# Patient Record
Sex: MALE | Race: WHITE | NOT HISPANIC OR LATINO | Employment: OTHER | URBAN - METROPOLITAN AREA
[De-identification: names, ages, dates, MRNs, and addresses within clinical notes are randomized per-mention and may not be internally consistent; named-entity substitution may affect disease eponyms.]

---

## 2018-09-07 ENCOUNTER — APPOINTMENT (OUTPATIENT)
Dept: GENERAL RADIOLOGY | Facility: HOSPITAL | Age: 68
End: 2018-09-07

## 2018-09-07 ENCOUNTER — APPOINTMENT (OUTPATIENT)
Dept: CT IMAGING | Facility: HOSPITAL | Age: 68
End: 2018-09-07

## 2018-09-07 ENCOUNTER — HOSPITAL ENCOUNTER (OUTPATIENT)
Facility: HOSPITAL | Age: 68
Setting detail: OBSERVATION
Discharge: HOME | End: 2018-09-09
Attending: EMERGENCY MEDICINE | Admitting: EMERGENCY MEDICINE

## 2018-09-07 DIAGNOSIS — R07.9 CHEST PAIN, UNSPECIFIED TYPE: Primary | ICD-10-CM

## 2018-09-07 DIAGNOSIS — N18.9 CHRONIC RENAL IMPAIRMENT, UNSPECIFIED CKD STAGE: ICD-10-CM

## 2018-09-07 DIAGNOSIS — I10 HYPERTENSION, UNSPECIFIED TYPE: ICD-10-CM

## 2018-09-07 DIAGNOSIS — I49.8 VENTRICULAR BIGEMINY: ICD-10-CM

## 2018-09-07 PROBLEM — K21.9 GERD (GASTROESOPHAGEAL REFLUX DISEASE): Status: ACTIVE | Noted: 2018-09-07

## 2018-09-07 PROBLEM — R00.1 BRADYCARDIA: Status: ACTIVE | Noted: 2018-09-07

## 2018-09-07 LAB
ALBUMIN SERPL-MCNC: 3.7 G/DL (ref 3.5–5.2)
ALBUMIN/GLOB SERPL: 1.2 G/DL
ALP SERPL-CCNC: 73 U/L (ref 39–117)
ALT SERPL W P-5'-P-CCNC: 11 U/L (ref 1–41)
ANION GAP SERPL CALCULATED.3IONS-SCNC: 9.2 MMOL/L
AST SERPL-CCNC: 12 U/L (ref 1–40)
BASOPHILS # BLD AUTO: 0.05 10*3/MM3 (ref 0–0.2)
BASOPHILS NFR BLD AUTO: 0.3 % (ref 0–1.5)
BILIRUB SERPL-MCNC: 0.5 MG/DL (ref 0.1–1.2)
BUN BLD-MCNC: 19 MG/DL (ref 8–23)
BUN/CREAT SERPL: 11.2 (ref 7–25)
CALCIUM SPEC-SCNC: 9.9 MG/DL (ref 8.6–10.5)
CHLORIDE SERPL-SCNC: 104 MMOL/L (ref 98–107)
CO2 SERPL-SCNC: 28.8 MMOL/L (ref 22–29)
CREAT BLD-MCNC: 1.7 MG/DL (ref 0.76–1.27)
D DIMER PPP FEU-MCNC: 0.32 MCGFEU/ML (ref 0–0.49)
DEPRECATED RDW RBC AUTO: 52.2 FL (ref 37–54)
EOSINOPHIL # BLD AUTO: 0.15 10*3/MM3 (ref 0–0.7)
EOSINOPHIL NFR BLD AUTO: 0.9 % (ref 0.3–6.2)
ERYTHROCYTE [DISTWIDTH] IN BLOOD BY AUTOMATED COUNT: 15.8 % (ref 11.5–14.5)
GFR SERPL CREATININE-BSD FRML MDRD: 40 ML/MIN/1.73
GLOBULIN UR ELPH-MCNC: 3 GM/DL
GLUCOSE BLD-MCNC: 91 MG/DL (ref 65–99)
HCT VFR BLD AUTO: 39.2 % (ref 40.4–52.2)
HGB BLD-MCNC: 12.5 G/DL (ref 13.7–17.6)
IMM GRANULOCYTES # BLD: 0.13 10*3/MM3 (ref 0–0.03)
IMM GRANULOCYTES NFR BLD: 0.8 % (ref 0–0.5)
INR PPP: 1.02 (ref 0.9–1.1)
LIPASE SERPL-CCNC: 41 U/L (ref 13–60)
LYMPHOCYTES # BLD AUTO: 11.47 10*3/MM3 (ref 0.9–4.8)
LYMPHOCYTES NFR BLD AUTO: 69.5 % (ref 19.6–45.3)
MAGNESIUM SERPL-MCNC: 2 MG/DL (ref 1.6–2.4)
MCH RBC QN AUTO: 28.7 PG (ref 27–32.7)
MCHC RBC AUTO-ENTMCNC: 31.9 G/DL (ref 32.6–36.4)
MCV RBC AUTO: 90.1 FL (ref 79.8–96.2)
MONOCYTES # BLD AUTO: 0.6 10*3/MM3 (ref 0.2–1.2)
MONOCYTES NFR BLD AUTO: 3.6 % (ref 5–12)
NEUTROPHILS # BLD AUTO: 4.23 10*3/MM3 (ref 1.9–8.1)
NEUTROPHILS NFR BLD AUTO: 25.7 % (ref 42.7–76)
NT-PROBNP SERPL-MCNC: 285.6 PG/ML (ref 0–900)
PLAT MORPH BLD: NORMAL
PLATELET # BLD AUTO: 280 10*3/MM3 (ref 140–500)
PMV BLD AUTO: 11 FL (ref 6–12)
POTASSIUM BLD-SCNC: 4 MMOL/L (ref 3.5–5.2)
PROCALCITONIN SERPL-MCNC: 0.03 NG/ML (ref 0.1–0.25)
PROT SERPL-MCNC: 6.7 G/DL (ref 6–8.5)
PROTHROMBIN TIME: 13.2 SECONDS (ref 11.7–14.2)
RBC # BLD AUTO: 4.35 10*6/MM3 (ref 4.6–6)
RBC MORPH BLD: NORMAL
SMUDGE CELLS BLD QL SMEAR: NORMAL
SODIUM BLD-SCNC: 142 MMOL/L (ref 136–145)
TROPONIN T SERPL-MCNC: <0.01 NG/ML (ref 0–0.03)
TROPONIN T SERPL-MCNC: <0.01 NG/ML (ref 0–0.03)
TSH SERPL DL<=0.05 MIU/L-ACNC: 2.94 MIU/ML (ref 0.27–4.2)
WBC NRBC COR # BLD: 16.5 10*3/MM3 (ref 4.5–10.7)

## 2018-09-07 PROCEDURE — 84443 ASSAY THYROID STIM HORMONE: CPT | Performed by: HOSPITALIST

## 2018-09-07 PROCEDURE — 84484 ASSAY OF TROPONIN QUANT: CPT | Performed by: EMERGENCY MEDICINE

## 2018-09-07 PROCEDURE — 87040 BLOOD CULTURE FOR BACTERIA: CPT | Performed by: HOSPITALIST

## 2018-09-07 PROCEDURE — 80053 COMPREHEN METABOLIC PANEL: CPT | Performed by: EMERGENCY MEDICINE

## 2018-09-07 PROCEDURE — G0378 HOSPITAL OBSERVATION PER HR: HCPCS

## 2018-09-07 PROCEDURE — 84484 ASSAY OF TROPONIN QUANT: CPT | Performed by: HOSPITALIST

## 2018-09-07 PROCEDURE — 96361 HYDRATE IV INFUSION ADD-ON: CPT

## 2018-09-07 PROCEDURE — 93005 ELECTROCARDIOGRAM TRACING: CPT | Performed by: EMERGENCY MEDICINE

## 2018-09-07 PROCEDURE — 93005 ELECTROCARDIOGRAM TRACING: CPT

## 2018-09-07 PROCEDURE — 85379 FIBRIN DEGRADATION QUANT: CPT | Performed by: EMERGENCY MEDICINE

## 2018-09-07 PROCEDURE — 83880 ASSAY OF NATRIURETIC PEPTIDE: CPT | Performed by: EMERGENCY MEDICINE

## 2018-09-07 PROCEDURE — 83690 ASSAY OF LIPASE: CPT | Performed by: EMERGENCY MEDICINE

## 2018-09-07 PROCEDURE — 85007 BL SMEAR W/DIFF WBC COUNT: CPT | Performed by: EMERGENCY MEDICINE

## 2018-09-07 PROCEDURE — 96374 THER/PROPH/DIAG INJ IV PUSH: CPT

## 2018-09-07 PROCEDURE — 85025 COMPLETE CBC W/AUTO DIFF WBC: CPT | Performed by: EMERGENCY MEDICINE

## 2018-09-07 PROCEDURE — 84145 PROCALCITONIN (PCT): CPT | Performed by: HOSPITALIST

## 2018-09-07 PROCEDURE — 99285 EMERGENCY DEPT VISIT HI MDM: CPT

## 2018-09-07 PROCEDURE — 71250 CT THORAX DX C-: CPT

## 2018-09-07 PROCEDURE — 85610 PROTHROMBIN TIME: CPT | Performed by: EMERGENCY MEDICINE

## 2018-09-07 PROCEDURE — 83735 ASSAY OF MAGNESIUM: CPT | Performed by: HOSPITALIST

## 2018-09-07 PROCEDURE — 99202 OFFICE O/P NEW SF 15 MIN: CPT | Performed by: INTERNAL MEDICINE

## 2018-09-07 PROCEDURE — 71046 X-RAY EXAM CHEST 2 VIEWS: CPT

## 2018-09-07 RX ORDER — ONDANSETRON 4 MG/1
4 TABLET, FILM COATED ORAL EVERY 6 HOURS PRN
Status: DISCONTINUED | OUTPATIENT
Start: 2018-09-07 | End: 2018-09-09 | Stop reason: HOSPADM

## 2018-09-07 RX ORDER — PREDNISOLONE ACETATE 10 MG/ML
1 SUSPENSION/ DROPS OPHTHALMIC DAILY
Status: DISCONTINUED | OUTPATIENT
Start: 2018-09-07 | End: 2018-09-09 | Stop reason: HOSPADM

## 2018-09-07 RX ORDER — FAMOTIDINE 10 MG/ML
20 INJECTION, SOLUTION INTRAVENOUS EVERY 12 HOURS SCHEDULED
Status: DISCONTINUED | OUTPATIENT
Start: 2018-09-07 | End: 2018-09-09 | Stop reason: HOSPADM

## 2018-09-07 RX ORDER — ONDANSETRON 4 MG/1
4 TABLET, ORALLY DISINTEGRATING ORAL EVERY 6 HOURS PRN
Status: DISCONTINUED | OUTPATIENT
Start: 2018-09-07 | End: 2018-09-09 | Stop reason: HOSPADM

## 2018-09-07 RX ORDER — SODIUM CHLORIDE 9 MG/ML
75 INJECTION, SOLUTION INTRAVENOUS CONTINUOUS
Status: DISCONTINUED | OUTPATIENT
Start: 2018-09-07 | End: 2018-09-09 | Stop reason: HOSPADM

## 2018-09-07 RX ORDER — NITROGLYCERIN 0.4 MG/1
0.4 TABLET SUBLINGUAL
Status: DISCONTINUED | OUTPATIENT
Start: 2018-09-07 | End: 2018-09-09 | Stop reason: HOSPADM

## 2018-09-07 RX ORDER — ZOLPIDEM TARTRATE 5 MG/1
5 TABLET ORAL NIGHTLY PRN
Status: DISCONTINUED | OUTPATIENT
Start: 2018-09-07 | End: 2018-09-09 | Stop reason: HOSPADM

## 2018-09-07 RX ORDER — ACETAMINOPHEN 325 MG/1
650 TABLET ORAL EVERY 4 HOURS PRN
Status: DISCONTINUED | OUTPATIENT
Start: 2018-09-07 | End: 2018-09-09 | Stop reason: HOSPADM

## 2018-09-07 RX ORDER — ASPIRIN 325 MG
325 TABLET ORAL ONCE
Status: COMPLETED | OUTPATIENT
Start: 2018-09-07 | End: 2018-09-07

## 2018-09-07 RX ORDER — SODIUM CHLORIDE 0.9 % (FLUSH) 0.9 %
1-10 SYRINGE (ML) INJECTION AS NEEDED
Status: DISCONTINUED | OUTPATIENT
Start: 2018-09-07 | End: 2018-09-09 | Stop reason: HOSPADM

## 2018-09-07 RX ORDER — LABETALOL HYDROCHLORIDE 5 MG/ML
20 INJECTION, SOLUTION INTRAVENOUS ONCE
Status: COMPLETED | OUTPATIENT
Start: 2018-09-07 | End: 2018-09-07

## 2018-09-07 RX ORDER — ONDANSETRON 2 MG/ML
4 INJECTION INTRAMUSCULAR; INTRAVENOUS EVERY 6 HOURS PRN
Status: DISCONTINUED | OUTPATIENT
Start: 2018-09-07 | End: 2018-09-09 | Stop reason: HOSPADM

## 2018-09-07 RX ORDER — SILICONE ADHESIVE 1.5" X 3"
1 SHEET (EA) TOPICAL 3 TIMES DAILY
Status: DISCONTINUED | OUTPATIENT
Start: 2018-09-07 | End: 2018-09-09 | Stop reason: HOSPADM

## 2018-09-07 RX ADMIN — LABETALOL HYDROCHLORIDE 20 MG: 5 INJECTION, SOLUTION INTRAVENOUS at 14:29

## 2018-09-07 RX ADMIN — ASPIRIN 325 MG: 325 TABLET ORAL at 16:19

## 2018-09-07 RX ADMIN — SODIUM CHLORIDE 75 ML/HR: 9 INJECTION, SOLUTION INTRAVENOUS at 20:47

## 2018-09-07 RX ADMIN — ZOLPIDEM TARTRATE 5 MG: 5 TABLET ORAL at 23:52

## 2018-09-07 RX ADMIN — NITROGLYCERIN 1 INCH: 20 OINTMENT TOPICAL at 18:33

## 2018-09-07 RX ADMIN — SODIUM CHLORIDE 1 DROP: 50 SOLUTION OPHTHALMIC at 23:52

## 2018-09-07 NOTE — H&P
HISTORY AND PHYSICAL   [facility]        Patient Identification:  Name: Angel Cisneros  Age: 68 y.o.  Sex: male  :  1950  MRN: [id number]                     Primary Care Physician: Chris Harden MD    Chief Complaint:  Chest pain     History of Present Illness:   Mr Cisneros is a pleasant 68-year-old male who is comfortably sitting in bed at this time.  Came to the ER due to complaints of chest pain.  He says that the chest pain was more right-sided and was worse with deep inspiration.  He is on Imbruvica for his leukemia and is managed by Dr. Childress with [facility] oncology.  He denies any productive cough or issues with fever chills or night sweats.  He does admit to problems with reflux but he does not take any type of Pepcid her PPI and uses as needed Tums.  He came to the ER troponin was negative though EKG demonstrated some abnormalities but no pass EKGs to really compare.  Cardiology was asked to admit though that they deferred to medicine secondary to his comorbidities.  He has no past history of hypertension though came in with a blood pressure 190/110 and was given labetalol his blood pressure subsequently came down to 140/80.  He also had nitroglycerin patch placed on his chest.  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  At the time of my exam was watching the sternum and heart rate on the monitor and he actually dropped down into the upper 30s though he was asymptomatic at the time he was not having issues with dizziness noted he seemed like he was syncopal and he was with not complaining of excessive chest pain worsening at the time.  Wife present at bedside who wants Dr. Michel suarez seeing for cardiology as she claims he is a dear friend but I informed her they do not come to [facility] anymore and will be consulting [place] cardiology.    Past Medical History:  Past  Medical History:   Diagnosis Date   • Arthritis     both knees   • Cancer (CMS/HCC)     dx with lymphoma 2009/ radiation   • Cataracts, bilateral    • Hx of cornea transplant     both eyes   • Leukemia (CMS/HCC)    • Leukemia (CMS/HCC)    • Lymphoma (CMS/HCC)     dx in 2009. treated with radiation.   • Lymphoma (CMS/HCC)     treated with radiation. dx in 2009   • Lymphoma (CMS/HCC)    • Lymphoma (CMS/HCC)      Past Surgical History:  Past Surgical History:   Procedure Laterality Date   • BREAST LUMPECTOMY Right    • COLONOSCOPY N/A 3/8/2016    Procedure: COLONOSCOPY with cold polypectomy X 3;  Surgeon: Chris Harden MD;  Location: [facility] ENDOSCOPY;  Service:    • EYE SURGERY     • KNEE SURGERY Left    • TOE SURGERY Bilateral    • TONSILLECTOMY        Home Meds:  Prescriptions Prior to Admission   Medication Sig Dispense Refill Last Dose   • ibrutinib (IMBRUVICA) 420 MG tablet tablet Take 420 mg by mouth Every Morning.   9/7/2018 at Unknown time   • prednisoLONE acetate (PRED FORTE) 1 % ophthalmic suspension Administer 1 drop to both eyes daily.      • sodium chloride (SENA 128) 5 % ophthalmic solution Administer 1 drop into the left eye 3 (three) times a day.          Allergies:  Allergies   Allergen Reactions   • Penicillins Rash     Immunizations:    There is no immunization history on file for this patient.  Social History:   Social History     Social History Narrative   • No narrative on file     Social History     Social History   • Marital status:      Spouse name: N/A   • Number of children: N/A   • Years of education: N/A     Occupational History   • Not on file.     Social History Main Topics   • Smoking status: Never Smoker   • Smokeless tobacco: Not on file   • Alcohol use No   • Drug use: No   • Sexual activity: Defer     Other Topics Concern   • Not on file     Social History Narrative   • No narrative on file       Family History:  History reviewed. No pertinent family history.     Review of  "Systems  See history of present illness and past medical history.  Patient denies fever chills or night sweats.  Denies any changes to vision smell taste or sound headache dizziness or loss of consciousness.  Denies any nausea vomiting but admits to reflux and denies any abdominal pains.  Admits to right-sided chest pain that is not reproducible and is worse with deep inspiration but denies any left-sided chest pain or radiation to arm or neck.  Denies any productive cough or shortness of breath.  Denies abdominal pain dysuria bruising bleeding or focal loss of function.  Denies missing any routine medications. Remainder of ROS is negative.    Objective:  tMax 24 hrs: Temp (24hrs), Av.9 °F (36.6 °C), Min:97.8 °F (36.6 °C), Max:97.9 °F (36.6 °C)    Vitals Ranges:   Temp:  [97.8 °F (36.6 °C)-97.9 °F (36.6 °C)] 97.9 °F (36.6 °C)  Heart Rate:  [67-73] 70  Resp:  [18-20] 20  BP: (147-193)/() 170/71      Exam:  /71 (BP Location: Left arm, Patient Position: Lying)   Pulse 70   Temp 97.9 °F (36.6 °C) (Oral)   Resp 20   Ht 172.7 cm (68\")   Wt 76.7 kg (169 lb 3.2 oz)   SpO2 99%   BMI 25.73 kg/m²     General Appearance:    Alert, cooperative, no distress, AOx3, soft spoken non toxic male, spouse at bs    Head:    Normocephalic, without obvious abnormality, atraumatic   Eyes:    PERRL, conjunctiva/corneas clear, EOM's intact, both eyes   Ears:    Normal external ear canals, both ears   Nose:   Nares normal, septum midline, mucosa normal, no drainage    or sinus tenderness   Throat:   Lips, mucosa, and tongue normal   Neck:   Supple, symmetrical, trachea midline, no adenopathy;     thyroid:  no enlargement/tenderness/nodules; no JVD, surgical changes noted    Back:     Symmetric, no curvature   Lungs:     Clear to auscultation bilaterally, respirations unlabored   Chest Wall:    No tenderness or deformity    Heart:    Bradycardic rate and rhythm down to upper 30s, S1 and S2 normal   Abdomen:     Soft, " non-tender, bowel sounds active all four quadrants,     no masses, no hepatomegaly, no splenomegaly   Extremities:   Extremities normal, atraumatic, no cyanosis or edema   Pulses:   2+ and symmetric all extremities   Skin:   Skin color, texture, turgor normal, no rashes or lesions       Neurologic:   CNII-XII intact, normal strength, moving all without focal deficit       .    Data Review:  Labs in chart were reviewed.             Imaging Results (all)     Procedure Component Value Units Date/Time    CT Chest Without Contrast [[id number]] Collected:  09/07/18 1630     Updated:  09/07/18 1630    Narrative:       CT CHEST WITHOUT CONTRAST     HISTORY: 68-year-old male with leukemia. Lymphoma. Chest pain. Abnormal  chest radiograph.     TECHNIQUE: Radiation dose reduction techniques were utilized, including  automated exposure control and exposure modulation based on body size.   3 mm images were obtained through the chest without the administration  of IV contrast. Compared with previous chest CT from 08/24/2011.     FINDINGS: There has been interval development of mediastinal and  bilateral hilar lymphadenopathy. Some of the nodes are difficult to  measure in the absence of IV contrast, but an AP window node measures  2.3 x 0.9 cm. A precarinal node measures 2.0 x 1.3 cm. There is no  axillary or subpectoral lymphadenopathy. There are faint ill-defined  ground glass opacities within the central aspects of the upper lobes.  There are no pleural or pericardial effusions. The pulmonary arteries  are enlarged.       Impression:       1. Mediastinal and bilateral hilar lymphadenopathy.  2. Uncertain etiology of the faint ground glass opacities in the central  aspects of the upper lobes. Clinically the patient is not in pulmonary  edema and there is also no correlation for pneumonia. The appearance is  nonspecific. Atypical pneumonia or early CHF is possible. Please  correlate clinically and follow-up is recommended.      Discussed with Dr. Page.       XR Chest 2 View [[id number]] Collected:  09/07/18 1443     Updated:  09/07/18 1523    Narrative:       TWO-VIEW CHEST     HISTORY: Leukemia. Lymphoma. Chest pain.     FINDINGS:  There are no recent chest x-rays for comparison. The lungs  are well-expanded with some very minimal vague interstitial prominence  as well as a linear band of atelectasis extending into the right upper  lobe. The heart is slightly enlarged. There is no evidence of hilar  adenopathy. There are no pleural effusions.     This report was finalized on 9/7/2018 3:20 PM by Dr. Fidel Madison M.D.               Assessment:  Principal Problem:    Chest pain  Active Problems:    Lymphoma (CMS/HCC)    Leukemia (CMS/HCC)    GERD (gastroesophageal reflux disease)    HTN (hypertension)    Bradycardia      Plan:  Chest pain with normal troponin though abnormalities on EKG noted.  Will repeat serial cardiac troponins.  Patient was given labetalol in the ER and during the end of my history and physical his heart rate started to drop down into the upper 30s and low 40s.  He is currently asymptomatic but will place a stat cardiology consult for further recommendations.  We'll hold any further management of his hypertension until cardiology can give further recommendations.  Wife requested Dr. Michel suarez with [facility] but I informed her that they no longer come to this hospital.  Will also check a TSH.  He was given an aspirin in the ER.    Immunocompromised with abnormalities noted on CT that are likely due to his underlying cancer.  He has no symptoms consistent with pneumonia such as fever chills night sweats productive cough or tachypnea.  We'll add appropriate calcitonin to labs and will also check blood cultures ×2.  He states his current leukocytosis is improved but he lives with an elevated white blood cell count secondary to his leukemia which is normally managed by [facility] Dr. Childress    Elevated creatinine -  probable CKD but no past labs to compare - gentle IVF overnight and will monitor with phosphorus and magnesium    Lovenox for DVT prophylaxis and further recommendations to follow as clinical course unfolds.    Jose Gayle MD  9/7/2018  7:54 PM

## 2018-09-07 NOTE — PROGRESS NOTES
Discharge Planning Assessment  [facility]     Patient Name: Angel Cisneros  MRN: [id number]  Today's Date: 9/7/2018    Admit Date: 9/7/2018          Discharge Needs Assessment     Row Name 09/07/18 1713       Living Environment    Lives With spouse    Name(s) of Who Lives With Patient Felicita Cisneros    Current Living Arrangements home/apartment/condo    Primary Care Provided by self    Provides Primary Care For no one    Family Caregiver if Needed none    Quality of Family Relationships supportive;involved;helpful    Able to Return to Prior Arrangements yes       Resource/Environmental Concerns    Resource/Environmental Concerns none    Transportation Concerns car, none       Transition Planning    Patient/Family Anticipates Transition to home with family    Patient/Family Anticipated Services at Transition none    Transportation Anticipated family or friend will provide       Discharge Needs Assessment    Readmission Within the Last 30 Days no previous admission in last 30 days    Concerns to be Addressed no discharge needs identified    Equipment Currently Used at Home none    Anticipated Changes Related to Illness none    Equipment Needed After Discharge none    Offered/Gave Vendor List no            Discharge Plan     Row Name 09/07/18 171       Plan    Plan Patient has no anticipated d/c needs at this time. Spouse will transport pt home upon d/c via private vehicle        Destination     No service coordination in this encounter.      Durable Medical Equipment     No service coordination in this encounter.      Dialysis/Infusion     No service coordination in this encounter.      Home Medical Care     No service coordination in this encounter.      Social Care     No service coordination in this encounter.                Demographic Summary     Row Name 09/07/18 1710       General Information    Admission Type observation    Arrived From home    Referral Source interdisciplinary rounds    Reason for Consult  discharge planning    Preferred Language English     Used During This Interaction no    General Information Comments information on facesheet correct       Contact Information    Permission Granted to Share Info With     Contact Information Obtained for     Contact Information Comments Wife at bedside- Felicita Cisneros            Functional Status     Row Name 09/07/18 1716       Functional Status    Usual Activity Tolerance good    Current Activity Tolerance good       Functional Status, IADL    Medications independent    Meal Preparation independent;assistive person    Housekeeping independent    Laundry independent    Shopping independent       Mental Status    General Appearance WDL WDL       Mental Status Summary    Recent Changes in Mental Status/Cognitive Functioning no changes       Employment/    Employment Status retired            Psychosocial    No documentation.           Abuse/Neglect    No documentation.           Legal    No documentation.           Substance Abuse    No documentation.           Patient Forms    No documentation.         Nahomy Huntley RN

## 2018-09-07 NOTE — PROGRESS NOTES
Clinical Pharmacy Services: Medication History    Angel Cisneros is a 68 y.o. male presenting to [facility] for   Chief Complaint   Patient presents with   • Chest Pain     right sided chest pain and indegestion starting last night.        He  has a past medical history of Arthritis; Cancer (CMS/HCC); Cataracts, bilateral; cornea transplant; Leukemia (CMS/HCC); Leukemia (CMS/HCC); Lymphoma (CMS/HCC); Lymphoma (CMS/HCC); Lymphoma (CMS/HCC); and Lymphoma (CMS/HCC).    Allergies as of 09/07/2018 - Reviewed 09/07/2018   Allergen Reaction Noted   • Penicillins Rash 03/08/2016       Medication information was obtained from: Patient  Pharmacy and Phone Number: [facility] [phone]    Prior to Admission Medications     Prescriptions Last Dose Informant Patient Reported? Taking?    ibrutinib (IMBRUVICA) 420 MG tablet tablet 9/7/2018 Self Yes Yes    Take 420 mg by mouth Every Morning.    prednisoLONE acetate (PRED FORTE) 1 % ophthalmic suspension  Self Yes Yes    Administer 1 drop to both eyes daily.    sodium chloride (SENA 128) 5 % ophthalmic solution  Self Yes Yes    Administer 1 drop into the left eye 3 (three) times a day.            Medication notes: Imbruvica added to profile per patient    This medication list is complete to the best of my knowledge as of 9/7/2018    Please call if questions.    Rupa Soler, Medication History Technician  9/7/2018 4:46 PM

## 2018-09-07 NOTE — ED PROVIDER NOTES
" EMERGENCY DEPARTMENT ENCOUNTER    CHIEF COMPLAINT  Chief Complaint: chest discomfort  History given by: patient  History limited by: none  Room Number: 28/28  PMD: Chris Harden MD Dr. Beanblossom, cardiologist    HPI:  Pt is a 68 y.o. male who presents complaining of right sided chest discomfort that he describes as his \"heart throbbing\" that began lastnight with associated indigestion that began earlier this morning. He reports that his right upper chest pain is worse with deep breathing. He took TUMS w/ some mild relief. He denies leg pain, n/v, or other complaints.    Duration: one day  Onset: gradual  Timing: constant  Location: right chest  Radiation: neck  Quality: 'heart throbbing'  Intensity/Severity: moderate  Progression: unchanged  Associated Symptoms: indigestion   Aggravating Factors: none  Alleviating Factors: none  Previous Episodes: none  Treatment before arrival: none    PAST MEDICAL HISTORY  Active Ambulatory Problems     Diagnosis Date Noted   • Lymphoma (CMS/HCC)    • Leukemia (CMS/HCC)      Resolved Ambulatory Problems     Diagnosis Date Noted   • No Resolved Ambulatory Problems     Past Medical History:   Diagnosis Date   • Arthritis    • Cancer (CMS/HCC)    • Cataracts, bilateral    • Hx of cornea transplant    • Leukemia (CMS/HCC)    • Leukemia (CMS/HCC)    • Lymphoma (CMS/HCC)    • Lymphoma (CMS/HCC)    • Lymphoma (CMS/HCC)    • Lymphoma (CMS/HCC)        PAST SURGICAL HISTORY  Past Surgical History:   Procedure Laterality Date   • BREAST LUMPECTOMY Right    • COLONOSCOPY N/A 3/8/2016    Procedure: COLONOSCOPY with cold polypectomy X 3;  Surgeon: Chris Harden MD;  Location: [facility] ENDOSCOPY;  Service:    • TOE SURGERY Bilateral    • TONSILLECTOMY         FAMILY HISTORY  History reviewed. No pertinent family history.    SOCIAL HISTORY  Social History     Social History   • Marital status:      Spouse name: N/A   • Number of children: N/A   • Years of education: N/A "     Occupational History   • Not on file.     Social History Main Topics   • Smoking status: Never Smoker   • Smokeless tobacco: Not on file   • Alcohol use No   • Drug use: No   • Sexual activity: Defer     Other Topics Concern   • Not on file     Social History Narrative   • No narrative on file       ALLERGIES  Penicillins    REVIEW OF SYSTEMS  Review of Systems   Constitutional: Negative for activity change, appetite change and fever.   HENT: Negative for congestion and sore throat.    Eyes: Negative.    Respiratory: Negative for cough and shortness of breath.    Cardiovascular: Positive for chest pain. Negative for leg swelling.   Gastrointestinal: Negative for abdominal pain, diarrhea and vomiting.        Indigestion   Endocrine: Negative.    Genitourinary: Negative for decreased urine volume and dysuria.   Musculoskeletal: Negative for neck pain.   Skin: Negative for rash and wound.   Allergic/Immunologic: Negative.    Neurological: Negative for weakness, numbness and headaches.   Hematological: Negative.    Psychiatric/Behavioral: Negative.    All other systems reviewed and are negative.      PHYSICAL EXAM  ED Triage Vitals [09/07/18 1353]   Temp Heart Rate Resp BP SpO2   97.8 °F (36.6 °C) 72 18 -- 98 %      Temp src Heart Rate Source Patient Position BP Location FiO2 (%)   Tympanic Monitor -- -- --       Physical Exam   Constitutional: He is oriented to person, place, and time. No distress.   HENT:   Head: Normocephalic and atraumatic.   Eyes: Pupils are equal, round, and reactive to light. EOM are normal.   Neck: Normal range of motion. Neck supple.   Cardiovascular: Normal rate, regular rhythm and normal heart sounds.    Pulmonary/Chest: Effort normal and breath sounds normal. No respiratory distress.   Mild right upper chest all tenderness.   Abdominal: Soft. There is tenderness in the epigastric area. There is no rebound and no guarding.   Musculoskeletal: Normal range of motion. He exhibits no edema.    Neurological: He is alert and oriented to person, place, and time. He has normal sensation and normal strength.   Skin: Skin is warm and dry.   Psychiatric: Mood and affect normal.   Nursing note and vitals reviewed.      LAB RESULTS  Lab Results (last 24 hours)     Procedure Component Value Units Date/Time    CBC & Differential [[id number]] Collected:  09/07/18 1426    Specimen:  Blood Updated:  09/07/18 1457    Narrative:       The following orders were created for panel order CBC & Differential.  Procedure                               Abnormality         Status                     ---------                               -----------         ------                     Scan Slide[[id number]]                                        Final result               CBC Auto Differential[[id number]]         Abnormal            Final result                 Please view results for these tests on the individual orders.    Comprehensive Metabolic Panel [[id number]]  (Abnormal) Collected:  09/07/18 1426    Specimen:  Blood Updated:  09/07/18 1505     Glucose 91 mg/dL      BUN 19 mg/dL      Creatinine 1.70 (H) mg/dL      Sodium 142 mmol/L      Potassium 4.0 mmol/L      Chloride 104 mmol/L      CO2 28.8 mmol/L      Calcium 9.9 mg/dL      Total Protein 6.7 g/dL      Albumin 3.70 g/dL      ALT (SGPT) 11 U/L      AST (SGOT) 12 U/L      Alkaline Phosphatase 73 U/L      Total Bilirubin 0.5 mg/dL      eGFR Non African Amer 40 (L) mL/min/1.73      Globulin 3.0 gm/dL      A/G Ratio 1.2 g/dL      BUN/Creatinine Ratio 11.2     Anion Gap 9.2 mmol/L     Protime-INR [[id number]]  (Normal) Collected:  09/07/18 1426    Specimen:  Blood Updated:  09/07/18 1456     Protime 13.2 Seconds      INR 1.02    BNP [[id number]]  (Normal) Collected:  09/07/18 1426    Specimen:  Blood Updated:  09/07/18 1511     proBNP 285.6 pg/mL     Narrative:       Among patients with dyspnea, NT-proBNP is highly sensitive for the detection of acute congestive heart failure. In  addition NT-proBNP of <300 pg/ml effectively rules out acute congestive heart failure with 99% negative predictive value.    D-dimer, Quantitative [[id number]]  (Normal) Collected:  09/07/18 1426    Specimen:  Blood Updated:  09/07/18 1456     D-Dimer, Quantitative 0.32 MCGFEU/mL     Narrative:       The Stago D-Dimer test used in conjunction with a clinical pretest probability (PTP) assessment model, has been approved by the FDA to rule out the presence of venous thromboembolism (VTE) in outpatients suspected of deep venous thrombosis (DVT) or pulmonary embolism (PE).     Troponin [[id number]]  (Normal) Collected:  09/07/18 1426    Specimen:  Blood Updated:  09/07/18 1511     Troponin T <0.010 ng/mL     Narrative:       Troponin T Reference Ranges:  Less than 0.03 ng/mL:    Negative for AMI  0.03 to 0.09 ng/mL:      Indeterminant for AMI  Greater than 0.09 ng/mL: Positive for AMI    Lipase [[id number]]  (Normal) Collected:  09/07/18 1426    Specimen:  Blood Updated:  09/07/18 1505     Lipase 41 U/L     CBC Auto Differential [[id number]]  (Abnormal) Collected:  09/07/18 1426    Specimen:  Blood Updated:  09/07/18 1457     WBC 16.50 (H) 10*3/mm3      RBC 4.35 (L) 10*6/mm3      Hemoglobin 12.5 (L) g/dL      Hematocrit 39.2 (L) %      MCV 90.1 fL      MCH 28.7 pg      MCHC 31.9 (L) g/dL      RDW 15.8 (H) %      RDW-SD 52.2 fl      MPV 11.0 fL      Platelets 280 10*3/mm3      Neutrophil % 25.7 (L) %      Lymphocyte % 69.5 (H) %      Monocyte % 3.6 (L) %      Eosinophil % 0.9 %      Basophil % 0.3 %      Immature Grans % 0.8 (H) %      Neutrophils, Absolute 4.23 10*3/mm3      Lymphocytes, Absolute 11.47 (H) 10*3/mm3      Monocytes, Absolute 0.60 10*3/mm3      Eosinophils, Absolute 0.15 10*3/mm3      Basophils, Absolute 0.05 10*3/mm3      Immature Grans, Absolute 0.13 (H) 10*3/mm3     Scan Slide [[id number]] Collected:  09/07/18 1426    Specimen:  Blood Updated:  09/07/18 1457     RBC Morphology Normal     Smudge Cells Slight/1+      Platelet Morphology Normal          I ordered the above labs and reviewed the results    RADIOLOGY  XR Chest 2 View   There are no recent chest x-rays for comparison. The lungs  are well-expanded with some very minimal vague interstitial prominence  as well as a linear band of atelectasis extending into the right upper  lobe. The heart is slightly enlarged. There is no evidence of hilar  adenopathy. There are no pleural effusions.           I ordered the above noted radiological studies. Interpreted by radiologist. Reviewed by me in PACS.       PROCEDURES  Procedures  EKG    EKG time: 1357  Rhythm/Rate: NSR 69  No Acute Ischemia  Non-Specific ST-T changes  T wave inversion in Lead I and Lead II  No prior for comparison.   Interpreted Contemporaneously by me.  Independently viewed by me    PROGRESS AND CONSULTS     1515  CT chest ordered for better view of possible PNA.    1601  BP- 157/95 HR- 67 Temp- 97.8 °F (36.6 °C) (Tympanic) O2 sat- 98%  Rechecked the patient who is in NAD and is resting comfortably. Pt reports he feels improved. Pt is noticed to be in ventricular bigeminy on the monitor intermittently. Discussed labs showing a mild elevation of his creatinine from last month; otherwise unremarkable. Pt told that CT chest showed no signs of PNA. Pt told the plan to consult with [facility] before arriving at a disposition decision.     1625  Discussed the pt with KISHA Arroyo.  He agrees patient should be admitted but asked for [facility] to admit.    1635  Dr House in ED to see patient and review EKG.    1645  D/W Dr Gayle who will admit to a tele bed.    MEDICAL DECISION MAKING  Results were reviewed/discussed with the patient and they were also made aware of online access. Pt also made aware that some labs, such as cultures, will not be resulted during ER visit and follow up with PMD is necessary.     MDM  Number of Diagnoses or Management Options  Chest pain, unspecified type:   Hypertension, unspecified type:    Ventricular bigeminy:      Amount and/or Complexity of Data Reviewed  Clinical lab tests: reviewed (Creatinine 1.70)  Tests in the radiology section of CPT®: reviewed (CXR-There are no recent chest x-rays for comparison. The lungs are well-expanded with some very minimal vague interstitial prominence as well as a linear band of atelectasis extending into the right upper lobe. The heart is slightly enlarged. There is no evidence of hilar adenopathy. There are no pleural effusions.  CT chest- mediastinal lymphadenopathy consistent with lymphoma. No PNA, masses, or pneumothorax noted. Questionable vascular congestion.)  Tests in the medicine section of CPT®: reviewed (See EKG procedure note.)  Decide to obtain previous medical records or to obtain history from someone other than the patient: yes (08/30/18 Pt's creatinine was 1.6 and WBC was 15.6)  Discuss the patient with other providers: yes (Dr. House, [facility] [place])  Independent visualization of images, tracings, or specimens: yes    Patient Progress  Patient progress: stable         DIAGNOSIS  Final diagnoses:   Chest pain, unspecified type   Ventricular bigeminy   Hypertension, unspecified type   Chronic renal impairment, unspecified CKD stage       DISPOSITION  ADMISSION    Latest Documented Vital Signs:  As of 4:50 PM  BP- 147/66 HR- 73 Temp- 97.8 °F (36.6 °C) (Tympanic) O2 sat- 96%      Documentation assistance provided by robyn Bell for Dr. Page.  Information recorded by the scribe was done at my direction and has been verified and validated by me.     Krysten Bell  09/07/18 1611       Thanh Page MD  09/07/18 1902

## 2018-09-07 NOTE — CONSULTS
Patient Name: Angel Cisneros  :1950  68 y.o.    Date of Admission: 2018  Date of Consultation:  18  Encounter Provider: Gilmar Caraballo MD  Place of Service: [facility] CARDIOLOGY  Referring Provider: No ref. provider found  Patient Care Team:  Chris Harden MD as PCP - General (Gastroenterology)      Chief complaint:   Chest pain/discomfort    History of Present Illness:    68-year-old male with a medical history of arthritis, leukemia and lymphoma that was treated with radiation in , cataracts and corneal transplants bilaterally.     2018-presented to the ED with chest pain to the right side of his chest describing it as feeling his- heart was throbbing that started last night and was associated with some indigestion earlier this morning.  He reports the chest pain is worse with deep inspiration, and he did get some relief from OTC Tums that he took prior to arrival. He denies any other type of discomfort,  Nausea/vomiting, or palpitations      We've been consult for management of his chest pain, hypertension and for cardiac workup      Troponin T negative ×3  Pro .6  Creatinine 1.70  BUN 19  D-dimer 0.32        CT chest IMPRESSION:  1. Mediastinal and bilateral hilar lymphadenopathy.  2. Uncertain etiology of the faint ground glass opacities in the central aspects of the upper lobes.   3. Clinically the patient is not in pulmonary edema and there is also no correlation for pneumonia. The appearance is nonspecific.   4.  Atypical pneumonia or early CHF is possible. Please correlate clinically and follow-up is recommended.     Chest x-ray 2 views FINDINGS:  There are no recent chest x-rays for comparison. The lungs  are well-expanded with some very minimal vague interstitial prominence as well as a linear band of atelectasis extending into the right upper lobe. The heart is slightly enlarged. There is no evidence of hilar adenopathy. There  are no pleural effusions.    Past Medical History:   Diagnosis Date   • Arthritis     both knees   • Cancer (CMS/HCC)     dx with lymphoma 2009/ radiation   • Cataracts, bilateral    • Hx of cornea transplant     both eyes   • Leukemia (CMS/HCC)    • Leukemia (CMS/HCC)    • Lymphoma (CMS/HCC)     dx in 2009. treated with radiation.   • Lymphoma (CMS/HCC)     treated with radiation. dx in 2009   • Lymphoma (CMS/HCC)    • Lymphoma (CMS/HCC)        Past Surgical History:   Procedure Laterality Date   • BREAST LUMPECTOMY Right    • COLONOSCOPY N/A 3/8/2016    Procedure: COLONOSCOPY with cold polypectomy X 3;  Surgeon: Chris Harden MD;  Location: [facility] ENDOSCOPY;  Service:    • EYE SURGERY     • KNEE SURGERY Left    • TOE SURGERY Bilateral    • TONSILLECTOMY           Prior to Admission medications    Medication Sig Start Date End Date Taking? Authorizing Provider   prednisoLONE acetate (PRED FORTE) 1 % ophthalmic suspension Administer 1 drop to both eyes daily.   Yes Provider, MD Sedrick   sodium chloride (SENA 128) 5 % ophthalmic solution Administer 1 drop into the left eye 3 (three) times a day.   Yes Provider, MD Sedrick       Allergies   Allergen Reactions   • Penicillins Rash       Social History     Social History   • Marital status:      Social History Main Topics   • Smoking status: Never Smoker   • Alcohol use No   • Drug use: No   • Sexual activity: Defer     Other Topics Concern   • Not on file       History reviewed. No pertinent family history.    REVIEW OF SYSTEMS:   All systems reviewed.  Pertinent positives identified in HPI.  All other systems are negative.      Objective:     Vitals:    09/07/18 1851 09/07/18 1957 09/07/18 2332 09/08/18 0658   BP:  137/63 157/72 117/60   BP Location:  Right arm Right arm Left arm   Patient Position:  Lying Lying Lying   Pulse:  70 74 59   Resp:  20 20 16   Temp:  98 °F (36.7 °C) 98 °F (36.7 °C) 97.7 °F (36.5 °C)   TempSrc:  Oral Oral Oral   SpO2:   98% 98% 96%   Weight: 76.7 kg (169 lb 3.2 oz)      Height:         Body mass index is 25.73 kg/m².    General Appearance:    Alert, cooperative, in no acute distress   Head:    Normocephalic, without obvious abnormality, atraumatic   Eyes:            Lids and lashes normal, conjunctivae and sclerae normal, no   icterus, no pallor, corneas clear, PERRLA   Ears:    Ears appear intact with no abnormalities noted   Throat:   No oral lesions, no thrush, oral mucosa moist   Neck:   No adenopathy, supple, trachea midline, no thyromegaly, no   carotid bruit, no JVD   Back:     No kyphosis present, no scoliosis present, no skin lesions, erythema or scars, no tenderness to percussion or palpation, range of motion normal   Lungs:     Clear to auscultation,respirations regular, even and unlabored    Heart:    Irregular rhythm and normal rate, normal S1 and S2, no murmur, no gallop, no rub, no click   Chest Wall:    No abnormalities observed   Abdomen:     Normal bowel sounds, no masses, no organomegaly, soft        non-tender, non-distended, no guarding, no rebound  tenderness   Extremities:   Moves all extremities well, no edema, no cyanosis, no redness   Pulses:   Pulses palpable and equal bilaterally. Normal radial, carotid, femoral, dorsalis pedis and posterior tibial pulses bilaterally. Normal abdominal aorta   Skin:  Psychiatric:   No bleeding, bruising or rash    Alert and oriented x 3, normal mood and affect   Lab Review:       Results from last 7 days  Lab Units 09/08/18  0450 09/07/18  1426   SODIUM mmol/L 142 142   POTASSIUM mmol/L 3.7 4.0   CHLORIDE mmol/L 106 104   CO2 mmol/L 23.7 28.8   BUN mg/dL 20 19   CREATININE mg/dL 1.50* 1.70*   CALCIUM mg/dL 8.8 9.9   BILIRUBIN mg/dL  --  0.5   ALK PHOS U/L  --  73   ALT (SGPT) U/L  --  11   AST (SGOT) U/L  --  12   GLUCOSE mg/dL 80 91       Results from last 7 days  Lab Units 09/08/18  0450 09/07/18  2232 09/07/18  1426   TROPONIN T ng/mL <0.010 <0.010 <0.010       Results  from last 7 days  Lab Units 09/08/18  0450   WBC 10*3/mm3 13.69*   HEMOGLOBIN g/dL 11.5*   HEMATOCRIT % 37.2*   PLATELETS 10*3/mm3 256       Results from last 7 days  Lab Units 09/07/18  1426   INR  1.02       Results from last 7 days  Lab Units 09/07/18  2232   MAGNESIUM mg/dL 2.0                 9/7/2018    I personally viewed and interpreted the patient's EKG/Telemetry data.        Assessment and Plan:   1.  Chest discomfort: This appears to be rather atypical more pleuritic type of discomfort than cardiac.  There is no evidence of an acute MI.  His electrocardiogram is abnormal and I will repeat.  2.  Hypertension: This is the first episode that the patient has ever had.  His blood pressure this morning is back down to normal.  We'll withhold any further medication at this time.  3.  Ventricular dysrhythmia: This is new for the patient.  He has complained recently of palpitations.  He has not had prior history of arrhythmia or any cardiac problem for that matter.  An echocardiogram is pending at this time.      Prior to any stress testing.  I think the patient needs to be hydrated and I will review his echocardiogram.  I don't believe this again is acute coronary syndrome.  Gilmar Caraballo MD  09/08/18  9:28 AM

## 2018-09-08 ENCOUNTER — APPOINTMENT (OUTPATIENT)
Dept: CARDIOLOGY | Facility: HOSPITAL | Age: 68
End: 2018-09-08
Attending: HOSPITALIST

## 2018-09-08 PROBLEM — N18.30 STAGE 3 CHRONIC KIDNEY DISEASE: Status: ACTIVE | Noted: 2018-09-08

## 2018-09-08 LAB
ALBUMIN SERPL-MCNC: 3.5 G/DL (ref 3.5–5.2)
ANION GAP SERPL CALCULATED.3IONS-SCNC: 12.3 MMOL/L
BH CV ECHO MEAS - ACS: 2.1 CM
BH CV ECHO MEAS - AO MAX PG (FULL): 3.5 MMHG
BH CV ECHO MEAS - AO MAX PG: 8.3 MMHG
BH CV ECHO MEAS - AO MEAN PG (FULL): 2 MMHG
BH CV ECHO MEAS - AO MEAN PG: 4 MMHG
BH CV ECHO MEAS - AO ROOT AREA (BSA CORRECTED): 1.7
BH CV ECHO MEAS - AO ROOT AREA: 8 CM^2
BH CV ECHO MEAS - AO ROOT DIAM: 3.2 CM
BH CV ECHO MEAS - AO V2 MAX: 144 CM/SEC
BH CV ECHO MEAS - AO V2 MEAN: 90.2 CM/SEC
BH CV ECHO MEAS - AO V2 VTI: 27.9 CM
BH CV ECHO MEAS - ASC AORTA: 2.9 CM
BH CV ECHO MEAS - AVA(I,A): 2.4 CM^2
BH CV ECHO MEAS - AVA(I,D): 2.4 CM^2
BH CV ECHO MEAS - AVA(V,A): 2.4 CM^2
BH CV ECHO MEAS - AVA(V,D): 2.4 CM^2
BH CV ECHO MEAS - BSA(HAYCOCK): 1.9 M^2
BH CV ECHO MEAS - BSA: 1.9 M^2
BH CV ECHO MEAS - BZI_BMI: 25.7 KILOGRAMS/M^2
BH CV ECHO MEAS - BZI_METRIC_HEIGHT: 172.7 CM
BH CV ECHO MEAS - BZI_METRIC_WEIGHT: 76.7 KG
BH CV ECHO MEAS - EDV(CUBED): 117.6 ML
BH CV ECHO MEAS - EDV(MOD-SP2): 71 ML
BH CV ECHO MEAS - EDV(MOD-SP4): 75 ML
BH CV ECHO MEAS - EDV(TEICH): 112.8 ML
BH CV ECHO MEAS - EF(CUBED): 77.1 %
BH CV ECHO MEAS - EF(MOD-BP): 65 %
BH CV ECHO MEAS - EF(MOD-SP2): 64.8 %
BH CV ECHO MEAS - EF(MOD-SP4): 62.7 %
BH CV ECHO MEAS - EF(TEICH): 69 %
BH CV ECHO MEAS - ESV(CUBED): 27 ML
BH CV ECHO MEAS - ESV(MOD-SP2): 25 ML
BH CV ECHO MEAS - ESV(MOD-SP4): 28 ML
BH CV ECHO MEAS - ESV(TEICH): 35 ML
BH CV ECHO MEAS - FS: 38.8 %
BH CV ECHO MEAS - IVS/LVPW: 0.69
BH CV ECHO MEAS - IVSD: 0.9 CM
BH CV ECHO MEAS - LAT PEAK E' VEL: 10 CM/SEC
BH CV ECHO MEAS - LV DIASTOLIC VOL/BSA (35-75): 39.4 ML/M^2
BH CV ECHO MEAS - LV MASS(C)D: 200.5 GRAMS
BH CV ECHO MEAS - LV MASS(C)DI: 105.4 GRAMS/M^2
BH CV ECHO MEAS - LV MAX PG: 4.8 MMHG
BH CV ECHO MEAS - LV MEAN PG: 2 MMHG
BH CV ECHO MEAS - LV SYSTOLIC VOL/BSA (12-30): 14.7 ML/M^2
BH CV ECHO MEAS - LV V1 MAX: 109 CM/SEC
BH CV ECHO MEAS - LV V1 MEAN: 72.1 CM/SEC
BH CV ECHO MEAS - LV V1 VTI: 21.5 CM
BH CV ECHO MEAS - LVIDD: 4.9 CM
BH CV ECHO MEAS - LVIDS: 3 CM
BH CV ECHO MEAS - LVLD AP2: 7.2 CM
BH CV ECHO MEAS - LVLD AP4: 6.8 CM
BH CV ECHO MEAS - LVLS AP2: 5.5 CM
BH CV ECHO MEAS - LVLS AP4: 5.4 CM
BH CV ECHO MEAS - LVOT AREA (M): 3.1 CM^2
BH CV ECHO MEAS - LVOT AREA: 3.1 CM^2
BH CV ECHO MEAS - LVOT DIAM: 2 CM
BH CV ECHO MEAS - LVPWD: 1.3 CM
BH CV ECHO MEAS - MED PEAK E' VEL: 8 CM/SEC
BH CV ECHO MEAS - MV A DUR: 0.17 SEC
BH CV ECHO MEAS - MV A MAX VEL: 89.4 CM/SEC
BH CV ECHO MEAS - MV DEC SLOPE: 380.5 CM/SEC^2
BH CV ECHO MEAS - MV DEC TIME: 0.27 SEC
BH CV ECHO MEAS - MV E MAX VEL: 90.6 CM/SEC
BH CV ECHO MEAS - MV E/A: 1
BH CV ECHO MEAS - MV MAX PG: 4.6 MMHG
BH CV ECHO MEAS - MV MEAN PG: 2 MMHG
BH CV ECHO MEAS - MV P1/2T MAX VEL: 103 CM/SEC
BH CV ECHO MEAS - MV P1/2T: 79.3 MSEC
BH CV ECHO MEAS - MV V2 MAX: 107 CM/SEC
BH CV ECHO MEAS - MV V2 MEAN: 70.6 CM/SEC
BH CV ECHO MEAS - MV V2 VTI: 31.6 CM
BH CV ECHO MEAS - MVA P1/2T LCG: 2.1 CM^2
BH CV ECHO MEAS - MVA(P1/2T): 2.8 CM^2
BH CV ECHO MEAS - MVA(VTI): 2.1 CM^2
BH CV ECHO MEAS - PA ACC TIME: 0.11 SEC
BH CV ECHO MEAS - PA MAX PG (FULL): 4.3 MMHG
BH CV ECHO MEAS - PA MAX PG: 6.8 MMHG
BH CV ECHO MEAS - PA PR(ACCEL): 30 MMHG
BH CV ECHO MEAS - PA V2 MAX: 130 CM/SEC
BH CV ECHO MEAS - PI END-D VEL: 124 CM/SEC
BH CV ECHO MEAS - PULM A REVS DUR: 0.17 SEC
BH CV ECHO MEAS - PULM A REVS VEL: 38.3 CM/SEC
BH CV ECHO MEAS - PULM DIAS VEL: 62 CM/SEC
BH CV ECHO MEAS - PULM S/D: 1.1
BH CV ECHO MEAS - PULM SYS VEL: 66 CM/SEC
BH CV ECHO MEAS - PVA(V,A): 2.1 CM^2
BH CV ECHO MEAS - PVA(V,D): 2.1 CM^2
BH CV ECHO MEAS - QP/QS: 0.72
BH CV ECHO MEAS - RAP SYSTOLE: 3 MMHG
BH CV ECHO MEAS - RV MAX PG: 2.5 MMHG
BH CV ECHO MEAS - RV MEAN PG: 1 MMHG
BH CV ECHO MEAS - RV V1 MAX: 78.3 CM/SEC
BH CV ECHO MEAS - RV V1 MEAN: 44.7 CM/SEC
BH CV ECHO MEAS - RV V1 VTI: 14.1 CM
BH CV ECHO MEAS - RVOT AREA: 3.5 CM^2
BH CV ECHO MEAS - RVOT DIAM: 2.1 CM
BH CV ECHO MEAS - RVSP: 37 MMHG
BH CV ECHO MEAS - SI(AO): 117.9 ML/M^2
BH CV ECHO MEAS - SI(CUBED): 47.6 ML/M^2
BH CV ECHO MEAS - SI(LVOT): 35.5 ML/M^2
BH CV ECHO MEAS - SI(MOD-SP2): 24.2 ML/M^2
BH CV ECHO MEAS - SI(MOD-SP4): 24.7 ML/M^2
BH CV ECHO MEAS - SI(TEICH): 40.9 ML/M^2
BH CV ECHO MEAS - SV(AO): 224.4 ML
BH CV ECHO MEAS - SV(CUBED): 90.6 ML
BH CV ECHO MEAS - SV(LVOT): 67.5 ML
BH CV ECHO MEAS - SV(MOD-SP2): 46 ML
BH CV ECHO MEAS - SV(MOD-SP4): 47 ML
BH CV ECHO MEAS - SV(RVOT): 48.8 ML
BH CV ECHO MEAS - SV(TEICH): 77.8 ML
BH CV ECHO MEAS - TAPSE (>1.6): 2.6 CM2
BH CV ECHO MEAS - TR MAX VEL: 293 CM/SEC
BH CV ECHO MEASUREMENTS AVERAGE E/E' RATIO: 10.07
BH CV VAS BP RIGHT ARM: NORMAL MMHG
BH CV XLRA - RV BASE: 3.2 CM
BH CV XLRA - TDI S': 22 CM/SEC
BUN BLD-MCNC: 20 MG/DL (ref 8–23)
BUN/CREAT SERPL: 13.3 (ref 7–25)
CALCIUM SPEC-SCNC: 8.8 MG/DL (ref 8.6–10.5)
CHLORIDE SERPL-SCNC: 106 MMOL/L (ref 98–107)
CO2 SERPL-SCNC: 23.7 MMOL/L (ref 22–29)
CREAT BLD-MCNC: 1.5 MG/DL (ref 0.76–1.27)
DEPRECATED RDW RBC AUTO: 52.8 FL (ref 37–54)
ERYTHROCYTE [DISTWIDTH] IN BLOOD BY AUTOMATED COUNT: 15.6 % (ref 11.5–14.5)
GFR SERPL CREATININE-BSD FRML MDRD: 47 ML/MIN/1.73
GLUCOSE BLD-MCNC: 80 MG/DL (ref 65–99)
HCT VFR BLD AUTO: 37.2 % (ref 40.4–52.2)
HGB BLD-MCNC: 11.5 G/DL (ref 13.7–17.6)
LEFT ATRIUM VOLUME INDEX: 21 ML/M2
MAXIMAL PREDICTED HEART RATE: 153 BPM
MCH RBC QN AUTO: 28.4 PG (ref 27–32.7)
MCHC RBC AUTO-ENTMCNC: 30.9 G/DL (ref 32.6–36.4)
MCV RBC AUTO: 91.9 FL (ref 79.8–96.2)
PHOSPHATE SERPL-MCNC: 3.7 MG/DL (ref 2.5–4.5)
PLATELET # BLD AUTO: 256 10*3/MM3 (ref 140–500)
PMV BLD AUTO: 11 FL (ref 6–12)
POTASSIUM BLD-SCNC: 3.7 MMOL/L (ref 3.5–5.2)
RBC # BLD AUTO: 4.05 10*6/MM3 (ref 4.6–6)
SODIUM BLD-SCNC: 142 MMOL/L (ref 136–145)
STRESS TARGET HR: 130 BPM
TROPONIN T SERPL-MCNC: <0.01 NG/ML (ref 0–0.03)
WBC NRBC COR # BLD: 13.69 10*3/MM3 (ref 4.5–10.7)

## 2018-09-08 PROCEDURE — 96375 TX/PRO/DX INJ NEW DRUG ADDON: CPT

## 2018-09-08 PROCEDURE — 93306 TTE W/DOPPLER COMPLETE: CPT | Performed by: INTERNAL MEDICINE

## 2018-09-08 PROCEDURE — 96361 HYDRATE IV INFUSION ADD-ON: CPT

## 2018-09-08 PROCEDURE — 93005 ELECTROCARDIOGRAM TRACING: CPT | Performed by: INTERNAL MEDICINE

## 2018-09-08 PROCEDURE — 93306 TTE W/DOPPLER COMPLETE: CPT

## 2018-09-08 PROCEDURE — 93010 ELECTROCARDIOGRAM REPORT: CPT | Performed by: INTERNAL MEDICINE

## 2018-09-08 PROCEDURE — 85027 COMPLETE CBC AUTOMATED: CPT | Performed by: HOSPITALIST

## 2018-09-08 PROCEDURE — G0378 HOSPITAL OBSERVATION PER HR: HCPCS

## 2018-09-08 PROCEDURE — 80069 RENAL FUNCTION PANEL: CPT | Performed by: HOSPITALIST

## 2018-09-08 PROCEDURE — 96376 TX/PRO/DX INJ SAME DRUG ADON: CPT

## 2018-09-08 PROCEDURE — 84484 ASSAY OF TROPONIN QUANT: CPT | Performed by: HOSPITALIST

## 2018-09-08 RX ADMIN — ACETAMINOPHEN 650 MG: 325 TABLET, FILM COATED ORAL at 09:07

## 2018-09-08 RX ADMIN — PREDNISOLONE ACETATE 1 DROP: 10 SUSPENSION/ DROPS OPHTHALMIC at 08:54

## 2018-09-08 RX ADMIN — SODIUM CHLORIDE 1 DROP: 50 SOLUTION OPHTHALMIC at 20:07

## 2018-09-08 RX ADMIN — FAMOTIDINE 20 MG: 10 INJECTION, SOLUTION INTRAVENOUS at 01:18

## 2018-09-08 RX ADMIN — SODIUM CHLORIDE 75 ML/HR: 9 INJECTION, SOLUTION INTRAVENOUS at 21:45

## 2018-09-08 RX ADMIN — SODIUM CHLORIDE 75 ML/HR: 9 INJECTION, SOLUTION INTRAVENOUS at 09:08

## 2018-09-08 RX ADMIN — FAMOTIDINE 20 MG: 10 INJECTION, SOLUTION INTRAVENOUS at 08:54

## 2018-09-08 RX ADMIN — SODIUM CHLORIDE 1 DROP: 50 SOLUTION OPHTHALMIC at 08:54

## 2018-09-08 RX ADMIN — SODIUM CHLORIDE 1 DROP: 50 SOLUTION OPHTHALMIC at 16:28

## 2018-09-08 RX ADMIN — ZOLPIDEM TARTRATE 5 MG: 5 TABLET ORAL at 23:01

## 2018-09-08 RX ADMIN — FAMOTIDINE 20 MG: 10 INJECTION, SOLUTION INTRAVENOUS at 20:07

## 2018-09-08 NOTE — PLAN OF CARE
Problem: Patient Care Overview  Goal: Plan of Care Review  Outcome: Ongoing (interventions implemented as appropriate)   09/08/18 3666   Coping/Psychosocial   Plan of Care Reviewed With patient;spouse   Plan of Care Review   Progress improving   OTHER   Outcome Summary Pt admitted with chest pain. Currently pt has no c/o CP. Now on IVF at 75ml/hr. Nitropaste placed once. Cardio consulted, Dr. House stated he will see pt in AM. ECHO scheduled for today. Spouse stayed with pt throughout the night. Continue to monitor.       Problem: Pain, Acute (Adult)  Goal: Identify Related Risk Factors and Signs and Symptoms  Outcome: Ongoing (interventions implemented as appropriate)    Goal: Acceptable Pain Control/Comfort Level  Outcome: Ongoing (interventions implemented as appropriate)

## 2018-09-08 NOTE — PROGRESS NOTES
Name: Angel Cisneros ADMIT: 2018   : 1950  PCP: Chris Harden MD    MRN: [id number] LOS: 0 days   AGE/SEX: 68 y.o. male  ROOM: [facility]/   Subjective     Feels good.  Almost completely back to normal.  Denies any more chest pain, shortness of breath, nausea, vomiting or diarrhea  No history of blood clots, no lower extremity edema, no hemoptysis  Objective   Vital Signs  Temp:  [97.7 °F (36.5 °C)-98.2 °F (36.8 °C)] 98.2 °F (36.8 °C)  Heart Rate:  [59-74] 59  Resp:  [16-20] 16  BP: (117-193)/() 149/89  SpO2:  [96 %-99 %] 96 %  on   ;   Device (Oxygen Therapy): room air  Body mass index is 25.73 kg/m².    Physical Exam   Constitutional: He is oriented to person, place, and time. No distress.   HENT:   Head: Normocephalic and atraumatic.   Neck: No JVD present.   Cardiovascular: Normal rate, regular rhythm and normal heart sounds.    No murmur heard.  Pulmonary/Chest: Effort normal and breath sounds normal. No stridor. No respiratory distress. He has no wheezes. He exhibits tenderness (Mild right-sided chest tenderness upon palpation).   Abdominal: Soft. He exhibits no distension. There is no tenderness.   Musculoskeletal: He exhibits no edema or tenderness.   Neurological: He is alert and oriented to person, place, and time. No cranial nerve deficit.   Skin: Skin is warm and dry. He is not diaphoretic.   Psychiatric: His behavior is normal.       Results Review:       I reviewed the patient's new clinical results.    Results from last 7 days  Lab Units 18  0450 18  1426   WBC 10*3/mm3 13.69* 16.50*   HEMOGLOBIN g/dL 11.5* 12.5*   PLATELETS 10*3/mm3 256 280     Results from last 7 days  Lab Units 18  0450 18  1426   SODIUM mmol/L 142 142   POTASSIUM mmol/L 3.7 4.0   CHLORIDE mmol/L 106 104   CO2 mmol/L 23.7 28.8   BUN mg/dL 20 19   CREATININE mg/dL 1.50* 1.70*   GLUCOSE mg/dL 80 91   Estimated Creatinine Clearance: 51.8 mL/min (A) (by C-G formula based on SCr of 1.5 mg/dL  (H)).  Results from last 7 days  Lab Units 09/08/18  0450 09/07/18  2232 09/07/18  1426   CALCIUM mg/dL 8.8  --  9.9   ALBUMIN g/dL 3.50  --  3.70   MAGNESIUM mg/dL  --  2.0  --    PHOSPHORUS mg/dL 3.7  --   --          famotidine 20 mg Intravenous Q12H   prednisoLONE acetate 1 drop Both Eyes Daily   sodium chloride 1 drop Left Eye TID       sodium chloride 75 mL/hr Last Rate: 75 mL/hr (09/08/18 1112)   Diet Regular; Cardiac      Assessment/Plan      Active Hospital Problems    Diagnosis Date Noted   • **Chest pain [R07.9] 09/07/2018   • Stage 3 chronic kidney disease [N18.3] 09/08/2018   • GERD (gastroesophageal reflux disease) [K21.9] 09/07/2018   • HTN (hypertension) [I10] 09/07/2018   • Bradycardia [R00.1] 09/07/2018   • Leukemia (CMS/HCC) [C95.90]    • Lymphoma (CMS/HCC) [C85.90]       Resolved Hospital Problems    Diagnosis Date Noted Date Resolved   No resolved problems to display.       · Chest pain resolved, no acute coronary syndrome, echocardiogram pending  · Low suspicion for pulmonary embolism, d-dimer not indicated  · Leukocytosis: Chronic issue from his leukemia  · Pro-calcitonin negative going against any bacterial infection, blood cultures are pending and no growth to date  · Continue hydration for now  · Chronic kidney disease stage III at baseline of 1.5  · Bradycardia resolved  · Discontinue Lovenox given contraindication with chemotherapy, start SCDs  ·   · Disposition: When cleared from cardiology standpoint        Blu Macias MD  [facility]  09/08/18  1:17 PM

## 2018-09-08 NOTE — PROGRESS NOTES
Discharge Planning Assessment  [facility]     Patient Name: Angel Cisneros  MRN: [id number]  Today's Date: 9/8/2018    Admit Date: 9/7/2018          Discharge Needs Assessment     Row Name 09/08/18 1811       Living Environment    Lives With spouse    Current Living Arrangements home/apartment/condo    Primary Care Provided by self    Provides Primary Care For no one    Family Caregiver if Needed none    Quality of Family Relationships helpful;involved;supportive    Able to Return to Prior Arrangements yes       Resource/Environmental Concerns    Resource/Environmental Concerns none    Transportation Concerns car, none       Transition Planning    Patient/Family Anticipates Transition to home with family    Patient/Family Anticipated Services at Transition none    Transportation Anticipated family or friend will provide       Discharge Needs Assessment    Readmission Within the Last 30 Days no previous admission in last 30 days    Concerns to be Addressed denies needs/concerns at this time    Equipment Currently Used at Home none    Anticipated Changes Related to Illness none    Equipment Needed After Discharge none    Outpatient/Agency/Support Group Needs --   none    Discharge Facility/Level of Care Needs --   n/a    Offered/Gave Vendor List no    Current Discharge Risk --   none            Discharge Plan     Row Name 09/08/18 1812       Plan    Plan Home via private auto with no anticipated needs    Patient/Family in Agreement with Plan yes    Plan Comments Introduced self/explained role of CCP. Face sheet data confirmed. CMS LYNN letter signed. Pt IADLs prior to hospitalization and he plans to return home with spouse at DC. Pt denies and DME/HH/SNF needs and hopes to DC on Sunday. Pt/spouse state that he is on a chemo drug that is very expensive but pt gets assistance thru [employer] for it. Plans for his spouse to drive him home at DC with no needs. CCP to follow............JW        Destination     No  service coordination in this encounter.      Durable Medical Equipment     No service coordination in this encounter.      Dialysis/Infusion     No service coordination in this encounter.      Home Medical Care     No service coordination in this encounter.      Social Care     No service coordination in this encounter.                Demographic Summary     Row Name 09/08/18 1811       General Information    Admission Type observation    Arrived From home    Required Notices Provided Observation Status Notice    Referral Source admission list    Reason for Consult discharge planning    Preferred Language English     Used During This Interaction no       Contact Information    Permission Granted to Share Info With ;family/designee            Functional Status     Row Name 09/08/18 1811       Functional Status    Usual Activity Tolerance good    Current Activity Tolerance fair       Functional Status, IADL    Medications independent    Meal Preparation assistive person    Housekeeping independent    Laundry assistive person    Shopping independent       Mental Status    General Appearance WDL WDL            Psychosocial    No documentation.           Abuse/Neglect    No documentation.           Legal    No documentation.           Substance Abuse    No documentation.           Patient Forms    No documentation.         Susana Paniagua RN

## 2018-09-09 VITALS
BODY MASS INDEX: 25.64 KG/M2 | SYSTOLIC BLOOD PRESSURE: 159 MMHG | HEIGHT: 68 IN | WEIGHT: 169.2 LBS | TEMPERATURE: 97.8 F | OXYGEN SATURATION: 96 % | DIASTOLIC BLOOD PRESSURE: 97 MMHG | HEART RATE: 72 BPM | RESPIRATION RATE: 18 BRPM

## 2018-09-09 PROBLEM — R00.1 BRADYCARDIA: Status: RESOLVED | Noted: 2018-09-07 | Resolved: 2018-09-09

## 2018-09-09 PROCEDURE — G0378 HOSPITAL OBSERVATION PER HR: HCPCS

## 2018-09-09 PROCEDURE — 99225 PR SBSQ OBSERVATION CARE/DAY 25 MINUTES: CPT | Performed by: INTERNAL MEDICINE

## 2018-09-09 PROCEDURE — 96376 TX/PRO/DX INJ SAME DRUG ADON: CPT

## 2018-09-09 PROCEDURE — 96361 HYDRATE IV INFUSION ADD-ON: CPT

## 2018-09-09 RX ORDER — CARVEDILOL 6.25 MG/1
6.25 TABLET ORAL EVERY 12 HOURS SCHEDULED
Status: DISCONTINUED | OUTPATIENT
Start: 2018-09-09 | End: 2018-09-09 | Stop reason: HOSPADM

## 2018-09-09 RX ORDER — CARVEDILOL 6.25 MG/1
6.25 TABLET ORAL EVERY 12 HOURS SCHEDULED
Qty: 60 TABLET | Refills: 0 | Status: SHIPPED | OUTPATIENT
Start: 2018-09-09 | End: 2019-09-17

## 2018-09-09 RX ADMIN — PREDNISOLONE ACETATE 1 DROP: 10 SUSPENSION/ DROPS OPHTHALMIC at 09:31

## 2018-09-09 RX ADMIN — SODIUM CHLORIDE 75 ML/HR: 9 INJECTION, SOLUTION INTRAVENOUS at 09:31

## 2018-09-09 RX ADMIN — CARVEDILOL 6.25 MG: 6.25 TABLET, FILM COATED ORAL at 11:08

## 2018-09-09 RX ADMIN — SODIUM CHLORIDE 1 DROP: 50 SOLUTION OPHTHALMIC at 09:31

## 2018-09-09 RX ADMIN — FAMOTIDINE 20 MG: 10 INJECTION, SOLUTION INTRAVENOUS at 09:30

## 2018-09-09 NOTE — DISCHARGE SUMMARY
Name: Angel Cisneros  Age: 68 y.o.  Sex: male  :  1950  MRN: [id number]         Primary Care Physician: Chris Harden MD      Date of Admission:  2018  Date of Discharge:  2018      Chief Complaint:  Chest Pain (right sided chest pain and indegestion starting last night. )      Presenting Problem/History of Present Illness:  Ventricular bigeminy [I49.9]  Chest pain, unspecified type [R07.9]  Chronic renal impairment, unspecified CKD stage [N18.9]  Hypertension, unspecified type [I10]     Discharge Diagnosis:  Active Hospital Problems    Diagnosis Date Noted   • **Chest pain [R07.9] 2018   • Stage 3 chronic kidney disease [N18.3] 2018   • GERD (gastroesophageal reflux disease) [K21.9] 2018   • HTN (hypertension) [I10] 2018   • Leukemia (CMS/HCC) [C95.90]    • Lymphoma (CMS/HCC) [C85.90]       Resolved Hospital Problems    Diagnosis Date Noted Date Resolved   • Bradycardia [R00.1] 2018       Secondary Diagnoses:  Past Medical History:   Diagnosis Date   • Arthritis     both knees   • Cancer (CMS/HCC)     dx with lymphoma / radiation   • Cataracts, bilateral    • Hx of cornea transplant     both eyes   • Leukemia (CMS/HCC)    • Leukemia (CMS/HCC)    • Lymphoma (CMS/HCC)     dx in . treated with radiation.   • Lymphoma (CMS/HCC)     treated with radiation. dx in    • Lymphoma (CMS/HCC)    • Lymphoma (CMS/HCC)        Consults:    Consulting Physician(s)     Provider Relationship Specialty    Kris Sullivan III, MD Consulting Physician Cardiology          Procedures Performed:    2-D echo    Interpretation Summary   · EF = 65%.  · Left ventricular systolic function is normal.  · Mild tricuspid valve regurgitation is present.  · Calculated right ventricular systolic pressure from tricuspid regurgitation is 37 mmHg.  · Estimated right ventricular systolic pressure from tricuspid regurgitation is mildly elevated (35-45 mmHg).        CT CHEST  WITHOUT CONTRAST     HISTORY: 68-year-old male with leukemia. Lymphoma. Chest pain. Abnormal  chest radiograph.     TECHNIQUE: Radiation dose reduction techniques were utilized, including  automated exposure control and exposure modulation based on body size.   3 mm images were obtained through the chest without the administration  of IV contrast. Compared with previous chest CT from 08/24/2011.     FINDINGS: There has been interval development of mediastinal and  bilateral hilar lymphadenopathy. Some of the nodes are difficult to  measure in the absence of IV contrast, but an AP window node measures  2.3 x 0.9 cm. A precarinal node measures 2.0 x 1.3 cm. There is no  axillary or subpectoral lymphadenopathy. There are faint ill-defined  ground glass opacities within the central aspects of the upper lobes.  There are no pleural or pericardial effusions. The pulmonary arteries  are enlarged.     IMPRESSION:  1. Mediastinal and bilateral hilar lymphadenopathy.  2. Uncertain etiology of the faint ground glass opacities in the central  aspects of the upper lobes. Clinically the patient is not in pulmonary  edema and there is also no correlation for pneumonia. The appearance is  nonspecific. Atypical pneumonia or early CHF is possible. Please  correlate clinically and follow-up is recommended.      Hospital Course:    The patient is a pleasant 68-year-old male with a history of leukemia currently on Imbruvica who came to the hospital for chest pain.  Please see admitting history and physical for complete details.  He was ruled out for acute coronary syndrome and the pain was atypical most consistent with musculoskeletal.  He had a CT chest without contrast showed possible pneumonia or early heart failure.  He has no symptoms or signs of pneumonia or heart failure.  He has lymphadenopathy likely from his underlying leukemia.  He had 2-D echo which was within normal limits.  Chest pain has resolved and continues with mild  back spasms which is not bothersome.  He had low suspicion for pulmonary embolism without any shortness of breath, lower extremity edema, hemoptysis.  Due to his underlying renal function a d-dimer undoubtedly would be positive and lead to further testing in a patient that has a low diagnostic probability of having a pulmonary embolism.  The patient is medically stable for discharge today.  He would like to follow-up with his wife's cardiologist, Dr. Michel Mathis in the future.  I discussed discharge plan with the patient and his wife and they're both in agreement.    Physical Exam:  Temp:  [97.8 °F (36.6 °C)-98.6 °F (37 °C)] 97.8 °F (36.6 °C)  Heart Rate:  [70-72] 72  Resp:  [16-18] 18  BP: (142-175)/(72-97) 159/97  Body mass index is 25.73 kg/m².  Physical Exam  onstitutional: He is oriented to person, place, and time. No distress.   HENT:   Head: Normocephalic and atraumatic.   Neck: No JVD present.   Cardiovascular: Normal rate, regular rhythm and normal heart sounds.    No murmur heard.  Pulmonary/Chest: Effort normal and breath sounds normal. No stridor. No respiratory distress. He has no wheezes.  Abdominal: Soft. He exhibits no distension. There is no tenderness.   Musculoskeletal: He exhibits no edema or tenderness.   Neurological: He is alert and oriented to person, place, and time. No cranial nerve deficit.   Skin: Skin is warm and dry. He is not diaphoretic.   Psychiatric: His behavior is normal.   Condition on Discharge:  Stable.    Discharge Disposition:   Home with family    Allergies:   Allergies   Allergen Reactions   • Penicillins Rash       Discharge Medications:      Your medication list      START taking these medications      Instructions Last Dose Given Next Dose Due   carvedilol 6.25 MG tablet  Commonly known as:  COREG      Take 1 tablet by mouth Every 12 (Twelve) Hours.          CONTINUE taking these medications      Instructions Last Dose Given Next Dose Due   IMBRUVICA 420 MG tablet  tablet  Generic drug:  ibrutinib      Take 420 mg by mouth Every Morning.       prednisoLONE acetate 1 % ophthalmic suspension  Commonly known as:  PRED FORTE      Administer 1 drop to both eyes daily.       sodium chloride 5 % ophthalmic solution  Commonly known as:  SENA 128      Administer 1 drop into the left eye 3 (three) times a day.             Where to Get Your Medications      These medications were sent to [facility]/pharmacy #[id number] - [facility] [place], [place] - [address] AT [facility] [phone] [facility] [phone]   [address] [facility] [address]    Phone:  [phone]   · carvedilol 6.25 MG tablet         Discharge Diet:   Diet Instructions     Advance Diet As Tolerated             Activity at Discharge:   Activity Instructions     Activity as Tolerated             Follow-up Appointments:  No future appointments.  Additional Instructions for the Follow-ups that You Need to Schedule     Discharge Follow-up with PCP    As directed      Currently Documented PCP:  Chris Harden MD  PCP Phone Number:  [phone]    Follow Up Details:  within 1 week for hospital follow up         Discharge Follow-up with Specified Provider: Cardiology (Dr. Martin); 2 Weeks    As directed      To:  Cardiology (Dr. Martin)    Follow Up:  2 Weeks           Follow-up Information     Chris Harden MD .    Specialty:  Gastroenterology  Why:  within 1 week for hospital follow up  Contact information:  [address] [facility] [address]  [place] [id number] [phone]                 Additional Instructions for the Follow-ups that You Need to Schedule     Discharge Follow-up with PCP    As directed      Currently Documented PCP:  Chris Harden MD  PCP Phone Number:  [phone]    Follow Up Details:  within 1 week for hospital follow up         Discharge Follow-up with Specified Provider: Cardiology (Dr. Martin); 2 Weeks    As directed      To:  Cardiology (Dr. Martin)    Follow  Up:  2 Weeks               Test Results Pending at Discharge:  None   Order Current Status    Blood Culture - Blood, Preliminary result    Blood Culture - Blood, Preliminary result           Code status:   Code Status and Medical Interventions:   Ordered at: 09/07/18 1953     Level Of Support Discussed With:    Patient     Code Status:    CPR     Medical Interventions (Level of Support Prior to Arrest):    Full         Blu Macias MD  [facility]  09/09/18  11:23 AM      Time: greater than 30 minutes.

## 2018-09-09 NOTE — PLAN OF CARE
Problem: Patient Care Overview  Goal: Plan of Care Review  Outcome: Ongoing (interventions implemented as appropriate)   09/08/18 5311   Coping/Psychosocial   Plan of Care Reviewed With patient;spouse   Plan of Care Review   Progress improving   OTHER   Outcome Summary PT WITH NO C/O CHEST PAIN, MEDICATED FOR HEADACHE AND LOWER ABDOMINAL PAIN WITH TYLENOL WITH RELIEF, HAD ECHO DONE TODAY, TROPONIN LEVELS NEGATIVE, IVF CONTINUES AT 75CC/HR, VSS, SAFETY MAINTAINED, CONTINUE TO MONITOR       Problem: Pain, Acute (Adult)  Goal: Identify Related Risk Factors and Signs and Symptoms  Outcome: Outcome(s) achieved Date Met: 09/08/18    Goal: Acceptable Pain Control/Comfort Level  Outcome: Ongoing (interventions implemented as appropriate)

## 2018-09-09 NOTE — PROGRESS NOTES
Hospital Follow Up    LOS:  LOS: 0 days   Patient Name: Angel Cisneros  Age/Sex: 68 y.o. male  : 1950  MRN: [id number]    Date of Hospital Visit: 18  Length of Stay: 0  Encounter Provider: Gilmar Caraballo MD  Place of Service: [facility]    Subjective:     Follow Up for: chest pain, PVCs    Interval History: .  Arrhythmia appears to be decreased.  The patient denies any symptoms.      Objective:     Objective:  Temp:  [97.8 °F (36.6 °C)-98.6 °F (37 °C)] 97.8 °F (36.6 °C)  Heart Rate:  [70-72] 70  Resp:  [16-18] 18  BP: (142-175)/(72-93) 154/87  Body mass index is 25.73 kg/m².    Intake/Output Summary (Last 24 hours) at 18 0916  Last data filed at 18 0721   Gross per 24 hour   Intake             1000 ml   Output              800 ml   Net              200 ml     1    18  1425 18  1851   Weight: 79.9 kg (176 lb 1.6 oz) 76.7 kg (169 lb 3.2 oz)     Weight change:     Physical Exam:   General Appearance: Alert, cooperative, in no acute distress. AAOx4.   HEENT: Normocephalic.  Neck: Supple. No JVD. No Carotid bruit. No thyromegaly  Lungs: CTAB. Normal respiratory effort and rate.  Heart:: Regular rate and rhythm, normal S1 and S2, no murmurs, gallops or rubs.  Abdomen: Soft, nontender, non-distended. positive bowel sounds  Extremities: Warm, no cyanosis, or clubbing. No edema.     Lab Review:     Results from last 7 days  Lab Units 18  0450 18  1426   SODIUM mmol/L 142 142   POTASSIUM mmol/L 3.7 4.0   CHLORIDE mmol/L 106 104   CO2 mmol/L 23.7 28.8   BUN mg/dL 20 19   CREATININE mg/dL 1.50* 1.70*   GLUCOSE mg/dL 80 91   CALCIUM mg/dL 8.8 9.9         Results from last 7 days  Lab Units 18  0450 18  2232 18  1426   TROPONIN T ng/mL <0.010 <0.010 <0.010       Results from last 7 days  Lab Units 18  0450 18  1426   WBC 10*3/mm3 13.69* 16.50*   HEMOGLOBIN g/dL 11.5* 12.5*   HEMATOCRIT % 37.2* 39.2*    PLATELETS 10*3/mm3 256 280       Results from last 7 days  Lab Units 09/07/18  1426   INR  1.02       Results from last 7 days  Lab Units 09/07/18  2232   MAGNESIUM mg/dL 2.0           Results from last 7 days  Lab Units 09/07/18  1426   PROBNP pg/mL 285.6       Results from last 7 days  Lab Units 09/07/18  2232   TSH mIU/mL 2.940         I reviewed the patient's new clinical results.          I personally viewed and interpreted the patient's EKG/Telemetry data.  Current Medications:   Scheduled Meds:  famotidine 20 mg Intravenous Q12H   prednisoLONE acetate 1 drop Both Eyes Daily   sodium chloride 1 drop Left Eye TID     Continuous Infusions:  sodium chloride 75 mL/hr Last Rate: 75 mL/hr (09/09/18 0447)       Allergies:  Allergies   Allergen Reactions   • Penicillins Rash       Assessment & Plan     Principal Problem:    Chest pain  Active Problems:    Lymphoma (CMS/HCC)    Leukemia (CMS/HCC)    GERD (gastroesophageal reflux disease)    HTN (hypertension)    Bradycardia    Stage 3 chronic kidney disease    1.  Chest discomfort: This appears to be rather atypical more pleuritic type of discomfort than cardiac.  There is no evidence of an acute MI.  His electrocardiogram is abnormal and I will repeat.  2.  Hypertension: This is the first episode that the patient has ever had.   his blood pressure is slightly elevated.    3.  Ventricular dysrhythmia: This is new for the patient.  He has complained recently of palpitations.  He has not had prior history of arrhythmia or any cardiac problem for that matter.          Plan: Start carvedilol.  From my standpoint he can be discharged to home and follow-up as an outpatient.  We can plan on an outpatient stress test after follow-up.    Gilmar Caraballo MD  09/09/18

## 2018-09-09 NOTE — PLAN OF CARE
Problem: Patient Care Overview  Goal: Plan of Care Review  Outcome: Ongoing (interventions implemented as appropriate)   09/09/18 0888   Coping/Psychosocial   Plan of Care Reviewed With patient   Plan of Care Review   Progress improving   OTHER   Outcome Summary Pt had slight episode of confusion after admin of ambien. Easily reoriented to place and time. No c/o chest pain of abd discomfort. VSS. Safety maintained. Will continue to monitor       Problem: Pain, Acute (Adult)  Goal: Acceptable Pain Control/Comfort Level  Outcome: Ongoing (interventions implemented as appropriate)   09/09/18 1658   Pain, Acute (Adult)   Acceptable Pain Control/Comfort Level making progress toward outcome

## 2018-09-12 LAB
BACTERIA SPEC AEROBE CULT: NORMAL
BACTERIA SPEC AEROBE CULT: NORMAL

## 2018-09-14 ENCOUNTER — NURSE TRIAGE (OUTPATIENT)
Dept: CALL CENTER | Facility: HOSPITAL | Age: 68
End: 2018-09-14

## 2018-09-14 NOTE — TELEPHONE ENCOUNTER
"  Reason for Disposition  • [1] Caller requesting NON-URGENT health information AND [2] PCP's office is the best resource    Additional Information  • Negative: [1] Caller is not with the adult (patient) AND [2] reporting urgent symptoms  • Negative: Lab result questions  • Negative: Medication questions  • Negative: Caller cannot be reached by phone  • Negative: Caller has already spoken to PCP or another triager  • Negative: RN needs further essential information from caller in order to complete triage  • Negative: Requesting regular office appointment    Answer Assessment - Initial Assessment Questions  1. REASON FOR CALL or QUESTION: \"What is your reason for calling today?\" or \"How can I best help you?\" or \"What question do you have that I can help answer?\"      Test results from 9/7/18    Protocols used: INFORMATION ONLY CALL-ADULT-    "

## 2018-09-24 ENCOUNTER — OFFICE VISIT (OUTPATIENT)
Dept: GASTROENTEROLOGY | Facility: CLINIC | Age: 68
End: 2018-09-24

## 2018-09-24 VITALS
HEIGHT: 69 IN | BODY MASS INDEX: 25.03 KG/M2 | SYSTOLIC BLOOD PRESSURE: 169 MMHG | WEIGHT: 169 LBS | HEART RATE: 58 BPM | DIASTOLIC BLOOD PRESSURE: 100 MMHG

## 2018-09-24 DIAGNOSIS — R10.84 GENERALIZED ABDOMINAL PAIN: Primary | ICD-10-CM

## 2018-09-24 DIAGNOSIS — C91.10 CLL (CHRONIC LYMPHOCYTIC LEUKEMIA) (HCC): ICD-10-CM

## 2018-09-24 PROCEDURE — 99213 OFFICE O/P EST LOW 20 MIN: CPT | Performed by: INTERNAL MEDICINE

## 2018-09-24 RX ORDER — CALCIUM CARBONATE 200(500)MG
1 TABLET,CHEWABLE ORAL DAILY
COMMUNITY

## 2018-09-24 RX ORDER — FLUOROMETHOLONE 0.1 %
1 SUSPENSION, DROPS(FINAL DOSAGE FORM)(ML) OPHTHALMIC (EYE) DAILY
COMMUNITY
Start: 2018-07-22

## 2018-09-28 DIAGNOSIS — R10.84 GENERALIZED ABDOMINAL PAIN: ICD-10-CM

## 2018-09-28 DIAGNOSIS — C91.10 CLL (CHRONIC LYMPHOCYTIC LEUKEMIA) (HCC): ICD-10-CM

## 2018-10-01 ENCOUNTER — TELEPHONE (OUTPATIENT)
Dept: GASTROENTEROLOGY | Facility: CLINIC | Age: 68
End: 2018-10-01

## 2018-10-01 NOTE — TELEPHONE ENCOUNTER
----- Message from Chris Harden MD sent at 9/28/2018  4:55 PM EDT -----      ----- Message -----  From: Leann Zamora RN  Sent: 9/28/2018   8:44 AM  To: Chris Harden MD    CT results reviewed by Dr Harden:  Only demonstrates mild BPH, all else normal.  Pt notified.  He has FU appt 10/8/18.

## 2018-10-03 PROBLEM — C91.10 CLL (CHRONIC LYMPHOCYTIC LEUKEMIA) (HCC): Status: ACTIVE | Noted: 2018-10-03

## 2018-10-03 PROBLEM — R10.84 GENERALIZED ABDOMINAL PAIN: Status: ACTIVE | Noted: 2018-10-03

## 2018-10-08 ENCOUNTER — OFFICE VISIT (OUTPATIENT)
Dept: GASTROENTEROLOGY | Facility: CLINIC | Age: 68
End: 2018-10-08

## 2018-10-08 VITALS — HEART RATE: 59 BPM | DIASTOLIC BLOOD PRESSURE: 80 MMHG | SYSTOLIC BLOOD PRESSURE: 143 MMHG

## 2018-10-08 DIAGNOSIS — C91.10 CLL (CHRONIC LYMPHOCYTIC LEUKEMIA) (HCC): ICD-10-CM

## 2018-10-08 DIAGNOSIS — R10.84 GENERALIZED ABDOMINAL PAIN: Primary | ICD-10-CM

## 2018-10-08 DIAGNOSIS — K21.9 GASTROESOPHAGEAL REFLUX DISEASE, ESOPHAGITIS PRESENCE NOT SPECIFIED: ICD-10-CM

## 2018-10-08 PROCEDURE — 99213 OFFICE O/P EST LOW 20 MIN: CPT | Performed by: INTERNAL MEDICINE

## 2018-10-08 RX ORDER — SODIUM CHLORIDE, SODIUM LACTATE, POTASSIUM CHLORIDE, CALCIUM CHLORIDE 600; 310; 30; 20 MG/100ML; MG/100ML; MG/100ML; MG/100ML
30 INJECTION, SOLUTION INTRAVENOUS CONTINUOUS
Status: CANCELLED | OUTPATIENT
Start: 2018-10-23

## 2018-10-08 NOTE — PROGRESS NOTES
Subjective   Angel Cisneros is a 68 y.o.. male is here today for follow-up.    Chief Complaint   Patient presents with   • Heartburn   • Abdominal Pain     Discuss CT Results      History of Present Illness  Patient continues to have generalized abdominal distress.  He is having some heartburn.  This tends to be postprandially.  It is mild.  Recent evaluation is included colonoscopy and most recently CT scan of the abdomen and pelvis.  Neither the studies demonstrated significant pathology.    The following portions of the patient's history were reviewed and updated as appropriate: allergies, current medications, past family history, past medical history, past social history, past surgical history and problem list.      Current Outpatient Prescriptions:   •  calcium carbonate (TUMS) 500 MG chewable tablet, Chew 1 tablet Daily., Disp: , Rfl:   •  carvedilol (COREG) 6.25 MG tablet, Take 1 tablet by mouth Every 12 (Twelve) Hours., Disp: 60 tablet, Rfl: 0  •  doxylamine (UNISOM) 25 MG tablet, Take  by mouth., Disp: , Rfl:   •  fluorometholone (FML) 0.1 % ophthalmic suspension, , Disp: , Rfl:   •  ibrutinib (IMBRUVICA) 420 MG tablet tablet, Take 420 mg by mouth Every Morning., Disp: , Rfl:   •  prednisoLONE acetate (PRED FORTE) 1 % ophthalmic suspension, Administer 1 drop to both eyes daily., Disp: , Rfl:   •  sodium chloride (SENA 128) 5 % ophthalmic solution, Administer 1 drop into the left eye 3 (three) times a day., Disp: , Rfl:     Family History   Problem Relation Age of Onset   • Kidney cancer Mother        Review of Systems   Respiratory: Negative for shortness of breath.    Cardiovascular: Negative for chest pain.       Objective   Physical Exam   Constitutional: He appears well-developed and well-nourished.   Abdominal:   His abdominal examination is completely benign.   Nursing note and vitals reviewed.      Pertinent laboratory results were reviewed. , Pertinent old records were reviewed.  and Pertinent  radiology results were reviewed.     Assessment/Plan   Problems Addressed this Visit        Digestive    GERD (gastroesophageal reflux disease)    Relevant Orders    Case Request (Completed)       Nervous and Auditory    Generalized abdominal pain - Primary    Relevant Orders    Case Request (Completed)       Hematopoietic and Hemostatic    CLL (chronic lymphocytic leukemia) (CMS/HCC)    Relevant Orders    Case Request (Completed)        For completeness sake we will go forward with EGD.  Use of over-the-counter PPIs is encouraged.

## 2018-10-09 PROBLEM — K21.9 GASTROESOPHAGEAL REFLUX DISEASE: Status: ACTIVE | Noted: 2018-10-09

## 2018-10-19 ENCOUNTER — TELEPHONE (OUTPATIENT)
Dept: GASTROENTEROLOGY | Facility: CLINIC | Age: 68
End: 2018-10-19

## 2018-10-23 ENCOUNTER — ANESTHESIA EVENT (OUTPATIENT)
Dept: GASTROENTEROLOGY | Facility: HOSPITAL | Age: 68
End: 2018-10-23

## 2018-10-23 ENCOUNTER — HOSPITAL ENCOUNTER (OUTPATIENT)
Facility: HOSPITAL | Age: 68
Setting detail: HOSPITAL OUTPATIENT SURGERY
Discharge: HOME | End: 2018-10-23
Attending: INTERNAL MEDICINE | Admitting: INTERNAL MEDICINE

## 2018-10-23 ENCOUNTER — ANESTHESIA (OUTPATIENT)
Dept: GASTROENTEROLOGY | Facility: HOSPITAL | Age: 68
End: 2018-10-23

## 2018-10-23 VITALS
TEMPERATURE: 98.1 F | RESPIRATION RATE: 16 BRPM | BODY MASS INDEX: 25.31 KG/M2 | HEART RATE: 53 BPM | HEIGHT: 68 IN | DIASTOLIC BLOOD PRESSURE: 68 MMHG | OXYGEN SATURATION: 98 % | SYSTOLIC BLOOD PRESSURE: 135 MMHG | WEIGHT: 167 LBS

## 2018-10-23 DIAGNOSIS — C91.10 CLL (CHRONIC LYMPHOCYTIC LEUKEMIA) (HCC): ICD-10-CM

## 2018-10-23 DIAGNOSIS — K21.9 GASTROESOPHAGEAL REFLUX DISEASE, ESOPHAGITIS PRESENCE NOT SPECIFIED: ICD-10-CM

## 2018-10-23 DIAGNOSIS — R10.84 GENERALIZED ABDOMINAL PAIN: ICD-10-CM

## 2018-10-23 PROCEDURE — 88305 TISSUE EXAM BY PATHOLOGIST: CPT | Performed by: INTERNAL MEDICINE

## 2018-10-23 PROCEDURE — 88312 SPECIAL STAINS GROUP 1: CPT | Performed by: INTERNAL MEDICINE

## 2018-10-23 PROCEDURE — 43239 EGD BIOPSY SINGLE/MULTIPLE: CPT | Performed by: INTERNAL MEDICINE

## 2018-10-23 PROCEDURE — 25010000002 PROPOFOL 10 MG/ML EMULSION: Performed by: ANESTHESIOLOGY

## 2018-10-23 RX ORDER — LIDOCAINE HYDROCHLORIDE 20 MG/ML
INJECTION, SOLUTION INFILTRATION; PERINEURAL AS NEEDED
Status: DISCONTINUED | OUTPATIENT
Start: 2018-10-23 | End: 2018-10-23 | Stop reason: SURG

## 2018-10-23 RX ORDER — PROPOFOL 10 MG/ML
VIAL (ML) INTRAVENOUS AS NEEDED
Status: DISCONTINUED | OUTPATIENT
Start: 2018-10-23 | End: 2018-10-23 | Stop reason: SURG

## 2018-10-23 RX ORDER — PROPOFOL 10 MG/ML
VIAL (ML) INTRAVENOUS CONTINUOUS PRN
Status: DISCONTINUED | OUTPATIENT
Start: 2018-10-23 | End: 2018-10-23 | Stop reason: SURG

## 2018-10-23 RX ORDER — SODIUM CHLORIDE, SODIUM LACTATE, POTASSIUM CHLORIDE, CALCIUM CHLORIDE 600; 310; 30; 20 MG/100ML; MG/100ML; MG/100ML; MG/100ML
30 INJECTION, SOLUTION INTRAVENOUS CONTINUOUS
Status: DISCONTINUED | OUTPATIENT
Start: 2018-10-23 | End: 2018-10-23 | Stop reason: HOSPADM

## 2018-10-23 RX ADMIN — PROPOFOL 125 MG: 10 INJECTION, EMULSION INTRAVENOUS at 09:17

## 2018-10-23 RX ADMIN — SODIUM CHLORIDE, POTASSIUM CHLORIDE, SODIUM LACTATE AND CALCIUM CHLORIDE 30 ML/HR: 600; 310; 30; 20 INJECTION, SOLUTION INTRAVENOUS at 08:33

## 2018-10-23 RX ADMIN — LIDOCAINE HYDROCHLORIDE 50 MG: 20 INJECTION, SOLUTION INFILTRATION; PERINEURAL at 09:17

## 2018-10-23 RX ADMIN — PROPOFOL 140 MCG/KG/MIN: 10 INJECTION, EMULSION INTRAVENOUS at 09:17

## 2018-10-23 NOTE — ANESTHESIA POSTPROCEDURE EVALUATION
Patient: Angel Cisneros    Procedure Summary     Date:  10/23/18 Room / Location:   [facility]    Anesthesia Start:  0910 Anesthesia Stop:  0930    Procedure:  ESOPHAGOGASTRODUODENOSCOPY WITH BIOPSY (N/A Esophagus) Diagnosis:       Generalized abdominal pain      CLL (chronic lymphocytic leukemia) (CMS/HCC)      Gastroesophageal reflux disease, esophagitis presence not specified      (Generalized abdominal pain [R10.84])      (CLL (chronic lymphocytic leukemia) (CMS/HCC) [C91.90])      (Gastroesophageal reflux disease, esophagitis presence not specified [K21.9])    Surgeon:  Chris Harden MD Provider:  Rich Bustamante MD    Anesthesia Type:  MAC ASA Status:  3          Anesthesia Type: MAC  Last vitals  BP   174/93 (10/23/18 0818)   Temp   36.7 °C (98.1 °F) (10/23/18 0814)   Pulse   63 (10/23/18 0814)   Resp   10 (10/23/18 0814)     SpO2   99 % (10/23/18 0814)     Post Anesthesia Care and Evaluation    Patient location during evaluation: bedside  Patient participation: complete - patient participated  Level of consciousness: awake and alert  Pain score: 0  Pain management: adequate  Airway patency: patent  Anesthetic complications: No anesthetic complications  PONV Status: none  Cardiovascular status: acceptable  Respiratory status: acceptable  Hydration status: acceptable  Post Neuraxial Block status: Motor and sensory function returned to baseline

## 2018-10-23 NOTE — ADDENDUM NOTE
Addendum  created 10/23/18 1013 by Rich Bustmaante MD    Anesthesia Review and Sign - Ready for Procedure

## 2018-10-23 NOTE — ANESTHESIA PREPROCEDURE EVALUATION
Anesthesia Evaluation     Patient summary reviewed                Airway   Mallampati: II  TM distance: >3 FB  Neck ROM: full  No difficulty expected  Dental    (+) upper dentures    Pulmonary     breath sounds clear to auscultation  Cardiovascular   Exercise tolerance: good (4-7 METS)    Rhythm: regular  Rate: normal        Neuro/Psych  GI/Hepatic/Renal/Endo      Musculoskeletal     Abdominal    Substance History      OB/GYN          Other                        Anesthesia Plan    ASA 3     MAC     intravenous induction

## 2018-10-24 LAB
CYTO UR: NORMAL
LAB AP CASE REPORT: NORMAL
PATH REPORT.FINAL DX SPEC: NORMAL
PATH REPORT.GROSS SPEC: NORMAL

## 2019-09-16 RX ORDER — ALLOPURINOL 100 MG/1
100 TABLET ORAL DAILY
Start: 2019-09-16 | End: 2019-11-20

## 2019-09-17 ENCOUNTER — OFFICE VISIT (OUTPATIENT)
Dept: FAMILY MEDICINE CLINIC | Facility: CLINIC | Age: 69
End: 2019-09-17

## 2019-09-17 VITALS
HEART RATE: 61 BPM | DIASTOLIC BLOOD PRESSURE: 78 MMHG | BODY MASS INDEX: 25.46 KG/M2 | OXYGEN SATURATION: 96 % | SYSTOLIC BLOOD PRESSURE: 157 MMHG | HEIGHT: 68 IN | WEIGHT: 168 LBS | TEMPERATURE: 98.5 F

## 2019-09-17 DIAGNOSIS — K21.9 GASTROESOPHAGEAL REFLUX DISEASE WITHOUT ESOPHAGITIS: ICD-10-CM

## 2019-09-17 DIAGNOSIS — I10 ESSENTIAL HYPERTENSION: Primary | ICD-10-CM

## 2019-09-17 DIAGNOSIS — C91.10 CLL (CHRONIC LYMPHOCYTIC LEUKEMIA) (HCC): ICD-10-CM

## 2019-09-17 PROCEDURE — 99214 OFFICE O/P EST MOD 30 MIN: CPT | Performed by: FAMILY MEDICINE

## 2019-09-17 RX ORDER — AMLODIPINE BESYLATE 5 MG/1
5 TABLET ORAL DAILY
Qty: 90 TABLET | Refills: 3 | Status: SHIPPED | OUTPATIENT
Start: 2019-09-17 | End: 2020-06-03 | Stop reason: SDUPTHER

## 2019-09-17 NOTE — PROGRESS NOTES
Chief Complaint   Patient presents with   • Hypertension       Subjective   Angel Cisneros is a 69 y.o. male.     History of Present Illness   F/U HTN.  BP running 140-150/90s.    F/U CLL.  Imbruvica is doing well.  No SE. Cr 1.7 6/26/19.    F/U TANNER.  Doing wellwith meds.  No Se.    The following portions of the patient's history were reviewed and updated as appropriate: allergies, current medications, past family history, past medical history, past social history, past surgical history and problem list.    Review of Systems   Constitutional: Negative for appetite change and fatigue.   HENT: Negative for nosebleeds and sore throat.    Eyes: Negative for blurred vision and visual disturbance.   Respiratory: Negative for shortness of breath and wheezing.    Cardiovascular: Negative for chest pain and leg swelling.   Gastrointestinal: Negative for abdominal distention and abdominal pain.   Endocrine: Negative for cold intolerance and polyuria.   Genitourinary: Negative for dysuria and hematuria.   Musculoskeletal: Negative for arthralgias and myalgias.   Skin: Negative for color change and rash.   Neurological: Negative for weakness and confusion.   Psychiatric/Behavioral: Negative for agitation and depressed mood.       Patient Active Problem List   Diagnosis   • Lymphoma (CMS/HCC)   • Leukemia (CMS/HCC)   • Chest pain   • GERD (gastroesophageal reflux disease)   • HTN (hypertension)   • Stage 3 chronic kidney disease (CMS/HCC)   • Generalized abdominal pain   • CLL (chronic lymphocytic leukemia) (CMS/HCC)   • Gastroesophageal reflux disease   • Hodgkin's disease of lymph nodes of head, face, and neck (CMS/HCC)       Allergies   Allergen Reactions   • Ambien [Zolpidem Tartrate] Mental Status Change   • Penicillins Rash         Current Outpatient Medications:   •  calcium carbonate (TUMS) 500 MG chewable tablet, Chew 1 tablet Daily., Disp: , Rfl:   •  fluorometholone (FML) 0.1 % ophthalmic suspension, , Disp: , Rfl:    •  ibrutinib (IMBRUVICA) 420 MG tablet tablet, Take 420 mg by mouth Every Morning., Disp: , Rfl:   •  prednisoLONE acetate (PRED FORTE) 1 % ophthalmic suspension, Administer 1 drop to both eyes daily., Disp: , Rfl:   •  sodium chloride (SENA 128) 5 % ophthalmic solution, Administer 1 drop into the left eye 3 (three) times a day., Disp: , Rfl:   •  allopurinol (ZYLOPRIM) 100 MG tablet, Take 1 tablet by mouth Daily., Disp: , Rfl:   •  amLODIPine (NORVASC) 5 MG tablet, Take 1 tablet by mouth Daily., Disp: 90 tablet, Rfl: 3  •  doxylamine (UNISOM) 25 MG tablet, Take  by mouth., Disp: , Rfl:     Past Medical History:   Diagnosis Date   • Arthritis     both knees   • Cancer (CMS/HCC)     dx with lymphoma 2009/ radiation   • Cataracts, bilateral    • Hx of cornea transplant     both eyes   • Leukemia (CMS/HCC)    • Lymphoma (CMS/HCC)     dx in 2009. treated with radiation.   • Lymphoma (CMS/HCC)     treated with radiation. dx in 2009       Past Surgical History:   Procedure Laterality Date   • BREAST LUMPECTOMY Right    • COLONOSCOPY N/A 3/8/2016    Procedure: COLONOSCOPY with cold polypectomy X 3;  Surgeon: Chris Harden MD;  Location: [facility] ENDOSCOPY;  Service:    • ENDOSCOPY N/A 10/23/2018    Procedure: ESOPHAGOGASTRODUODENOSCOPY WITH BIOPSY;  Surgeon: Chris Harden MD;  Location: [facility] ENDOSCOPY;  Service: Gastroenterology   • EYE SURGERY      partial cornea transplant 4-5 years ago   • KNEE SURGERY Left    • TOE SURGERY Bilateral    • TONSILLECTOMY         Family History   Problem Relation Age of Onset   • Kidney cancer Mother        Social History     Tobacco Use   • Smoking status: Never Smoker   • Smokeless tobacco: Never Used   Substance Use Topics   • Alcohol use: No            Objective     Vitals:    09/17/19 1616   BP: 157/78   Pulse: 61   Temp: 98.5 °F (36.9 °C)   SpO2: 96%     Body mass index is 25.54 kg/m².    Physical Exam   Constitutional: He is oriented to person, place, and time. He appears  well-developed and well-nourished.   HENT:   Head: Normocephalic and atraumatic.   Right Ear: External ear normal.   Left Ear: External ear normal.   Nose: Nose normal.   Mouth/Throat: Oropharynx is clear and moist.   Eyes: Conjunctivae and EOM are normal. Pupils are equal, round, and reactive to light.   Neck: Normal range of motion. Neck supple. No tracheal deviation present. No thyromegaly present.   Cardiovascular: Normal rate, regular rhythm and normal heart sounds. Exam reveals no gallop and no friction rub.   No murmur heard.  Pulmonary/Chest: Effort normal and breath sounds normal. No respiratory distress. He exhibits no tenderness.   Abdominal: Soft. Bowel sounds are normal. He exhibits no distension. There is no tenderness.   Musculoskeletal: Normal range of motion. He exhibits no edema or tenderness.   Lymphadenopathy:     He has no cervical adenopathy.   Neurological: He is alert and oriented to person, place, and time. He displays normal reflexes. No cranial nerve deficit or sensory deficit. Coordination normal.   Skin: Skin is warm and dry.   Psychiatric: He has a normal mood and affect. His behavior is normal. Judgment and thought content normal.   Nursing note and vitals reviewed.      Lab Results   Component Value Date    GLUCOSE 80 09/08/2018    BUN 23 (H) 06/26/2019    CREATININE 1.7 (H) 06/26/2019    EGFRIFNONA 47 (L) 09/08/2018    BCR 13.5 06/26/2019    K 4.0 06/26/2019    CO2 28 06/26/2019    CALCIUM 8.9 06/26/2019    ALBUMIN 3.8 06/26/2019    LABIL2 1.4 06/26/2019    AST 27 06/26/2019    ALT 18 06/26/2019       WBC   Date Value Ref Range Status   06/26/2019 10.29 4.5 - 11.0 10*3/uL Final     RBC   Date Value Ref Range Status   06/26/2019 4.62 4.5 - 5.9 10*6/uL Final     Hemoglobin   Date Value Ref Range Status   06/26/2019 13.9 13.5 - 17.5 g/dL Final     Hematocrit   Date Value Ref Range Status   06/26/2019 42.3 41.0 - 53.0 % Final     MCV   Date Value Ref Range Status   06/26/2019 91.6 80.0 -  100.0 fL Final     MCH   Date Value Ref Range Status   06/26/2019 30.1 26.0 - 34.0 pg Final     MCHC   Date Value Ref Range Status   06/26/2019 32.9 31.0 - 37.0 g/dL Final     RDW   Date Value Ref Range Status   06/26/2019 15.3 12.0 - 16.8 % Final     RDW-SD   Date Value Ref Range Status   09/08/2018 52.8 37.0 - 54.0 fl Final     MPV   Date Value Ref Range Status   06/26/2019 10.7 6.7 - 10.8 fL Final     Platelets   Date Value Ref Range Status   06/26/2019 226 140 - 440 10*3/uL Final     Neutrophil Rel %   Date Value Ref Range Status   06/26/2019 67.1 45 - 80 % Final     Lymphocyte Rel %   Date Value Ref Range Status   06/26/2019 23.4 15 - 50 % Final     Monocyte Rel %   Date Value Ref Range Status   06/26/2019 7.9 0 - 15 % Final     Eosinophil %   Date Value Ref Range Status   06/26/2019 1.2 0 - 7 % Final     Basophil Rel %   Date Value Ref Range Status   06/26/2019 0.4 0 - 2 % Final     Immature Grans %   Date Value Ref Range Status   09/07/2018 0.8 (H) 0.0 - 0.5 % Final     Neutrophils Absolute   Date Value Ref Range Status   06/26/2019 6.91 2.0 - 8.8 10*3/uL Final     Lymphocytes Absolute   Date Value Ref Range Status   06/26/2019 2.41 0.7 - 5.5 10*3/uL Final     Monocytes Absolute   Date Value Ref Range Status   06/26/2019 0.81 0.0 - 1.7 10*3/uL Final     Eosinophils Absolute   Date Value Ref Range Status   06/26/2019 0.12 0.0 - 0.8 10*3/uL Final     Basophils Absolute   Date Value Ref Range Status   06/26/2019 0.04 0.0 - 0.2 10*3/uL Final     Immature Grans, Absolute   Date Value Ref Range Status   09/07/2018 0.13 (H) 0.00 - 0.03 10*3/mm3 Final       No results found for: HGBA1C    No results found for: [id number]    TSH   Date Value Ref Range Status   09/07/2018 2.940 0.270 - 4.200 mIU/mL Final       No results found for: CHOL  No results found for: TRIG  No results found for: HDL  No results found for: LDL  No results found for: VLDL  No results found for: LDLHDL      Procedures    Assessment/Plan   Problems  Addressed this Visit        Cardiovascular and Mediastinum    HTN (hypertension) - Primary    Relevant Medications    amLODIPine (NORVASC) 5 MG tablet       Digestive    GERD (gastroesophageal reflux disease)       Hematopoietic and Hemostatic    CLL (chronic lymphocytic leukemia) (CMS/[facility])          No orders of the defined types were placed in this encounter.      Current Outpatient Medications   Medication Sig Dispense Refill   • calcium carbonate (TUMS) 500 MG chewable tablet Chew 1 tablet Daily.     • fluorometholone (FML) 0.1 % ophthalmic suspension      • ibrutinib (IMBRUVICA) 420 MG tablet tablet Take 420 mg by mouth Every Morning.     • prednisoLONE acetate (PRED FORTE) 1 % ophthalmic suspension Administer 1 drop to both eyes daily.     • sodium chloride (SENA 128) 5 % ophthalmic solution Administer 1 drop into the left eye 3 (three) times a day.     • allopurinol (ZYLOPRIM) 100 MG tablet Take 1 tablet by mouth Daily.     • amLODIPine (NORVASC) 5 MG tablet Take 1 tablet by mouth Daily. 90 tablet 3   • doxylamine (UNISOM) 25 MG tablet Take  by mouth.       No current facility-administered medications for this visit.        Angel Cisneros had no medications administered during this visit.    Return in about 2 months (around 11/17/2019).    There are no Patient Instructions on file for this visit.

## 2019-11-05 ENCOUNTER — OFFICE VISIT (OUTPATIENT)
Dept: FAMILY MEDICINE CLINIC | Facility: CLINIC | Age: 69
End: 2019-11-05

## 2019-11-05 VITALS
BODY MASS INDEX: 24.55 KG/M2 | SYSTOLIC BLOOD PRESSURE: 142 MMHG | WEIGHT: 162 LBS | HEIGHT: 68 IN | OXYGEN SATURATION: 99 % | DIASTOLIC BLOOD PRESSURE: 80 MMHG | HEART RATE: 63 BPM | TEMPERATURE: 97.5 F

## 2019-11-05 DIAGNOSIS — B37.0 ORAL THRUSH: Primary | ICD-10-CM

## 2019-11-05 PROCEDURE — 99213 OFFICE O/P EST LOW 20 MIN: CPT | Performed by: FAMILY MEDICINE

## 2019-11-05 NOTE — PROGRESS NOTES
Chief Complaint   Patient presents with   • Headache     C/o pain on left side of head   • Sore Throat     C/o sore throat    • Bruise on tongue       Subjective   Angel Cisneros is a 69 y.o. male.     History of Present Illness   PT has been having ST, cough, congestion sinus issues on left side of his head.  He has a bruise on center of his tongue. Left side of his head hurts as well. No fever or chills.  He just finished amoxil yesterday for his dental work.    The following portions of the patient's history were reviewed and updated as appropriate: allergies, current medications, past family history, past medical history, past social history, past surgical history and problem list.    Review of Systems   Constitutional: Negative for fatigue.   Eyes: Negative for blurred vision.   Respiratory: Negative for cough, chest tightness and shortness of breath.    Cardiovascular: Negative for chest pain, palpitations and leg swelling.   Neurological: Negative for dizziness, light-headedness and headache.   All other systems reviewed and are negative.      Patient Active Problem List   Diagnosis   • Lymphoma (CMS/HCC)   • Leukemia (CMS/HCC)   • Chest pain   • GERD (gastroesophageal reflux disease)   • HTN (hypertension)   • Stage 3 chronic kidney disease (CMS/HCC)   • Generalized abdominal pain   • CLL (chronic lymphocytic leukemia) (CMS/HCC)   • Gastroesophageal reflux disease   • Hodgkin's disease of lymph nodes of head, face, and neck (CMS/HCC)   • Oral thrush       Allergies   Allergen Reactions   • Ambien [Zolpidem Tartrate] Mental Status Change   • Penicillins Rash         Current Outpatient Medications:   •  allopurinol (ZYLOPRIM) 100 MG tablet, Take 1 tablet by mouth Daily., Disp: , Rfl:   •  amLODIPine (NORVASC) 5 MG tablet, Take 1 tablet by mouth Daily., Disp: 90 tablet, Rfl: 3  •  calcium carbonate (TUMS) 500 MG chewable tablet, Chew 1 tablet Daily., Disp: , Rfl:   •  doxylamine (UNISOM) 25 MG tablet, Take   "by mouth., Disp: , Rfl:   •  fluorometholone (FML) 0.1 % ophthalmic suspension, , Disp: , Rfl:   •  ibrutinib (IMBRUVICA) 420 MG tablet tablet, Take 420 mg by mouth Every Morning., Disp: , Rfl:   •  prednisoLONE acetate (PRED FORTE) 1 % ophthalmic suspension, Administer 1 drop to both eyes daily., Disp: , Rfl:   •  sodium chloride (SENA 128) 5 % ophthalmic solution, Administer 1 drop into the left eye 3 (three) times a day., Disp: , Rfl:   •  nystatin (MYCOSTATIN) [id number] UNIT/ML suspension, Swish and swallow 5 mL 4 (Four) Times a Day., Disp: 473 mL, Rfl: 0    Past Medical History:   Diagnosis Date   • Arthritis     both knees   • Cancer (CMS/HCC)     dx with lymphoma 2009/ radiation   • Cataracts, bilateral    • Hx of cornea transplant     both eyes   • Leukemia (CMS/HCC)    • Lymphoma (CMS/HCC)     dx in 2009. treated with radiation.   • Lymphoma (CMS/HCC)     treated with radiation. dx in 2009       Past Surgical History:   Procedure Laterality Date   • BREAST LUMPECTOMY Right    • COLONOSCOPY N/A 3/8/2016    Procedure: COLONOSCOPY with cold polypectomy X 3;  Surgeon: Chris Harden MD;  Location: [facility] ENDOSCOPY;  Service:    • ENDOSCOPY N/A 10/23/2018    Procedure: ESOPHAGOGASTRODUODENOSCOPY WITH BIOPSY;  Surgeon: Chris Harden MD;  Location: [facility] ENDOSCOPY;  Service: Gastroenterology   • EYE SURGERY      partial cornea transplant 4-5 years ago   • KNEE SURGERY Left    • TOE SURGERY Bilateral    • TONSILLECTOMY         Family History   Problem Relation Age of Onset   • Kidney cancer Mother        Social History     Tobacco Use   • Smoking status: Never Smoker   • Smokeless tobacco: Never Used   Substance Use Topics   • Alcohol use: No            Objective     Visit Vitals  /80   Pulse 63   Temp 97.5 °F (36.4 °C)   Ht 172.7 cm (68\")   Wt 73.5 kg (162 lb)   SpO2 99%   BMI 24.63 kg/m²       Physical Exam   Constitutional: He appears well-developed. No distress.   HENT:   Head: Normocephalic and " atraumatic.   Right Ear: External ear normal.   Left Ear: External ear normal.   Nose: Nose normal.   Mouth/Throat: Oropharynx is clear and moist. No oropharyngeal exudate.   Oral thrush and sinus pain   Eyes: Conjunctivae and lids are normal.   Neck: Trachea normal and normal range of motion. Neck supple. Carotid bruit is not present. No thyroid mass and no thyromegaly present.   Cardiovascular: Normal rate, regular rhythm and normal heart sounds.   Pulmonary/Chest: Effort normal and breath sounds normal.   Lymphadenopathy:     He has no cervical adenopathy.   Neurological: He is alert. He is not disoriented.   Skin: Skin is warm and dry.   Psychiatric: He has a normal mood and affect. His speech is normal and behavior is normal. He is attentive.       Lab Results   Component Value Date    GLUCOSE 80 09/08/2018    BUN 19 10/03/2019    CREATININE 1.7 (H) 10/03/2019    EGFRIFNONA 47 (L) 09/08/2018    BCR 11.2 10/03/2019    K 4.5 10/03/2019    CO2 29 10/03/2019    CALCIUM 8.9 10/03/2019    ALBUMIN 4.0 10/03/2019    LABIL2 1.3 10/03/2019    AST 27 10/03/2019    ALT 15 10/03/2019       WBC   Date Value Ref Range Status   10/03/2019 7.37 4.5 - 11.0 10*3/uL Final     RBC   Date Value Ref Range Status   10/03/2019 4.77 4.5 - 5.9 10*6/uL Final     Hemoglobin   Date Value Ref Range Status   10/03/2019 14.4 13.5 - 17.5 g/dL Final     Hematocrit   Date Value Ref Range Status   10/03/2019 44.0 41.0 - 53.0 % Final     MCV   Date Value Ref Range Status   10/03/2019 92.2 80.0 - 100.0 fL Final     MCH   Date Value Ref Range Status   10/03/2019 30.2 26.0 - 34.0 pg Final     MCHC   Date Value Ref Range Status   10/03/2019 32.7 31.0 - 37.0 g/dL Final     RDW   Date Value Ref Range Status   10/03/2019 15.0 12.0 - 16.8 % Final     RDW-SD   Date Value Ref Range Status   09/08/2018 52.8 37.0 - 54.0 fl Final     MPV   Date Value Ref Range Status   10/03/2019 11.1 (H) 6.7 - 10.8 fL Final     Platelets   Date Value Ref Range Status    10/03/2019 224 140 - 440 10*3/uL Final     Neutrophil Rel %   Date Value Ref Range Status   10/03/2019 46.9 45 - 80 % Final     Lymphocyte Rel %   Date Value Ref Range Status   10/03/2019 40.4 15 - 50 % Final     Monocyte Rel %   Date Value Ref Range Status   10/03/2019 11.1 0 - 15 % Final     Eosinophil %   Date Value Ref Range Status   10/03/2019 0.9 0 - 7 % Final     Basophil Rel %   Date Value Ref Range Status   10/03/2019 0.7 0 - 2 % Final     Immature Grans %   Date Value Ref Range Status   09/07/2018 0.8 (H) 0.0 - 0.5 % Final     Neutrophils Absolute   Date Value Ref Range Status   10/03/2019 3.45 2.0 - 8.8 10*3/uL Final     Lymphocytes Absolute   Date Value Ref Range Status   10/03/2019 2.98 0.7 - 5.5 10*3/uL Final     Monocytes Absolute   Date Value Ref Range Status   10/03/2019 0.82 0.0 - 1.7 10*3/uL Final     Eosinophils Absolute   Date Value Ref Range Status   10/03/2019 0.07 0.0 - 0.8 10*3/uL Final     Basophils Absolute   Date Value Ref Range Status   10/03/2019 0.05 0.0 - 0.2 10*3/uL Final     Immature Grans, Absolute   Date Value Ref Range Status   09/07/2018 0.13 (H) 0.00 - 0.03 10*3/mm3 Final       No results found for: HGBA1C    No results found for: [id number]    TSH   Date Value Ref Range Status   09/07/2018 2.940 0.270 - 4.200 mIU/mL Final           Procedures    Assessment/Plan   Problems Addressed this Visit        Other    Oral thrush - Primary    Relevant Medications    nystatin (MYCOSTATIN) [id number] UNIT/ML suspension          No orders of the defined types were placed in this encounter.      Current Outpatient Medications   Medication Sig Dispense Refill   • allopurinol (ZYLOPRIM) 100 MG tablet Take 1 tablet by mouth Daily.     • amLODIPine (NORVASC) 5 MG tablet Take 1 tablet by mouth Daily. 90 tablet 3   • calcium carbonate (TUMS) 500 MG chewable tablet Chew 1 tablet Daily.     • doxylamine (UNISOM) 25 MG tablet Take  by mouth.     • fluorometholone (FML) 0.1 % ophthalmic suspension       • ibrutinib (IMBRUVICA) 420 MG tablet tablet Take 420 mg by mouth Every Morning.     • prednisoLONE acetate (PRED FORTE) 1 % ophthalmic suspension Administer 1 drop to both eyes daily.     • sodium chloride (SENA 128) 5 % ophthalmic solution Administer 1 drop into the left eye 3 (three) times a day.     • nystatin (MYCOSTATIN) [id number] UNIT/ML suspension Swish and swallow 5 mL 4 (Four) Times a Day. 473 mL 0     No current facility-administered medications for this visit.        No Follow-up on file.    There are no Patient Instructions on file for this visit.

## 2019-11-20 ENCOUNTER — OFFICE VISIT (OUTPATIENT)
Dept: FAMILY MEDICINE CLINIC | Facility: CLINIC | Age: 69
End: 2019-11-20

## 2019-11-20 VITALS
BODY MASS INDEX: 24.86 KG/M2 | SYSTOLIC BLOOD PRESSURE: 131 MMHG | TEMPERATURE: 98.5 F | HEART RATE: 64 BPM | DIASTOLIC BLOOD PRESSURE: 82 MMHG | HEIGHT: 68 IN | WEIGHT: 164 LBS | OXYGEN SATURATION: 98 %

## 2019-11-20 DIAGNOSIS — I10 ESSENTIAL HYPERTENSION: Primary | ICD-10-CM

## 2019-11-20 DIAGNOSIS — Z00.00 MEDICARE ANNUAL WELLNESS VISIT, SUBSEQUENT: ICD-10-CM

## 2019-11-20 DIAGNOSIS — C91.10 CLL (CHRONIC LYMPHOCYTIC LEUKEMIA) (HCC): ICD-10-CM

## 2019-11-20 PROCEDURE — 90686 IIV4 VACC NO PRSV 0.5 ML IM: CPT | Performed by: FAMILY MEDICINE

## 2019-11-20 PROCEDURE — G0439 PPPS, SUBSEQ VISIT: HCPCS | Performed by: FAMILY MEDICINE

## 2019-11-20 PROCEDURE — 99214 OFFICE O/P EST MOD 30 MIN: CPT | Performed by: FAMILY MEDICINE

## 2019-11-20 PROCEDURE — G0008 ADMIN INFLUENZA VIRUS VAC: HCPCS | Performed by: FAMILY MEDICINE

## 2019-11-20 NOTE — PROGRESS NOTES
The ABCs of the Annual Wellness Visit  Subsequent Medicare Wellness Visit    Chief Complaint   Patient presents with   • Hypertension       Subjective   History of Present Illness:  Angel Cisneros is a 69 y.o. male who presents for a Subsequent Medicare Wellness Visit.  F/u HTN.  BP log 120-140/70-80s.  F/u CLL.  Undergoing imbruvica treatment now.    HEALTH RISK ASSESSMENT    Recent Hospitalizations:  No hospitalization(s) within the last year.    Current Medical Providers:  Patient Care Team:  Ayaan Amin MD as PCP - General (Family Medicine)    Smoking Status:  Social History     Tobacco Use   Smoking Status Never Smoker   Smokeless Tobacco Never Used       Alcohol Consumption:  Social History     Substance and Sexual Activity   Alcohol Use No       Depression Screen:   PHQ-2/PHQ-9 Depression Screening 11/20/2019   Little interest or pleasure in doing things 0   Feeling down, depressed, or hopeless 0   Total Score 0       Fall Risk Screen:  JIMMY Fall Risk Assessment was completed, and patient is at LOW risk for falls.Assessment completed on:11/20/2019    Health Habits and Functional and Cognitive Screening:  Functional & Cognitive Status 11/20/2019   Do you have difficulty preparing food and eating? No   Do you have difficulty bathing yourself, getting dressed or grooming yourself? No   Do you have difficulty using the toilet? No   Do you have difficulty moving around from place to place? No   Do you have trouble with steps or getting out of a bed or a chair? No   Current Diet Well Balanced Diet   Dental Exam Up to date   Eye Exam Up to date   Exercise (times per week) 0 times per week   Current Exercise Activities Include None   Do you need help using the phone?  No   Are you deaf or do you have serious difficulty hearing?  No   Do you need help with transportation? No   Do you need help shopping? No   Do you need help preparing meals?  No   Do you need help with housework?  No   Do you need help with  laundry? No   Do you need help taking your medications? No   Do you need help managing money? No   Do you ever drive or ride in a car without wearing a seat belt? No   Have you felt unusual stress, anger or loneliness in the last month? No   Who do you live with? Spouse   If you need help, do you have trouble finding someone available to you? No   Have you been bothered in the last four weeks by sexual problems? No   Do you have difficulty concentrating, remembering or making decisions? No         Does the patient have evidence of cognitive impairment? No    Asprin use counseling:Does not need ASA (and currently is not on it)    Age-appropriate Screening Schedule:  Refer to the list below for future screening recommendations based on patient's age, sex and/or medical conditions. Orders for these recommended tests are listed in the plan section. The patient has been provided with a written plan.    Health Maintenance   Topic Date Due   • TDAP/TD VACCINES (1 - Tdap) 01/26/1970   • ZOSTER VACCINE (1 of 2) 01/26/2001   • PNEUMOCOCCAL VACCINES (65+ LOW/MEDIUM RISK) (1 of 2 - PCV13) 01/26/2016   • INFLUENZA VACCINE  08/01/2019   • COLONOSCOPY  03/08/2026          The following portions of the patient's history were reviewed and updated as appropriate: allergies, current medications, past family history, past medical history, past social history, past surgical history and problem list.    Outpatient Medications Prior to Visit   Medication Sig Dispense Refill   • amLODIPine (NORVASC) 5 MG tablet Take 1 tablet by mouth Daily. 90 tablet 3   • calcium carbonate (TUMS) 500 MG chewable tablet Chew 1 tablet Daily.     • doxylamine (UNISOM) 25 MG tablet Take  by mouth.     • fluorometholone (FML) 0.1 % ophthalmic suspension      • ibrutinib (IMBRUVICA) 420 MG tablet tablet Take 420 mg by mouth Every Morning.     • prednisoLONE acetate (PRED FORTE) 1 % ophthalmic suspension Administer 1 drop to both eyes daily.     • sodium  chloride (SENA 128) 5 % ophthalmic solution Administer 1 drop into the left eye 3 (three) times a day.     • allopurinol (ZYLOPRIM) 100 MG tablet Take 1 tablet by mouth Daily.     • nystatin (MYCOSTATIN) 736862 UNIT/ML suspension Swish and swallow 5 mL 4 (Four) Times a Day. 473 mL 0     No facility-administered medications prior to visit.        Patient Active Problem List   Diagnosis   • Lymphoma (CMS/HCC)   • Leukemia (CMS/HCC)   • Chest pain   • GERD (gastroesophageal reflux disease)   • HTN (hypertension)   • Stage 3 chronic kidney disease (CMS/HCC)   • Generalized abdominal pain   • CLL (chronic lymphocytic leukemia) (CMS/HCC)   • Gastroesophageal reflux disease   • Hodgkin's disease of lymph nodes of head, face, and neck (CMS/HCC)   • Oral thrush   • Medicare annual wellness visit, subsequent       Advanced Care Planning:  Patient does not have an advance directive - information provided to the patient today    Review of Systems   Constitutional: Negative for activity change, appetite change and fatigue.   HENT: Negative for hearing loss and postnasal drip.    Eyes: Negative for discharge and itching.   Respiratory: Negative for cough and shortness of breath.    Cardiovascular: Negative for chest pain and leg swelling.   Gastrointestinal: Negative for abdominal distention and abdominal pain.   Endocrine: Negative for cold intolerance and heat intolerance.   Genitourinary: Negative for difficulty urinating and flank pain.   Musculoskeletal: Negative for arthralgias and myalgias.   Skin: Negative for color change.   Neurological: Negative for dizziness and facial asymmetry.   Hematological: Negative for adenopathy.   Psychiatric/Behavioral: Negative for agitation and confusion.       Compared to one year ago, the patient feels his physical health is the same.  Compared to one year ago, the patient feels his mental health is the same.    Reviewed chart for potential of high risk medication in the elderly:  "yes  Reviewed chart for potential of harmful drug interactions in the elderly:yes    Objective         Vitals:    11/20/19 1606   BP: 131/82   Pulse: 64   Temp: 98.5 °F (36.9 °C)   SpO2: 98%   Weight: 74.4 kg (164 lb)   Height: 172.7 cm (68\")       Body mass index is 24.94 kg/m².  Discussed the patient's BMI with him. The BMI is in the acceptable range.    Physical Exam   Constitutional: He appears well-developed and well-nourished.   HENT:   Head: Normocephalic and atraumatic.   Right Ear: External ear normal.   Left Ear: External ear normal.   Nose: Nose normal.   Mouth/Throat: Oropharynx is clear and moist.   Eyes: Conjunctivae and EOM are normal. Pupils are equal, round, and reactive to light. Right eye exhibits no discharge. Left eye exhibits no discharge. No scleral icterus.   Neck: Normal range of motion. Neck supple. No JVD present. No tracheal deviation present. No thyromegaly present.   Cardiovascular: Normal rate, regular rhythm, normal heart sounds and intact distal pulses. Exam reveals no gallop and no friction rub.   No murmur heard.  Pulmonary/Chest: Effort normal and breath sounds normal. No respiratory distress. He has no wheezes. He has no rales. He exhibits no tenderness.   Abdominal: Soft. Bowel sounds are normal. He exhibits no distension and no mass. There is no tenderness. There is no rebound and no guarding. No hernia.   Musculoskeletal: Normal range of motion. He exhibits no edema or tenderness.   Lymphadenopathy:     He has no cervical adenopathy.   Neurological: He displays normal reflexes. No cranial nerve deficit or sensory deficit. He exhibits normal muscle tone. Coordination normal.   Skin: Skin is warm and dry.   Psychiatric: He has a normal mood and affect. His behavior is normal. Judgment and thought content normal.   Nursing note and vitals reviewed.      Lab Results   Component Value Date    GLU 87 10/03/2019        Assessment/Plan   Medicare Risks and Personalized Health " Plan  CMS Preventative Services Quick Reference  Fall Risk    The above risks/problems have been discussed with the patient.  Pertinent information has been shared with the patient in the After Visit Summary.  Follow up plans and orders are seen below in the Assessment/Plan Section.    Diagnoses and all orders for this visit:    1. Essential hypertension (Primary)    2. CLL (chronic lymphocytic leukemia) (CMS/[facility])    3. Medicare annual wellness visit, subsequent    Other orders  -     Fluarix/Fluzone/Afluria Quad>6 Months      Follow Up:  Return in about 6 months (around 5/20/2020).     An After Visit Summary and PPPS were given to the patient.

## 2019-12-30 ENCOUNTER — OFFICE VISIT (OUTPATIENT)
Dept: RETAIL CLINIC | Facility: CLINIC | Age: 69
End: 2019-12-30

## 2019-12-30 VITALS
DIASTOLIC BLOOD PRESSURE: 72 MMHG | WEIGHT: 166 LBS | BODY MASS INDEX: 25.16 KG/M2 | TEMPERATURE: 99 F | HEIGHT: 68 IN | OXYGEN SATURATION: 97 % | RESPIRATION RATE: 18 BRPM | SYSTOLIC BLOOD PRESSURE: 132 MMHG | HEART RATE: 64 BPM

## 2019-12-30 DIAGNOSIS — R05.9 COUGHING: ICD-10-CM

## 2019-12-30 DIAGNOSIS — J06.9 ACUTE URI: Primary | ICD-10-CM

## 2019-12-30 LAB
EXPIRATION DATE: NORMAL
FLUAV AG NPH QL: NEGATIVE
FLUBV AG NPH QL: NEGATIVE
INTERNAL CONTROL: NORMAL
Lab: NORMAL

## 2019-12-30 PROCEDURE — 99213 OFFICE O/P EST LOW 20 MIN: CPT | Performed by: NURSE PRACTITIONER

## 2019-12-30 PROCEDURE — 87804 INFLUENZA ASSAY W/OPTIC: CPT | Performed by: NURSE PRACTITIONER

## 2019-12-30 RX ORDER — BENZONATATE 100 MG/1
100 CAPSULE ORAL 3 TIMES DAILY PRN
Qty: 30 CAPSULE | Refills: 0 | Status: SHIPPED | OUTPATIENT
Start: 2019-12-30 | End: 2020-01-09

## 2019-12-30 RX ORDER — DOXYCYCLINE HYCLATE 100 MG
100 TABLET ORAL 2 TIMES DAILY
Qty: 20 TABLET | Refills: 0 | Status: SHIPPED | OUTPATIENT
Start: 2019-12-30 | End: 2020-01-09

## 2019-12-30 NOTE — PROGRESS NOTES
"Subjective   Angel Cisneros is a 69 y.o. male.     /72 (BP Location: Left arm, Patient Position: Sitting, Cuff Size: Adult)   Pulse 64   Temp 99 °F (37.2 °C) (Oral)   Resp 18   Ht 172.7 cm (68\")   Wt 75.3 kg (166 lb)   SpO2 97%   BMI 25.24 kg/m²       Cough   This is a new problem. The current episode started in the past 7 days. The problem has been gradually worsening. The problem occurs constantly. The cough is productive of sputum. Associated symptoms include nasal congestion, postnasal drip and a sore throat. Pertinent negatives include no chills, fever, headaches or myalgias. Nothing aggravates the symptoms. He has tried OTC cough suppressant for the symptoms. The treatment provided no relief. His past medical history is significant for bronchitis.        The following portions of the patient's history were reviewed and updated as appropriate: current medications, past family history, past medical history, past social history, past surgical history and problem list.    Review of Systems   Constitutional: Negative for chills and fever.   HENT: Positive for postnasal drip and sore throat.    Eyes: Negative.    Respiratory: Positive for cough.    Cardiovascular: Negative.    Gastrointestinal: Negative.    Endocrine: Negative.    Genitourinary: Negative.    Musculoskeletal: Negative for myalgias.   Skin: Negative.    Allergic/Immunologic: Negative.    Neurological: Negative.    Hematological: Negative.    Psychiatric/Behavioral: Negative.        Objective   Physical Exam   Constitutional: He is oriented to person, place, and time. He appears well-developed and well-nourished.   HENT:   Head: Normocephalic and atraumatic.   Right Ear: Hearing, tympanic membrane, external ear and ear canal normal.   Left Ear: Hearing, tympanic membrane, external ear and ear canal normal.   Nose: Sinus tenderness and congestion present.   Mouth/Throat: Uvula is midline and mucous membranes are normal. Posterior " oropharyngeal erythema present.   Eyes: Pupils are equal, round, and reactive to light. Conjunctivae and EOM are normal.   Cardiovascular: Normal rate, regular rhythm and normal heart sounds.   Pulmonary/Chest: Effort normal and breath sounds normal.   Abdominal: Soft. Bowel sounds are normal.   Musculoskeletal: Normal range of motion.   Neurological: He is alert and oriented to person, place, and time.   Skin: Skin is warm and dry. Capillary refill takes less than 2 seconds.   Psychiatric: He has a normal mood and affect.   Vitals reviewed.        Assessment/Plan   Angel was seen today for sinusitis.    Diagnoses and all orders for this visit:    Acute URI  -     doxycycline (VIBRAMYICN) 100 MG tablet; Take 1 tablet by mouth 2 (Two) Times a Day for 10 days.    Coughing  -     POC Influenza A / B  -     benzonatate (TESSALON PERLES) 100 MG capsule; Take 1 capsule by mouth 3 (Three) Times a Day As Needed for Cough for up to 10 days.        Cough, Adult    A cough helps to clear your throat and lungs. A cough may last only 2-3 weeks (acute), or it may last longer than 8 weeks (chronic). Many different things can cause a cough. A cough may be a sign of an illness or another medical condition.  Follow these instructions at home:  · Pay attention to any changes in your cough.  · Take medicines only as told by your doctor.  ? If you were prescribed an antibiotic medicine, take it as told by your doctor. Do not stop taking it even if you start to feel better.  ? Talk with your doctor before you try using a cough medicine.  · Drink enough fluid to keep your pee (urine) clear or pale yellow.  · If the air is dry, use a cold steam vaporizer or humidifier in your home.  · Stay away from things that make you cough at work or at home.  · If your cough is worse at night, try using extra pillows to raise your head up higher while you sleep.  · Do not smoke, and try not to be around smoke. If you need help quitting, ask your  doctor.  · Do not have caffeine.  · Do not drink alcohol.  · Rest as needed.  Contact a doctor if:  · You have new problems (symptoms).  · You cough up yellow fluid (pus).  · Your cough does not get better after 2-3 weeks, or your cough gets worse.  · Medicine does not help your cough and you are not sleeping well.  · You have pain that gets worse or pain that is not helped with medicine.  · You have a fever.  · You are losing weight and you do not know why.  · You have night sweats.  Get help right away if:  · You cough up blood.  · You have trouble breathing.  · Your heartbeat is very fast.  This information is not intended to replace advice given to you by your health care provider. Make sure you discuss any questions you have with your health care provider.  Document Released: 08/30/2012 Document Revised: 05/25/2017 Document Reviewed: 02/24/2016  [employer] Interactive Patient Education © 2019 [employer]

## 2019-12-30 NOTE — PATIENT INSTRUCTIONS
Cough, Adult    A cough helps to clear your throat and lungs. A cough may last only 2-3 weeks (acute), or it may last longer than 8 weeks (chronic). Many different things can cause a cough. A cough may be a sign of an illness or another medical condition.  Follow these instructions at home:  · Pay attention to any changes in your cough.  · Take medicines only as told by your doctor.  ? If you were prescribed an antibiotic medicine, take it as told by your doctor. Do not stop taking it even if you start to feel better.  ? Talk with your doctor before you try using a cough medicine.  · Drink enough fluid to keep your pee (urine) clear or pale yellow.  · If the air is dry, use a cold steam vaporizer or humidifier in your home.  · Stay away from things that make you cough at work or at home.  · If your cough is worse at night, try using extra pillows to raise your head up higher while you sleep.  · Do not smoke, and try not to be around smoke. If you need help quitting, ask your doctor.  · Do not have caffeine.  · Do not drink alcohol.  · Rest as needed.  Contact a doctor if:  · You have new problems (symptoms).  · You cough up yellow fluid (pus).  · Your cough does not get better after 2-3 weeks, or your cough gets worse.  · Medicine does not help your cough and you are not sleeping well.  · You have pain that gets worse or pain that is not helped with medicine.  · You have a fever.  · You are losing weight and you do not know why.  · You have night sweats.  Get help right away if:  · You cough up blood.  · You have trouble breathing.  · Your heartbeat is very fast.  This information is not intended to replace advice given to you by your health care provider. Make sure you discuss any questions you have with your health care provider.  Document Released: 08/30/2012 Document Revised: 05/25/2017 Document Reviewed: 02/24/2016  [employer] Interactive Patient Education © 2019 [employer]

## 2020-06-03 ENCOUNTER — OFFICE VISIT (OUTPATIENT)
Dept: FAMILY MEDICINE CLINIC | Facility: CLINIC | Age: 70
End: 2020-06-03

## 2020-06-03 VITALS
BODY MASS INDEX: 26.01 KG/M2 | SYSTOLIC BLOOD PRESSURE: 156 MMHG | OXYGEN SATURATION: 96 % | DIASTOLIC BLOOD PRESSURE: 86 MMHG | TEMPERATURE: 97.5 F | WEIGHT: 171.6 LBS | HEART RATE: 59 BPM | HEIGHT: 68 IN

## 2020-06-03 DIAGNOSIS — I10 ESSENTIAL HYPERTENSION: Primary | ICD-10-CM

## 2020-06-03 DIAGNOSIS — F51.01 PRIMARY INSOMNIA: ICD-10-CM

## 2020-06-03 DIAGNOSIS — N18.30 STAGE 3 CHRONIC KIDNEY DISEASE (HCC): ICD-10-CM

## 2020-06-03 DIAGNOSIS — C91.10 CLL (CHRONIC LYMPHOCYTIC LEUKEMIA) (HCC): ICD-10-CM

## 2020-06-03 PROCEDURE — 99214 OFFICE O/P EST MOD 30 MIN: CPT | Performed by: FAMILY MEDICINE

## 2020-06-03 RX ORDER — AMLODIPINE BESYLATE 10 MG/1
10 TABLET ORAL DAILY
Qty: 90 TABLET | Refills: 3 | Status: SHIPPED | OUTPATIENT
Start: 2020-06-03 | End: 2021-06-02

## 2020-06-03 NOTE — PROGRESS NOTES
Chief Complaint   Patient presents with   • Hypertension     Follow up        Subjective   Angel Cisneros is a 70 y.o. male.     History of Present Illness   F/U HTN.  No orthostasis.  On amlodipine 5mg a day. No HA.    F/u insomnia.  Does well with unisom prn.    Fu CKD Stage 3.  Cr 1.7 6 months ago.     The following portions of the patient's history were reviewed and updated as appropriate: allergies, current medications, past family history, past medical history, past social history, past surgical history and problem list.    Review of Systems   Constitutional: Negative for appetite change and fatigue.   HENT: Negative for nosebleeds and sore throat.    Eyes: Negative for blurred vision and visual disturbance.   Respiratory: Negative for shortness of breath and wheezing.    Cardiovascular: Negative for chest pain and leg swelling.   Gastrointestinal: Negative for abdominal distention and abdominal pain.   Endocrine: Negative for cold intolerance and polyuria.   Genitourinary: Negative for dysuria and hematuria.   Musculoskeletal: Negative for arthralgias and myalgias.   Skin: Negative for color change and rash.   Neurological: Negative for weakness and confusion.   Psychiatric/Behavioral: Negative for agitation and depressed mood.       Patient Active Problem List   Diagnosis   • Lymphoma (CMS/HCC)   • Leukemia (CMS/HCC)   • Chest pain   • GERD (gastroesophageal reflux disease)   • HTN (hypertension)   • Stage 3 chronic kidney disease (CMS/HCC)   • Generalized abdominal pain   • CLL (chronic lymphocytic leukemia) (CMS/HCC)   • Gastroesophageal reflux disease   • Hodgkin's disease of lymph nodes of head, face, and neck (CMS/HCC)   • Oral thrush   • Medicare annual wellness visit, subsequent   • Primary insomnia       Allergies   Allergen Reactions   • Ambien [Zolpidem Tartrate] Mental Status Change   • Penicillins Rash         Current Outpatient Medications:   •  amLODIPine (NORVASC) 10 MG tablet, Take 1 tablet  by mouth Daily., Disp: 90 tablet, Rfl: 3  •  calcium carbonate (TUMS) 500 MG chewable tablet, Chew 1 tablet Daily., Disp: , Rfl:   •  doxylamine (UNISOM) 25 MG tablet, Take  by mouth., Disp: , Rfl:   •  fluorometholone (FML) 0.1 % ophthalmic suspension, , Disp: , Rfl:   •  ibrutinib (IMBRUVICA) 420 MG tablet tablet, Take 420 mg by mouth Every Morning., Disp: , Rfl:   •  sodium chloride (SENA 128) 5 % ophthalmic solution, Administer 1 drop into the left eye 3 (three) times a day., Disp: , Rfl:   •  nystatin (MYCOSTATIN) 191382 UNIT/ML suspension, Swish and swallow 5 mL 4 (Four) Times a Day., Disp: 473 mL, Rfl: 0  •  prednisoLONE acetate (PRED FORTE) 1 % ophthalmic suspension, Administer 1 drop to both eyes daily., Disp: , Rfl:     Past Medical History:   Diagnosis Date   • Arthritis     both knees   • Cancer (CMS/HCC)     dx with lymphoma 2009/ radiation   • Cataracts, bilateral    • Hx of cornea transplant     both eyes   • Leukemia (CMS/HCC)    • Lymphoma (CMS/HCC)     dx in 2009. treated with radiation.   • Lymphoma (CMS/HCC)     treated with radiation. dx in 2009       Past Surgical History:   Procedure Laterality Date   • BREAST LUMPECTOMY Right    • COLONOSCOPY N/A 3/8/2016    Procedure: COLONOSCOPY with cold polypectomy X 3;  Surgeon: Chris Harden MD;  Location: [facility] ENDOSCOPY;  Service:    • ENDOSCOPY N/A 10/23/2018    Procedure: ESOPHAGOGASTRODUODENOSCOPY WITH BIOPSY;  Surgeon: Chris Harden MD;  Location: [facility] ENDOSCOPY;  Service: Gastroenterology   • EYE SURGERY      partial cornea transplant 4-5 years ago   • KNEE SURGERY Left    • TOE SURGERY Bilateral    • TONSILLECTOMY         Family History   Problem Relation Age of Onset   • Kidney cancer Mother        Social History     Tobacco Use   • Smoking status: Never Smoker   • Smokeless tobacco: Never Used   Substance Use Topics   • Alcohol use: No            Objective     Vitals:    06/03/20 1116   BP: 156/86   Pulse:    Temp:    SpO2:       Body mass index is 26.09 kg/m².    Physical Exam   Constitutional: He appears well-developed and well-nourished.   HENT:   Head: Normocephalic and atraumatic.   Mouth/Throat: Oropharynx is clear and moist.   Eyes: Pupils are equal, round, and reactive to light. No scleral icterus.   Neck: No thyromegaly present.   Cardiovascular: Normal rate and regular rhythm. Exam reveals no gallop and no friction rub.   No murmur heard.  Pulmonary/Chest: Effort normal. No respiratory distress. He has no wheezes. He has no rales. He exhibits no tenderness.   Abdominal: Soft. Bowel sounds are normal. He exhibits no distension. There is no tenderness.   Musculoskeletal: Normal range of motion. He exhibits no edema or deformity.   Lymphadenopathy:     He has no cervical adenopathy.   Neurological: No cranial nerve deficit. He exhibits normal muscle tone.   Skin: Skin is warm and dry. No rash noted. He is not diaphoretic.   Vitals reviewed.      Lab Results   Component Value Date    GLUCOSE 80 09/08/2018    BUN 21 (H) 12/06/2019    CREATININE 1.7 (H) 12/06/2019    EGFRIFNONA 47 (L) 09/08/2018    BCR 12.4 12/06/2019    K 4.2 12/06/2019    CO2 28 12/06/2019    CALCIUM 9.2 12/06/2019    ALBUMIN 3.8 12/06/2019    LABIL2 1.3 12/06/2019    AST 24 12/06/2019    ALT 14 12/06/2019       WBC   Date Value Ref Range Status   12/06/2019 7.32 4.5 - 11.0 10*3/uL Final     RBC   Date Value Ref Range Status   12/06/2019 4.65 4.5 - 5.9 10*6/uL Final     Hemoglobin   Date Value Ref Range Status   12/06/2019 13.8 13.5 - 17.5 g/dL Final     Hematocrit   Date Value Ref Range Status   12/06/2019 42.3 41.0 - 53.0 % Final     MCV   Date Value Ref Range Status   12/06/2019 91.0 80.0 - 100.0 fL Final     MCH   Date Value Ref Range Status   12/06/2019 29.7 26.0 - 34.0 pg Final     MCHC   Date Value Ref Range Status   12/06/2019 32.6 31.0 - 37.0 g/dL Final     RDW   Date Value Ref Range Status   12/06/2019 14.8 12.0 - 16.8 % Final     RDW-SD   Date Value Ref  Range Status   09/08/2018 52.8 37.0 - 54.0 fl Final     MPV   Date Value Ref Range Status   12/06/2019 11.0 (H) 6.7 - 10.8 fL Final     Platelets   Date Value Ref Range Status   12/06/2019 227 140 - 440 10*3/uL Final     Neutrophil Rel %   Date Value Ref Range Status   12/06/2019 52.4 45 - 80 % Final     Lymphocyte Rel %   Date Value Ref Range Status   12/06/2019 34.6 15 - 50 % Final     Monocyte Rel %   Date Value Ref Range Status   12/06/2019 10.4 0 - 15 % Final     Eosinophil %   Date Value Ref Range Status   12/06/2019 1.8 0 - 7 % Final     Basophil Rel %   Date Value Ref Range Status   12/06/2019 0.8 0 - 2 % Final     Immature Grans %   Date Value Ref Range Status   09/07/2018 0.8 (H) 0.0 - 0.5 % Final     Neutrophils Absolute   Date Value Ref Range Status   12/06/2019 3.84 2.0 - 8.8 10*3/uL Final     Lymphocytes Absolute   Date Value Ref Range Status   12/06/2019 2.53 0.7 - 5.5 10*3/uL Final     Monocytes Absolute   Date Value Ref Range Status   12/06/2019 0.76 0.0 - 1.7 10*3/uL Final     Eosinophils Absolute   Date Value Ref Range Status   12/06/2019 0.13 0.0 - 0.8 10*3/uL Final     Basophils Absolute   Date Value Ref Range Status   12/06/2019 0.06 0.0 - 0.2 10*3/uL Final     Immature Grans, Absolute   Date Value Ref Range Status   09/07/2018 0.13 (H) 0.00 - 0.03 10*3/mm3 Final       No results found for: HGBA1C    No results found for: ZGFRGQXS31    TSH   Date Value Ref Range Status   09/07/2018 2.940 0.270 - 4.200 mIU/mL Final       No results found for: CHOL  No results found for: TRIG  No results found for: HDL  No results found for: LDL  No results found for: VLDL  No results found for: LDLHDL      Procedures    Assessment/Plan   Problems Addressed this Visit        Cardiovascular and Mediastinum    HTN (hypertension) - Primary    Relevant Medications    amLODIPine (NORVASC) 10 MG tablet    Other Relevant Orders    Comprehensive Metabolic Panel       Genitourinary    Stage 3 chronic kidney disease  (CMS/[facility])    Relevant Orders    Comprehensive Metabolic Panel       Hematopoietic and Hemostatic    CLL (chronic lymphocytic leukemia) (CMS/[facility])    Relevant Orders    CBC & Differential       Other    Primary insomnia      Check ambulatory BP as BP uncontrolled.  Increase amlodipine ot 10 mg a day.      Orders Placed This Encounter   Procedures   • Comprehensive Metabolic Panel   • CBC & Differential     Order Specific Question:   Manual Differential     Answer:   No       Current Outpatient Medications   Medication Sig Dispense Refill   • amLODIPine (NORVASC) 10 MG tablet Take 1 tablet by mouth Daily. 90 tablet 3   • calcium carbonate (TUMS) 500 MG chewable tablet Chew 1 tablet Daily.     • doxylamine (UNISOM) 25 MG tablet Take  by mouth.     • fluorometholone (FML) 0.1 % ophthalmic suspension      • ibrutinib (IMBRUVICA) 420 MG tablet tablet Take 420 mg by mouth Every Morning.     • sodium chloride (SENA 128) 5 % ophthalmic solution Administer 1 drop into the left eye 3 (three) times a day.     • nystatin (MYCOSTATIN) 375806 UNIT/ML suspension Swish and swallow 5 mL 4 (Four) Times a Day. 473 mL 0   • prednisoLONE acetate (PRED FORTE) 1 % ophthalmic suspension Administer 1 drop to both eyes daily.       No current facility-administered medications for this visit.        Angel Cisneros had no medications administered during this visit.    No follow-ups on file.    There are no Patient Instructions on file for this visit.

## 2020-06-04 LAB
ALBUMIN SERPL-MCNC: 4.2 G/DL (ref 3.5–5.2)
ALBUMIN/GLOB SERPL: 1.9 G/DL
ALP SERPL-CCNC: 92 U/L (ref 39–117)
ALT SERPL-CCNC: 20 U/L (ref 1–41)
AST SERPL-CCNC: 17 U/L (ref 1–40)
BASOPHILS # BLD AUTO: 0.1 10*3/MM3 (ref 0–0.2)
BASOPHILS NFR BLD AUTO: 0.9 % (ref 0–1.5)
BILIRUB SERPL-MCNC: 0.5 MG/DL (ref 0.2–1.2)
BUN SERPL-MCNC: 24 MG/DL (ref 8–23)
BUN/CREAT SERPL: 14.1 (ref 7–25)
CALCIUM SERPL-MCNC: 9.3 MG/DL (ref 8.6–10.5)
CHLORIDE SERPL-SCNC: 103 MMOL/L (ref 98–107)
CO2 SERPL-SCNC: 28.1 MMOL/L (ref 22–29)
CREAT SERPL-MCNC: 1.7 MG/DL (ref 0.76–1.27)
EOSINOPHIL # BLD AUTO: 0.17 10*3/MM3 (ref 0–0.4)
EOSINOPHIL NFR BLD AUTO: 1.5 % (ref 0.3–6.2)
ERYTHROCYTE [DISTWIDTH] IN BLOOD BY AUTOMATED COUNT: 14.2 % (ref 12.3–15.4)
GLOBULIN SER CALC-MCNC: 2.2 GM/DL
GLUCOSE SERPL-MCNC: 77 MG/DL (ref 65–99)
HCT VFR BLD AUTO: 40.8 % (ref 37.5–51)
HGB BLD-MCNC: 13.3 G/DL (ref 13–17.7)
IMM GRANULOCYTES # BLD AUTO: 0.1 10*3/MM3 (ref 0–0.05)
IMM GRANULOCYTES NFR BLD AUTO: 0.9 % (ref 0–0.5)
LYMPHOCYTES # BLD AUTO: 3.52 10*3/MM3 (ref 0.7–3.1)
LYMPHOCYTES NFR BLD AUTO: 31.5 % (ref 19.6–45.3)
MCH RBC QN AUTO: 30 PG (ref 26.6–33)
MCHC RBC AUTO-ENTMCNC: 32.6 G/DL (ref 31.5–35.7)
MCV RBC AUTO: 91.9 FL (ref 79–97)
MONOCYTES # BLD AUTO: 1.29 10*3/MM3 (ref 0.1–0.9)
MONOCYTES NFR BLD AUTO: 11.5 % (ref 5–12)
NEUTROPHILS # BLD AUTO: 5.99 10*3/MM3 (ref 1.7–7)
NEUTROPHILS NFR BLD AUTO: 53.7 % (ref 42.7–76)
NRBC BLD AUTO-RTO: 0 /100 WBC (ref 0–0.2)
PLATELET # BLD AUTO: 221 10*3/MM3 (ref 140–450)
POTASSIUM SERPL-SCNC: 4 MMOL/L (ref 3.5–5.2)
PROT SERPL-MCNC: 6.4 G/DL (ref 6–8.5)
RBC # BLD AUTO: 4.44 10*6/MM3 (ref 4.14–5.8)
SODIUM SERPL-SCNC: 139 MMOL/L (ref 136–145)
WBC # BLD AUTO: 11.17 10*3/MM3 (ref 3.4–10.8)

## 2020-06-18 ENCOUNTER — TELEPHONE (OUTPATIENT)
Dept: FAMILY MEDICINE CLINIC | Facility: CLINIC | Age: 70
End: 2020-06-18

## 2020-06-18 NOTE — TELEPHONE ENCOUNTER
PATIENT STATE: that he would like his results on his lab that were taken on 6/3/2020    PATIENT CAN BE REACHED ON:  [phone]

## 2020-06-19 NOTE — TELEPHONE ENCOUNTER
Tell him labs are stable.  White count only slightly elevated at 11.1 which is great for him.  Creatinine stable at 1.7(elevated but ok).  Recheck in 4 to 6 months again.

## 2020-11-23 ENCOUNTER — TELEPHONE (OUTPATIENT)
Dept: FAMILY MEDICINE CLINIC | Facility: CLINIC | Age: 70
End: 2020-11-23

## 2020-11-23 ENCOUNTER — TELEMEDICINE (OUTPATIENT)
Dept: FAMILY MEDICINE CLINIC | Facility: CLINIC | Age: 70
End: 2020-11-23

## 2020-11-23 DIAGNOSIS — F51.01 PRIMARY INSOMNIA: Primary | ICD-10-CM

## 2020-11-23 DIAGNOSIS — F43.21 ADJUSTMENT DISORDER WITH DEPRESSED MOOD: ICD-10-CM

## 2020-11-23 PROCEDURE — 99213 OFFICE O/P EST LOW 20 MIN: CPT | Performed by: FAMILY MEDICINE

## 2020-11-23 RX ORDER — MIRTAZAPINE 7.5 MG/1
7.5 TABLET, FILM COATED ORAL NIGHTLY
Qty: 30 TABLET | Refills: 2 | Status: SHIPPED | OUTPATIENT
Start: 2020-11-23 | End: 2020-12-15 | Stop reason: SDUPTHER

## 2020-11-23 NOTE — PROGRESS NOTES
C/o insomnia.      Subjective   Angel Cisneros is a 70 y.o. male.     History of Present Illness   Telemedicine visit.  No video available.  Consent obtained.  6 minute visit.    C/o insomnia.  Has had chronic problem and used unisom in past but no help now.  Wife passed away 11/22/20.  Hx of trouble with ambien.    The following portions of the patient's history were reviewed and updated as appropriate: allergies, current medications, past family history, past medical history, past social history, past surgical history and problem list.    Review of Systems   Constitutional: Negative for appetite change and fatigue.   HENT: Negative for nosebleeds and sore throat.    Eyes: Negative for blurred vision and visual disturbance.   Respiratory: Negative for shortness of breath and wheezing.    Cardiovascular: Negative for chest pain and leg swelling.   Gastrointestinal: Negative for abdominal distention and abdominal pain.   Endocrine: Negative for cold intolerance and polyuria.   Genitourinary: Negative for dysuria and hematuria.   Musculoskeletal: Negative for arthralgias and myalgias.   Skin: Negative for color change and rash.   Neurological: Negative for weakness and confusion.   Psychiatric/Behavioral: Positive for sleep disturbance and depressed mood. Negative for agitation and suicidal ideas.       Patient Active Problem List   Diagnosis   • Lymphoma (CMS/HCC)   • Leukemia (CMS/[facility])   • Chest pain   • GERD (gastroesophageal reflux disease)   • HTN (hypertension)   • Stage 3 chronic kidney disease   • Generalized abdominal pain   • CLL (chronic lymphocytic leukemia) (CMS/[facility])   • Gastroesophageal reflux disease   • Hodgkin's disease of lymph nodes of head, face, and neck (CMS/HCC)   • Oral thrush   • Medicare annual wellness visit, subsequent   • Primary insomnia   • Adjustment disorder with depressed mood       Allergies   Allergen Reactions   • Ambien [Zolpidem Tartrate] Mental Status Change   • Penicillins  Rash         Current Outpatient Medications:   •  amLODIPine (NORVASC) 10 MG tablet, Take 1 tablet by mouth Daily., Disp: 90 tablet, Rfl: 3  •  calcium carbonate (TUMS) 500 MG chewable tablet, Chew 1 tablet Daily., Disp: , Rfl:   •  doxylamine (UNISOM) 25 MG tablet, Take  by mouth., Disp: , Rfl:   •  fluorometholone (FML) 0.1 % ophthalmic suspension, , Disp: , Rfl:   •  ibrutinib (IMBRUVICA) 420 MG tablet tablet, Take 420 mg by mouth Every Morning., Disp: , Rfl:   •  mirtazapine (REMERON) 7.5 MG tablet, Take 1 tablet by mouth Every Night., Disp: 30 tablet, Rfl: 2  •  nystatin (MYCOSTATIN) 739505 UNIT/ML suspension, Swish and swallow 5 mL 4 (Four) Times a Day., Disp: 473 mL, Rfl: 0  •  prednisoLONE acetate (PRED FORTE) 1 % ophthalmic suspension, Administer 1 drop to both eyes daily., Disp: , Rfl:   •  sodium chloride (SENA 128) 5 % ophthalmic solution, Administer 1 drop into the left eye 3 (three) times a day., Disp: , Rfl:     Past Medical History:   Diagnosis Date   • Arthritis     both knees   • Cancer (CMS/HCC)     dx with lymphoma 2009/ radiation   • Cataracts, bilateral    • Hx of cornea transplant     both eyes   • Leukemia (CMS/HCC)    • Lymphoma (CMS/HCC)     dx in 2009. treated with radiation.   • Lymphoma (CMS/HCC)     treated with radiation. dx in 2009       Past Surgical History:   Procedure Laterality Date   • BREAST LUMPECTOMY Right    • COLONOSCOPY N/A 3/8/2016    Procedure: COLONOSCOPY with cold polypectomy X 3;  Surgeon: Chris Harden MD;  Location: [facility] ENDOSCOPY;  Service:    • ENDOSCOPY N/A 10/23/2018    Procedure: ESOPHAGOGASTRODUODENOSCOPY WITH BIOPSY;  Surgeon: Chris Harden MD;  Location: [facility] ENDOSCOPY;  Service: Gastroenterology   • EYE SURGERY      partial cornea transplant 4-5 years ago   • KNEE SURGERY Left    • TOE SURGERY Bilateral    • TONSILLECTOMY         Family History   Problem Relation Age of Onset   • Kidney cancer Mother        Social History     Tobacco Use   •  Smoking status: Never Smoker   • Smokeless tobacco: Never Used   Substance Use Topics   • Alcohol use: No            Objective     There were no vitals filed for this visit.  There is no height or weight on file to calculate BMI.    Physical Exam  Pulmonary:      Breath sounds: Normal breath sounds.   Neurological:      Mental Status: He is oriented to person, place, and time.   Psychiatric:         Thought Content: Thought content normal.         Judgment: Judgment normal.         Lab Results   Component Value Date    GLUCOSE 80 09/08/2018    BUN 24 (H) 06/03/2020    CREATININE 1.70 (H) 06/03/2020    EGFRIFNONA 40 (L) 06/03/2020    EGFRIFAFRI 49 (L) 06/03/2020    BCR 14.1 06/03/2020    K 4.0 06/03/2020    CO2 28.1 06/03/2020    CALCIUM 9.3 06/03/2020    PROTENTOTREF 6.4 06/03/2020    ALBUMIN 4.20 06/03/2020    LABIL2 1.9 06/03/2020    AST 17 06/03/2020    ALT 20 06/03/2020       WBC   Date Value Ref Range Status   06/03/2020 11.17 (H) 3.40 - 10.80 10*3/mm3 Final   12/06/2019 7.32 4.5 - 11.0 10*3/uL Final     RBC   Date Value Ref Range Status   06/03/2020 4.44 4.14 - 5.80 10*6/mm3 Final   12/06/2019 4.65 4.5 - 5.9 10*6/uL Final     Hemoglobin   Date Value Ref Range Status   06/03/2020 13.3 13.0 - 17.7 g/dL Final   12/06/2019 13.8 13.5 - 17.5 g/dL Final     Hematocrit   Date Value Ref Range Status   06/03/2020 40.8 37.5 - 51.0 % Final   12/06/2019 42.3 41.0 - 53.0 % Final     MCV   Date Value Ref Range Status   06/03/2020 91.9 79.0 - 97.0 fL Final   12/06/2019 91.0 80.0 - 100.0 fL Final     MCH   Date Value Ref Range Status   06/03/2020 30.0 26.6 - 33.0 pg Final   12/06/2019 29.7 26.0 - 34.0 pg Final     MCHC   Date Value Ref Range Status   06/03/2020 32.6 31.5 - 35.7 g/dL Final   12/06/2019 32.6 31.0 - 37.0 g/dL Final     RDW   Date Value Ref Range Status   06/03/2020 14.2 12.3 - 15.4 % Final   12/06/2019 14.8 12.0 - 16.8 % Final     RDW-SD   Date Value Ref Range Status   09/08/2018 52.8 37.0 - 54.0 fl Final     MPV    Date Value Ref Range Status   12/06/2019 11.0 (H) 6.7 - 10.8 fL Final     Platelets   Date Value Ref Range Status   06/03/2020 221 140 - 450 10*3/mm3 Final   12/06/2019 227 140 - 440 10*3/uL Final     Neutrophil Rel %   Date Value Ref Range Status   06/03/2020 53.7 42.7 - 76.0 % Final   12/06/2019 52.4 45 - 80 % Final     Lymphocyte Rel %   Date Value Ref Range Status   06/03/2020 31.5 19.6 - 45.3 % Final   12/06/2019 34.6 15 - 50 % Final     Monocyte Rel %   Date Value Ref Range Status   06/03/2020 11.5 5.0 - 12.0 % Final   12/06/2019 10.4 0 - 15 % Final     Eosinophil Rel %   Date Value Ref Range Status   06/03/2020 1.5 0.3 - 6.2 % Final     Eosinophil %   Date Value Ref Range Status   12/06/2019 1.8 0 - 7 % Final     Basophil Rel %   Date Value Ref Range Status   06/03/2020 0.9 0.0 - 1.5 % Final   12/06/2019 0.8 0 - 2 % Final     Immature Grans %   Date Value Ref Range Status   09/07/2018 0.8 (H) 0.0 - 0.5 % Final     Neutrophils Absolute   Date Value Ref Range Status   06/03/2020 5.99 1.70 - 7.00 10*3/mm3 Final   12/06/2019 3.84 2.0 - 8.8 10*3/uL Final     Lymphocytes Absolute   Date Value Ref Range Status   06/03/2020 3.52 (H) 0.70 - 3.10 10*3/mm3 Final   12/06/2019 2.53 0.7 - 5.5 10*3/uL Final     Monocytes Absolute   Date Value Ref Range Status   06/03/2020 1.29 (H) 0.10 - 0.90 10*3/mm3 Final   12/06/2019 0.76 0.0 - 1.7 10*3/uL Final     Eosinophils Absolute   Date Value Ref Range Status   06/03/2020 0.17 0.00 - 0.40 10*3/mm3 Final   12/06/2019 0.13 0.0 - 0.8 10*3/uL Final     Basophils Absolute   Date Value Ref Range Status   06/03/2020 0.10 0.00 - 0.20 10*3/mm3 Final   12/06/2019 0.06 0.0 - 0.2 10*3/uL Final     Immature Grans, Absolute   Date Value Ref Range Status   09/07/2018 0.13 (H) 0.00 - 0.03 10*3/mm3 Final     nRBC   Date Value Ref Range Status   06/03/2020 0.0 0.0 - 0.2 /100 WBC Final       No results found for: HGBA1C    No results found for: [id number]    TSH   Date Value Ref Range Status    09/07/2018 2.940 0.270 - 4.200 mIU/mL Final       No results found for: CHOL  No results found for: TRIG  No results found for: HDL  No results found for: LDL  No results found for: VLDL  No results found for: LDLHDL      Procedures    Assessment/Plan   Problems Addressed this Visit        Other    Primary insomnia - Primary    Relevant Medications    mirtazapine (REMERON) 7.5 MG tablet    Adjustment disorder with depressed mood    Relevant Medications    mirtazapine (REMERON) 7.5 MG tablet      Diagnoses       Codes Comments    Primary insomnia    -  Primary ICD-10-CM: F51.01  ICD-9-CM: 307.42     Adjustment disorder with depressed mood     ICD-10-CM: F43.21  ICD-9-CM: 309.0           No orders of the defined types were placed in this encounter.      Current Outpatient Medications   Medication Sig Dispense Refill   • amLODIPine (NORVASC) 10 MG tablet Take 1 tablet by mouth Daily. 90 tablet 3   • calcium carbonate (TUMS) 500 MG chewable tablet Chew 1 tablet Daily.     • doxylamine (UNISOM) 25 MG tablet Take  by mouth.     • fluorometholone (FML) 0.1 % ophthalmic suspension      • ibrutinib (IMBRUVICA) 420 MG tablet tablet Take 420 mg by mouth Every Morning.     • mirtazapine (REMERON) 7.5 MG tablet Take 1 tablet by mouth Every Night. 30 tablet 2   • nystatin (MYCOSTATIN) [id number] UNIT/ML suspension Swish and swallow 5 mL 4 (Four) Times a Day. 473 mL 0   • prednisoLONE acetate (PRED FORTE) 1 % ophthalmic suspension Administer 1 drop to both eyes daily.     • sodium chloride (SENA 128) 5 % ophthalmic solution Administer 1 drop into the left eye 3 (three) times a day.       No current facility-administered medications for this visit.        Angel Cisneros had no medications administered during this visit.    No follow-ups on file.    There are no Patient Instructions on file for this visit.

## 2020-11-23 NOTE — TELEPHONE ENCOUNTER
Caller: Angel Cisneros    Relationship: Self    Best call back number: [phone]     What medication are you requesting: LIGHT SLEEP AIDE    What are your current symptoms: PATIENT'S WIFE PASSED ON 11/22/20 AND HE IS HAVING TROUBLE GETTING REST    How long have you been experiencing symptoms: 1 NIGHT    Have you had these symptoms before:    [] Yes  [x] No    Have you been treated for these symptoms before:   [] Yes  [x] No    If a prescription is needed, what is your preferred pharmacy and phone number: [id number] - [facility], [place] - [address] AT [facility] - [phone] [facility] [phone]      Additional notes: PATIENT STATED THAT HE CAN'T HANDLE AMBIEN.

## 2020-12-04 ENCOUNTER — OFFICE VISIT (OUTPATIENT)
Dept: FAMILY MEDICINE CLINIC | Facility: CLINIC | Age: 70
End: 2020-12-04

## 2020-12-04 VITALS
SYSTOLIC BLOOD PRESSURE: 154 MMHG | HEART RATE: 69 BPM | WEIGHT: 159 LBS | BODY MASS INDEX: 24.1 KG/M2 | TEMPERATURE: 97.2 F | DIASTOLIC BLOOD PRESSURE: 82 MMHG | HEIGHT: 68 IN | OXYGEN SATURATION: 97 %

## 2020-12-04 DIAGNOSIS — R30.0 DYSURIA: ICD-10-CM

## 2020-12-04 DIAGNOSIS — R31.9 HEMATURIA, UNSPECIFIED TYPE: Primary | ICD-10-CM

## 2020-12-04 LAB
BILIRUB BLD-MCNC: NEGATIVE MG/DL
CLARITY, POC: CLEAR
COLOR UR: YELLOW
GLUCOSE UR STRIP-MCNC: NEGATIVE MG/DL
KETONES UR QL: NEGATIVE
LEUKOCYTE EST, POC: NEGATIVE
NITRITE UR-MCNC: NEGATIVE MG/ML
PH UR: 6 [PH] (ref 5–8)
PROT UR STRIP-MCNC: ABNORMAL MG/DL
RBC # UR STRIP: ABNORMAL /UL
SP GR UR: 1.02 (ref 1–1.03)
UROBILINOGEN UR QL: NORMAL

## 2020-12-04 PROCEDURE — 81003 URINALYSIS AUTO W/O SCOPE: CPT | Performed by: FAMILY MEDICINE

## 2020-12-04 PROCEDURE — 99213 OFFICE O/P EST LOW 20 MIN: CPT | Performed by: FAMILY MEDICINE

## 2020-12-04 NOTE — PROGRESS NOTES
CC: blood in urine.      Subjective   Angel Cisneros is a 70 y.o. male.     History of Present Illness   PT is here for blood in his urine for past 3 days.  Some dysuria.  No new back pain. No pain with bowel movements.  He may have slight constipation.  He states he has no blood from stool or rectum.  Hx of kidney stones in the past.    The following portions of the patient's history were reviewed and updated as appropriate: allergies, current medications, past family history, past medical history, past social history, past surgical history and problem list.    Review of Systems   Constitutional: Negative for fatigue.   Eyes: Negative for blurred vision.   Respiratory: Negative for cough, chest tightness and shortness of breath.    Cardiovascular: Negative for chest pain, palpitations and leg swelling.   Neurological: Negative for dizziness, light-headedness and headache.     See above.    Patient Active Problem List   Diagnosis   • Lymphoma (CMS/HCC)   • Leukemia (CMS/HCC)   • Chest pain   • GERD (gastroesophageal reflux disease)   • HTN (hypertension)   • Stage 3 chronic kidney disease   • Generalized abdominal pain   • CLL (chronic lymphocytic leukemia) (CMS/HCC)   • Gastroesophageal reflux disease   • Hodgkin's disease of lymph nodes of head, face, and neck (CMS/HCC)   • Oral thrush   • Medicare annual wellness visit, subsequent   • Primary insomnia   • Adjustment disorder with depressed mood   • Dysuria       Allergies   Allergen Reactions   • Ambien [Zolpidem Tartrate] Mental Status Change   • Penicillins Rash         Current Outpatient Medications:   •  amLODIPine (NORVASC) 10 MG tablet, Take 1 tablet by mouth Daily., Disp: 90 tablet, Rfl: 3  •  calcium carbonate (TUMS) 500 MG chewable tablet, Chew 1 tablet Daily., Disp: , Rfl:   •  doxylamine (UNISOM) 25 MG tablet, Take  by mouth., Disp: , Rfl:   •  fluorometholone (FML) 0.1 % ophthalmic suspension, , Disp: , Rfl:   •  ibrutinib (IMBRUVICA) 420 MG tablet  "tablet, Take 420 mg by mouth Every Morning., Disp: , Rfl:   •  mirtazapine (REMERON) 7.5 MG tablet, Take 1 tablet by mouth Every Night., Disp: 30 tablet, Rfl: 2  •  nystatin (MYCOSTATIN) 114376 UNIT/ML suspension, Swish and swallow 5 mL 4 (Four) Times a Day., Disp: 473 mL, Rfl: 0  •  prednisoLONE acetate (PRED FORTE) 1 % ophthalmic suspension, Administer 1 drop to both eyes daily., Disp: , Rfl:   •  sodium chloride (SENA 128) 5 % ophthalmic solution, Administer 1 drop into the left eye 3 (three) times a day., Disp: , Rfl:     Past Medical History:   Diagnosis Date   • Arthritis     both knees   • Cancer (CMS/HCC)     dx with lymphoma 2009/ radiation   • Cataracts, bilateral    • Hx of cornea transplant     both eyes   • Leukemia (CMS/HCC)    • Lymphoma (CMS/HCC)     dx in 2009. treated with radiation.   • Lymphoma (CMS/HCC)     treated with radiation. dx in 2009       Past Surgical History:   Procedure Laterality Date   • BREAST LUMPECTOMY Right    • COLONOSCOPY N/A 3/8/2016    Procedure: COLONOSCOPY with cold polypectomy X 3;  Surgeon: Chris Harden MD;  Location: [facility] ENDOSCOPY;  Service:    • ENDOSCOPY N/A 10/23/2018    Procedure: ESOPHAGOGASTRODUODENOSCOPY WITH BIOPSY;  Surgeon: Chris Harden MD;  Location: [facility] ENDOSCOPY;  Service: Gastroenterology   • EYE SURGERY      partial cornea transplant 4-5 years ago   • KNEE SURGERY Left    • TOE SURGERY Bilateral    • TONSILLECTOMY         Family History   Problem Relation Age of Onset   • Kidney cancer Mother        Social History     Tobacco Use   • Smoking status: Never Smoker   • Smokeless tobacco: Never Used   Substance Use Topics   • Alcohol use: No            Objective     Visit Vitals  /82   Pulse 69   Temp 97.2 °F (36.2 °C)   Ht 172.7 cm (68\")   Wt 72.1 kg (159 lb)   SpO2 97%   BMI 24.18 kg/m²       Physical Exam  Vitals signs and nursing note reviewed.   Constitutional:       Appearance: He is normal weight.   Cardiovascular:      Rate and " Rhythm: Normal rate and regular rhythm.   Pulmonary:      Effort: Pulmonary effort is normal. No respiratory distress.      Breath sounds: Normal breath sounds. No stridor.   Abdominal:      General: Abdomen is flat. Bowel sounds are normal.      Palpations: Abdomen is soft.      Comments: Suprapubic pain and some left sided flank pain,.   Neurological:      Mental Status: He is alert.         Lab Results   Component Value Date    GLUCOSE 80 09/08/2018    BUN 26 (H) 11/04/2020    CREATININE 1.80 (H) 11/04/2020    EGFRIFNONA 40 (L) 06/03/2020    EGFRIFAFRI 49 (L) 06/03/2020    BCR 14.4 11/04/2020    K 4.1 11/04/2020    CO2 28 11/04/2020    CALCIUM 9.3 11/04/2020    PROTENTOTREF 6.4 06/03/2020    ALBUMIN 3.8 11/04/2020    LABIL2 1.4 11/04/2020    AST 29 11/04/2020    ALT 15 11/04/2020       WBC   Date Value Ref Range Status   11/04/2020 8.16 4.5 - 11.0 10*3/uL Final     RBC   Date Value Ref Range Status   11/04/2020 4.28 (L) 4.5 - 5.9 10*6/uL Final     Hemoglobin   Date Value Ref Range Status   11/04/2020 12.9 (L) 13.5 - 17.5 g/dL Final     Hematocrit   Date Value Ref Range Status   11/04/2020 39.2 (L) 41.0 - 53.0 % Final     MCV   Date Value Ref Range Status   11/04/2020 91.6 80.0 - 100.0 fL Final     MCH   Date Value Ref Range Status   11/04/2020 30.1 26.0 - 34.0 pg Final     MCHC   Date Value Ref Range Status   11/04/2020 32.9 31.0 - 37.0 g/dL Final     RDW   Date Value Ref Range Status   11/04/2020 15.2 12.0 - 16.8 % Final     RDW-SD   Date Value Ref Range Status   09/08/2018 52.8 37.0 - 54.0 fl Final     MPV   Date Value Ref Range Status   11/04/2020 11.3 8.4 - 12.4 fL Final     Platelets   Date Value Ref Range Status   11/04/2020 225 140 - 440 10*3/uL Final     Neutrophil Rel %   Date Value Ref Range Status   11/04/2020 56.8 45 - 80 % Final     Lymphocyte Rel %   Date Value Ref Range Status   11/04/2020 32.5 15 - 50 % Final     Monocyte Rel %   Date Value Ref Range Status   11/04/2020 9.6 0 - 15 % Final      Eosinophil %   Date Value Ref Range Status   11/04/2020 0.5 0 - 7 % Final     Basophil Rel %   Date Value Ref Range Status   11/04/2020 0.6 0 - 2 % Final     Immature Grans %   Date Value Ref Range Status   09/07/2018 0.8 (H) 0.0 - 0.5 % Final     Neutrophils Absolute   Date Value Ref Range Status   11/04/2020 4.64 2.0 - 8.8 10*3/uL Final     Lymphocytes Absolute   Date Value Ref Range Status   11/04/2020 2.65 0.7 - 5.5 10*3/uL Final     Monocytes Absolute   Date Value Ref Range Status   11/04/2020 0.78 0.0 - 1.7 10*3/uL Final     Eosinophils Absolute   Date Value Ref Range Status   11/04/2020 0.04 0.0 - 0.8 10*3/uL Final     Basophils Absolute   Date Value Ref Range Status   11/04/2020 0.05 0.0 - 0.2 10*3/uL Final     Immature Grans, Absolute   Date Value Ref Range Status   09/07/2018 0.13 (H) 0.00 - 0.03 10*3/mm3 Final     nRBC   Date Value Ref Range Status   06/03/2020 0.0 0.0 - 0.2 /100 WBC Final       No results found for: HGBA1C    No results found for: [id number]    TSH   Date Value Ref Range Status   09/07/2018 2.940 0.270 - 4.200 mIU/mL Final       No results found for: CHOL  No results found for: TRIG  No results found for: HDL  No results found for: LDL  No results found for: VLDL  No results found for: LDLHDL      Procedures    Assessment/Plan   Problems Addressed this Visit        Nervous and Auditory    Dysuria      Other Visit Diagnoses     Hematuria, unspecified type    -  Primary    Relevant Orders    POCT urinalysis dipstick, automated (Completed)    Urine Culture - Urine, Urine, Clean Catch      Diagnoses       Codes Comments    Hematuria, unspecified type    -  Primary ICD-10-CM: R31.9  ICD-9-CM: 599.70     Dysuria     ICD-10-CM: R30.0  ICD-9-CM: 788.1           Orders Placed This Encounter   Procedures   • Urine Culture - Urine, Urine, Clean Catch   • POCT urinalysis dipstick, automated       Current Outpatient Medications   Medication Sig Dispense Refill   • amLODIPine (NORVASC) 10 MG  tablet Take 1 tablet by mouth Daily. 90 tablet 3   • calcium carbonate (TUMS) 500 MG chewable tablet Chew 1 tablet Daily.     • doxylamine (UNISOM) 25 MG tablet Take  by mouth.     • fluorometholone (FML) 0.1 % ophthalmic suspension      • ibrutinib (IMBRUVICA) 420 MG tablet tablet Take 420 mg by mouth Every Morning.     • mirtazapine (REMERON) 7.5 MG tablet Take 1 tablet by mouth Every Night. 30 tablet 2   • nystatin (MYCOSTATIN) 476387 UNIT/ML suspension Swish and swallow 5 mL 4 (Four) Times a Day. 473 mL 0   • prednisoLONE acetate (PRED FORTE) 1 % ophthalmic suspension Administer 1 drop to both eyes daily.     • sodium chloride (SENA 128) 5 % ophthalmic solution Administer 1 drop into the left eye 3 (three) times a day.       No current facility-administered medications for this visit.        No follow-ups on file.    There are no Patient Instructions on file for this visit.  Will  Get urine culture and push fluids.  If culture comes back normal will get CT to check for stone.

## 2020-12-06 LAB
BACTERIA UR CULT: NO GROWTH
BACTERIA UR CULT: NORMAL

## 2020-12-07 ENCOUNTER — TELEPHONE (OUTPATIENT)
Dept: FAMILY MEDICINE CLINIC | Facility: CLINIC | Age: 70
End: 2020-12-07

## 2020-12-07 DIAGNOSIS — R31.0 GROSS HEMATURIA: Primary | ICD-10-CM

## 2020-12-07 NOTE — TELEPHONE ENCOUNTER
Patient informed and stated that he was still having some blood in his urine and would like to go ahead with the CT

## 2020-12-14 ENCOUNTER — TELEPHONE (OUTPATIENT)
Dept: FAMILY MEDICINE CLINIC | Facility: CLINIC | Age: 70
End: 2020-12-14

## 2020-12-14 NOTE — TELEPHONE ENCOUNTER
Tell him his CT is normal.  No concerns seen.  No sign of cancer or problems seen.  Stay with same plan.  Thanks.

## 2020-12-14 NOTE — TELEPHONE ENCOUNTER
PATIENT HAS A CT DONE ON 12/07/20 AND HAS NOT GOTTEN THE RESULTS YET. PLEASE CALL.    PLEASE ADVISE   [phone]

## 2020-12-15 ENCOUNTER — OFFICE VISIT (OUTPATIENT)
Dept: FAMILY MEDICINE CLINIC | Facility: CLINIC | Age: 70
End: 2020-12-15

## 2020-12-15 VITALS
SYSTOLIC BLOOD PRESSURE: 150 MMHG | DIASTOLIC BLOOD PRESSURE: 78 MMHG | HEIGHT: 68 IN | TEMPERATURE: 97.9 F | BODY MASS INDEX: 24.4 KG/M2 | HEART RATE: 40 BPM | OXYGEN SATURATION: 97 % | WEIGHT: 161 LBS

## 2020-12-15 DIAGNOSIS — F51.01 PRIMARY INSOMNIA: ICD-10-CM

## 2020-12-15 DIAGNOSIS — K92.1 BLOOD IN STOOL: ICD-10-CM

## 2020-12-15 DIAGNOSIS — F43.21 ADJUSTMENT DISORDER WITH DEPRESSED MOOD: ICD-10-CM

## 2020-12-15 DIAGNOSIS — R31.9 HEMATURIA OF UNKNOWN CAUSE: Primary | ICD-10-CM

## 2020-12-15 PROCEDURE — 99213 OFFICE O/P EST LOW 20 MIN: CPT | Performed by: FAMILY MEDICINE

## 2020-12-15 PROCEDURE — 81003 URINALYSIS AUTO W/O SCOPE: CPT | Performed by: FAMILY MEDICINE

## 2020-12-15 RX ORDER — MIRTAZAPINE 7.5 MG/1
7.5 TABLET, FILM COATED ORAL NIGHTLY
Qty: 90 TABLET | Refills: 2 | Status: SHIPPED | OUTPATIENT
Start: 2020-12-15 | End: 2021-03-16 | Stop reason: SDUPTHER

## 2020-12-15 NOTE — PROGRESS NOTES
Chief Complaint   Patient presents with   • Bleeding from penis     C/o bleeding from penis again. He was seen for this same thing earlier this month. It got a little better, but never went away.        Subjective   Angel Cisneros is a 70 y.o. male.     History of Present Illness   Pt is here for some blood in urine.  He has also had some blood in stool  He does have a hx of hemorrhoids.  He has a hx of stones in the past.  No pain with bowel movements.  He had a normal abd/pelvis ct for stones last week.   No fever or chills.  No nvcd.  He takes activia to keep him regulated.  The following portions of the patient's history were reviewed and updated as appropriate: allergies, current medications, past family history, past medical history, past social history, past surgical history and problem list.    Review of Systems   Constitutional: Negative for fatigue.   Eyes: Negative for blurred vision.   Respiratory: Negative for cough, chest tightness and shortness of breath.    Cardiovascular: Negative for chest pain, palpitations and leg swelling.   Neurological: Negative for dizziness, light-headedness and headache.       Patient Active Problem List   Diagnosis   • Lymphoma (CMS/HCC)   • Leukemia (CMS/HCC)   • Chest pain   • GERD (gastroesophageal reflux disease)   • HTN (hypertension)   • Stage 3 chronic kidney disease   • Generalized abdominal pain   • CLL (chronic lymphocytic leukemia) (CMS/HCC)   • Gastroesophageal reflux disease   • Hodgkin's disease of lymph nodes of head, face, and neck (CMS/HCC)   • Oral thrush   • Medicare annual wellness visit, subsequent   • Primary insomnia   • Adjustment disorder with depressed mood   • Dysuria   • Gross hematuria       Allergies   Allergen Reactions   • Ambien [Zolpidem Tartrate] Mental Status Change   • Penicillins Rash         Current Outpatient Medications:   •  amLODIPine (NORVASC) 10 MG tablet, Take 1 tablet by mouth Daily., Disp: 90 tablet, Rfl: 3  •  calcium  carbonate (TUMS) 500 MG chewable tablet, Chew 1 tablet Daily., Disp: , Rfl:   •  doxylamine (UNISOM) 25 MG tablet, Take  by mouth., Disp: , Rfl:   •  fluorometholone (FML) 0.1 % ophthalmic suspension, , Disp: , Rfl:   •  ibrutinib (IMBRUVICA) 420 MG tablet tablet, Take 420 mg by mouth Every Morning., Disp: , Rfl:   •  mirtazapine (REMERON) 7.5 MG tablet, Take 1 tablet by mouth Every Night., Disp: 90 tablet, Rfl: 2  •  nystatin (MYCOSTATIN) [id number] UNIT/ML suspension, Swish and swallow 5 mL 4 (Four) Times a Day., Disp: 473 mL, Rfl: 0  •  prednisoLONE acetate (PRED FORTE) 1 % ophthalmic suspension, Administer 1 drop to both eyes daily., Disp: , Rfl:   •  sodium chloride (SENA 128) 5 % ophthalmic solution, Administer 1 drop into the left eye 3 (three) times a day., Disp: , Rfl:     Past Medical History:   Diagnosis Date   • Arthritis     both knees   • Cancer (CMS/HCC)     dx with lymphoma 2009/ radiation   • Cataracts, bilateral    • Hx of cornea transplant     both eyes   • Leukemia (CMS/HCC)    • Lymphoma (CMS/HCC)     dx in 2009. treated with radiation.   • Lymphoma (CMS/HCC)     treated with radiation. dx in 2009       Past Surgical History:   Procedure Laterality Date   • BREAST LUMPECTOMY Right    • COLONOSCOPY N/A 3/8/2016    Procedure: COLONOSCOPY with cold polypectomy X 3;  Surgeon: Chris Harden MD;  Location: [facility] ENDOSCOPY;  Service:    • ENDOSCOPY N/A 10/23/2018    Procedure: ESOPHAGOGASTRODUODENOSCOPY WITH BIOPSY;  Surgeon: Chris Harden MD;  Location: [facility] ENDOSCOPY;  Service: Gastroenterology   • EYE SURGERY      partial cornea transplant 4-5 years ago   • KNEE SURGERY Left    • TOE SURGERY Bilateral    • TONSILLECTOMY         Family History   Problem Relation Age of Onset   • Kidney cancer Mother        Social History     Tobacco Use   • Smoking status: Never Smoker   • Smokeless tobacco: Never Used   Substance Use Topics   • Alcohol use: No            Objective     Visit Vitals  /78  "  Pulse (!) 40   Temp 97.9 °F (36.6 °C)   Ht 172.7 cm (68\")   Wt 73 kg (161 lb)   SpO2 97%   BMI 24.48 kg/m²       Physical Exam  Vitals signs and nursing note reviewed.   Cardiovascular:      Rate and Rhythm: Normal rate and regular rhythm.   Pulmonary:      Effort: Pulmonary effort is normal.      Breath sounds: Normal breath sounds.   Abdominal:      General: There is no distension.      Palpations: There is no mass.      Tenderness: There is no abdominal tenderness. There is no guarding.      Hernia: No hernia is present.         Lab Results   Component Value Date    GLUCOSE 80 09/08/2018    BUN 26 (H) 11/04/2020    CREATININE 1.80 (H) 11/04/2020    EGFRIFNONA 40 (L) 06/03/2020    EGFRIFAFRI 49 (L) 06/03/2020    BCR 14.4 11/04/2020    K 4.1 11/04/2020    CO2 28 11/04/2020    CALCIUM 9.3 11/04/2020    PROTENTOTREF 6.4 06/03/2020    ALBUMIN 3.8 11/04/2020    LABIL2 1.4 11/04/2020    AST 29 11/04/2020    ALT 15 11/04/2020       WBC   Date Value Ref Range Status   11/04/2020 8.16 4.5 - 11.0 10*3/uL Final     RBC   Date Value Ref Range Status   11/04/2020 4.28 (L) 4.5 - 5.9 10*6/uL Final     Hemoglobin   Date Value Ref Range Status   11/04/2020 12.9 (L) 13.5 - 17.5 g/dL Final     Hematocrit   Date Value Ref Range Status   11/04/2020 39.2 (L) 41.0 - 53.0 % Final     MCV   Date Value Ref Range Status   11/04/2020 91.6 80.0 - 100.0 fL Final     MCH   Date Value Ref Range Status   11/04/2020 30.1 26.0 - 34.0 pg Final     MCHC   Date Value Ref Range Status   11/04/2020 32.9 31.0 - 37.0 g/dL Final     RDW   Date Value Ref Range Status   11/04/2020 15.2 12.0 - 16.8 % Final     RDW-SD   Date Value Ref Range Status   09/08/2018 52.8 37.0 - 54.0 fl Final     MPV   Date Value Ref Range Status   11/04/2020 11.3 8.4 - 12.4 fL Final     Platelets   Date Value Ref Range Status   11/04/2020 225 140 - 440 10*3/uL Final     Neutrophil Rel %   Date Value Ref Range Status   11/04/2020 56.8 45 - 80 % Final     Lymphocyte Rel %   Date " Value Ref Range Status   11/04/2020 32.5 15 - 50 % Final     Monocyte Rel %   Date Value Ref Range Status   11/04/2020 9.6 0 - 15 % Final     Eosinophil %   Date Value Ref Range Status   11/04/2020 0.5 0 - 7 % Final     Basophil Rel %   Date Value Ref Range Status   11/04/2020 0.6 0 - 2 % Final     Immature Grans %   Date Value Ref Range Status   09/07/2018 0.8 (H) 0.0 - 0.5 % Final     Neutrophils Absolute   Date Value Ref Range Status   11/04/2020 4.64 2.0 - 8.8 10*3/uL Final     Lymphocytes Absolute   Date Value Ref Range Status   11/04/2020 2.65 0.7 - 5.5 10*3/uL Final     Monocytes Absolute   Date Value Ref Range Status   11/04/2020 0.78 0.0 - 1.7 10*3/uL Final     Eosinophils Absolute   Date Value Ref Range Status   11/04/2020 0.04 0.0 - 0.8 10*3/uL Final     Basophils Absolute   Date Value Ref Range Status   11/04/2020 0.05 0.0 - 0.2 10*3/uL Final     Immature Grans, Absolute   Date Value Ref Range Status   09/07/2018 0.13 (H) 0.00 - 0.03 10*3/mm3 Final     nRBC   Date Value Ref Range Status   06/03/2020 0.0 0.0 - 0.2 /100 WBC Final       No results found for: HGBA1C    No results found for: [id number]    TSH   Date Value Ref Range Status   09/07/2018 2.940 0.270 - 4.200 mIU/mL Final       No results found for: CHOL  No results found for: TRIG  No results found for: HDL  No results found for: LDL  No results found for: VLDL  No results found for: LDLHDL      Procedures    Assessment/Plan   Problems Addressed this Visit     None      Visit Diagnoses     Hematuria of unknown cause    -  Primary    Relevant Orders    POC Urinalysis Dipstick, Automated    Ambulatory Referral to Urology    Blood in stool        Relevant Orders    Ambulatory Referral to Gastroenterology      Diagnoses       Codes Comments    Hematuria of unknown cause    -  Primary ICD-10-CM: R31.9  ICD-9-CM: 599.70     Blood in stool     ICD-10-CM: K92.1  ICD-9-CM: 578.1           Orders Placed This Encounter   Procedures   • Ambulatory Referral  to Gastroenterology     Referral Priority:   Routine     Referral Type:   Consultation     Referral Reason:   Specialty Services Required     Requested Specialty:   Gastroenterology     Number of Visits Requested:   1   • Ambulatory Referral to Urology     Referral Priority:   Routine     Referral Type:   Consultation     Referral Reason:   Specialty Services Required     Requested Specialty:   Urology     Number of Visits Requested:   1   • POC Urinalysis Dipstick, Automated       Current Outpatient Medications   Medication Sig Dispense Refill   • amLODIPine (NORVASC) 10 MG tablet Take 1 tablet by mouth Daily. 90 tablet 3   • calcium carbonate (TUMS) 500 MG chewable tablet Chew 1 tablet Daily.     • doxylamine (UNISOM) 25 MG tablet Take  by mouth.     • fluorometholone (FML) 0.1 % ophthalmic suspension      • ibrutinib (IMBRUVICA) 420 MG tablet tablet Take 420 mg by mouth Every Morning.     • mirtazapine (REMERON) 7.5 MG tablet Take 1 tablet by mouth Every Night. 90 tablet 2   • nystatin (MYCOSTATIN) 339563 UNIT/ML suspension Swish and swallow 5 mL 4 (Four) Times a Day. 473 mL 0   • prednisoLONE acetate (PRED FORTE) 1 % ophthalmic suspension Administer 1 drop to both eyes daily.     • sodium chloride (SENA 128) 5 % ophthalmic solution Administer 1 drop into the left eye 3 (three) times a day.       No current facility-administered medications for this visit.      Will get referrals. Drink more water.  Warning signs discussed.  No follow-ups on file.    There are no Patient Instructions on file for this visit.

## 2021-01-07 DIAGNOSIS — R31.0 GROSS HEMATURIA: ICD-10-CM

## 2021-01-18 ENCOUNTER — TELEMEDICINE (OUTPATIENT)
Dept: FAMILY MEDICINE CLINIC | Facility: CLINIC | Age: 71
End: 2021-01-18

## 2021-01-18 VITALS — SYSTOLIC BLOOD PRESSURE: 140 MMHG | DIASTOLIC BLOOD PRESSURE: 80 MMHG

## 2021-01-18 DIAGNOSIS — I10 ESSENTIAL HYPERTENSION: Primary | ICD-10-CM

## 2021-01-18 DIAGNOSIS — R05.9 COUGH: ICD-10-CM

## 2021-01-18 DIAGNOSIS — J01.00 ACUTE NON-RECURRENT MAXILLARY SINUSITIS: ICD-10-CM

## 2021-01-18 DIAGNOSIS — F51.01 PRIMARY INSOMNIA: ICD-10-CM

## 2021-01-18 PROCEDURE — 99214 OFFICE O/P EST MOD 30 MIN: CPT | Performed by: FAMILY MEDICINE

## 2021-01-18 RX ORDER — DOXYCYCLINE 100 MG/1
100 TABLET ORAL 2 TIMES DAILY
Qty: 14 TABLET | Refills: 0 | Status: SHIPPED | OUTPATIENT
Start: 2021-01-18 | End: 2021-06-17

## 2021-01-18 NOTE — PROGRESS NOTES
C/o sinus congestion.  F/U HTN.        Subjective   Angel Cisneros is a 70 y.o. male.     History of Present Illness   C/o sinus congestion.   Going on 4-5 days.  Lot of nasal discharge.  Occ cough.  No known ill contacts.  In person visit changed to telemedicine visit.    F/U HTN.  Doing wel lwiht meds.   No Se.    Fu insomnia.  Doing wel lwith mirtazapine.  No Se.    The following portions of the patient's history were reviewed and updated as appropriate: allergies, current medications, past family history, past medical history, past social history, past surgical history and problem list.    Review of Systems   Constitutional: Negative for appetite change and fatigue.   HENT: Positive for rhinorrhea and sinus pressure. Negative for nosebleeds and sore throat.    Eyes: Negative for blurred vision and visual disturbance.   Respiratory: Positive for cough. Negative for shortness of breath and wheezing.    Cardiovascular: Negative for chest pain and leg swelling.   Gastrointestinal: Negative for abdominal distention and abdominal pain.   Endocrine: Negative for cold intolerance and polyuria.   Genitourinary: Negative for dysuria and hematuria.   Musculoskeletal: Negative for arthralgias and myalgias.   Skin: Negative for color change and rash.   Neurological: Negative for weakness and confusion.   Psychiatric/Behavioral: Negative for agitation and depressed mood.       Patient Active Problem List   Diagnosis   • Lymphoma (CMS/HCC)   • Leukemia (CMS/HCC)   • Chest pain   • GERD (gastroesophageal reflux disease)   • HTN (hypertension)   • Stage 3 chronic kidney disease   • Generalized abdominal pain   • CLL (chronic lymphocytic leukemia) (CMS/[facility])   • Gastroesophageal reflux disease   • Hodgkin's disease of lymph nodes of head, face, and neck (CMS/HCC)   • Oral thrush   • Medicare annual wellness visit, subsequent   • Primary insomnia   • Adjustment disorder with depressed mood   • Dysuria   • Gross hematuria   • Acute  non-recurrent maxillary sinusitis   • Cough       Allergies   Allergen Reactions   • Ambien [Zolpidem Tartrate] Mental Status Change   • Penicillins Rash         Current Outpatient Medications:   •  amLODIPine (NORVASC) 10 MG tablet, Take 1 tablet by mouth Daily., Disp: 90 tablet, Rfl: 3  •  calcium carbonate (TUMS) 500 MG chewable tablet, Chew 1 tablet Daily., Disp: , Rfl:   •  doxycycline (ADOXA) 100 MG tablet, Take 1 tablet by mouth 2 (Two) Times a Day., Disp: 14 tablet, Rfl: 0  •  doxylamine (UNISOM) 25 MG tablet, Take  by mouth., Disp: , Rfl:   •  fluorometholone (FML) 0.1 % ophthalmic suspension, , Disp: , Rfl:   •  ibrutinib (IMBRUVICA) 420 MG tablet tablet, Take 420 mg by mouth Every Morning., Disp: , Rfl:   •  mirtazapine (REMERON) 7.5 MG tablet, Take 1 tablet by mouth Every Night., Disp: 90 tablet, Rfl: 2  •  nystatin (MYCOSTATIN) 780704 UNIT/ML suspension, Swish and swallow 5 mL 4 (Four) Times a Day., Disp: 473 mL, Rfl: 0  •  prednisoLONE acetate (PRED FORTE) 1 % ophthalmic suspension, Administer 1 drop to both eyes daily., Disp: , Rfl:   •  sodium chloride (SENA 128) 5 % ophthalmic solution, Administer 1 drop into the left eye 3 (three) times a day., Disp: , Rfl:     Past Medical History:   Diagnosis Date   • Arthritis     both knees   • Cancer (CMS/HCC)     dx with lymphoma 2009/ radiation   • Cataracts, bilateral    • Hx of cornea transplant     both eyes   • Leukemia (CMS/HCC)    • Lymphoma (CMS/HCC)     dx in 2009. treated with radiation.   • Lymphoma (CMS/HCC)     treated with radiation. dx in 2009       Past Surgical History:   Procedure Laterality Date   • BREAST LUMPECTOMY Right    • COLONOSCOPY N/A 3/8/2016    Procedure: COLONOSCOPY with cold polypectomy X 3;  Surgeon: Chris Harden MD;  Location: [facility] ENDOSCOPY;  Service:    • ENDOSCOPY N/A 10/23/2018    Procedure: ESOPHAGOGASTRODUODENOSCOPY WITH BIOPSY;  Surgeon: Chris Harden MD;  Location: [facility] ENDOSCOPY;  Service:  Gastroenterology   • EYE SURGERY      partial cornea transplant 4-5 years ago   • KNEE SURGERY Left    • TOE SURGERY Bilateral    • TONSILLECTOMY         Family History   Problem Relation Age of Onset   • Kidney cancer Mother        Social History     Tobacco Use   • Smoking status: Never Smoker   • Smokeless tobacco: Never Used   Substance Use Topics   • Alcohol use: No            Objective     Vitals:    01/18/21 1404   BP: 140/80     There is no height or weight on file to calculate BMI.    Physical Exam  Neurological:      Mental Status: He is oriented to person, place, and time.   Psychiatric:         Thought Content: Thought content normal.         Judgment: Judgment normal.         Lab Results   Component Value Date    GLUCOSE 80 09/08/2018    BUN 26 (H) 11/04/2020    CREATININE 1.80 (H) 11/04/2020    EGFRIFNONA 40 (L) 06/03/2020    EGFRIFAFRI 49 (L) 06/03/2020    BCR 14.4 11/04/2020    K 4.1 11/04/2020    CO2 28 11/04/2020    CALCIUM 9.3 11/04/2020    PROTENTOTREF 6.4 06/03/2020    ALBUMIN 3.8 11/04/2020    LABIL2 1.4 11/04/2020    AST 29 11/04/2020    ALT 15 11/04/2020       WBC   Date Value Ref Range Status   11/04/2020 8.16 4.5 - 11.0 10*3/uL Final     RBC   Date Value Ref Range Status   11/04/2020 4.28 (L) 4.5 - 5.9 10*6/uL Final     Hemoglobin   Date Value Ref Range Status   11/04/2020 12.9 (L) 13.5 - 17.5 g/dL Final     Hematocrit   Date Value Ref Range Status   11/04/2020 39.2 (L) 41.0 - 53.0 % Final     MCV   Date Value Ref Range Status   11/04/2020 91.6 80.0 - 100.0 fL Final     MCH   Date Value Ref Range Status   11/04/2020 30.1 26.0 - 34.0 pg Final     MCHC   Date Value Ref Range Status   11/04/2020 32.9 31.0 - 37.0 g/dL Final     RDW   Date Value Ref Range Status   11/04/2020 15.2 12.0 - 16.8 % Final     RDW-SD   Date Value Ref Range Status   09/08/2018 52.8 37.0 - 54.0 fl Final     MPV   Date Value Ref Range Status   11/04/2020 11.3 8.4 - 12.4 fL Final     Platelets   Date Value Ref Range Status    11/04/2020 225 140 - 440 10*3/uL Final     Neutrophil Rel %   Date Value Ref Range Status   11/04/2020 56.8 45 - 80 % Final     Lymphocyte Rel %   Date Value Ref Range Status   11/04/2020 32.5 15 - 50 % Final     Monocyte Rel %   Date Value Ref Range Status   11/04/2020 9.6 0 - 15 % Final     Eosinophil %   Date Value Ref Range Status   11/04/2020 0.5 0 - 7 % Final     Basophil Rel %   Date Value Ref Range Status   11/04/2020 0.6 0 - 2 % Final     Immature Grans %   Date Value Ref Range Status   09/07/2018 0.8 (H) 0.0 - 0.5 % Final     Neutrophils Absolute   Date Value Ref Range Status   11/04/2020 4.64 2.0 - 8.8 10*3/uL Final     Lymphocytes Absolute   Date Value Ref Range Status   11/04/2020 2.65 0.7 - 5.5 10*3/uL Final     Monocytes Absolute   Date Value Ref Range Status   11/04/2020 0.78 0.0 - 1.7 10*3/uL Final     Eosinophils Absolute   Date Value Ref Range Status   11/04/2020 0.04 0.0 - 0.8 10*3/uL Final     Basophils Absolute   Date Value Ref Range Status   11/04/2020 0.05 0.0 - 0.2 10*3/uL Final     Immature Grans, Absolute   Date Value Ref Range Status   09/07/2018 0.13 (H) 0.00 - 0.03 10*3/mm3 Final     nRBC   Date Value Ref Range Status   06/03/2020 0.0 0.0 - 0.2 /100 WBC Final       No results found for: HGBA1C    No results found for: OLMCACPH18    TSH   Date Value Ref Range Status   09/07/2018 2.940 0.270 - 4.200 mIU/mL Final       No results found for: CHOL  No results found for: TRIG  No results found for: HDL  No results found for: LDL  No results found for: VLDL  No results found for: LDLHDL      Procedures    Assessment/Plan   Problems Addressed this Visit     HTN (hypertension) - Primary    Primary insomnia    Acute non-recurrent maxillary sinusitis    Relevant Medications    doxycycline (ADOXA) 100 MG tablet    Cough    Relevant Orders    COVID-19,[facility] ROUTINE, NP/OP SWAB IN TRANSPORT MEDIA OR ESWAB 72 HR TAT - Swab, Nasopharynx      Diagnoses       Codes Comments    Essential hypertension     -  Primary ICD-10-CM: I10  ICD-9-CM: 401.9     Primary insomnia     ICD-10-CM: F51.01  ICD-9-CM: 307.42     Acute non-recurrent maxillary sinusitis     ICD-10-CM: J01.00  ICD-9-CM: 461.0     Cough     ICD-10-CM: R05  ICD-9-CM: 786.2         Continue amlodipine.    Continue mirtazapine.    Quarantine until test comes back.    Orders Placed This Encounter   Procedures   • COVID-19,[facility] ROUTINE, NP/OP SWAB IN TRANSPORT MEDIA OR ESWAB 72 HR TAT - Swab, Nasopharynx     Order Specific Question:   Previously tested for COVID-19?     Answer:   No     Order Specific Question:   Employed in healthcare setting?     Answer:   No     Order Specific Question:   Symptomatic for COVID-19 as defined by CDC?     Answer:   Yes     Order Specific Question:   Hospitalized for COVID-19?     Answer:   No     Order Specific Question:   Admitted to ICU for COVID-19?     Answer:   No     Order Specific Question:   Resident in a congregate (group) care setting?     Answer:   No       Current Outpatient Medications   Medication Sig Dispense Refill   • amLODIPine (NORVASC) 10 MG tablet Take 1 tablet by mouth Daily. 90 tablet 3   • calcium carbonate (TUMS) 500 MG chewable tablet Chew 1 tablet Daily.     • doxycycline (ADOXA) 100 MG tablet Take 1 tablet by mouth 2 (Two) Times a Day. 14 tablet 0   • doxylamine (UNISOM) 25 MG tablet Take  by mouth.     • fluorometholone (FML) 0.1 % ophthalmic suspension      • ibrutinib (IMBRUVICA) 420 MG tablet tablet Take 420 mg by mouth Every Morning.     • mirtazapine (REMERON) 7.5 MG tablet Take 1 tablet by mouth Every Night. 90 tablet 2   • nystatin (MYCOSTATIN) 878351 UNIT/ML suspension Swish and swallow 5 mL 4 (Four) Times a Day. 473 mL 0   • prednisoLONE acetate (PRED FORTE) 1 % ophthalmic suspension Administer 1 drop to both eyes daily.     • sodium chloride (SENA 128) 5 % ophthalmic solution Administer 1 drop into the left eye 3 (three) times a day.       No current facility-administered medications  for this visit.        Angel Cisneros had no medications administered during this visit.    No follow-ups on file.    There are no Patient Instructions on file for this visit.

## 2021-01-19 ENCOUNTER — TELEPHONE (OUTPATIENT)
Dept: FAMILY MEDICINE CLINIC | Facility: CLINIC | Age: 71
End: 2021-01-19

## 2021-01-19 NOTE — TELEPHONE ENCOUNTER
That is normal for that medicine.  Needs to eat before taking med and will do fine with it.   Make sure drinks 6-8  Ounces of water with pill.

## 2021-01-19 NOTE — TELEPHONE ENCOUNTER
DELETE AFTER REVIEWING: Telephone encounter to be sent to the clinical pool     Caller: MARIANA COLÓN    Relationship: SELF    Best call back number: [phone]    What medications are you currently taking:   Current Outpatient Medications on File Prior to Visit   Medication Sig Dispense Refill   • amLODIPine (NORVASC) 10 MG tablet Take 1 tablet by mouth Daily. 90 tablet 3   • calcium carbonate (TUMS) 500 MG chewable tablet Chew 1 tablet Daily.     • doxycycline (ADOXA) 100 MG tablet Take 1 tablet by mouth 2 (Two) Times a Day. 14 tablet 0   • doxylamine (UNISOM) 25 MG tablet Take  by mouth.     • fluorometholone (FML) 0.1 % ophthalmic suspension      • ibrutinib (IMBRUVICA) 420 MG tablet tablet Take 420 mg by mouth Every Morning.     • mirtazapine (REMERON) 7.5 MG tablet Take 1 tablet by mouth Every Night. 90 tablet 2   • nystatin (MYCOSTATIN) 165570 UNIT/ML suspension Swish and swallow 5 mL 4 (Four) Times a Day. 473 mL 0   • prednisoLONE acetate (PRED FORTE) 1 % ophthalmic suspension Administer 1 drop to both eyes daily.     • sodium chloride (SENA 128) 5 % ophthalmic solution Administer 1 drop into the left eye 3 (three) times a day.       No current facility-administered medications on file prior to visit.         Which medication are you concerned about: NEW ANTIBIOTIC GIVEN YESTERDAY DUE TO BLOODY MUCUS, MED IS AT HOME, UNSURE OF NAME OF THE MEDICINE.     Who prescribed you this medication: DR SHORT    What are your concerns: SECOND TIME PATIENT TOOK MED, HE NOTED THAT HE GOT REALLY HOT AND ITS BEEN AFFECTING HIS STOMACH. WOULD LIKE TO KNOW IF HE HAS TAKEN THIS MED BEFORE, IF NOT MIGHT NEED TO CHANGE IT.     How long have you been taking these medications: 24 HOURS

## 2021-01-20 LAB — SARS-COV-2 RNA RESP QL NAA+PROBE: NOT DETECTED

## 2021-01-20 NOTE — PROGRESS NOTES
Tell him his Covid test was negative.  No sign of infection.  Stay on the same plan as we discussed.  Thanks.

## 2021-02-09 ENCOUNTER — TELEPHONE (OUTPATIENT)
Dept: FAMILY MEDICINE CLINIC | Facility: CLINIC | Age: 71
End: 2021-02-09

## 2021-02-09 NOTE — TELEPHONE ENCOUNTER
Caller: Angel Cisneros    Relationship: Self    Best call back number: [phone]    Which medication are you concerned about: MIRTAZAPINE    Who prescribed you this medication: DR. SHORT    What are your concerns: PATIENT STATES HE HAS NOT BEEN TAKING THEM NIGHTLY AND WANTS TO KNOW IF THAT OK OR NOT. PATIENT STATES HE HASN'T HAD DEPRESSION SINCE November AND WANTS TO KNOW IF HE STILL NEEDS TO TAKE THEM OR NOT. PATIENT IS REQUESTING A CALL BACK.

## 2021-03-01 ENCOUNTER — TELEPHONE (OUTPATIENT)
Dept: FAMILY MEDICINE CLINIC | Facility: CLINIC | Age: 71
End: 2021-03-01

## 2021-03-01 NOTE — TELEPHONE ENCOUNTER
PATIENT STATED HE WOULD LIKE FOR HIS REFERRAL ON 3/9 TO BE SWITCHED TO [facility]. PATIENT STATED THE APPOINTMENT IS CURRENTLY AT [place]. PLEASE ADVISE.     CALLBACK NUMBER -[phone]

## 2021-03-16 ENCOUNTER — OFFICE VISIT (OUTPATIENT)
Dept: FAMILY MEDICINE CLINIC | Facility: CLINIC | Age: 71
End: 2021-03-16

## 2021-03-16 VITALS
HEIGHT: 68 IN | OXYGEN SATURATION: 99 % | TEMPERATURE: 98.2 F | DIASTOLIC BLOOD PRESSURE: 82 MMHG | WEIGHT: 152 LBS | HEART RATE: 62 BPM | BODY MASS INDEX: 23.04 KG/M2 | SYSTOLIC BLOOD PRESSURE: 124 MMHG

## 2021-03-16 DIAGNOSIS — F43.21 ADJUSTMENT DISORDER WITH DEPRESSED MOOD: ICD-10-CM

## 2021-03-16 DIAGNOSIS — I10 ESSENTIAL HYPERTENSION: ICD-10-CM

## 2021-03-16 DIAGNOSIS — Z23 ENCOUNTER FOR IMMUNIZATION: ICD-10-CM

## 2021-03-16 DIAGNOSIS — R39.11 BENIGN PROSTATIC HYPERPLASIA WITH URINARY HESITANCY: ICD-10-CM

## 2021-03-16 DIAGNOSIS — N40.1 BENIGN PROSTATIC HYPERPLASIA WITH URINARY HESITANCY: ICD-10-CM

## 2021-03-16 DIAGNOSIS — Z00.00 MEDICARE ANNUAL WELLNESS VISIT, SUBSEQUENT: Primary | ICD-10-CM

## 2021-03-16 DIAGNOSIS — F51.01 PRIMARY INSOMNIA: ICD-10-CM

## 2021-03-16 PROBLEM — N40.0 BPH (BENIGN PROSTATIC HYPERPLASIA): Status: ACTIVE | Noted: 2021-03-16

## 2021-03-16 PROCEDURE — G0009 ADMIN PNEUMOCOCCAL VACCINE: HCPCS | Performed by: FAMILY MEDICINE

## 2021-03-16 PROCEDURE — G0439 PPPS, SUBSEQ VISIT: HCPCS | Performed by: FAMILY MEDICINE

## 2021-03-16 PROCEDURE — 90732 PPSV23 VACC 2 YRS+ SUBQ/IM: CPT | Performed by: FAMILY MEDICINE

## 2021-03-16 PROCEDURE — 1159F MED LIST DOCD IN RCRD: CPT | Performed by: FAMILY MEDICINE

## 2021-03-16 PROCEDURE — 1170F FXNL STATUS ASSESSED: CPT | Performed by: FAMILY MEDICINE

## 2021-03-16 PROCEDURE — 99214 OFFICE O/P EST MOD 30 MIN: CPT | Performed by: FAMILY MEDICINE

## 2021-03-16 RX ORDER — MIRTAZAPINE 7.5 MG/1
7.5 TABLET, FILM COATED ORAL NIGHTLY
Qty: 90 TABLET | Refills: 2 | Status: SHIPPED | OUTPATIENT
Start: 2021-03-16 | End: 2022-02-02

## 2021-03-16 NOTE — PROGRESS NOTES
The ABCs of the Annual Wellness Visit  Subsequent Medicare Wellness Visit    Chief Complaint   Patient presents with   • Medicare Wellness-subsequent       Subjective   History of Present Illness:  Angel Cisneros is a 71 y.o. male who presents for a Subsequent Medicare Wellness Visit.  F/U insomnia.  Trouble with only sleeping 4-5 hours at night.  Not on mirtazapine now.    F/U HTN.  No orthostasis.  Doing well with meds.  Cr 1.7 8 days ago. (was 2.0 6 months ago).    HEALTH RISK ASSESSMENT    Recent Hospitalizations:  No hospitalization(s) within the last year.    Current Medical Providers:  Patient Care Team:  Ayaan Amin MD as PCP - General (Family Medicine)    Smoking Status:  Social History     Tobacco Use   Smoking Status Never Smoker   Smokeless Tobacco Never Used       Alcohol Consumption:  Social History     Substance and Sexual Activity   Alcohol Use No       Depression Screen:   PHQ-2/PHQ-9 Depression Screening 12/4/2020   Little interest or pleasure in doing things 1   Feeling down, depressed, or hopeless 1   Trouble falling or staying asleep, or sleeping too much 1   Feeling tired or having little energy 0   Poor appetite or overeating 0   Feeling bad about yourself - or that you are a failure or have let yourself or your family down 0   Trouble concentrating on things, such as reading the newspaper or watching television 0   Moving or speaking so slowly that other people could have noticed. Or the opposite - being so fidgety or restless that you have been moving around a lot more than usual 0   Thoughts that you would be better off dead, or of hurting yourself in some way 0   Total Score 3   If you checked off any problems, how difficult have these problems made it for you to do your work, take care of things at home, or get along with other people? Somewhat difficult       Fall Risk Screen:  JOEYADI Fall Risk Assessment has not been completed.    Health Habits and Functional and Cognitive  Screening:  Functional & Cognitive Status 3/16/2021   Do you have difficulty preparing food and eating? No   Do you have difficulty bathing yourself, getting dressed or grooming yourself? No   Do you have difficulty using the toilet? No   Do you have difficulty moving around from place to place? No   Do you have trouble with steps or getting out of a bed or a chair? No   Current Diet Well Balanced Diet   Dental Exam Up to date   Eye Exam Up to date   Exercise (times per week) 0 times per week   Current Exercises Include No Regular Exercise   Current Exercise Activities Include -   Do you need help using the phone?  No   Are you deaf or do you have serious difficulty hearing?  No   Do you need help with transportation? No   Do you need help shopping? No   Do you need help preparing meals?  No   Do you need help with housework?  No   Do you need help with laundry? No   Do you need help taking your medications? No   Do you need help managing money? No   Do you ever drive or ride in a car without wearing a seat belt? No   Have you felt unusual stress, anger or loneliness in the last month? Yes   Who do you live with? Alone   If you need help, do you have trouble finding someone available to you? No   Have you been bothered in the last four weeks by sexual problems? -   Do you have difficulty concentrating, remembering or making decisions? No         Does the patient have evidence of cognitive impairment? No    Asprin use counseling:Does not need ASA (and currently is not on it)    Age-appropriate Screening Schedule:  Refer to the list below for future screening recommendations based on patient's age, sex and/or medical conditions. Orders for these recommended tests are listed in the plan section. The patient has been provided with a written plan.    Health Maintenance   Topic Date Due   • TDAP/TD VACCINES (1 - Tdap) Never done   • ZOSTER VACCINE (1 of 2) Never done   • COLONOSCOPY  03/08/2026   • INFLUENZA VACCINE   Completed          The following portions of the patient's history were reviewed and updated as appropriate: allergies, current medications, past family history, past medical history, past social history, past surgical history and problem list.    Outpatient Medications Prior to Visit   Medication Sig Dispense Refill   • amLODIPine (NORVASC) 10 MG tablet Take 1 tablet by mouth Daily. 90 tablet 3   • calcium carbonate (TUMS) 500 MG chewable tablet Chew 1 tablet Daily.     • fluorometholone (FML) 0.1 % ophthalmic suspension      • ibrutinib (IMBRUVICA) 420 MG tablet tablet Take 420 mg by mouth Every Morning.     • nystatin (MYCOSTATIN) 875951 UNIT/ML suspension Swish and swallow 5 mL 4 (Four) Times a Day. 473 mL 0   • prednisoLONE acetate (PRED FORTE) 1 % ophthalmic suspension Administer 1 drop to both eyes daily.     • sodium chloride (SENA 128) 5 % ophthalmic solution Administer 1 drop into the left eye 3 (three) times a day.     • doxycycline (ADOXA) 100 MG tablet Take 1 tablet by mouth 2 (Two) Times a Day. 14 tablet 0   • doxylamine (UNISOM) 25 MG tablet Take  by mouth.     • mirtazapine (REMERON) 7.5 MG tablet Take 1 tablet by mouth Every Night. 90 tablet 2     No facility-administered medications prior to visit.       Patient Active Problem List   Diagnosis   • Lymphoma (CMS/HCC)   • Leukemia (CMS/HCC)   • Chest pain   • GERD (gastroesophageal reflux disease)   • HTN (hypertension)   • Stage 3 chronic kidney disease (CMS/HCC)   • Generalized abdominal pain   • CLL (chronic lymphocytic leukemia) (CMS/HCC)   • Gastroesophageal reflux disease   • Hodgkin's disease of lymph nodes of head, face, and neck (CMS/HCC)   • Oral thrush   • Medicare annual wellness visit, subsequent   • Primary insomnia   • Adjustment disorder with depressed mood   • Dysuria   • Gross hematuria   • Acute non-recurrent maxillary sinusitis   • Cough   • BPH (benign prostatic hyperplasia)       Advanced Care Planning:  ACP discussion was held  "with the patient during this visit. Patient has an advance directive (not in EMR), copy requested.    Review of Systems   Constitutional: Negative for activity change, appetite change and fatigue.   HENT: Negative for hearing loss and postnasal drip.    Eyes: Negative for discharge and itching.   Respiratory: Negative for cough and shortness of breath.    Cardiovascular: Negative for chest pain and leg swelling.   Gastrointestinal: Negative for abdominal distention and abdominal pain.   Endocrine: Negative for cold intolerance and heat intolerance.   Genitourinary: Negative for difficulty urinating and flank pain.   Musculoskeletal: Negative for arthralgias and myalgias.   Skin: Negative for color change.   Neurological: Negative for dizziness and facial asymmetry.   Hematological: Negative for adenopathy.   Psychiatric/Behavioral: Positive for sleep disturbance. Negative for agitation and confusion.       Compared to one year ago, the patient feels his physical health is the same.  Compared to one year ago, the patient feels his mental health is the same.    Reviewed chart for potential of high risk medication in the elderly: yes  Reviewed chart for potential of harmful drug interactions in the elderly:yes    Objective         Vitals:    03/16/21 1317   BP: 124/82   BP Location: Right arm   Patient Position: Sitting   Pulse: 62   Temp: 98.2 °F (36.8 °C)   TempSrc: Temporal   SpO2: 99%   Weight: 68.9 kg (152 lb)   Height: 172.7 cm (68\")       Body mass index is 23.11 kg/m².  Discussed the patient's BMI with him. The BMI is in the acceptable range.    Physical Exam  Vitals reviewed.   Constitutional:       Appearance: He is well-developed. He is not diaphoretic.   HENT:      Head: Normocephalic and atraumatic.   Eyes:      General: No scleral icterus.     Pupils: Pupils are equal, round, and reactive to light.   Neck:      Thyroid: No thyromegaly.   Cardiovascular:      Rate and Rhythm: Normal rate and regular " rhythm.      Heart sounds: No murmur. No friction rub. No gallop.    Pulmonary:      Effort: Pulmonary effort is normal. No respiratory distress.      Breath sounds: No wheezing or rales.   Chest:      Chest wall: No tenderness.   Abdominal:      General: Bowel sounds are normal. There is no distension.      Palpations: Abdomen is soft.      Tenderness: There is no abdominal tenderness.   Musculoskeletal:         General: No deformity. Normal range of motion.   Lymphadenopathy:      Cervical: No cervical adenopathy.   Skin:     General: Skin is warm and dry.      Findings: No rash.   Neurological:      Cranial Nerves: No cranial nerve deficit.      Motor: No abnormal muscle tone.         Lab Results   Component Value Date    GLU 88 03/08/2021        Assessment/Plan   Medicare Risks and Personalized Health Plan  CMS Preventative Services Quick Reference  Inactivity/Sedentary    The above risks/problems have been discussed with the patient.  Pertinent information has been shared with the patient in the After Visit Summary.  Follow up plans and orders are seen below in the Assessment/Plan Section.    Diagnoses and all orders for this visit:    1. Medicare annual wellness visit, subsequent (Primary)  -     Pneumococcal Polysaccharide Vaccine 23-Valent Greater Than or Equal To 1yo Subcutaneous / IM    2. Essential hypertension  -     Lipid Panel With / Chol / HDL Ratio    3. Primary insomnia  -     mirtazapine (REMERON) 7.5 MG tablet; Take 1 tablet by mouth Every Night.  Dispense: 90 tablet; Refill: 2    4. Benign prostatic hyperplasia with urinary hesitancy  -     PSA DIAGNOSTIC    5. Encounter for immunization   -     Pneumococcal Polysaccharide Vaccine 23-Valent Greater Than or Equal To 1yo Subcutaneous / IM    6. Adjustment disorder with depressed mood  -     mirtazapine (REMERON) 7.5 MG tablet; Take 1 tablet by mouth Every Night.  Dispense: 90 tablet; Refill: 2    Insomnia uncontrolled.  Start mirtazapine.    HTN  controlled.  Continue amlodipine.     Follow Up:  Return in about 3 months (around 6/16/2021).     An After Visit Summary and PPPS were given to the patient.     Preventive Counseling:  Encouraged regular exercise.  Pneumovax today.  covid recommended.  PSA checked today.

## 2021-03-17 DIAGNOSIS — R97.20 ELEVATED PSA: Primary | ICD-10-CM

## 2021-03-17 LAB
CHOLEST SERPL-MCNC: 169 MG/DL (ref 0–200)
CHOLEST/HDLC SERPL: 3.25 {RATIO}
HDLC SERPL-MCNC: 52 MG/DL (ref 40–60)
LDLC SERPL CALC-MCNC: 95 MG/DL (ref 0–100)
PSA SERPL-MCNC: 14.7 NG/ML (ref 0–4)
TRIGL SERPL-MCNC: 125 MG/DL (ref 0–150)
VLDLC SERPL CALC-MCNC: 22 MG/DL (ref 5–40)

## 2021-06-02 RX ORDER — AMLODIPINE BESYLATE 10 MG/1
TABLET ORAL
Qty: 90 TABLET | Refills: 3 | Status: SHIPPED | OUTPATIENT
Start: 2021-06-02 | End: 2021-06-17 | Stop reason: SDUPTHER

## 2021-06-17 ENCOUNTER — OFFICE VISIT (OUTPATIENT)
Dept: FAMILY MEDICINE CLINIC | Facility: CLINIC | Age: 71
End: 2021-06-17

## 2021-06-17 VITALS
BODY MASS INDEX: 23.79 KG/M2 | HEIGHT: 68 IN | WEIGHT: 157 LBS | TEMPERATURE: 97.8 F | DIASTOLIC BLOOD PRESSURE: 72 MMHG | HEART RATE: 66 BPM | SYSTOLIC BLOOD PRESSURE: 118 MMHG | OXYGEN SATURATION: 98 %

## 2021-06-17 DIAGNOSIS — R97.20 ELEVATED PSA: ICD-10-CM

## 2021-06-17 DIAGNOSIS — I10 ESSENTIAL HYPERTENSION: Primary | ICD-10-CM

## 2021-06-17 DIAGNOSIS — F51.01 PRIMARY INSOMNIA: ICD-10-CM

## 2021-06-17 PROCEDURE — 99214 OFFICE O/P EST MOD 30 MIN: CPT | Performed by: FAMILY MEDICINE

## 2021-06-17 RX ORDER — FINASTERIDE 5 MG/1
TABLET, FILM COATED ORAL
COMMUNITY
Start: 2021-03-29 | End: 2022-02-02 | Stop reason: SDUPTHER

## 2021-06-17 RX ORDER — AMLODIPINE BESYLATE 10 MG/1
10 TABLET ORAL DAILY
Qty: 90 TABLET | Refills: 3 | Status: SHIPPED | OUTPATIENT
Start: 2021-06-17 | End: 2022-04-18 | Stop reason: SDUPTHER

## 2021-06-17 NOTE — PROGRESS NOTES
Chief Complaint   Patient presents with   • Hypertension       Subjective   Angel Cisneros is a 71 y.o. male.     History of Present Illness   F/U HTN.  No orthostasis.  Doing well with meds.    F/U insomnia.   Doing better with mirtazapine 7.5 at night.  On occasion needs unisom with it.  I have a new girlfriend named Suzanne joseph.    F/U elevated PSA/BPH.  Seeing [facility].   See Dr Florence.   No nocturia.     The following portions of the patient's history were reviewed and updated as appropriate: allergies, current medications, past family history, past medical history, past social history, past surgical history and problem list.    Review of Systems   Constitutional: Negative for appetite change and fatigue.   HENT: Negative for nosebleeds and sore throat.    Eyes: Negative for blurred vision and visual disturbance.   Respiratory: Negative for shortness of breath and wheezing.    Cardiovascular: Negative for chest pain and leg swelling.   Gastrointestinal: Negative for abdominal distention and abdominal pain.   Endocrine: Negative for cold intolerance and polyuria.   Genitourinary: Negative for dysuria and hematuria.   Musculoskeletal: Negative for arthralgias and myalgias.   Skin: Negative for color change and rash.   Neurological: Negative for weakness and confusion.   Psychiatric/Behavioral: Negative for agitation and depressed mood.       Patient Active Problem List   Diagnosis   • Lymphoma (CMS/HCC)   • Leukemia (CMS/HCC)   • Chest pain   • GERD (gastroesophageal reflux disease)   • HTN (hypertension)   • Stage 3 chronic kidney disease (CMS/HCC)   • Generalized abdominal pain   • CLL (chronic lymphocytic leukemia) (CMS/[facility])   • Gastroesophageal reflux disease   • Hodgkin's disease of lymph nodes of head, face, and neck (CMS/HCC)   • Oral thrush   • Medicare annual wellness visit, subsequent   • Primary insomnia   • Adjustment disorder with depressed mood   • Dysuria   • Gross hematuria   • Acute  non-recurrent maxillary sinusitis   • Cough   • BPH (benign prostatic hyperplasia)   • Elevated PSA       Allergies   Allergen Reactions   • Ambien [Zolpidem Tartrate] Mental Status Change   • Penicillins Rash         Current Outpatient Medications:   •  amLODIPine (NORVASC) 10 MG tablet, Take 1 tablet by mouth Daily., Disp: 90 tablet, Rfl: 3  •  calcium carbonate (TUMS) 500 MG chewable tablet, Chew 1 tablet Daily., Disp: , Rfl:   •  doxylamine (UNISOM) 25 MG tablet, Take  by mouth., Disp: , Rfl:   •  finasteride (PROSCAR) 5 MG tablet, , Disp: , Rfl:   •  fluorometholone (FML) 0.1 % ophthalmic suspension, , Disp: , Rfl:   •  ibrutinib (IMBRUVICA) 420 MG tablet tablet, Take 420 mg by mouth Every Morning., Disp: , Rfl:   •  mirtazapine (REMERON) 7.5 MG tablet, Take 1 tablet by mouth Every Night., Disp: 90 tablet, Rfl: 2  •  prednisoLONE acetate (PRED FORTE) 1 % ophthalmic suspension, Administer 1 drop to both eyes daily., Disp: , Rfl:   •  sodium chloride (SENA 128) 5 % ophthalmic solution, Administer 1 drop into the left eye 3 (three) times a day., Disp: , Rfl:     Past Medical History:   Diagnosis Date   • Arthritis     both knees   • Cancer (CMS/HCC)     dx with lymphoma 2009/ radiation   • Cataracts, bilateral    • Hx of cornea transplant     both eyes   • Leukemia (CMS/HCC)    • Lymphoma (CMS/HCC)     dx in 2009. treated with radiation.   • Lymphoma (CMS/HCC)     treated with radiation. dx in 2009       Past Surgical History:   Procedure Laterality Date   • BREAST LUMPECTOMY Right    • COLONOSCOPY N/A 3/8/2016    Procedure: COLONOSCOPY with cold polypectomy X 3;  Surgeon: Chris Harden MD;  Location: [facility] ENDOSCOPY;  Service:    • ENDOSCOPY N/A 10/23/2018    Procedure: ESOPHAGOGASTRODUODENOSCOPY WITH BIOPSY;  Surgeon: Chris Harden MD;  Location: [facility] ENDOSCOPY;  Service: Gastroenterology   • EYE SURGERY      partial cornea transplant 4-5 years ago   • KNEE SURGERY Left    • TOE SURGERY Bilateral    •  TONSILLECTOMY         Family History   Problem Relation Age of Onset   • Kidney cancer Mother        Social History     Tobacco Use   • Smoking status: Never Smoker   • Smokeless tobacco: Never Used   Substance Use Topics   • Alcohol use: No            Objective     Vitals:    06/17/21 0934   BP: 118/72   Pulse: 66   Temp: 97.8 °F (36.6 °C)   SpO2: 98%     Body mass index is 23.87 kg/m².    Physical Exam  Vitals reviewed.   Constitutional:       Appearance: He is well-developed. He is not diaphoretic.   HENT:      Head: Normocephalic and atraumatic.   Eyes:      General: No scleral icterus.     Pupils: Pupils are equal, round, and reactive to light.   Neck:      Thyroid: No thyromegaly.   Cardiovascular:      Rate and Rhythm: Normal rate and regular rhythm.      Heart sounds: No murmur heard.   No friction rub. No gallop.    Pulmonary:      Effort: Pulmonary effort is normal. No respiratory distress.      Breath sounds: No wheezing or rales.   Chest:      Chest wall: No tenderness.   Abdominal:      General: Bowel sounds are normal. There is no distension.      Palpations: Abdomen is soft.      Tenderness: There is no abdominal tenderness.   Musculoskeletal:         General: No deformity. Normal range of motion.   Lymphadenopathy:      Cervical: No cervical adenopathy.   Skin:     General: Skin is warm and dry.      Findings: No rash.   Neurological:      Cranial Nerves: No cranial nerve deficit.      Motor: No abnormal muscle tone.         Lab Results   Component Value Date    GLUCOSE 80 09/08/2018    BUN 20 03/08/2021    CREATININE 1.70 (H) 03/08/2021    EGFRIFNONA 40 (L) 06/03/2020    EGFRIFAFRI 49 (L) 06/03/2020    BCR 11.8 03/08/2021    K 4.4 03/08/2021    CO2 31 (H) 03/08/2021    CALCIUM 9.1 03/08/2021    PROTENTOTREF 6.4 06/03/2020    ALBUMIN 3.9 03/08/2021    LABIL2 1.4 03/08/2021    AST 22 03/08/2021    ALT 14 03/08/2021       WBC   Date Value Ref Range Status   03/08/2021 7.55 4.5 - 11.0 10*3/uL Final      RBC   Date Value Ref Range Status   03/08/2021 4.63 4.5 - 5.9 10*6/uL Final     Hemoglobin   Date Value Ref Range Status   03/08/2021 13.6 13.5 - 17.5 g/dL Final     Hematocrit   Date Value Ref Range Status   03/08/2021 42.5 41.0 - 53.0 % Final     MCV   Date Value Ref Range Status   03/08/2021 91.8 80.0 - 100.0 fL Final     MCH   Date Value Ref Range Status   03/08/2021 29.4 26.0 - 34.0 pg Final     MCHC   Date Value Ref Range Status   03/08/2021 32.0 31.0 - 37.0 g/dL Final     RDW   Date Value Ref Range Status   03/08/2021 15.3 12.0 - 16.8 % Final     RDW-SD   Date Value Ref Range Status   09/08/2018 52.8 37.0 - 54.0 fl Final     MPV   Date Value Ref Range Status   03/08/2021 11.3 8.4 - 12.4 fL Final     Platelets   Date Value Ref Range Status   03/08/2021 251 140 - 440 10*3/uL Final     Neutrophil Rel %   Date Value Ref Range Status   03/08/2021 62.2 45 - 80 % Final     Lymphocyte Rel %   Date Value Ref Range Status   03/08/2021 25.8 15 - 50 % Final     Monocyte Rel %   Date Value Ref Range Status   03/08/2021 11.1 0 - 15 % Final     Eosinophil %   Date Value Ref Range Status   03/08/2021 0.4 0 - 7 % Final     Basophil Rel %   Date Value Ref Range Status   03/08/2021 0.5 0 - 2 % Final     Immature Grans %   Date Value Ref Range Status   09/07/2018 0.8 (H) 0.0 - 0.5 % Final     Neutrophils Absolute   Date Value Ref Range Status   03/08/2021 4.69 2.0 - 8.8 10*3/uL Final     Lymphocytes Absolute   Date Value Ref Range Status   03/08/2021 1.95 0.7 - 5.5 10*3/uL Final     Monocytes Absolute   Date Value Ref Range Status   03/08/2021 0.84 0.0 - 1.7 10*3/uL Final     Eosinophils Absolute   Date Value Ref Range Status   03/08/2021 0.03 0.0 - 0.8 10*3/uL Final     Basophils Absolute   Date Value Ref Range Status   03/08/2021 0.04 0.0 - 0.2 10*3/uL Final     Immature Grans, Absolute   Date Value Ref Range Status   09/07/2018 0.13 (H) 0.00 - 0.03 10*3/mm3 Final     nRBC   Date Value Ref Range Status   06/03/2020 0.0 0.0  - 0.2 /100 WBC Final       No results found for: HGBA1C    No results found for: ZAMPXOST09    TSH   Date Value Ref Range Status   09/07/2018 2.940 0.270 - 4.200 mIU/mL Final       No results found for: CHOL  Lab Results   Component Value Date    TRIG 125 03/16/2021     Lab Results   Component Value Date    HDL 52 03/16/2021     Lab Results   Component Value Date    LDL 95 03/16/2021     Lab Results   Component Value Date    VLDL 22 03/16/2021     No results found for: LDLHDL      Procedures    Assessment/Plan   Problems Addressed this Visit     Elevated PSA    HTN (hypertension) - Primary    Relevant Medications    amLODIPine (NORVASC) 10 MG tablet    Primary insomnia      Diagnoses       Codes Comments    Essential hypertension    -  Primary ICD-10-CM: I10  ICD-9-CM: 401.9     Primary insomnia     ICD-10-CM: F51.01  ICD-9-CM: 307.42     Elevated PSA     ICD-10-CM: R97.20  ICD-9-CM: 790.93       HTN.  Controlled.  Continue meds. RF amlodipine.    Insomnia.  Controlled.  continue mirtazapine.    Elevated PSA.   Continue f/u with urology.  Continue finasteride.      No orders of the defined types were placed in this encounter.      Current Outpatient Medications   Medication Sig Dispense Refill   • amLODIPine (NORVASC) 10 MG tablet Take 1 tablet by mouth Daily. 90 tablet 3   • calcium carbonate (TUMS) 500 MG chewable tablet Chew 1 tablet Daily.     • doxylamine (UNISOM) 25 MG tablet Take  by mouth.     • finasteride (PROSCAR) 5 MG tablet      • fluorometholone (FML) 0.1 % ophthalmic suspension      • ibrutinib (IMBRUVICA) 420 MG tablet tablet Take 420 mg by mouth Every Morning.     • mirtazapine (REMERON) 7.5 MG tablet Take 1 tablet by mouth Every Night. 90 tablet 2   • prednisoLONE acetate (PRED FORTE) 1 % ophthalmic suspension Administer 1 drop to both eyes daily.     • sodium chloride (SENA 128) 5 % ophthalmic solution Administer 1 drop into the left eye 3 (three) times a day.       No current  facility-administered medications for this visit.       Angel Cisneros had no medications administered during this visit.    Return in about 6 months (around 12/17/2021).    There are no Patient Instructions on file for this visit.

## 2021-08-31 ENCOUNTER — TELEMEDICINE (OUTPATIENT)
Dept: FAMILY MEDICINE CLINIC | Facility: CLINIC | Age: 71
End: 2021-08-31

## 2021-08-31 DIAGNOSIS — J06.9 ACUTE URI: Primary | ICD-10-CM

## 2021-08-31 PROCEDURE — 99422 OL DIG E/M SVC 11-20 MIN: CPT | Performed by: NURSE PRACTITIONER

## 2021-08-31 NOTE — PROGRESS NOTES
Chief Complaint  Cough  Unable to complete visit using a video connection to the patient. A phone visit was used to complete this visits. Total time of discussion was 15 minutes.    You have chosen to receive care through a telephone visit. Do you consent to use a telephone visit for your medical care today? Yes  Subjective          Angel Cisneros presents to [facility]  Cough  This is a new problem. The current episode started yesterday. The cough is non-productive. Associated symptoms include headaches and nasal congestion. Pertinent negatives include no ear pain, fever, sore throat or shortness of breath. Nothing aggravates the symptoms. Treatments tried: Tylenol and Claritin.  The treatment provided mild relief.   Patient has completed COVID vaccination series.   Objective   Vital Signs:   There were no vitals taken for this visit.    Physical Exam   Result Review :            Assessment and Plan {CC Problem List  Visit Diagnosis   ROS  Review (Popup)  Health Maintenance  Quality  BestPractice  Medications  SmartSets  SnapShot Encounters  Media :23}   Diagnoses and all orders for this visit:    1. Acute URI (Primary)  -     COVID-19,[facility] ROUTINE, NP/OP SWAB IN TRANSPORT MEDIA OR ESWAB 72 HR TAT - Swab, Nasopharynx; Future    Further evaluation and treatment pending lab result.     Follow Up   Return if symptoms worsen or fail to improve.  Patient was given instructions and counseling regarding his condition or for health maintenance advice. Please see specific information pulled into the AVS if appropriate.

## 2021-09-01 ENCOUNTER — TELEPHONE (OUTPATIENT)
Dept: FAMILY MEDICINE CLINIC | Facility: CLINIC | Age: 71
End: 2021-09-01

## 2021-09-01 DIAGNOSIS — R05.9 COUGH: Primary | ICD-10-CM

## 2021-09-01 RX ORDER — AZITHROMYCIN 250 MG/1
TABLET, FILM COATED ORAL
Qty: 6 TABLET | Refills: 0 | Status: SHIPPED | OUTPATIENT
Start: 2021-09-01 | End: 2021-10-16 | Stop reason: HOSPADM

## 2021-09-01 NOTE — TELEPHONE ENCOUNTER
Caller: Angel Cisneros    Relationship: Self    Best call back number: [phone]    What medication are you requesting: ANTIBIOTIC     What are your current symptoms: RESPIRATORY CONGESTION       Have you had these symptoms before:    [x] Yes  [] No    Have you been treated for these symptoms before:   [x] Yes  [] No    If a prescription is needed, what is your preferred pharmacy and phone number: [id number] - [facility], [place] - [address] AT [facility] [phone] [facility] [phone] FX     Additional notes: PATIENT HAD VIDEO VISIT 8-31-21 WITH DAVID MORTENSEN. PATIENT STATES HE WAS PRESCRIBED AN ANTIBIOTIC IN MARCH AND WOULD LIKE TO HAVE THE SAME ONE NOW    PLEASE ADVISE

## 2021-09-03 ENCOUNTER — TRANSCRIBE ORDERS (OUTPATIENT)
Dept: ADMINISTRATIVE | Facility: HOSPITAL | Age: 71
End: 2021-09-03

## 2021-09-03 ENCOUNTER — TELEPHONE (OUTPATIENT)
Dept: FAMILY MEDICINE CLINIC | Facility: CLINIC | Age: 71
End: 2021-09-03

## 2021-09-03 DIAGNOSIS — U07.1 CLINICAL DIAGNOSIS OF SEVERE ACUTE RESPIRATORY SYNDROME CORONAVIRUS 2 (SARS-COV-2) DISEASE: Primary | ICD-10-CM

## 2021-09-03 RX ORDER — METHYLPREDNISOLONE SODIUM SUCCINATE 125 MG/2ML
125 INJECTION, POWDER, LYOPHILIZED, FOR SOLUTION INTRAMUSCULAR; INTRAVENOUS AS NEEDED
OUTPATIENT
Start: 2021-09-05

## 2021-09-03 RX ORDER — SODIUM CHLORIDE 9 MG/ML
30 INJECTION, SOLUTION INTRAVENOUS ONCE
OUTPATIENT
Start: 2021-09-05

## 2021-09-03 RX ORDER — EPINEPHRINE 1 MG/ML
0.3 INJECTION, SOLUTION, CONCENTRATE INTRAVENOUS AS NEEDED
OUTPATIENT
Start: 2021-09-05

## 2021-09-03 RX ORDER — DIPHENHYDRAMINE HYDROCHLORIDE 50 MG/ML
50 INJECTION INTRAMUSCULAR; INTRAVENOUS AS NEEDED
OUTPATIENT
Start: 2021-09-05

## 2021-09-05 ENCOUNTER — HOSPITAL ENCOUNTER (INPATIENT)
Facility: HOSPITAL | Age: 71
LOS: 41 days | Discharge: NURSING, REHAB OR LONG-TERM CARE | End: 2021-10-16
Attending: EMERGENCY MEDICINE | Admitting: INTERNAL MEDICINE

## 2021-09-05 ENCOUNTER — APPOINTMENT (OUTPATIENT)
Dept: GENERAL RADIOLOGY | Facility: HOSPITAL | Age: 71
End: 2021-09-05

## 2021-09-05 DIAGNOSIS — N18.9 ACUTE RENAL FAILURE SUPERIMPOSED ON CHRONIC KIDNEY DISEASE, UNSPECIFIED CKD STAGE, UNSPECIFIED ACUTE RENAL FAILURE TYPE (HCC): ICD-10-CM

## 2021-09-05 DIAGNOSIS — J96.01 ACUTE HYPOXEMIC RESPIRATORY FAILURE (HCC): ICD-10-CM

## 2021-09-05 DIAGNOSIS — U07.1 COVID-19 VIRUS INFECTION: Primary | ICD-10-CM

## 2021-09-05 DIAGNOSIS — N17.9 ACUTE RENAL FAILURE SUPERIMPOSED ON CHRONIC KIDNEY DISEASE, UNSPECIFIED CKD STAGE, UNSPECIFIED ACUTE RENAL FAILURE TYPE (HCC): ICD-10-CM

## 2021-09-05 LAB
ALBUMIN SERPL-MCNC: 4.1 G/DL (ref 3.5–5.2)
ALBUMIN/GLOB SERPL: 1.6 G/DL
ALP SERPL-CCNC: 123 U/L (ref 39–117)
ALT SERPL W P-5'-P-CCNC: 46 U/L (ref 1–41)
ANION GAP SERPL CALCULATED.3IONS-SCNC: 14.5 MMOL/L (ref 5–15)
AST SERPL-CCNC: 57 U/L (ref 1–40)
BASOPHILS # BLD AUTO: 0.02 10*3/MM3 (ref 0–0.2)
BASOPHILS NFR BLD AUTO: 0.3 % (ref 0–1.5)
BILIRUB SERPL-MCNC: 0.3 MG/DL (ref 0–1.2)
BUN SERPL-MCNC: 40 MG/DL (ref 8–23)
BUN/CREAT SERPL: 14.8 (ref 7–25)
CALCIUM SPEC-SCNC: 8.7 MG/DL (ref 8.6–10.5)
CHLORIDE SERPL-SCNC: 108 MMOL/L (ref 98–107)
CO2 SERPL-SCNC: 21.5 MMOL/L (ref 22–29)
CREAT SERPL-MCNC: 2.7 MG/DL (ref 0.76–1.27)
D-LACTATE SERPL-SCNC: 1.3 MMOL/L (ref 0.5–2)
DEPRECATED RDW RBC AUTO: 45.8 FL (ref 37–54)
EOSINOPHIL # BLD AUTO: 0 10*3/MM3 (ref 0–0.4)
EOSINOPHIL NFR BLD AUTO: 0 % (ref 0.3–6.2)
ERYTHROCYTE [DISTWIDTH] IN BLOOD BY AUTOMATED COUNT: 14.3 % (ref 12.3–15.4)
GFR SERPL CREATININE-BSD FRML MDRD: 23 ML/MIN/1.73
GLOBULIN UR ELPH-MCNC: 2.5 GM/DL
GLUCOSE SERPL-MCNC: 113 MG/DL (ref 65–99)
HCT VFR BLD AUTO: 40.6 % (ref 37.5–51)
HGB BLD-MCNC: 13.9 G/DL (ref 13–17.7)
IMM GRANULOCYTES # BLD AUTO: 0.14 10*3/MM3 (ref 0–0.05)
IMM GRANULOCYTES NFR BLD AUTO: 2.1 % (ref 0–0.5)
LYMPHOCYTES # BLD AUTO: 1.16 10*3/MM3 (ref 0.7–3.1)
LYMPHOCYTES NFR BLD AUTO: 17.6 % (ref 19.6–45.3)
MCH RBC QN AUTO: 30.1 PG (ref 26.6–33)
MCHC RBC AUTO-ENTMCNC: 34.2 G/DL (ref 31.5–35.7)
MCV RBC AUTO: 87.9 FL (ref 79–97)
MONOCYTES # BLD AUTO: 0.79 10*3/MM3 (ref 0.1–0.9)
MONOCYTES NFR BLD AUTO: 12 % (ref 5–12)
NEUTROPHILS NFR BLD AUTO: 4.48 10*3/MM3 (ref 1.7–7)
NEUTROPHILS NFR BLD AUTO: 68 % (ref 42.7–76)
NRBC BLD AUTO-RTO: 0 /100 WBC (ref 0–0.2)
PLATELET # BLD AUTO: 141 10*3/MM3 (ref 140–450)
PMV BLD AUTO: 12 FL (ref 6–12)
POTASSIUM SERPL-SCNC: 4.5 MMOL/L (ref 3.5–5.2)
PROCALCITONIN SERPL-MCNC: 0.12 NG/ML (ref 0–0.25)
PROT SERPL-MCNC: 6.6 G/DL (ref 6–8.5)
RBC # BLD AUTO: 4.62 10*6/MM3 (ref 4.14–5.8)
SODIUM SERPL-SCNC: 144 MMOL/L (ref 136–145)
TROPONIN T SERPL-MCNC: 0.01 NG/ML (ref 0–0.03)
WBC # BLD AUTO: 6.59 10*3/MM3 (ref 3.4–10.8)

## 2021-09-05 PROCEDURE — 84484 ASSAY OF TROPONIN QUANT: CPT | Performed by: NURSE PRACTITIONER

## 2021-09-05 PROCEDURE — 71045 X-RAY EXAM CHEST 1 VIEW: CPT

## 2021-09-05 PROCEDURE — 99285 EMERGENCY DEPT VISIT HI MDM: CPT

## 2021-09-05 PROCEDURE — 87040 BLOOD CULTURE FOR BACTERIA: CPT | Performed by: NURSE PRACTITIONER

## 2021-09-05 PROCEDURE — 85025 COMPLETE CBC W/AUTO DIFF WBC: CPT | Performed by: NURSE PRACTITIONER

## 2021-09-05 PROCEDURE — 36415 COLL VENOUS BLD VENIPUNCTURE: CPT

## 2021-09-05 PROCEDURE — 80053 COMPREHEN METABOLIC PANEL: CPT | Performed by: NURSE PRACTITIONER

## 2021-09-05 PROCEDURE — 84145 PROCALCITONIN (PCT): CPT | Performed by: NURSE PRACTITIONER

## 2021-09-05 PROCEDURE — U0004 COV-19 TEST NON-CDC HGH THRU: HCPCS | Performed by: NURSE PRACTITIONER

## 2021-09-05 PROCEDURE — 93010 ELECTROCARDIOGRAM REPORT: CPT | Performed by: INTERNAL MEDICINE

## 2021-09-05 PROCEDURE — 93005 ELECTROCARDIOGRAM TRACING: CPT | Performed by: NURSE PRACTITIONER

## 2021-09-05 PROCEDURE — 25010000002 DEXAMETHASONE PER 1 MG: Performed by: NURSE PRACTITIONER

## 2021-09-05 PROCEDURE — 83605 ASSAY OF LACTIC ACID: CPT | Performed by: NURSE PRACTITIONER

## 2021-09-05 RX ORDER — DEXTROSE MONOHYDRATE 25 G/50ML
25 INJECTION, SOLUTION INTRAVENOUS
Status: DISCONTINUED | OUTPATIENT
Start: 2021-09-05 | End: 2021-10-16 | Stop reason: HOSPADM

## 2021-09-05 RX ORDER — DEXAMETHASONE SODIUM PHOSPHATE 10 MG/ML
6 INJECTION INTRAMUSCULAR; INTRAVENOUS DAILY
Status: DISCONTINUED | OUTPATIENT
Start: 2021-09-06 | End: 2021-09-07

## 2021-09-05 RX ORDER — ACETAMINOPHEN 325 MG/1
650 TABLET ORAL EVERY 4 HOURS PRN
Status: DISCONTINUED | OUTPATIENT
Start: 2021-09-05 | End: 2021-10-16 | Stop reason: HOSPADM

## 2021-09-05 RX ORDER — UREA 10 %
3 LOTION (ML) TOPICAL NIGHTLY PRN
Status: DISCONTINUED | OUTPATIENT
Start: 2021-09-05 | End: 2021-10-16 | Stop reason: HOSPADM

## 2021-09-05 RX ORDER — NICOTINE POLACRILEX 4 MG
15 LOZENGE BUCCAL
Status: DISCONTINUED | OUTPATIENT
Start: 2021-09-05 | End: 2021-10-16 | Stop reason: HOSPADM

## 2021-09-05 RX ORDER — NITROGLYCERIN 0.4 MG/1
0.4 TABLET SUBLINGUAL
Status: DISCONTINUED | OUTPATIENT
Start: 2021-09-05 | End: 2021-10-16 | Stop reason: HOSPADM

## 2021-09-05 RX ORDER — INSULIN LISPRO 100 [IU]/ML
0-9 INJECTION, SOLUTION INTRAVENOUS; SUBCUTANEOUS
Status: DISCONTINUED | OUTPATIENT
Start: 2021-09-06 | End: 2021-10-05

## 2021-09-05 RX ORDER — ONDANSETRON 4 MG/1
4 TABLET, FILM COATED ORAL EVERY 6 HOURS PRN
Status: DISCONTINUED | OUTPATIENT
Start: 2021-09-05 | End: 2021-10-16 | Stop reason: HOSPADM

## 2021-09-05 RX ORDER — ONDANSETRON 2 MG/ML
4 INJECTION INTRAMUSCULAR; INTRAVENOUS EVERY 6 HOURS PRN
Status: DISCONTINUED | OUTPATIENT
Start: 2021-09-05 | End: 2021-10-16 | Stop reason: HOSPADM

## 2021-09-05 RX ORDER — DEXAMETHASONE SODIUM PHOSPHATE 10 MG/ML
6 INJECTION INTRAMUSCULAR; INTRAVENOUS ONCE
Status: COMPLETED | OUTPATIENT
Start: 2021-09-05 | End: 2021-09-05

## 2021-09-05 RX ORDER — SODIUM CHLORIDE 0.9 % (FLUSH) 0.9 %
10 SYRINGE (ML) INJECTION AS NEEDED
Status: DISCONTINUED | OUTPATIENT
Start: 2021-09-05 | End: 2021-10-16 | Stop reason: HOSPADM

## 2021-09-05 RX ADMIN — DEXAMETHASONE SODIUM PHOSPHATE 6 MG: 10 INJECTION INTRAMUSCULAR; INTRAVENOUS at 16:57

## 2021-09-05 RX ADMIN — SODIUM CHLORIDE 1000 ML: 9 INJECTION, SOLUTION INTRAVENOUS at 16:56

## 2021-09-06 PROBLEM — J96.01 ACUTE RESPIRATORY FAILURE WITH HYPOXIA: Status: ACTIVE | Noted: 2021-09-06

## 2021-09-06 PROBLEM — N18.32 CHRONIC KIDNEY DISEASE, STAGE 3B (HCC): Status: ACTIVE | Noted: 2018-09-08

## 2021-09-06 PROBLEM — J12.82 PNEUMONIA DUE TO COVID-19 VIRUS: Status: ACTIVE | Noted: 2021-09-05

## 2021-09-06 PROBLEM — I48.0 PAROXYSMAL ATRIAL FIBRILLATION: Status: ACTIVE | Noted: 2021-09-06

## 2021-09-06 LAB
ANION GAP SERPL CALCULATED.3IONS-SCNC: 13.7 MMOL/L (ref 5–15)
BUN SERPL-MCNC: 40 MG/DL (ref 8–23)
BUN/CREAT SERPL: 16.7 (ref 7–25)
CALCIUM SPEC-SCNC: 8.9 MG/DL (ref 8.6–10.5)
CHLORIDE SERPL-SCNC: 111 MMOL/L (ref 98–107)
CO2 SERPL-SCNC: 17.3 MMOL/L (ref 22–29)
CREAT SERPL-MCNC: 2.39 MG/DL (ref 0.76–1.27)
CRP SERPL-MCNC: 8.66 MG/DL (ref 0–0.5)
DEPRECATED RDW RBC AUTO: 46.1 FL (ref 37–54)
ERYTHROCYTE [DISTWIDTH] IN BLOOD BY AUTOMATED COUNT: 14.3 % (ref 12.3–15.4)
FERRITIN SERPL-MCNC: 1117 NG/ML (ref 30–400)
GFR SERPL CREATININE-BSD FRML MDRD: 27 ML/MIN/1.73
GLUCOSE BLDC GLUCOMTR-MCNC: 125 MG/DL (ref 70–130)
GLUCOSE BLDC GLUCOMTR-MCNC: 132 MG/DL (ref 70–130)
GLUCOSE BLDC GLUCOMTR-MCNC: 138 MG/DL (ref 70–130)
GLUCOSE BLDC GLUCOMTR-MCNC: 142 MG/DL (ref 70–130)
GLUCOSE SERPL-MCNC: 142 MG/DL (ref 65–99)
HBA1C MFR BLD: 5.39 % (ref 4.8–5.6)
HCT VFR BLD AUTO: 44.9 % (ref 37.5–51)
HGB BLD-MCNC: 14.8 G/DL (ref 13–17.7)
LDH SERPL-CCNC: 419 U/L (ref 135–225)
MCH RBC QN AUTO: 29.3 PG (ref 26.6–33)
MCHC RBC AUTO-ENTMCNC: 33 G/DL (ref 31.5–35.7)
MCV RBC AUTO: 88.9 FL (ref 79–97)
PLATELET # BLD AUTO: 139 10*3/MM3 (ref 140–450)
PMV BLD AUTO: 12 FL (ref 6–12)
POTASSIUM SERPL-SCNC: 4.6 MMOL/L (ref 3.5–5.2)
QT INTERVAL: 371 MS
QT INTERVAL: 388 MS
RBC # BLD AUTO: 5.05 10*6/MM3 (ref 4.14–5.8)
SARS-COV-2 ORF1AB RESP QL NAA+PROBE: DETECTED
SODIUM SERPL-SCNC: 142 MMOL/L (ref 136–145)
WBC # BLD AUTO: 4.6 10*3/MM3 (ref 3.4–10.8)

## 2021-09-06 PROCEDURE — 93010 ELECTROCARDIOGRAM REPORT: CPT | Performed by: INTERNAL MEDICINE

## 2021-09-06 PROCEDURE — 25010000002 DEXAMETHASONE PER 1 MG: Performed by: INTERNAL MEDICINE

## 2021-09-06 PROCEDURE — 25010000002 ENOXAPARIN PER 10 MG: Performed by: INTERNAL MEDICINE

## 2021-09-06 PROCEDURE — 82962 GLUCOSE BLOOD TEST: CPT

## 2021-09-06 PROCEDURE — 85027 COMPLETE CBC AUTOMATED: CPT | Performed by: INTERNAL MEDICINE

## 2021-09-06 PROCEDURE — 93005 ELECTROCARDIOGRAM TRACING: CPT | Performed by: INTERNAL MEDICINE

## 2021-09-06 PROCEDURE — 83615 LACTATE (LD) (LDH) ENZYME: CPT | Performed by: INTERNAL MEDICINE

## 2021-09-06 PROCEDURE — 97162 PT EVAL MOD COMPLEX 30 MIN: CPT | Performed by: PHYSICAL THERAPIST

## 2021-09-06 PROCEDURE — 86140 C-REACTIVE PROTEIN: CPT | Performed by: INTERNAL MEDICINE

## 2021-09-06 PROCEDURE — 25010000002 HEPARIN (PORCINE) PER 1000 UNITS: Performed by: INTERNAL MEDICINE

## 2021-09-06 PROCEDURE — 82728 ASSAY OF FERRITIN: CPT | Performed by: INTERNAL MEDICINE

## 2021-09-06 PROCEDURE — 83036 HEMOGLOBIN GLYCOSYLATED A1C: CPT | Performed by: INTERNAL MEDICINE

## 2021-09-06 PROCEDURE — 80048 BASIC METABOLIC PNL TOTAL CA: CPT | Performed by: INTERNAL MEDICINE

## 2021-09-06 PROCEDURE — XW033E5 INTRODUCTION OF REMDESIVIR ANTI-INFECTIVE INTO PERIPHERAL VEIN, PERCUTANEOUS APPROACH, NEW TECHNOLOGY GROUP 5: ICD-10-PCS | Performed by: INTERNAL MEDICINE

## 2021-09-06 PROCEDURE — 36415 COLL VENOUS BLD VENIPUNCTURE: CPT | Performed by: INTERNAL MEDICINE

## 2021-09-06 RX ORDER — FAMOTIDINE 20 MG/1
20 TABLET, FILM COATED ORAL DAILY
Status: DISCONTINUED | OUTPATIENT
Start: 2021-09-06 | End: 2021-10-16 | Stop reason: HOSPADM

## 2021-09-06 RX ORDER — FAMOTIDINE 20 MG/1
20 TABLET, FILM COATED ORAL 2 TIMES DAILY
Status: DISCONTINUED | OUTPATIENT
Start: 2021-09-06 | End: 2021-09-06

## 2021-09-06 RX ORDER — SODIUM BICARBONATE 650 MG/1
650 TABLET ORAL 3 TIMES DAILY
Status: DISCONTINUED | OUTPATIENT
Start: 2021-09-06 | End: 2021-09-07

## 2021-09-06 RX ORDER — PREDNISOLONE ACETATE 10 MG/ML
1 SUSPENSION/ DROPS OPHTHALMIC DAILY
Status: DISCONTINUED | OUTPATIENT
Start: 2021-09-06 | End: 2021-10-16 | Stop reason: HOSPADM

## 2021-09-06 RX ORDER — CALCIUM CARBONATE 200(500)MG
1 TABLET,CHEWABLE ORAL DAILY
Status: DISCONTINUED | OUTPATIENT
Start: 2021-09-06 | End: 2021-10-16 | Stop reason: HOSPADM

## 2021-09-06 RX ORDER — AMLODIPINE BESYLATE 10 MG/1
10 TABLET ORAL DAILY
Status: DISCONTINUED | OUTPATIENT
Start: 2021-09-06 | End: 2021-09-09

## 2021-09-06 RX ORDER — FINASTERIDE 5 MG/1
5 TABLET, FILM COATED ORAL DAILY
Status: DISCONTINUED | OUTPATIENT
Start: 2021-09-06 | End: 2021-10-16 | Stop reason: HOSPADM

## 2021-09-06 RX ORDER — HEPARIN SODIUM 5000 [USP'U]/ML
5000 INJECTION, SOLUTION INTRAVENOUS; SUBCUTANEOUS EVERY 8 HOURS SCHEDULED
Status: DISCONTINUED | OUTPATIENT
Start: 2021-09-06 | End: 2021-09-06

## 2021-09-06 RX ORDER — MIRTAZAPINE 15 MG/1
7.5 TABLET, FILM COATED ORAL NIGHTLY
Status: DISCONTINUED | OUTPATIENT
Start: 2021-09-06 | End: 2021-10-16 | Stop reason: HOSPADM

## 2021-09-06 RX ORDER — SILICONE ADHESIVE 1.5" X 3"
1 SHEET (EA) TOPICAL 3 TIMES DAILY
Status: DISCONTINUED | OUTPATIENT
Start: 2021-09-06 | End: 2021-10-16 | Stop reason: HOSPADM

## 2021-09-06 RX ADMIN — ANTACID TABLETS 1 TABLET: 500 TABLET, CHEWABLE ORAL at 08:47

## 2021-09-06 RX ADMIN — Medication 3 MG: at 00:13

## 2021-09-06 RX ADMIN — ENOXAPARIN SODIUM 70 MG: 80 INJECTION, SOLUTION INTRAVENOUS; SUBCUTANEOUS at 15:02

## 2021-09-06 RX ADMIN — REMDESIVIR 200 MG: 100 INJECTION, POWDER, LYOPHILIZED, FOR SOLUTION INTRAVENOUS at 14:59

## 2021-09-06 RX ADMIN — METOPROLOL TARTRATE 12.5 MG: 25 TABLET ORAL at 20:31

## 2021-09-06 RX ADMIN — DEXAMETHASONE SODIUM PHOSPHATE 6 MG: 10 INJECTION INTRAMUSCULAR; INTRAVENOUS at 08:47

## 2021-09-06 RX ADMIN — HEPARIN SODIUM 5000 UNITS: 5000 INJECTION INTRAVENOUS; SUBCUTANEOUS at 06:01

## 2021-09-06 RX ADMIN — SODIUM CHLORIDE 1 DROP: 50 SOLUTION OPHTHALMIC at 20:37

## 2021-09-06 RX ADMIN — MIRTAZAPINE 7.5 MG: 15 TABLET, FILM COATED ORAL at 20:31

## 2021-09-06 RX ADMIN — FINASTERIDE 5 MG: 5 TABLET, FILM COATED ORAL at 08:47

## 2021-09-06 RX ADMIN — METOPROLOL TARTRATE 12.5 MG: 25 TABLET ORAL at 14:59

## 2021-09-06 RX ADMIN — SODIUM CHLORIDE 1 DROP: 50 SOLUTION OPHTHALMIC at 15:01

## 2021-09-06 RX ADMIN — FAMOTIDINE 20 MG: 20 TABLET, FILM COATED ORAL at 15:00

## 2021-09-06 RX ADMIN — Medication 3 MG: at 23:27

## 2021-09-06 RX ADMIN — AMLODIPINE BESYLATE 10 MG: 10 TABLET ORAL at 08:47

## 2021-09-06 RX ADMIN — SODIUM BICARBONATE 650 MG: 650 TABLET ORAL at 15:00

## 2021-09-06 RX ADMIN — ACETAMINOPHEN 650 MG: 325 TABLET, FILM COATED ORAL at 00:13

## 2021-09-06 RX ADMIN — PREDNISOLONE ACETATE 1 DROP: 10 SUSPENSION/ DROPS OPHTHALMIC at 08:47

## 2021-09-06 RX ADMIN — SODIUM BICARBONATE 650 MG: 650 TABLET ORAL at 20:31

## 2021-09-06 RX ADMIN — SODIUM CHLORIDE 1 DROP: 50 SOLUTION OPHTHALMIC at 08:47

## 2021-09-07 ENCOUNTER — HOSPITAL ENCOUNTER (OUTPATIENT)
Dept: INFUSION THERAPY | Facility: HOSPITAL | Age: 71
Discharge: HOME | End: 2021-09-07

## 2021-09-07 ENCOUNTER — APPOINTMENT (OUTPATIENT)
Dept: GENERAL RADIOLOGY | Facility: HOSPITAL | Age: 71
End: 2021-09-07

## 2021-09-07 LAB
ALBUMIN SERPL-MCNC: 3.5 G/DL (ref 3.5–5.2)
ALBUMIN/GLOB SERPL: 1.1 G/DL
ALP SERPL-CCNC: 114 U/L (ref 39–117)
ALT SERPL W P-5'-P-CCNC: 49 U/L (ref 1–41)
ANION GAP SERPL CALCULATED.3IONS-SCNC: 13.5 MMOL/L (ref 5–15)
ARTERIAL PATENCY WRIST A: POSITIVE
AST SERPL-CCNC: 55 U/L (ref 1–40)
ATMOSPHERIC PRESS: 744.8 MMHG
BASE EXCESS BLDA CALC-SCNC: -3.9 MMOL/L (ref 0–2)
BASOPHILS # BLD AUTO: 0.05 10*3/MM3 (ref 0–0.2)
BASOPHILS NFR BLD AUTO: 0.4 % (ref 0–1.5)
BDY SITE: ABNORMAL
BILIRUB SERPL-MCNC: 0.2 MG/DL (ref 0–1.2)
BUN SERPL-MCNC: 44 MG/DL (ref 8–23)
BUN/CREAT SERPL: 22.7 (ref 7–25)
CALCIUM SPEC-SCNC: 8.8 MG/DL (ref 8.6–10.5)
CHLORIDE SERPL-SCNC: 115 MMOL/L (ref 98–107)
CO2 SERPL-SCNC: 16.5 MMOL/L (ref 22–29)
CREAT SERPL-MCNC: 1.94 MG/DL (ref 0.76–1.27)
CRP SERPL-MCNC: 4.96 MG/DL (ref 0–0.5)
D DIMER PPP FEU-MCNC: 0.59 MCGFEU/ML (ref 0–0.49)
DEPRECATED RDW RBC AUTO: 46.8 FL (ref 37–54)
EOSINOPHIL # BLD AUTO: 0 10*3/MM3 (ref 0–0.4)
EOSINOPHIL NFR BLD AUTO: 0 % (ref 0.3–6.2)
ERYTHROCYTE [DISTWIDTH] IN BLOOD BY AUTOMATED COUNT: 14.4 % (ref 12.3–15.4)
FERRITIN SERPL-MCNC: 1203 NG/ML (ref 30–400)
GAS FLOW AIRWAY: 15 LPM
GFR SERPL CREATININE-BSD FRML MDRD: 34 ML/MIN/1.73
GLOBULIN UR ELPH-MCNC: 3.3 GM/DL
GLUCOSE BLDC GLUCOMTR-MCNC: 104 MG/DL (ref 70–130)
GLUCOSE BLDC GLUCOMTR-MCNC: 117 MG/DL (ref 70–130)
GLUCOSE BLDC GLUCOMTR-MCNC: 137 MG/DL (ref 70–130)
GLUCOSE BLDC GLUCOMTR-MCNC: 96 MG/DL (ref 70–130)
GLUCOSE SERPL-MCNC: 104 MG/DL (ref 65–99)
HCO3 BLDA-SCNC: 19.4 MMOL/L (ref 22–28)
HCT VFR BLD AUTO: 43.1 % (ref 37.5–51)
HGB BLD-MCNC: 14.5 G/DL (ref 13–17.7)
IMM GRANULOCYTES # BLD AUTO: 0.21 10*3/MM3 (ref 0–0.05)
IMM GRANULOCYTES NFR BLD AUTO: 1.6 % (ref 0–0.5)
LYMPHOCYTES # BLD AUTO: 1.79 10*3/MM3 (ref 0.7–3.1)
LYMPHOCYTES NFR BLD AUTO: 13.8 % (ref 19.6–45.3)
MCH RBC QN AUTO: 29.7 PG (ref 26.6–33)
MCHC RBC AUTO-ENTMCNC: 33.6 G/DL (ref 31.5–35.7)
MCV RBC AUTO: 88.3 FL (ref 79–97)
MODALITY: ABNORMAL
MONOCYTES # BLD AUTO: 0.94 10*3/MM3 (ref 0.1–0.9)
MONOCYTES NFR BLD AUTO: 7.2 % (ref 5–12)
NEUTROPHILS NFR BLD AUTO: 77 % (ref 42.7–76)
NEUTROPHILS NFR BLD AUTO: 9.99 10*3/MM3 (ref 1.7–7)
NRBC BLD AUTO-RTO: 0 /100 WBC (ref 0–0.2)
PCO2 BLDA: 30.1 MM HG (ref 35–45)
PH BLDA: 7.42 PH UNITS (ref 7.35–7.45)
PLATELET # BLD AUTO: 208 10*3/MM3 (ref 140–450)
PMV BLD AUTO: 11.5 FL (ref 6–12)
PO2 BLDA: 63 MM HG (ref 80–100)
POTASSIUM SERPL-SCNC: 4.4 MMOL/L (ref 3.5–5.2)
PROT SERPL-MCNC: 6.8 G/DL (ref 6–8.5)
RBC # BLD AUTO: 4.88 10*6/MM3 (ref 4.14–5.8)
SAO2 % BLDCOA: 92.5 % (ref 92–99)
SODIUM SERPL-SCNC: 145 MMOL/L (ref 136–145)
TOTAL RATE: 20 BREATHS/MINUTE
WBC # BLD AUTO: 12.98 10*3/MM3 (ref 3.4–10.8)

## 2021-09-07 PROCEDURE — 85025 COMPLETE CBC W/AUTO DIFF WBC: CPT | Performed by: INTERNAL MEDICINE

## 2021-09-07 PROCEDURE — 71045 X-RAY EXAM CHEST 1 VIEW: CPT

## 2021-09-07 PROCEDURE — 25010000002 FUROSEMIDE PER 20 MG: Performed by: INTERNAL MEDICINE

## 2021-09-07 PROCEDURE — 80053 COMPREHEN METABOLIC PANEL: CPT | Performed by: INTERNAL MEDICINE

## 2021-09-07 PROCEDURE — 36600 WITHDRAWAL OF ARTERIAL BLOOD: CPT

## 2021-09-07 PROCEDURE — 25010000002 DEXAMETHASONE PER 1 MG: Performed by: INTERNAL MEDICINE

## 2021-09-07 PROCEDURE — 86140 C-REACTIVE PROTEIN: CPT | Performed by: INTERNAL MEDICINE

## 2021-09-07 PROCEDURE — 25010000002 ENOXAPARIN PER 10 MG: Performed by: INTERNAL MEDICINE

## 2021-09-07 PROCEDURE — 25010000002 ONDANSETRON PER 1 MG: Performed by: INTERNAL MEDICINE

## 2021-09-07 PROCEDURE — 82803 BLOOD GASES ANY COMBINATION: CPT

## 2021-09-07 PROCEDURE — 82962 GLUCOSE BLOOD TEST: CPT

## 2021-09-07 PROCEDURE — 85379 FIBRIN DEGRADATION QUANT: CPT | Performed by: INTERNAL MEDICINE

## 2021-09-07 PROCEDURE — 82728 ASSAY OF FERRITIN: CPT | Performed by: INTERNAL MEDICINE

## 2021-09-07 RX ORDER — DEXAMETHASONE SODIUM PHOSPHATE 10 MG/ML
10 INJECTION INTRAMUSCULAR; INTRAVENOUS EVERY 12 HOURS
Status: DISCONTINUED | OUTPATIENT
Start: 2021-09-07 | End: 2021-09-28

## 2021-09-07 RX ORDER — FUROSEMIDE 10 MG/ML
40 INJECTION INTRAMUSCULAR; INTRAVENOUS ONCE
Status: COMPLETED | OUTPATIENT
Start: 2021-09-07 | End: 2021-09-07

## 2021-09-07 RX ORDER — DEXTROMETHORPHAN POLISTIREX 30 MG/5ML
60 SUSPENSION ORAL EVERY 12 HOURS SCHEDULED
Status: DISCONTINUED | OUTPATIENT
Start: 2021-09-07 | End: 2021-10-16 | Stop reason: HOSPADM

## 2021-09-07 RX ORDER — BENZONATATE 100 MG/1
200 CAPSULE ORAL 3 TIMES DAILY PRN
Status: DISCONTINUED | OUTPATIENT
Start: 2021-09-07 | End: 2021-10-16 | Stop reason: HOSPADM

## 2021-09-07 RX ADMIN — SODIUM CHLORIDE 1 DROP: 50 SOLUTION OPHTHALMIC at 16:57

## 2021-09-07 RX ADMIN — ANTACID TABLETS 1 TABLET: 500 TABLET, CHEWABLE ORAL at 10:09

## 2021-09-07 RX ADMIN — DEXAMETHASONE SODIUM PHOSPHATE 10 MG: 10 INJECTION INTRAMUSCULAR; INTRAVENOUS at 23:13

## 2021-09-07 RX ADMIN — MIRTAZAPINE 7.5 MG: 15 TABLET, FILM COATED ORAL at 21:04

## 2021-09-07 RX ADMIN — DEXTROMETHORPHAN POLISTIREX 60 MG: 30 SUSPENSION, EXTENDED RELEASE ORAL at 21:04

## 2021-09-07 RX ADMIN — SODIUM BICARBONATE 650 MG: 650 TABLET ORAL at 16:57

## 2021-09-07 RX ADMIN — METOPROLOL TARTRATE 12.5 MG: 25 TABLET ORAL at 21:04

## 2021-09-07 RX ADMIN — METOPROLOL TARTRATE 12.5 MG: 25 TABLET ORAL at 10:09

## 2021-09-07 RX ADMIN — ONDANSETRON 4 MG: 2 INJECTION INTRAMUSCULAR; INTRAVENOUS at 11:41

## 2021-09-07 RX ADMIN — SODIUM BICARBONATE 650 MG: 650 TABLET ORAL at 10:09

## 2021-09-07 RX ADMIN — SODIUM BICARBONATE 650 MG: 650 TABLET ORAL at 21:04

## 2021-09-07 RX ADMIN — PREDNISOLONE ACETATE 1 DROP: 10 SUSPENSION/ DROPS OPHTHALMIC at 10:12

## 2021-09-07 RX ADMIN — SODIUM CHLORIDE 1 DROP: 50 SOLUTION OPHTHALMIC at 10:12

## 2021-09-07 RX ADMIN — BENZONATATE 200 MG: 100 CAPSULE ORAL at 01:12

## 2021-09-07 RX ADMIN — SODIUM CHLORIDE 1 DROP: 50 SOLUTION OPHTHALMIC at 21:14

## 2021-09-07 RX ADMIN — DEXTROMETHORPHAN POLISTIREX 60 MG: 30 SUSPENSION, EXTENDED RELEASE ORAL at 10:12

## 2021-09-07 RX ADMIN — ENOXAPARIN SODIUM 70 MG: 80 INJECTION, SOLUTION INTRAVENOUS; SUBCUTANEOUS at 14:17

## 2021-09-07 RX ADMIN — SODIUM CHLORIDE, PRESERVATIVE FREE 10 ML: 5 INJECTION INTRAVENOUS at 23:13

## 2021-09-07 RX ADMIN — FUROSEMIDE 40 MG: 10 INJECTION, SOLUTION INTRAMUSCULAR; INTRAVENOUS at 23:13

## 2021-09-07 RX ADMIN — FAMOTIDINE 20 MG: 20 TABLET, FILM COATED ORAL at 10:10

## 2021-09-07 RX ADMIN — AMLODIPINE BESYLATE 10 MG: 10 TABLET ORAL at 10:09

## 2021-09-07 RX ADMIN — FINASTERIDE 5 MG: 5 TABLET, FILM COATED ORAL at 14:16

## 2021-09-07 RX ADMIN — REMDESIVIR 100 MG: 100 INJECTION, POWDER, LYOPHILIZED, FOR SOLUTION INTRAVENOUS at 14:18

## 2021-09-07 RX ADMIN — DEXAMETHASONE SODIUM PHOSPHATE 6 MG: 10 INJECTION INTRAMUSCULAR; INTRAVENOUS at 10:09

## 2021-09-08 ENCOUNTER — APPOINTMENT (OUTPATIENT)
Dept: CT IMAGING | Facility: HOSPITAL | Age: 71
End: 2021-09-08

## 2021-09-08 LAB
ALBUMIN SERPL-MCNC: 3.6 G/DL (ref 3.5–5.2)
ALBUMIN/GLOB SERPL: 1.2 G/DL
ALP SERPL-CCNC: 138 U/L (ref 39–117)
ALT SERPL W P-5'-P-CCNC: 65 U/L (ref 1–41)
ANION GAP SERPL CALCULATED.3IONS-SCNC: 16.1 MMOL/L (ref 5–15)
AST SERPL-CCNC: 60 U/L (ref 1–40)
BASOPHILS # BLD AUTO: 0.05 10*3/MM3 (ref 0–0.2)
BASOPHILS NFR BLD AUTO: 0.5 % (ref 0–1.5)
BILIRUB SERPL-MCNC: 0.3 MG/DL (ref 0–1.2)
BUN SERPL-MCNC: 67 MG/DL (ref 8–23)
BUN/CREAT SERPL: 29.3 (ref 7–25)
CALCIUM SPEC-SCNC: 8.8 MG/DL (ref 8.6–10.5)
CHLORIDE SERPL-SCNC: 114 MMOL/L (ref 98–107)
CO2 SERPL-SCNC: 17.9 MMOL/L (ref 22–29)
CREAT SERPL-MCNC: 2.29 MG/DL (ref 0.76–1.27)
CRP SERPL-MCNC: 4.4 MG/DL (ref 0–0.5)
DEPRECATED RDW RBC AUTO: 47 FL (ref 37–54)
EOSINOPHIL # BLD AUTO: 0 10*3/MM3 (ref 0–0.4)
EOSINOPHIL NFR BLD AUTO: 0 % (ref 0.3–6.2)
ERYTHROCYTE [DISTWIDTH] IN BLOOD BY AUTOMATED COUNT: 14.5 % (ref 12.3–15.4)
FERRITIN SERPL-MCNC: 1221 NG/ML (ref 30–400)
GFR SERPL CREATININE-BSD FRML MDRD: 28 ML/MIN/1.73
GLOBULIN UR ELPH-MCNC: 3 GM/DL
GLUCOSE BLDC GLUCOMTR-MCNC: 119 MG/DL (ref 70–130)
GLUCOSE BLDC GLUCOMTR-MCNC: 132 MG/DL (ref 70–130)
GLUCOSE BLDC GLUCOMTR-MCNC: 143 MG/DL (ref 70–130)
GLUCOSE BLDC GLUCOMTR-MCNC: 146 MG/DL (ref 70–130)
GLUCOSE SERPL-MCNC: 174 MG/DL (ref 65–99)
HCT VFR BLD AUTO: 45.5 % (ref 37.5–51)
HGB BLD-MCNC: 15.1 G/DL (ref 13–17.7)
IMM GRANULOCYTES # BLD AUTO: 0.24 10*3/MM3 (ref 0–0.05)
IMM GRANULOCYTES NFR BLD AUTO: 2.4 % (ref 0–0.5)
LDH SERPL-CCNC: 406 U/L (ref 135–225)
LYMPHOCYTES # BLD AUTO: 0.79 10*3/MM3 (ref 0.7–3.1)
LYMPHOCYTES NFR BLD AUTO: 8 % (ref 19.6–45.3)
MCH RBC QN AUTO: 29.7 PG (ref 26.6–33)
MCHC RBC AUTO-ENTMCNC: 33.2 G/DL (ref 31.5–35.7)
MCV RBC AUTO: 89.4 FL (ref 79–97)
MONOCYTES # BLD AUTO: 0.29 10*3/MM3 (ref 0.1–0.9)
MONOCYTES NFR BLD AUTO: 2.9 % (ref 5–12)
NEUTROPHILS NFR BLD AUTO: 8.48 10*3/MM3 (ref 1.7–7)
NEUTROPHILS NFR BLD AUTO: 86.2 % (ref 42.7–76)
NRBC BLD AUTO-RTO: 0 /100 WBC (ref 0–0.2)
NT-PROBNP SERPL-MCNC: 1571 PG/ML (ref 0–900)
PLATELET # BLD AUTO: 262 10*3/MM3 (ref 140–450)
PMV BLD AUTO: 11.4 FL (ref 6–12)
POTASSIUM SERPL-SCNC: 4.6 MMOL/L (ref 3.5–5.2)
PROT SERPL-MCNC: 6.6 G/DL (ref 6–8.5)
RBC # BLD AUTO: 5.09 10*6/MM3 (ref 4.14–5.8)
SODIUM SERPL-SCNC: 148 MMOL/L (ref 136–145)
WBC # BLD AUTO: 9.85 10*3/MM3 (ref 3.4–10.8)

## 2021-09-08 PROCEDURE — 86140 C-REACTIVE PROTEIN: CPT | Performed by: INTERNAL MEDICINE

## 2021-09-08 PROCEDURE — 25010000002 ENOXAPARIN PER 10 MG: Performed by: INTERNAL MEDICINE

## 2021-09-08 PROCEDURE — 25010000002 DEXAMETHASONE PER 1 MG: Performed by: INTERNAL MEDICINE

## 2021-09-08 PROCEDURE — 83615 LACTATE (LD) (LDH) ENZYME: CPT | Performed by: INTERNAL MEDICINE

## 2021-09-08 PROCEDURE — 85025 COMPLETE CBC W/AUTO DIFF WBC: CPT | Performed by: INTERNAL MEDICINE

## 2021-09-08 PROCEDURE — 97110 THERAPEUTIC EXERCISES: CPT

## 2021-09-08 PROCEDURE — 82962 GLUCOSE BLOOD TEST: CPT

## 2021-09-08 PROCEDURE — 83880 ASSAY OF NATRIURETIC PEPTIDE: CPT | Performed by: INTERNAL MEDICINE

## 2021-09-08 PROCEDURE — 70450 CT HEAD/BRAIN W/O DYE: CPT

## 2021-09-08 PROCEDURE — 82728 ASSAY OF FERRITIN: CPT | Performed by: INTERNAL MEDICINE

## 2021-09-08 PROCEDURE — 80053 COMPREHEN METABOLIC PANEL: CPT | Performed by: INTERNAL MEDICINE

## 2021-09-08 RX ADMIN — ENOXAPARIN SODIUM 70 MG: 80 INJECTION, SOLUTION INTRAVENOUS; SUBCUTANEOUS at 16:36

## 2021-09-08 RX ADMIN — DEXAMETHASONE SODIUM PHOSPHATE 10 MG: 10 INJECTION INTRAMUSCULAR; INTRAVENOUS at 21:30

## 2021-09-08 RX ADMIN — METOPROLOL TARTRATE 12.5 MG: 25 TABLET ORAL at 10:29

## 2021-09-08 RX ADMIN — AMLODIPINE BESYLATE 10 MG: 10 TABLET ORAL at 10:29

## 2021-09-08 RX ADMIN — DEXTROMETHORPHAN POLISTIREX 60 MG: 30 SUSPENSION, EXTENDED RELEASE ORAL at 21:30

## 2021-09-08 RX ADMIN — FAMOTIDINE 20 MG: 20 TABLET, FILM COATED ORAL at 10:29

## 2021-09-08 RX ADMIN — SODIUM CHLORIDE 1 DROP: 50 SOLUTION OPHTHALMIC at 09:00

## 2021-09-08 RX ADMIN — DEXAMETHASONE SODIUM PHOSPHATE 10 MG: 10 INJECTION INTRAMUSCULAR; INTRAVENOUS at 10:29

## 2021-09-08 RX ADMIN — SODIUM CHLORIDE 1 DROP: 50 SOLUTION OPHTHALMIC at 16:39

## 2021-09-08 RX ADMIN — MIRTAZAPINE 7.5 MG: 15 TABLET, FILM COATED ORAL at 21:31

## 2021-09-08 RX ADMIN — REMDESIVIR 100 MG: 100 INJECTION, POWDER, LYOPHILIZED, FOR SOLUTION INTRAVENOUS at 16:36

## 2021-09-08 RX ADMIN — Medication 3 MG: at 23:06

## 2021-09-08 RX ADMIN — ANTACID TABLETS 1 TABLET: 500 TABLET, CHEWABLE ORAL at 10:31

## 2021-09-08 RX ADMIN — METOPROLOL TARTRATE 12.5 MG: 25 TABLET ORAL at 21:31

## 2021-09-08 RX ADMIN — SODIUM CHLORIDE, PRESERVATIVE FREE 10 ML: 5 INJECTION INTRAVENOUS at 21:31

## 2021-09-08 RX ADMIN — SODIUM CHLORIDE 1 DROP: 50 SOLUTION OPHTHALMIC at 21:32

## 2021-09-08 RX ADMIN — PREDNISOLONE ACETATE 1 DROP: 10 SUSPENSION/ DROPS OPHTHALMIC at 10:31

## 2021-09-08 RX ADMIN — DEXTROMETHORPHAN POLISTIREX 60 MG: 30 SUSPENSION, EXTENDED RELEASE ORAL at 10:29

## 2021-09-08 RX ADMIN — FINASTERIDE 5 MG: 5 TABLET, FILM COATED ORAL at 10:30

## 2021-09-09 LAB
ALBUMIN SERPL-MCNC: 3.5 G/DL (ref 3.5–5.2)
ALBUMIN/GLOB SERPL: 1.2 G/DL
ALP SERPL-CCNC: 147 U/L (ref 39–117)
ALT SERPL W P-5'-P-CCNC: 108 U/L (ref 1–41)
ANION GAP SERPL CALCULATED.3IONS-SCNC: 17.3 MMOL/L (ref 5–15)
AST SERPL-CCNC: 95 U/L (ref 1–40)
BASOPHILS # BLD AUTO: 0.09 10*3/MM3 (ref 0–0.2)
BASOPHILS NFR BLD AUTO: 0.8 % (ref 0–1.5)
BILIRUB SERPL-MCNC: 0.3 MG/DL (ref 0–1.2)
BUN SERPL-MCNC: 76 MG/DL (ref 8–23)
BUN/CREAT SERPL: 34.5 (ref 7–25)
CALCIUM SPEC-SCNC: 8.7 MG/DL (ref 8.6–10.5)
CHLORIDE SERPL-SCNC: 119 MMOL/L (ref 98–107)
CO2 SERPL-SCNC: 17.7 MMOL/L (ref 22–29)
CREAT SERPL-MCNC: 2.2 MG/DL (ref 0.76–1.27)
CREAT UR-MCNC: 66.7 MG/DL
CRP SERPL-MCNC: 2.75 MG/DL (ref 0–0.5)
D DIMER PPP FEU-MCNC: 0.43 MCGFEU/ML (ref 0–0.49)
DEPRECATED RDW RBC AUTO: 48.3 FL (ref 37–54)
EOSINOPHIL # BLD AUTO: 0 10*3/MM3 (ref 0–0.4)
EOSINOPHIL NFR BLD AUTO: 0 % (ref 0.3–6.2)
ERYTHROCYTE [DISTWIDTH] IN BLOOD BY AUTOMATED COUNT: 14.5 % (ref 12.3–15.4)
FERRITIN SERPL-MCNC: 1133 NG/ML (ref 30–400)
GFR SERPL CREATININE-BSD FRML MDRD: 30 ML/MIN/1.73
GLOBULIN UR ELPH-MCNC: 2.9 GM/DL
GLUCOSE BLDC GLUCOMTR-MCNC: 125 MG/DL (ref 70–130)
GLUCOSE BLDC GLUCOMTR-MCNC: 150 MG/DL (ref 70–130)
GLUCOSE BLDC GLUCOMTR-MCNC: 173 MG/DL (ref 70–130)
GLUCOSE BLDC GLUCOMTR-MCNC: 179 MG/DL (ref 70–130)
GLUCOSE SERPL-MCNC: 142 MG/DL (ref 65–99)
HCT VFR BLD AUTO: 46.4 % (ref 37.5–51)
HGB BLD-MCNC: 15.1 G/DL (ref 13–17.7)
IMM GRANULOCYTES # BLD AUTO: 0.44 10*3/MM3 (ref 0–0.05)
IMM GRANULOCYTES NFR BLD AUTO: 3.7 % (ref 0–0.5)
LYMPHOCYTES # BLD AUTO: 1.27 10*3/MM3 (ref 0.7–3.1)
LYMPHOCYTES NFR BLD AUTO: 10.8 % (ref 19.6–45.3)
MCH RBC QN AUTO: 29.5 PG (ref 26.6–33)
MCHC RBC AUTO-ENTMCNC: 32.5 G/DL (ref 31.5–35.7)
MCV RBC AUTO: 90.8 FL (ref 79–97)
MONOCYTES # BLD AUTO: 0.69 10*3/MM3 (ref 0.1–0.9)
MONOCYTES NFR BLD AUTO: 5.8 % (ref 5–12)
NEUTROPHILS NFR BLD AUTO: 78.9 % (ref 42.7–76)
NEUTROPHILS NFR BLD AUTO: 9.31 10*3/MM3 (ref 1.7–7)
NRBC BLD AUTO-RTO: 0.1 /100 WBC (ref 0–0.2)
PLATELET # BLD AUTO: 303 10*3/MM3 (ref 140–450)
PMV BLD AUTO: 11.1 FL (ref 6–12)
POTASSIUM SERPL-SCNC: 5 MMOL/L (ref 3.5–5.2)
PROCALCITONIN SERPL-MCNC: 0.09 NG/ML (ref 0–0.25)
PROT SERPL-MCNC: 6.4 G/DL (ref 6–8.5)
RBC # BLD AUTO: 5.11 10*6/MM3 (ref 4.14–5.8)
SODIUM SERPL-SCNC: 154 MMOL/L (ref 136–145)
SODIUM UR-SCNC: 43 MMOL/L
TSH SERPL DL<=0.05 MIU/L-ACNC: 0.27 UIU/ML (ref 0.27–4.2)
WBC # BLD AUTO: 11.8 10*3/MM3 (ref 3.4–10.8)

## 2021-09-09 PROCEDURE — 80053 COMPREHEN METABOLIC PANEL: CPT | Performed by: INTERNAL MEDICINE

## 2021-09-09 PROCEDURE — 86140 C-REACTIVE PROTEIN: CPT | Performed by: INTERNAL MEDICINE

## 2021-09-09 PROCEDURE — 82570 ASSAY OF URINE CREATININE: CPT | Performed by: INTERNAL MEDICINE

## 2021-09-09 PROCEDURE — 82962 GLUCOSE BLOOD TEST: CPT

## 2021-09-09 PROCEDURE — 82728 ASSAY OF FERRITIN: CPT | Performed by: INTERNAL MEDICINE

## 2021-09-09 PROCEDURE — 85379 FIBRIN DEGRADATION QUANT: CPT | Performed by: INTERNAL MEDICINE

## 2021-09-09 PROCEDURE — 99223 1ST HOSP IP/OBS HIGH 75: CPT | Performed by: INTERNAL MEDICINE

## 2021-09-09 PROCEDURE — 99221 1ST HOSP IP/OBS SF/LOW 40: CPT | Performed by: INTERNAL MEDICINE

## 2021-09-09 PROCEDURE — 84145 PROCALCITONIN (PCT): CPT | Performed by: INTERNAL MEDICINE

## 2021-09-09 PROCEDURE — 84300 ASSAY OF URINE SODIUM: CPT | Performed by: INTERNAL MEDICINE

## 2021-09-09 PROCEDURE — 84443 ASSAY THYROID STIM HORMONE: CPT | Performed by: INTERNAL MEDICINE

## 2021-09-09 PROCEDURE — 85025 COMPLETE CBC W/AUTO DIFF WBC: CPT | Performed by: INTERNAL MEDICINE

## 2021-09-09 PROCEDURE — 25010000002 DEXAMETHASONE PER 1 MG: Performed by: INTERNAL MEDICINE

## 2021-09-09 PROCEDURE — 25010000002 ENOXAPARIN PER 10 MG: Performed by: INTERNAL MEDICINE

## 2021-09-09 RX ORDER — DEXTROSE MONOHYDRATE 50 MG/ML
100 INJECTION, SOLUTION INTRAVENOUS CONTINUOUS
Status: ACTIVE | OUTPATIENT
Start: 2021-09-09 | End: 2021-09-10

## 2021-09-09 RX ADMIN — ANTACID TABLETS 1 TABLET: 500 TABLET, CHEWABLE ORAL at 09:20

## 2021-09-09 RX ADMIN — DEXTROSE MONOHYDRATE 100 ML/HR: 50 INJECTION, SOLUTION INTRAVENOUS at 13:18

## 2021-09-09 RX ADMIN — SODIUM CHLORIDE 1 DROP: 50 SOLUTION OPHTHALMIC at 09:22

## 2021-09-09 RX ADMIN — SODIUM CHLORIDE 1 DROP: 50 SOLUTION OPHTHALMIC at 20:06

## 2021-09-09 RX ADMIN — SODIUM CHLORIDE 1 DROP: 50 SOLUTION OPHTHALMIC at 16:57

## 2021-09-09 RX ADMIN — FINASTERIDE 5 MG: 5 TABLET, FILM COATED ORAL at 09:20

## 2021-09-09 RX ADMIN — METOPROLOL TARTRATE 12.5 MG: 25 TABLET ORAL at 09:21

## 2021-09-09 RX ADMIN — AMLODIPINE BESYLATE 10 MG: 10 TABLET ORAL at 09:21

## 2021-09-09 RX ADMIN — REMDESIVIR 100 MG: 100 INJECTION, POWDER, LYOPHILIZED, FOR SOLUTION INTRAVENOUS at 13:32

## 2021-09-09 RX ADMIN — METOPROLOL TARTRATE 12.5 MG: 25 TABLET ORAL at 20:06

## 2021-09-09 RX ADMIN — PREDNISOLONE ACETATE 1 DROP: 10 SUSPENSION/ DROPS OPHTHALMIC at 09:22

## 2021-09-09 RX ADMIN — MIRTAZAPINE 7.5 MG: 15 TABLET, FILM COATED ORAL at 20:06

## 2021-09-09 RX ADMIN — DEXTROMETHORPHAN POLISTIREX 60 MG: 30 SUSPENSION, EXTENDED RELEASE ORAL at 09:20

## 2021-09-09 RX ADMIN — FAMOTIDINE 20 MG: 20 TABLET, FILM COATED ORAL at 09:21

## 2021-09-09 RX ADMIN — ENOXAPARIN SODIUM 70 MG: 80 INJECTION, SOLUTION INTRAVENOUS; SUBCUTANEOUS at 13:17

## 2021-09-09 RX ADMIN — DEXTROSE MONOHYDRATE 100 ML/HR: 50 INJECTION, SOLUTION INTRAVENOUS at 23:38

## 2021-09-09 RX ADMIN — DEXAMETHASONE SODIUM PHOSPHATE 10 MG: 10 INJECTION INTRAMUSCULAR; INTRAVENOUS at 22:07

## 2021-09-09 RX ADMIN — Medication 3 MG: at 23:44

## 2021-09-09 RX ADMIN — DEXTROMETHORPHAN POLISTIREX 60 MG: 30 SUSPENSION, EXTENDED RELEASE ORAL at 20:06

## 2021-09-09 RX ADMIN — DEXAMETHASONE SODIUM PHOSPHATE 10 MG: 10 INJECTION INTRAMUSCULAR; INTRAVENOUS at 13:18

## 2021-09-10 LAB
ALBUMIN SERPL-MCNC: 3.2 G/DL (ref 3.5–5.2)
ALBUMIN/GLOB SERPL: 1.5 G/DL
ALP SERPL-CCNC: 142 U/L (ref 39–117)
ALT SERPL W P-5'-P-CCNC: 162 U/L (ref 1–41)
ANION GAP SERPL CALCULATED.3IONS-SCNC: 12.5 MMOL/L (ref 5–15)
AST SERPL-CCNC: 113 U/L (ref 1–40)
BACTERIA SPEC AEROBE CULT: NORMAL
BACTERIA SPEC AEROBE CULT: NORMAL
BASOPHILS # BLD AUTO: 0.04 10*3/MM3 (ref 0–0.2)
BASOPHILS NFR BLD AUTO: 0.4 % (ref 0–1.5)
BILIRUB SERPL-MCNC: 0.3 MG/DL (ref 0–1.2)
BUN SERPL-MCNC: 69 MG/DL (ref 8–23)
BUN/CREAT SERPL: 36.7 (ref 7–25)
CALCIUM SPEC-SCNC: 8.4 MG/DL (ref 8.6–10.5)
CHLORIDE SERPL-SCNC: 115 MMOL/L (ref 98–107)
CO2 SERPL-SCNC: 17.5 MMOL/L (ref 22–29)
CREAT SERPL-MCNC: 1.88 MG/DL (ref 0.76–1.27)
CRP SERPL-MCNC: 1.51 MG/DL (ref 0–0.5)
DEPRECATED RDW RBC AUTO: 46 FL (ref 37–54)
EOSINOPHIL # BLD AUTO: 0 10*3/MM3 (ref 0–0.4)
EOSINOPHIL NFR BLD AUTO: 0 % (ref 0.3–6.2)
ERYTHROCYTE [DISTWIDTH] IN BLOOD BY AUTOMATED COUNT: 14.4 % (ref 12.3–15.4)
FERRITIN SERPL-MCNC: 1058 NG/ML (ref 30–400)
GFR SERPL CREATININE-BSD FRML MDRD: 36 ML/MIN/1.73
GLOBULIN UR ELPH-MCNC: 2.2 GM/DL
GLUCOSE BLDC GLUCOMTR-MCNC: 148 MG/DL (ref 70–130)
GLUCOSE BLDC GLUCOMTR-MCNC: 152 MG/DL (ref 70–130)
GLUCOSE BLDC GLUCOMTR-MCNC: 164 MG/DL (ref 70–130)
GLUCOSE BLDC GLUCOMTR-MCNC: 169 MG/DL (ref 70–130)
GLUCOSE SERPL-MCNC: 179 MG/DL (ref 65–99)
HCT VFR BLD AUTO: 40.9 % (ref 37.5–51)
HGB BLD-MCNC: 13.5 G/DL (ref 13–17.7)
IMM GRANULOCYTES # BLD AUTO: 0.41 10*3/MM3 (ref 0–0.05)
IMM GRANULOCYTES NFR BLD AUTO: 3.9 % (ref 0–0.5)
LDH SERPL-CCNC: 409 U/L (ref 135–225)
LYMPHOCYTES # BLD AUTO: 0.67 10*3/MM3 (ref 0.7–3.1)
LYMPHOCYTES NFR BLD AUTO: 6.3 % (ref 19.6–45.3)
MAGNESIUM SERPL-MCNC: 2.7 MG/DL (ref 1.6–2.4)
MCH RBC QN AUTO: 29.2 PG (ref 26.6–33)
MCHC RBC AUTO-ENTMCNC: 33 G/DL (ref 31.5–35.7)
MCV RBC AUTO: 88.5 FL (ref 79–97)
MONOCYTES # BLD AUTO: 0.44 10*3/MM3 (ref 0.1–0.9)
MONOCYTES NFR BLD AUTO: 4.2 % (ref 5–12)
NEUTROPHILS NFR BLD AUTO: 85.2 % (ref 42.7–76)
NEUTROPHILS NFR BLD AUTO: 9.02 10*3/MM3 (ref 1.7–7)
NRBC BLD AUTO-RTO: 0 /100 WBC (ref 0–0.2)
PHOSPHATE SERPL-MCNC: 3.5 MG/DL (ref 2.5–4.5)
PLATELET # BLD AUTO: 316 10*3/MM3 (ref 140–450)
PMV BLD AUTO: 11.1 FL (ref 6–12)
POTASSIUM SERPL-SCNC: 4.7 MMOL/L (ref 3.5–5.2)
PROT SERPL-MCNC: 5.4 G/DL (ref 6–8.5)
RBC # BLD AUTO: 4.62 10*6/MM3 (ref 4.14–5.8)
SODIUM SERPL-SCNC: 145 MMOL/L (ref 136–145)
URATE SERPL-MCNC: 11.3 MG/DL (ref 3.4–7)
WBC # BLD AUTO: 10.58 10*3/MM3 (ref 3.4–10.8)

## 2021-09-10 PROCEDURE — 63710000001 INSULIN LISPRO (HUMAN) PER 5 UNITS: Performed by: INTERNAL MEDICINE

## 2021-09-10 PROCEDURE — 83735 ASSAY OF MAGNESIUM: CPT | Performed by: INTERNAL MEDICINE

## 2021-09-10 PROCEDURE — 85025 COMPLETE CBC W/AUTO DIFF WBC: CPT | Performed by: INTERNAL MEDICINE

## 2021-09-10 PROCEDURE — 99232 SBSQ HOSP IP/OBS MODERATE 35: CPT | Performed by: INTERNAL MEDICINE

## 2021-09-10 PROCEDURE — 25010000002 ENOXAPARIN PER 10 MG: Performed by: INTERNAL MEDICINE

## 2021-09-10 PROCEDURE — 80053 COMPREHEN METABOLIC PANEL: CPT | Performed by: INTERNAL MEDICINE

## 2021-09-10 PROCEDURE — 25010000002 DEXAMETHASONE PER 1 MG: Performed by: INTERNAL MEDICINE

## 2021-09-10 PROCEDURE — 83615 LACTATE (LD) (LDH) ENZYME: CPT | Performed by: INTERNAL MEDICINE

## 2021-09-10 PROCEDURE — 86140 C-REACTIVE PROTEIN: CPT | Performed by: INTERNAL MEDICINE

## 2021-09-10 PROCEDURE — 82962 GLUCOSE BLOOD TEST: CPT

## 2021-09-10 PROCEDURE — 84100 ASSAY OF PHOSPHORUS: CPT | Performed by: INTERNAL MEDICINE

## 2021-09-10 PROCEDURE — 84550 ASSAY OF BLOOD/URIC ACID: CPT | Performed by: INTERNAL MEDICINE

## 2021-09-10 PROCEDURE — 82728 ASSAY OF FERRITIN: CPT | Performed by: INTERNAL MEDICINE

## 2021-09-10 PROCEDURE — 97110 THERAPEUTIC EXERCISES: CPT

## 2021-09-10 RX ADMIN — METOPROLOL TARTRATE 12.5 MG: 25 TABLET ORAL at 20:41

## 2021-09-10 RX ADMIN — METOPROLOL TARTRATE 12.5 MG: 25 TABLET ORAL at 08:23

## 2021-09-10 RX ADMIN — MIRTAZAPINE 7.5 MG: 15 TABLET, FILM COATED ORAL at 20:41

## 2021-09-10 RX ADMIN — DEXAMETHASONE SODIUM PHOSPHATE 10 MG: 10 INJECTION INTRAMUSCULAR; INTRAVENOUS at 23:33

## 2021-09-10 RX ADMIN — DEXTROMETHORPHAN POLISTIREX 60 MG: 30 SUSPENSION, EXTENDED RELEASE ORAL at 08:23

## 2021-09-10 RX ADMIN — ENOXAPARIN SODIUM 70 MG: 80 INJECTION, SOLUTION INTRAVENOUS; SUBCUTANEOUS at 11:37

## 2021-09-10 RX ADMIN — FINASTERIDE 5 MG: 5 TABLET, FILM COATED ORAL at 08:23

## 2021-09-10 RX ADMIN — DEXAMETHASONE SODIUM PHOSPHATE 10 MG: 10 INJECTION INTRAMUSCULAR; INTRAVENOUS at 10:11

## 2021-09-10 RX ADMIN — REMDESIVIR 100 MG: 100 INJECTION, POWDER, LYOPHILIZED, FOR SOLUTION INTRAVENOUS at 10:11

## 2021-09-10 RX ADMIN — ANTACID TABLETS 1 TABLET: 500 TABLET, CHEWABLE ORAL at 08:23

## 2021-09-10 RX ADMIN — SODIUM CHLORIDE 1 DROP: 50 SOLUTION OPHTHALMIC at 08:23

## 2021-09-10 RX ADMIN — FAMOTIDINE 20 MG: 20 TABLET, FILM COATED ORAL at 08:23

## 2021-09-10 RX ADMIN — INSULIN LISPRO 2 UNITS: 100 INJECTION, SOLUTION INTRAVENOUS; SUBCUTANEOUS at 16:53

## 2021-09-10 RX ADMIN — PREDNISOLONE ACETATE 1 DROP: 10 SUSPENSION/ DROPS OPHTHALMIC at 08:23

## 2021-09-10 RX ADMIN — DEXTROMETHORPHAN POLISTIREX 60 MG: 30 SUSPENSION, EXTENDED RELEASE ORAL at 20:41

## 2021-09-10 RX ADMIN — INSULIN LISPRO 2 UNITS: 100 INJECTION, SOLUTION INTRAVENOUS; SUBCUTANEOUS at 08:23

## 2021-09-10 RX ADMIN — INSULIN LISPRO 2 UNITS: 100 INJECTION, SOLUTION INTRAVENOUS; SUBCUTANEOUS at 11:36

## 2021-09-10 RX ADMIN — SODIUM CHLORIDE 1 DROP: 50 SOLUTION OPHTHALMIC at 16:53

## 2021-09-10 RX ADMIN — SODIUM CHLORIDE 1 DROP: 50 SOLUTION OPHTHALMIC at 21:33

## 2021-09-11 LAB
ALBUMIN SERPL-MCNC: 3 G/DL (ref 3.5–5.2)
ALBUMIN/GLOB SERPL: 1.1 G/DL
ALP SERPL-CCNC: 149 U/L (ref 39–117)
ALT SERPL W P-5'-P-CCNC: 142 U/L (ref 1–41)
ANION GAP SERPL CALCULATED.3IONS-SCNC: 11.5 MMOL/L (ref 5–15)
AST SERPL-CCNC: 55 U/L (ref 1–40)
BASOPHILS # BLD AUTO: 0.03 10*3/MM3 (ref 0–0.2)
BASOPHILS NFR BLD AUTO: 0.2 % (ref 0–1.5)
BILIRUB SERPL-MCNC: 0.4 MG/DL (ref 0–1.2)
BUN SERPL-MCNC: 57 MG/DL (ref 8–23)
BUN/CREAT SERPL: 33.1 (ref 7–25)
CALCIUM SPEC-SCNC: 8.6 MG/DL (ref 8.6–10.5)
CHLORIDE SERPL-SCNC: 116 MMOL/L (ref 98–107)
CO2 SERPL-SCNC: 18.5 MMOL/L (ref 22–29)
CREAT SERPL-MCNC: 1.72 MG/DL (ref 0.76–1.27)
CRP SERPL-MCNC: 1.01 MG/DL (ref 0–0.5)
D DIMER PPP FEU-MCNC: 0.48 MCGFEU/ML (ref 0–0.49)
DEPRECATED RDW RBC AUTO: 47.5 FL (ref 37–54)
EOSINOPHIL # BLD AUTO: 0 10*3/MM3 (ref 0–0.4)
EOSINOPHIL NFR BLD AUTO: 0 % (ref 0.3–6.2)
ERYTHROCYTE [DISTWIDTH] IN BLOOD BY AUTOMATED COUNT: 14.4 % (ref 12.3–15.4)
FERRITIN SERPL-MCNC: 1115 NG/ML (ref 30–400)
GFR SERPL CREATININE-BSD FRML MDRD: 40 ML/MIN/1.73
GLOBULIN UR ELPH-MCNC: 2.7 GM/DL
GLUCOSE BLDC GLUCOMTR-MCNC: 110 MG/DL (ref 70–130)
GLUCOSE BLDC GLUCOMTR-MCNC: 120 MG/DL (ref 70–130)
GLUCOSE BLDC GLUCOMTR-MCNC: 138 MG/DL (ref 70–130)
GLUCOSE BLDC GLUCOMTR-MCNC: 162 MG/DL (ref 70–130)
GLUCOSE SERPL-MCNC: 118 MG/DL (ref 65–99)
HCT VFR BLD AUTO: 44.5 % (ref 37.5–51)
HGB BLD-MCNC: 14.7 G/DL (ref 13–17.7)
LYMPHOCYTES # BLD AUTO: 0.57 10*3/MM3 (ref 0.7–3.1)
LYMPHOCYTES NFR BLD AUTO: 3.9 % (ref 19.6–45.3)
MCH RBC QN AUTO: 30 PG (ref 26.6–33)
MCHC RBC AUTO-ENTMCNC: 33 G/DL (ref 31.5–35.7)
MCV RBC AUTO: 90.8 FL (ref 79–97)
MONOCYTES # BLD AUTO: 0.74 10*3/MM3 (ref 0.1–0.9)
MONOCYTES NFR BLD AUTO: 5.1 % (ref 5–12)
NEUTROPHILS NFR BLD AUTO: 12.64 10*3/MM3 (ref 1.7–7)
NEUTROPHILS NFR BLD AUTO: 86.8 % (ref 42.7–76)
PLATELET # BLD AUTO: 318 10*3/MM3 (ref 140–450)
PMV BLD AUTO: 10.8 FL (ref 6–12)
POTASSIUM SERPL-SCNC: 4.5 MMOL/L (ref 3.5–5.2)
PROT SERPL-MCNC: 5.7 G/DL (ref 6–8.5)
RBC # BLD AUTO: 4.9 10*6/MM3 (ref 4.14–5.8)
SODIUM SERPL-SCNC: 146 MMOL/L (ref 136–145)
WBC # BLD AUTO: 14.56 10*3/MM3 (ref 3.4–10.8)

## 2021-09-11 PROCEDURE — 82962 GLUCOSE BLOOD TEST: CPT

## 2021-09-11 PROCEDURE — 82728 ASSAY OF FERRITIN: CPT | Performed by: INTERNAL MEDICINE

## 2021-09-11 PROCEDURE — 80053 COMPREHEN METABOLIC PANEL: CPT | Performed by: INTERNAL MEDICINE

## 2021-09-11 PROCEDURE — 85025 COMPLETE CBC W/AUTO DIFF WBC: CPT | Performed by: INTERNAL MEDICINE

## 2021-09-11 PROCEDURE — 86140 C-REACTIVE PROTEIN: CPT | Performed by: INTERNAL MEDICINE

## 2021-09-11 PROCEDURE — 25010000002 DEXAMETHASONE PER 1 MG: Performed by: INTERNAL MEDICINE

## 2021-09-11 PROCEDURE — 25010000002 ENOXAPARIN PER 10 MG: Performed by: INTERNAL MEDICINE

## 2021-09-11 PROCEDURE — 85379 FIBRIN DEGRADATION QUANT: CPT | Performed by: INTERNAL MEDICINE

## 2021-09-11 RX ORDER — SACCHAROMYCES BOULARDII 250 MG
250 CAPSULE ORAL 2 TIMES DAILY
Status: DISCONTINUED | OUTPATIENT
Start: 2021-09-11 | End: 2021-10-16 | Stop reason: HOSPADM

## 2021-09-11 RX ORDER — POLYETHYLENE GLYCOL 3350 17 G/17G
17 POWDER, FOR SOLUTION ORAL DAILY PRN
Status: DISCONTINUED | OUTPATIENT
Start: 2021-09-11 | End: 2021-10-16 | Stop reason: HOSPADM

## 2021-09-11 RX ADMIN — SODIUM CHLORIDE 1 DROP: 50 SOLUTION OPHTHALMIC at 18:22

## 2021-09-11 RX ADMIN — DEXAMETHASONE SODIUM PHOSPHATE 10 MG: 10 INJECTION INTRAMUSCULAR; INTRAVENOUS at 12:17

## 2021-09-11 RX ADMIN — Medication 250 MG: at 12:18

## 2021-09-11 RX ADMIN — PREDNISOLONE ACETATE 1 DROP: 10 SUSPENSION/ DROPS OPHTHALMIC at 08:48

## 2021-09-11 RX ADMIN — FAMOTIDINE 20 MG: 20 TABLET, FILM COATED ORAL at 08:41

## 2021-09-11 RX ADMIN — METOPROLOL TARTRATE 12.5 MG: 25 TABLET ORAL at 08:41

## 2021-09-11 RX ADMIN — DEXAMETHASONE SODIUM PHOSPHATE 10 MG: 10 INJECTION INTRAMUSCULAR; INTRAVENOUS at 20:19

## 2021-09-11 RX ADMIN — Medication 250 MG: at 20:22

## 2021-09-11 RX ADMIN — MIRTAZAPINE 7.5 MG: 15 TABLET, FILM COATED ORAL at 20:19

## 2021-09-11 RX ADMIN — FINASTERIDE 5 MG: 5 TABLET, FILM COATED ORAL at 08:41

## 2021-09-11 RX ADMIN — DEXTROMETHORPHAN POLISTIREX 60 MG: 30 SUSPENSION, EXTENDED RELEASE ORAL at 20:19

## 2021-09-11 RX ADMIN — DEXTROMETHORPHAN POLISTIREX 60 MG: 30 SUSPENSION, EXTENDED RELEASE ORAL at 08:41

## 2021-09-11 RX ADMIN — ENOXAPARIN SODIUM 70 MG: 80 INJECTION, SOLUTION INTRAVENOUS; SUBCUTANEOUS at 12:17

## 2021-09-11 RX ADMIN — ANTACID TABLETS 1 TABLET: 500 TABLET, CHEWABLE ORAL at 08:41

## 2021-09-11 RX ADMIN — METOPROLOL TARTRATE 12.5 MG: 25 TABLET ORAL at 20:19

## 2021-09-11 RX ADMIN — SODIUM CHLORIDE 1 DROP: 50 SOLUTION OPHTHALMIC at 08:48

## 2021-09-11 RX ADMIN — SODIUM CHLORIDE 1 DROP: 50 SOLUTION OPHTHALMIC at 20:21

## 2021-09-12 LAB
ALBUMIN SERPL-MCNC: 2.9 G/DL (ref 3.5–5.2)
ALBUMIN/GLOB SERPL: 1 G/DL
ALP SERPL-CCNC: 153 U/L (ref 39–117)
ALT SERPL W P-5'-P-CCNC: 118 U/L (ref 1–41)
ANION GAP SERPL CALCULATED.3IONS-SCNC: 11.9 MMOL/L (ref 5–15)
AST SERPL-CCNC: 37 U/L (ref 1–40)
BILIRUB SERPL-MCNC: 0.4 MG/DL (ref 0–1.2)
BUN SERPL-MCNC: 57 MG/DL (ref 8–23)
BUN/CREAT SERPL: 33.5 (ref 7–25)
CALCIUM SPEC-SCNC: 8.8 MG/DL (ref 8.6–10.5)
CHLORIDE SERPL-SCNC: 119 MMOL/L (ref 98–107)
CO2 SERPL-SCNC: 18.1 MMOL/L (ref 22–29)
CREAT SERPL-MCNC: 1.7 MG/DL (ref 0.76–1.27)
CRP SERPL-MCNC: 0.74 MG/DL (ref 0–0.5)
DEPRECATED RDW RBC AUTO: 47.6 FL (ref 37–54)
ERYTHROCYTE [DISTWIDTH] IN BLOOD BY AUTOMATED COUNT: 14.7 % (ref 12.3–15.4)
FERRITIN SERPL-MCNC: 1201 NG/ML (ref 30–400)
GFR SERPL CREATININE-BSD FRML MDRD: 40 ML/MIN/1.73
GLOBULIN UR ELPH-MCNC: 3 GM/DL
GLUCOSE BLDC GLUCOMTR-MCNC: 108 MG/DL (ref 70–130)
GLUCOSE BLDC GLUCOMTR-MCNC: 131 MG/DL (ref 70–130)
GLUCOSE BLDC GLUCOMTR-MCNC: 136 MG/DL (ref 70–130)
GLUCOSE BLDC GLUCOMTR-MCNC: 167 MG/DL (ref 70–130)
GLUCOSE SERPL-MCNC: 110 MG/DL (ref 65–99)
HCT VFR BLD AUTO: 43.4 % (ref 37.5–51)
HGB BLD-MCNC: 14.7 G/DL (ref 13–17.7)
LDH SERPL-CCNC: 461 U/L (ref 135–225)
LYMPHOCYTES # BLD MANUAL: 0 10*3/MM3 (ref 0.7–3.1)
LYMPHOCYTES NFR BLD MANUAL: 0 % (ref 19.6–45.3)
LYMPHOCYTES NFR BLD MANUAL: 9 % (ref 5–12)
MCH RBC QN AUTO: 30.2 PG (ref 26.6–33)
MCHC RBC AUTO-ENTMCNC: 33.9 G/DL (ref 31.5–35.7)
MCV RBC AUTO: 89.1 FL (ref 79–97)
METAMYELOCYTES NFR BLD MANUAL: 1 % (ref 0–0)
MONOCYTES # BLD AUTO: 1.52 10*3/MM3 (ref 0.1–0.9)
NEUTROPHILS # BLD AUTO: 15.21 10*3/MM3 (ref 1.7–7)
NEUTROPHILS NFR BLD MANUAL: 90 % (ref 42.7–76)
PLAT MORPH BLD: NORMAL
PLATELET # BLD AUTO: 399 10*3/MM3 (ref 140–450)
PMV BLD AUTO: 10.7 FL (ref 6–12)
POTASSIUM SERPL-SCNC: 5.2 MMOL/L (ref 3.5–5.2)
PROT SERPL-MCNC: 5.9 G/DL (ref 6–8.5)
RBC # BLD AUTO: 4.87 10*6/MM3 (ref 4.14–5.8)
RBC MORPH BLD: NORMAL
SMUDGE CELLS BLD QL SMEAR: ABNORMAL
SODIUM SERPL-SCNC: 149 MMOL/L (ref 136–145)
WBC # BLD AUTO: 16.9 10*3/MM3 (ref 3.4–10.8)

## 2021-09-12 PROCEDURE — 82728 ASSAY OF FERRITIN: CPT | Performed by: INTERNAL MEDICINE

## 2021-09-12 PROCEDURE — 86140 C-REACTIVE PROTEIN: CPT | Performed by: INTERNAL MEDICINE

## 2021-09-12 PROCEDURE — 85007 BL SMEAR W/DIFF WBC COUNT: CPT | Performed by: INTERNAL MEDICINE

## 2021-09-12 PROCEDURE — 82962 GLUCOSE BLOOD TEST: CPT

## 2021-09-12 PROCEDURE — 25010000002 ENOXAPARIN PER 10 MG: Performed by: INTERNAL MEDICINE

## 2021-09-12 PROCEDURE — 85025 COMPLETE CBC W/AUTO DIFF WBC: CPT | Performed by: INTERNAL MEDICINE

## 2021-09-12 PROCEDURE — 25010000002 DEXAMETHASONE PER 1 MG: Performed by: INTERNAL MEDICINE

## 2021-09-12 PROCEDURE — 80053 COMPREHEN METABOLIC PANEL: CPT | Performed by: INTERNAL MEDICINE

## 2021-09-12 PROCEDURE — 83615 LACTATE (LD) (LDH) ENZYME: CPT | Performed by: INTERNAL MEDICINE

## 2021-09-12 RX ORDER — DEXTROSE MONOHYDRATE 50 MG/ML
100 INJECTION, SOLUTION INTRAVENOUS CONTINUOUS
Status: ACTIVE | OUTPATIENT
Start: 2021-09-12 | End: 2021-09-13

## 2021-09-12 RX ADMIN — ACETAMINOPHEN 650 MG: 325 TABLET, FILM COATED ORAL at 20:54

## 2021-09-12 RX ADMIN — PREDNISOLONE ACETATE 1 DROP: 10 SUSPENSION/ DROPS OPHTHALMIC at 10:56

## 2021-09-12 RX ADMIN — DEXTROMETHORPHAN POLISTIREX 60 MG: 30 SUSPENSION, EXTENDED RELEASE ORAL at 10:56

## 2021-09-12 RX ADMIN — ANTACID TABLETS 1 TABLET: 500 TABLET, CHEWABLE ORAL at 10:56

## 2021-09-12 RX ADMIN — SODIUM CHLORIDE 1 DROP: 50 SOLUTION OPHTHALMIC at 10:58

## 2021-09-12 RX ADMIN — FINASTERIDE 5 MG: 5 TABLET, FILM COATED ORAL at 10:56

## 2021-09-12 RX ADMIN — Medication 250 MG: at 10:56

## 2021-09-12 RX ADMIN — METOPROLOL TARTRATE 12.5 MG: 25 TABLET ORAL at 20:41

## 2021-09-12 RX ADMIN — DEXTROSE MONOHYDRATE 100 ML/HR: 50 INJECTION, SOLUTION INTRAVENOUS at 17:22

## 2021-09-12 RX ADMIN — METOPROLOL TARTRATE 12.5 MG: 25 TABLET ORAL at 10:56

## 2021-09-12 RX ADMIN — SODIUM CHLORIDE 1 DROP: 50 SOLUTION OPHTHALMIC at 16:00

## 2021-09-12 RX ADMIN — DEXAMETHASONE SODIUM PHOSPHATE 10 MG: 10 INJECTION INTRAMUSCULAR; INTRAVENOUS at 10:55

## 2021-09-12 RX ADMIN — ENOXAPARIN SODIUM 70 MG: 80 INJECTION, SOLUTION INTRAVENOUS; SUBCUTANEOUS at 10:56

## 2021-09-12 RX ADMIN — DEXTROMETHORPHAN POLISTIREX 60 MG: 30 SUSPENSION, EXTENDED RELEASE ORAL at 20:41

## 2021-09-12 RX ADMIN — DEXAMETHASONE SODIUM PHOSPHATE 10 MG: 10 INJECTION INTRAMUSCULAR; INTRAVENOUS at 23:06

## 2021-09-12 RX ADMIN — Medication 250 MG: at 20:41

## 2021-09-12 RX ADMIN — MIRTAZAPINE 7.5 MG: 15 TABLET, FILM COATED ORAL at 20:41

## 2021-09-12 RX ADMIN — Medication 3 MG: at 23:06

## 2021-09-12 RX ADMIN — FAMOTIDINE 20 MG: 20 TABLET, FILM COATED ORAL at 10:56

## 2021-09-12 RX ADMIN — SODIUM CHLORIDE 1 DROP: 50 SOLUTION OPHTHALMIC at 20:42

## 2021-09-13 LAB
ALBUMIN SERPL-MCNC: 3.1 G/DL (ref 3.5–5.2)
ALBUMIN/GLOB SERPL: 1.2 G/DL
ALP SERPL-CCNC: 143 U/L (ref 39–117)
ALT SERPL W P-5'-P-CCNC: 102 U/L (ref 1–41)
ANION GAP SERPL CALCULATED.3IONS-SCNC: 9.3 MMOL/L (ref 5–15)
APTT PPP: 28 SECONDS (ref 22.7–35.4)
AST SERPL-CCNC: 37 U/L (ref 1–40)
BILIRUB SERPL-MCNC: 0.4 MG/DL (ref 0–1.2)
BUN SERPL-MCNC: 48 MG/DL (ref 8–23)
BUN/CREAT SERPL: 31.4 (ref 7–25)
BURR CELLS BLD QL SMEAR: ABNORMAL
CALCIUM SPEC-SCNC: 8.5 MG/DL (ref 8.6–10.5)
CHLORIDE SERPL-SCNC: 112 MMOL/L (ref 98–107)
CO2 SERPL-SCNC: 21.7 MMOL/L (ref 22–29)
CREAT SERPL-MCNC: 1.53 MG/DL (ref 0.76–1.27)
CRP SERPL-MCNC: 0.47 MG/DL (ref 0–0.5)
D DIMER PPP FEU-MCNC: 0.29 MCGFEU/ML (ref 0–0.49)
DEPRECATED RDW RBC AUTO: 44.9 FL (ref 37–54)
ERYTHROCYTE [DISTWIDTH] IN BLOOD BY AUTOMATED COUNT: 14.3 % (ref 12.3–15.4)
FERRITIN SERPL-MCNC: 1263 NG/ML (ref 30–400)
GFR SERPL CREATININE-BSD FRML MDRD: 45 ML/MIN/1.73
GLOBULIN UR ELPH-MCNC: 2.5 GM/DL
GLUCOSE BLDC GLUCOMTR-MCNC: 115 MG/DL (ref 70–130)
GLUCOSE BLDC GLUCOMTR-MCNC: 158 MG/DL (ref 70–130)
GLUCOSE BLDC GLUCOMTR-MCNC: 93 MG/DL (ref 70–130)
GLUCOSE SERPL-MCNC: 136 MG/DL (ref 65–99)
HCT VFR BLD AUTO: 40.4 % (ref 37.5–51)
HGB BLD-MCNC: 13.6 G/DL (ref 13–17.7)
INR PPP: 1.11 (ref 0.9–1.1)
LYMPHOCYTES # BLD MANUAL: 0.44 10*3/MM3 (ref 0.7–3.1)
LYMPHOCYTES NFR BLD MANUAL: 3 % (ref 19.6–45.3)
MCH RBC QN AUTO: 29.6 PG (ref 26.6–33)
MCHC RBC AUTO-ENTMCNC: 33.7 G/DL (ref 31.5–35.7)
MCV RBC AUTO: 87.8 FL (ref 79–97)
NEUTROPHILS # BLD AUTO: 14.24 10*3/MM3 (ref 1.7–7)
NEUTROPHILS NFR BLD MANUAL: 97 % (ref 42.7–76)
PLAT MORPH BLD: NORMAL
PLATELET # BLD AUTO: 327 10*3/MM3 (ref 140–450)
PMV BLD AUTO: 10.7 FL (ref 6–12)
POIKILOCYTOSIS BLD QL SMEAR: ABNORMAL
POTASSIUM SERPL-SCNC: 4.6 MMOL/L (ref 3.5–5.2)
PROT SERPL-MCNC: 5.6 G/DL (ref 6–8.5)
PROTHROMBIN TIME: 14.1 SECONDS (ref 11.7–14.2)
RBC # BLD AUTO: 4.6 10*6/MM3 (ref 4.14–5.8)
SODIUM SERPL-SCNC: 143 MMOL/L (ref 136–145)
WBC # BLD AUTO: 14.68 10*3/MM3 (ref 3.4–10.8)
WBC MORPH BLD: NORMAL

## 2021-09-13 PROCEDURE — 85025 COMPLETE CBC W/AUTO DIFF WBC: CPT | Performed by: INTERNAL MEDICINE

## 2021-09-13 PROCEDURE — 82962 GLUCOSE BLOOD TEST: CPT

## 2021-09-13 PROCEDURE — 85007 BL SMEAR W/DIFF WBC COUNT: CPT | Performed by: INTERNAL MEDICINE

## 2021-09-13 PROCEDURE — 82728 ASSAY OF FERRITIN: CPT | Performed by: INTERNAL MEDICINE

## 2021-09-13 PROCEDURE — 80053 COMPREHEN METABOLIC PANEL: CPT | Performed by: INTERNAL MEDICINE

## 2021-09-13 PROCEDURE — 85379 FIBRIN DEGRADATION QUANT: CPT | Performed by: INTERNAL MEDICINE

## 2021-09-13 PROCEDURE — 86140 C-REACTIVE PROTEIN: CPT | Performed by: INTERNAL MEDICINE

## 2021-09-13 PROCEDURE — 25010000002 DEXAMETHASONE PER 1 MG: Performed by: INTERNAL MEDICINE

## 2021-09-13 PROCEDURE — 97110 THERAPEUTIC EXERCISES: CPT

## 2021-09-13 PROCEDURE — 85610 PROTHROMBIN TIME: CPT | Performed by: HOSPITALIST

## 2021-09-13 PROCEDURE — 85730 THROMBOPLASTIN TIME PARTIAL: CPT | Performed by: HOSPITALIST

## 2021-09-13 PROCEDURE — 97530 THERAPEUTIC ACTIVITIES: CPT

## 2021-09-13 PROCEDURE — 25010000002 ENOXAPARIN PER 10 MG: Performed by: INTERNAL MEDICINE

## 2021-09-13 RX ADMIN — ENOXAPARIN SODIUM 70 MG: 80 INJECTION, SOLUTION INTRAVENOUS; SUBCUTANEOUS at 14:23

## 2021-09-13 RX ADMIN — ACETAMINOPHEN 650 MG: 325 TABLET, FILM COATED ORAL at 20:53

## 2021-09-13 RX ADMIN — PREDNISOLONE ACETATE 1 DROP: 10 SUSPENSION/ DROPS OPHTHALMIC at 08:08

## 2021-09-13 RX ADMIN — ANTACID TABLETS 1 TABLET: 500 TABLET, CHEWABLE ORAL at 08:08

## 2021-09-13 RX ADMIN — DEXAMETHASONE SODIUM PHOSPHATE 10 MG: 10 INJECTION INTRAMUSCULAR; INTRAVENOUS at 14:23

## 2021-09-13 RX ADMIN — FAMOTIDINE 20 MG: 20 TABLET, FILM COATED ORAL at 08:09

## 2021-09-13 RX ADMIN — Medication 250 MG: at 20:53

## 2021-09-13 RX ADMIN — Medication 3 MG: at 23:10

## 2021-09-13 RX ADMIN — FINASTERIDE 5 MG: 5 TABLET, FILM COATED ORAL at 08:08

## 2021-09-13 RX ADMIN — MIRTAZAPINE 7.5 MG: 15 TABLET, FILM COATED ORAL at 20:52

## 2021-09-13 RX ADMIN — SODIUM CHLORIDE 1 DROP: 50 SOLUTION OPHTHALMIC at 20:54

## 2021-09-13 RX ADMIN — BENZONATATE 200 MG: 100 CAPSULE ORAL at 20:53

## 2021-09-13 RX ADMIN — DEXAMETHASONE SODIUM PHOSPHATE 10 MG: 10 INJECTION INTRAMUSCULAR; INTRAVENOUS at 21:01

## 2021-09-13 RX ADMIN — METOPROLOL TARTRATE 12.5 MG: 25 TABLET ORAL at 20:52

## 2021-09-13 RX ADMIN — DEXTROMETHORPHAN POLISTIREX 60 MG: 30 SUSPENSION, EXTENDED RELEASE ORAL at 20:52

## 2021-09-13 RX ADMIN — SODIUM CHLORIDE 1 DROP: 50 SOLUTION OPHTHALMIC at 08:11

## 2021-09-13 RX ADMIN — SODIUM CHLORIDE 1 DROP: 50 SOLUTION OPHTHALMIC at 14:23

## 2021-09-13 RX ADMIN — DEXTROMETHORPHAN POLISTIREX 60 MG: 30 SUSPENSION, EXTENDED RELEASE ORAL at 08:09

## 2021-09-13 RX ADMIN — METOPROLOL TARTRATE 12.5 MG: 25 TABLET ORAL at 08:08

## 2021-09-13 RX ADMIN — Medication 250 MG: at 08:08

## 2021-09-14 LAB
ALBUMIN SERPL-MCNC: 3 G/DL (ref 3.5–5.2)
ALBUMIN/GLOB SERPL: 1.1 G/DL
ALP SERPL-CCNC: 147 U/L (ref 39–117)
ALT SERPL W P-5'-P-CCNC: 99 U/L (ref 1–41)
ANION GAP SERPL CALCULATED.3IONS-SCNC: 15.5 MMOL/L (ref 5–15)
AST SERPL-CCNC: 37 U/L (ref 1–40)
BILIRUB SERPL-MCNC: 0.5 MG/DL (ref 0–1.2)
BUN SERPL-MCNC: 43 MG/DL (ref 8–23)
BUN/CREAT SERPL: 21 (ref 7–25)
CALCIUM SPEC-SCNC: 8.9 MG/DL (ref 8.6–10.5)
CHLORIDE SERPL-SCNC: 110 MMOL/L (ref 98–107)
CO2 SERPL-SCNC: 16.5 MMOL/L (ref 22–29)
CREAT SERPL-MCNC: 2.05 MG/DL (ref 0.76–1.27)
CREAT UR-MCNC: 63.9 MG/DL
CRP SERPL-MCNC: 0.4 MG/DL (ref 0–0.5)
DEPRECATED RDW RBC AUTO: 48.5 FL (ref 37–54)
ERYTHROCYTE [DISTWIDTH] IN BLOOD BY AUTOMATED COUNT: 14.4 % (ref 12.3–15.4)
FERRITIN SERPL-MCNC: 1244 NG/ML (ref 30–400)
GFR SERPL CREATININE-BSD FRML MDRD: 32 ML/MIN/1.73
GLOBULIN UR ELPH-MCNC: 2.8 GM/DL
GLUCOSE BLDC GLUCOMTR-MCNC: 117 MG/DL (ref 70–130)
GLUCOSE BLDC GLUCOMTR-MCNC: 128 MG/DL (ref 70–130)
GLUCOSE BLDC GLUCOMTR-MCNC: 131 MG/DL (ref 70–130)
GLUCOSE BLDC GLUCOMTR-MCNC: 132 MG/DL (ref 70–130)
GLUCOSE SERPL-MCNC: 112 MG/DL (ref 65–99)
HCT VFR BLD AUTO: 47.3 % (ref 37.5–51)
HGB BLD-MCNC: 14.8 G/DL (ref 13–17.7)
LDH SERPL-CCNC: 413 U/L (ref 135–225)
LYMPHOCYTES # BLD MANUAL: 0.3 10*3/MM3 (ref 0.7–3.1)
LYMPHOCYTES NFR BLD MANUAL: 2 % (ref 19.6–45.3)
LYMPHOCYTES NFR BLD MANUAL: 2 % (ref 5–12)
MCH RBC QN AUTO: 28.8 PG (ref 26.6–33)
MCHC RBC AUTO-ENTMCNC: 31.3 G/DL (ref 31.5–35.7)
MCV RBC AUTO: 92 FL (ref 79–97)
MONOCYTES # BLD AUTO: 0.3 10*3/MM3 (ref 0.1–0.9)
MYELOCYTES NFR BLD MANUAL: 4 % (ref 0–0)
NEUTROPHILS # BLD AUTO: 13.57 10*3/MM3 (ref 1.7–7)
NEUTROPHILS NFR BLD MANUAL: 92 % (ref 42.7–76)
PLAT MORPH BLD: NORMAL
PLATELET # BLD AUTO: 357 10*3/MM3 (ref 140–450)
PMV BLD AUTO: 10.9 FL (ref 6–12)
POIKILOCYTOSIS BLD QL SMEAR: ABNORMAL
POTASSIUM SERPL-SCNC: 5 MMOL/L (ref 3.5–5.2)
PROT SERPL-MCNC: 5.8 G/DL (ref 6–8.5)
RBC # BLD AUTO: 5.14 10*6/MM3 (ref 4.14–5.8)
SODIUM SERPL-SCNC: 142 MMOL/L (ref 136–145)
SODIUM UR-SCNC: 47 MMOL/L
WBC # BLD AUTO: 14.75 10*3/MM3 (ref 3.4–10.8)
WBC MORPH BLD: NORMAL

## 2021-09-14 PROCEDURE — 82728 ASSAY OF FERRITIN: CPT | Performed by: INTERNAL MEDICINE

## 2021-09-14 PROCEDURE — 83615 LACTATE (LD) (LDH) ENZYME: CPT | Performed by: INTERNAL MEDICINE

## 2021-09-14 PROCEDURE — 80053 COMPREHEN METABOLIC PANEL: CPT | Performed by: INTERNAL MEDICINE

## 2021-09-14 PROCEDURE — 82570 ASSAY OF URINE CREATININE: CPT | Performed by: INTERNAL MEDICINE

## 2021-09-14 PROCEDURE — 99232 SBSQ HOSP IP/OBS MODERATE 35: CPT | Performed by: INTERNAL MEDICINE

## 2021-09-14 PROCEDURE — 85025 COMPLETE CBC W/AUTO DIFF WBC: CPT | Performed by: INTERNAL MEDICINE

## 2021-09-14 PROCEDURE — 86140 C-REACTIVE PROTEIN: CPT | Performed by: INTERNAL MEDICINE

## 2021-09-14 PROCEDURE — 25010000002 DEXAMETHASONE PER 1 MG: Performed by: INTERNAL MEDICINE

## 2021-09-14 PROCEDURE — 84300 ASSAY OF URINE SODIUM: CPT | Performed by: INTERNAL MEDICINE

## 2021-09-14 PROCEDURE — 85007 BL SMEAR W/DIFF WBC COUNT: CPT | Performed by: INTERNAL MEDICINE

## 2021-09-14 PROCEDURE — 25010000002 ENOXAPARIN PER 10 MG: Performed by: INTERNAL MEDICINE

## 2021-09-14 PROCEDURE — 82962 GLUCOSE BLOOD TEST: CPT

## 2021-09-14 RX ORDER — AMLODIPINE BESYLATE 5 MG/1
5 TABLET ORAL
Status: DISCONTINUED | OUTPATIENT
Start: 2021-09-14 | End: 2021-09-15

## 2021-09-14 RX ORDER — SODIUM BICARBONATE 650 MG/1
1300 TABLET ORAL 2 TIMES DAILY
Status: DISCONTINUED | OUTPATIENT
Start: 2021-09-14 | End: 2021-09-21

## 2021-09-14 RX ADMIN — FAMOTIDINE 20 MG: 20 TABLET, FILM COATED ORAL at 08:47

## 2021-09-14 RX ADMIN — SODIUM CHLORIDE, PRESERVATIVE FREE 10 ML: 5 INJECTION INTRAVENOUS at 20:16

## 2021-09-14 RX ADMIN — MIRTAZAPINE 7.5 MG: 15 TABLET, FILM COATED ORAL at 20:17

## 2021-09-14 RX ADMIN — PREDNISOLONE ACETATE 1 DROP: 10 SUSPENSION/ DROPS OPHTHALMIC at 08:47

## 2021-09-14 RX ADMIN — Medication 250 MG: at 20:17

## 2021-09-14 RX ADMIN — METOPROLOL TARTRATE 12.5 MG: 25 TABLET ORAL at 08:47

## 2021-09-14 RX ADMIN — DEXTROMETHORPHAN POLISTIREX 60 MG: 30 SUSPENSION, EXTENDED RELEASE ORAL at 20:16

## 2021-09-14 RX ADMIN — AMLODIPINE BESYLATE 5 MG: 5 TABLET ORAL at 17:15

## 2021-09-14 RX ADMIN — ACETAMINOPHEN 650 MG: 325 TABLET, FILM COATED ORAL at 20:20

## 2021-09-14 RX ADMIN — DEXAMETHASONE SODIUM PHOSPHATE 10 MG: 10 INJECTION INTRAMUSCULAR; INTRAVENOUS at 23:57

## 2021-09-14 RX ADMIN — ANTACID TABLETS 1 TABLET: 500 TABLET, CHEWABLE ORAL at 08:47

## 2021-09-14 RX ADMIN — Medication 250 MG: at 08:47

## 2021-09-14 RX ADMIN — METOPROLOL TARTRATE 12.5 MG: 25 TABLET ORAL at 20:17

## 2021-09-14 RX ADMIN — SODIUM BICARBONATE 1300 MG: 650 TABLET ORAL at 20:17

## 2021-09-14 RX ADMIN — SODIUM BICARBONATE 1300 MG: 650 TABLET ORAL at 17:15

## 2021-09-14 RX ADMIN — FINASTERIDE 5 MG: 5 TABLET, FILM COATED ORAL at 08:47

## 2021-09-14 RX ADMIN — DEXTROMETHORPHAN POLISTIREX 60 MG: 30 SUSPENSION, EXTENDED RELEASE ORAL at 08:47

## 2021-09-14 RX ADMIN — ENOXAPARIN SODIUM 70 MG: 80 INJECTION, SOLUTION INTRAVENOUS; SUBCUTANEOUS at 11:27

## 2021-09-14 RX ADMIN — SODIUM CHLORIDE 1 DROP: 50 SOLUTION OPHTHALMIC at 23:57

## 2021-09-14 RX ADMIN — SODIUM CHLORIDE 1 DROP: 50 SOLUTION OPHTHALMIC at 08:46

## 2021-09-14 RX ADMIN — SODIUM CHLORIDE 1 DROP: 50 SOLUTION OPHTHALMIC at 17:15

## 2021-09-14 RX ADMIN — DEXAMETHASONE SODIUM PHOSPHATE 10 MG: 10 INJECTION INTRAMUSCULAR; INTRAVENOUS at 11:28

## 2021-09-15 LAB
ALBUMIN SERPL-MCNC: 2.8 G/DL (ref 3.5–5.2)
ALBUMIN/GLOB SERPL: 1.1 G/DL
ALP SERPL-CCNC: 133 U/L (ref 39–117)
ALT SERPL W P-5'-P-CCNC: 81 U/L (ref 1–41)
ANION GAP SERPL CALCULATED.3IONS-SCNC: 12.8 MMOL/L (ref 5–15)
AST SERPL-CCNC: 28 U/L (ref 1–40)
BILIRUB SERPL-MCNC: 0.5 MG/DL (ref 0–1.2)
BUN SERPL-MCNC: 43 MG/DL (ref 8–23)
BUN/CREAT SERPL: 31.2 (ref 7–25)
CALCIUM SPEC-SCNC: 8.1 MG/DL (ref 8.6–10.5)
CHLORIDE SERPL-SCNC: 109 MMOL/L (ref 98–107)
CO2 SERPL-SCNC: 17.2 MMOL/L (ref 22–29)
CREAT SERPL-MCNC: 1.38 MG/DL (ref 0.76–1.27)
CRP SERPL-MCNC: 0.32 MG/DL (ref 0–0.5)
D DIMER PPP FEU-MCNC: 0.61 MCGFEU/ML (ref 0–0.49)
DEPRECATED RDW RBC AUTO: 44.7 FL (ref 37–54)
ERYTHROCYTE [DISTWIDTH] IN BLOOD BY AUTOMATED COUNT: 14 % (ref 12.3–15.4)
FERRITIN SERPL-MCNC: 1157 NG/ML (ref 30–400)
GFR SERPL CREATININE-BSD FRML MDRD: 51 ML/MIN/1.73
GLOBULIN UR ELPH-MCNC: 2.6 GM/DL
GLUCOSE BLDC GLUCOMTR-MCNC: 121 MG/DL (ref 70–130)
GLUCOSE BLDC GLUCOMTR-MCNC: 131 MG/DL (ref 70–130)
GLUCOSE BLDC GLUCOMTR-MCNC: 90 MG/DL (ref 70–130)
GLUCOSE BLDC GLUCOMTR-MCNC: 97 MG/DL (ref 70–130)
GLUCOSE SERPL-MCNC: 102 MG/DL (ref 65–99)
HCT VFR BLD AUTO: 43.1 % (ref 37.5–51)
HGB BLD-MCNC: 14.5 G/DL (ref 13–17.7)
LYMPHOCYTES # BLD MANUAL: 0.32 10*3/MM3 (ref 0.7–3.1)
LYMPHOCYTES NFR BLD MANUAL: 2 % (ref 19.6–45.3)
LYMPHOCYTES NFR BLD MANUAL: 4 % (ref 5–12)
MAGNESIUM SERPL-MCNC: 2.6 MG/DL (ref 1.6–2.4)
MCH RBC QN AUTO: 29.7 PG (ref 26.6–33)
MCHC RBC AUTO-ENTMCNC: 33.6 G/DL (ref 31.5–35.7)
MCV RBC AUTO: 88.1 FL (ref 79–97)
MONOCYTES # BLD AUTO: 0.64 10*3/MM3 (ref 0.1–0.9)
MYELOCYTES NFR BLD MANUAL: 1 % (ref 0–0)
NEUTROPHILS # BLD AUTO: 14.98 10*3/MM3 (ref 1.7–7)
NEUTROPHILS NFR BLD MANUAL: 93 % (ref 42.7–76)
PHOSPHATE SERPL-MCNC: 3.5 MG/DL (ref 2.5–4.5)
PLAT MORPH BLD: NORMAL
PLATELET # BLD AUTO: 299 10*3/MM3 (ref 140–450)
PMV BLD AUTO: 10.5 FL (ref 6–12)
POIKILOCYTOSIS BLD QL SMEAR: ABNORMAL
POTASSIUM SERPL-SCNC: 4.7 MMOL/L (ref 3.5–5.2)
PROT SERPL-MCNC: 5.4 G/DL (ref 6–8.5)
RBC # BLD AUTO: 4.89 10*6/MM3 (ref 4.14–5.8)
SODIUM SERPL-SCNC: 139 MMOL/L (ref 136–145)
WBC # BLD AUTO: 16.11 10*3/MM3 (ref 3.4–10.8)
WBC MORPH BLD: NORMAL

## 2021-09-15 PROCEDURE — 82728 ASSAY OF FERRITIN: CPT | Performed by: INTERNAL MEDICINE

## 2021-09-15 PROCEDURE — 85007 BL SMEAR W/DIFF WBC COUNT: CPT | Performed by: INTERNAL MEDICINE

## 2021-09-15 PROCEDURE — 85379 FIBRIN DEGRADATION QUANT: CPT | Performed by: INTERNAL MEDICINE

## 2021-09-15 PROCEDURE — 25010000002 DEXAMETHASONE PER 1 MG: Performed by: INTERNAL MEDICINE

## 2021-09-15 PROCEDURE — 82962 GLUCOSE BLOOD TEST: CPT

## 2021-09-15 PROCEDURE — 80053 COMPREHEN METABOLIC PANEL: CPT | Performed by: INTERNAL MEDICINE

## 2021-09-15 PROCEDURE — 97110 THERAPEUTIC EXERCISES: CPT

## 2021-09-15 PROCEDURE — 25010000002 ENOXAPARIN PER 10 MG: Performed by: INTERNAL MEDICINE

## 2021-09-15 PROCEDURE — 83735 ASSAY OF MAGNESIUM: CPT | Performed by: INTERNAL MEDICINE

## 2021-09-15 PROCEDURE — 85025 COMPLETE CBC W/AUTO DIFF WBC: CPT | Performed by: INTERNAL MEDICINE

## 2021-09-15 PROCEDURE — 84100 ASSAY OF PHOSPHORUS: CPT | Performed by: INTERNAL MEDICINE

## 2021-09-15 PROCEDURE — 99232 SBSQ HOSP IP/OBS MODERATE 35: CPT | Performed by: INTERNAL MEDICINE

## 2021-09-15 PROCEDURE — 86140 C-REACTIVE PROTEIN: CPT | Performed by: INTERNAL MEDICINE

## 2021-09-15 PROCEDURE — 97530 THERAPEUTIC ACTIVITIES: CPT

## 2021-09-15 RX ORDER — AMLODIPINE BESYLATE 10 MG/1
10 TABLET ORAL
Status: DISCONTINUED | OUTPATIENT
Start: 2021-09-15 | End: 2021-10-16 | Stop reason: HOSPADM

## 2021-09-15 RX ORDER — AMLODIPINE BESYLATE 5 MG/1
5 TABLET ORAL ONCE
Status: DISCONTINUED | OUTPATIENT
Start: 2021-09-15 | End: 2021-09-15

## 2021-09-15 RX ADMIN — AMLODIPINE BESYLATE 10 MG: 10 TABLET ORAL at 11:43

## 2021-09-15 RX ADMIN — FINASTERIDE 5 MG: 5 TABLET, FILM COATED ORAL at 09:12

## 2021-09-15 RX ADMIN — SODIUM CHLORIDE 1 DROP: 50 SOLUTION OPHTHALMIC at 20:32

## 2021-09-15 RX ADMIN — Medication 250 MG: at 09:12

## 2021-09-15 RX ADMIN — SODIUM BICARBONATE 1300 MG: 650 TABLET ORAL at 09:11

## 2021-09-15 RX ADMIN — FAMOTIDINE 20 MG: 20 TABLET, FILM COATED ORAL at 09:11

## 2021-09-15 RX ADMIN — ANTACID TABLETS 1 TABLET: 500 TABLET, CHEWABLE ORAL at 09:12

## 2021-09-15 RX ADMIN — SODIUM CHLORIDE, PRESERVATIVE FREE 10 ML: 5 INJECTION INTRAVENOUS at 09:12

## 2021-09-15 RX ADMIN — SODIUM BICARBONATE 1300 MG: 650 TABLET ORAL at 20:31

## 2021-09-15 RX ADMIN — DEXAMETHASONE SODIUM PHOSPHATE 10 MG: 10 INJECTION INTRAMUSCULAR; INTRAVENOUS at 22:34

## 2021-09-15 RX ADMIN — ENOXAPARIN SODIUM 70 MG: 80 INJECTION, SOLUTION INTRAVENOUS; SUBCUTANEOUS at 14:20

## 2021-09-15 RX ADMIN — SODIUM CHLORIDE 1 DROP: 50 SOLUTION OPHTHALMIC at 17:44

## 2021-09-15 RX ADMIN — METOPROLOL TARTRATE 12.5 MG: 25 TABLET ORAL at 20:31

## 2021-09-15 RX ADMIN — DEXAMETHASONE SODIUM PHOSPHATE 10 MG: 10 INJECTION INTRAMUSCULAR; INTRAVENOUS at 09:12

## 2021-09-15 RX ADMIN — DEXTROMETHORPHAN POLISTIREX 60 MG: 30 SUSPENSION, EXTENDED RELEASE ORAL at 09:13

## 2021-09-15 RX ADMIN — Medication 250 MG: at 20:31

## 2021-09-15 RX ADMIN — SODIUM CHLORIDE 1 DROP: 50 SOLUTION OPHTHALMIC at 09:12

## 2021-09-15 RX ADMIN — MIRTAZAPINE 7.5 MG: 15 TABLET, FILM COATED ORAL at 20:31

## 2021-09-15 RX ADMIN — METOPROLOL TARTRATE 12.5 MG: 25 TABLET ORAL at 08:11

## 2021-09-15 RX ADMIN — Medication 3 MG: at 22:34

## 2021-09-15 RX ADMIN — PREDNISOLONE ACETATE 1 DROP: 10 SUSPENSION/ DROPS OPHTHALMIC at 09:12

## 2021-09-15 RX ADMIN — DEXTROMETHORPHAN POLISTIREX 60 MG: 30 SUSPENSION, EXTENDED RELEASE ORAL at 22:34

## 2021-09-16 ENCOUNTER — APPOINTMENT (OUTPATIENT)
Dept: GENERAL RADIOLOGY | Facility: HOSPITAL | Age: 71
End: 2021-09-16

## 2021-09-16 LAB
ALBUMIN SERPL-MCNC: 2.8 G/DL (ref 3.5–5.2)
ALBUMIN/GLOB SERPL: 1.1 G/DL
ALP SERPL-CCNC: 126 U/L (ref 39–117)
ALT SERPL W P-5'-P-CCNC: 79 U/L (ref 1–41)
ANION GAP SERPL CALCULATED.3IONS-SCNC: 9.6 MMOL/L (ref 5–15)
AST SERPL-CCNC: 34 U/L (ref 1–40)
BILIRUB SERPL-MCNC: 0.5 MG/DL (ref 0–1.2)
BUN SERPL-MCNC: 39 MG/DL (ref 8–23)
BUN/CREAT SERPL: 28.1 (ref 7–25)
CALCIUM SPEC-SCNC: 8.2 MG/DL (ref 8.6–10.5)
CHLORIDE SERPL-SCNC: 107 MMOL/L (ref 98–107)
CO2 SERPL-SCNC: 23.4 MMOL/L (ref 22–29)
CREAT SERPL-MCNC: 1.39 MG/DL (ref 0.76–1.27)
CRP SERPL-MCNC: <0.3 MG/DL (ref 0–0.5)
DEPRECATED RDW RBC AUTO: 45.6 FL (ref 37–54)
ERYTHROCYTE [DISTWIDTH] IN BLOOD BY AUTOMATED COUNT: 14.1 % (ref 12.3–15.4)
FERRITIN SERPL-MCNC: 1115 NG/ML (ref 30–400)
GFR SERPL CREATININE-BSD FRML MDRD: 51 ML/MIN/1.73
GLOBULIN UR ELPH-MCNC: 2.5 GM/DL
GLUCOSE BLDC GLUCOMTR-MCNC: 105 MG/DL (ref 70–130)
GLUCOSE BLDC GLUCOMTR-MCNC: 107 MG/DL (ref 70–130)
GLUCOSE BLDC GLUCOMTR-MCNC: 134 MG/DL (ref 70–130)
GLUCOSE BLDC GLUCOMTR-MCNC: 93 MG/DL (ref 70–130)
GLUCOSE SERPL-MCNC: 90 MG/DL (ref 65–99)
HCT VFR BLD AUTO: 43.9 % (ref 37.5–51)
HGB BLD-MCNC: 14.4 G/DL (ref 13–17.7)
LDH SERPL-CCNC: 377 U/L (ref 135–225)
LYMPHOCYTES # BLD MANUAL: 0.2 10*3/MM3 (ref 0.7–3.1)
LYMPHOCYTES NFR BLD MANUAL: 1 % (ref 19.6–45.3)
LYMPHOCYTES NFR BLD MANUAL: 4 % (ref 5–12)
MAGNESIUM SERPL-MCNC: 2.3 MG/DL (ref 1.6–2.4)
MCH RBC QN AUTO: 29.4 PG (ref 26.6–33)
MCHC RBC AUTO-ENTMCNC: 32.8 G/DL (ref 31.5–35.7)
MCV RBC AUTO: 89.6 FL (ref 79–97)
MONOCYTES # BLD AUTO: 0.79 10*3/MM3 (ref 0.1–0.9)
NEUTROPHILS # BLD AUTO: 18.77 10*3/MM3 (ref 1.7–7)
NEUTROPHILS NFR BLD MANUAL: 94.9 % (ref 42.7–76)
NRBC BLD AUTO-RTO: 0.1 /100 WBC (ref 0–0.2)
NT-PROBNP SERPL-MCNC: 239.1 PG/ML (ref 0–900)
PHOSPHATE SERPL-MCNC: 3.2 MG/DL (ref 2.5–4.5)
PLAT MORPH BLD: NORMAL
PLATELET # BLD AUTO: 297 10*3/MM3 (ref 140–450)
PMV BLD AUTO: 10.3 FL (ref 6–12)
POIKILOCYTOSIS BLD QL SMEAR: ABNORMAL
POTASSIUM SERPL-SCNC: 4.6 MMOL/L (ref 3.5–5.2)
PROCALCITONIN SERPL-MCNC: 0.05 NG/ML (ref 0–0.25)
PROT SERPL-MCNC: 5.3 G/DL (ref 6–8.5)
RBC # BLD AUTO: 4.9 10*6/MM3 (ref 4.14–5.8)
SODIUM SERPL-SCNC: 140 MMOL/L (ref 136–145)
WBC # BLD AUTO: 19.78 10*3/MM3 (ref 3.4–10.8)
WBC MORPH BLD: NORMAL

## 2021-09-16 PROCEDURE — 82962 GLUCOSE BLOOD TEST: CPT

## 2021-09-16 PROCEDURE — 99233 SBSQ HOSP IP/OBS HIGH 50: CPT | Performed by: INTERNAL MEDICINE

## 2021-09-16 PROCEDURE — 82728 ASSAY OF FERRITIN: CPT | Performed by: INTERNAL MEDICINE

## 2021-09-16 PROCEDURE — 84145 PROCALCITONIN (PCT): CPT | Performed by: INTERNAL MEDICINE

## 2021-09-16 PROCEDURE — 85007 BL SMEAR W/DIFF WBC COUNT: CPT | Performed by: INTERNAL MEDICINE

## 2021-09-16 PROCEDURE — 80053 COMPREHEN METABOLIC PANEL: CPT | Performed by: INTERNAL MEDICINE

## 2021-09-16 PROCEDURE — 85025 COMPLETE CBC W/AUTO DIFF WBC: CPT | Performed by: INTERNAL MEDICINE

## 2021-09-16 PROCEDURE — 83615 LACTATE (LD) (LDH) ENZYME: CPT | Performed by: INTERNAL MEDICINE

## 2021-09-16 PROCEDURE — 25010000002 ENOXAPARIN PER 10 MG: Performed by: INTERNAL MEDICINE

## 2021-09-16 PROCEDURE — 83735 ASSAY OF MAGNESIUM: CPT | Performed by: INTERNAL MEDICINE

## 2021-09-16 PROCEDURE — 84100 ASSAY OF PHOSPHORUS: CPT | Performed by: INTERNAL MEDICINE

## 2021-09-16 PROCEDURE — 94799 UNLISTED PULMONARY SVC/PX: CPT

## 2021-09-16 PROCEDURE — 25010000002 DEXAMETHASONE PER 1 MG: Performed by: INTERNAL MEDICINE

## 2021-09-16 PROCEDURE — 71045 X-RAY EXAM CHEST 1 VIEW: CPT

## 2021-09-16 PROCEDURE — 86140 C-REACTIVE PROTEIN: CPT | Performed by: INTERNAL MEDICINE

## 2021-09-16 PROCEDURE — 83880 ASSAY OF NATRIURETIC PEPTIDE: CPT | Performed by: INTERNAL MEDICINE

## 2021-09-16 RX ADMIN — ANTACID TABLETS 1 TABLET: 500 TABLET, CHEWABLE ORAL at 08:15

## 2021-09-16 RX ADMIN — DEXAMETHASONE SODIUM PHOSPHATE 10 MG: 10 INJECTION INTRAMUSCULAR; INTRAVENOUS at 11:24

## 2021-09-16 RX ADMIN — FINASTERIDE 5 MG: 5 TABLET, FILM COATED ORAL at 08:14

## 2021-09-16 RX ADMIN — Medication 250 MG: at 20:14

## 2021-09-16 RX ADMIN — SODIUM CHLORIDE, PRESERVATIVE FREE 10 ML: 5 INJECTION INTRAVENOUS at 20:15

## 2021-09-16 RX ADMIN — DEXTROMETHORPHAN POLISTIREX 60 MG: 30 SUSPENSION, EXTENDED RELEASE ORAL at 20:14

## 2021-09-16 RX ADMIN — DEXTROMETHORPHAN POLISTIREX 60 MG: 30 SUSPENSION, EXTENDED RELEASE ORAL at 08:15

## 2021-09-16 RX ADMIN — DEXAMETHASONE SODIUM PHOSPHATE 10 MG: 10 INJECTION INTRAMUSCULAR; INTRAVENOUS at 23:18

## 2021-09-16 RX ADMIN — SODIUM BICARBONATE 1300 MG: 650 TABLET ORAL at 20:14

## 2021-09-16 RX ADMIN — SODIUM CHLORIDE 1 DROP: 50 SOLUTION OPHTHALMIC at 20:16

## 2021-09-16 RX ADMIN — METOPROLOL TARTRATE 12.5 MG: 25 TABLET ORAL at 08:15

## 2021-09-16 RX ADMIN — PREDNISOLONE ACETATE 1 DROP: 10 SUSPENSION/ DROPS OPHTHALMIC at 08:17

## 2021-09-16 RX ADMIN — SODIUM BICARBONATE 1300 MG: 650 TABLET ORAL at 08:15

## 2021-09-16 RX ADMIN — Medication 250 MG: at 08:15

## 2021-09-16 RX ADMIN — ACETAMINOPHEN 650 MG: 325 TABLET, FILM COATED ORAL at 13:47

## 2021-09-16 RX ADMIN — SODIUM CHLORIDE 1 DROP: 50 SOLUTION OPHTHALMIC at 15:38

## 2021-09-16 RX ADMIN — METOPROLOL TARTRATE 12.5 MG: 25 TABLET ORAL at 20:15

## 2021-09-16 RX ADMIN — SODIUM CHLORIDE 1 DROP: 50 SOLUTION OPHTHALMIC at 08:17

## 2021-09-16 RX ADMIN — ENOXAPARIN SODIUM 70 MG: 80 INJECTION, SOLUTION INTRAVENOUS; SUBCUTANEOUS at 13:38

## 2021-09-16 RX ADMIN — FAMOTIDINE 20 MG: 20 TABLET, FILM COATED ORAL at 08:15

## 2021-09-16 RX ADMIN — MIRTAZAPINE 7.5 MG: 15 TABLET, FILM COATED ORAL at 20:14

## 2021-09-16 RX ADMIN — AMLODIPINE BESYLATE 10 MG: 10 TABLET ORAL at 08:15

## 2021-09-17 LAB
ALBUMIN SERPL-MCNC: 2.7 G/DL (ref 3.5–5.2)
ALBUMIN/GLOB SERPL: 0.9 G/DL
ALP SERPL-CCNC: 114 U/L (ref 39–117)
ALT SERPL W P-5'-P-CCNC: 82 U/L (ref 1–41)
ANION GAP SERPL CALCULATED.3IONS-SCNC: 11 MMOL/L (ref 5–15)
AST SERPL-CCNC: 31 U/L (ref 1–40)
BASOPHILS # BLD AUTO: 0.13 10*3/MM3 (ref 0–0.2)
BASOPHILS NFR BLD AUTO: 0.7 % (ref 0–1.5)
BILIRUB SERPL-MCNC: 0.5 MG/DL (ref 0–1.2)
BUN SERPL-MCNC: 44 MG/DL (ref 8–23)
BUN/CREAT SERPL: 25.9 (ref 7–25)
CALCIUM SPEC-SCNC: 8.4 MG/DL (ref 8.6–10.5)
CHLORIDE SERPL-SCNC: 102 MMOL/L (ref 98–107)
CO2 SERPL-SCNC: 22 MMOL/L (ref 22–29)
CREAT SERPL-MCNC: 1.7 MG/DL (ref 0.76–1.27)
CRP SERPL-MCNC: 14.48 MG/DL (ref 0–0.5)
D DIMER PPP FEU-MCNC: <0.27 MCGFEU/ML (ref 0–0.49)
DEPRECATED RDW RBC AUTO: 43.4 FL (ref 37–54)
EOSINOPHIL # BLD AUTO: 0 10*3/MM3 (ref 0–0.4)
EOSINOPHIL NFR BLD AUTO: 0 % (ref 0.3–6.2)
ERYTHROCYTE [DISTWIDTH] IN BLOOD BY AUTOMATED COUNT: 13.9 % (ref 12.3–15.4)
FERRITIN SERPL-MCNC: 1762 NG/ML (ref 30–400)
GFR SERPL CREATININE-BSD FRML MDRD: 40 ML/MIN/1.73
GLOBULIN UR ELPH-MCNC: 2.9 GM/DL
GLUCOSE BLDC GLUCOMTR-MCNC: 123 MG/DL (ref 70–130)
GLUCOSE BLDC GLUCOMTR-MCNC: 138 MG/DL (ref 70–130)
GLUCOSE BLDC GLUCOMTR-MCNC: 142 MG/DL (ref 70–130)
GLUCOSE BLDC GLUCOMTR-MCNC: 93 MG/DL (ref 70–130)
GLUCOSE SERPL-MCNC: 180 MG/DL (ref 65–99)
HCT VFR BLD AUTO: 42.6 % (ref 37.5–51)
HGB BLD-MCNC: 14.6 G/DL (ref 13–17.7)
IMM GRANULOCYTES # BLD AUTO: 0.5 10*3/MM3 (ref 0–0.05)
IMM GRANULOCYTES NFR BLD AUTO: 2.5 % (ref 0–0.5)
LYMPHOCYTES # BLD AUTO: 0.38 10*3/MM3 (ref 0.7–3.1)
LYMPHOCYTES NFR BLD AUTO: 1.9 % (ref 19.6–45.3)
MCH RBC QN AUTO: 30 PG (ref 26.6–33)
MCHC RBC AUTO-ENTMCNC: 34.3 G/DL (ref 31.5–35.7)
MCV RBC AUTO: 87.5 FL (ref 79–97)
MONOCYTES # BLD AUTO: 0.34 10*3/MM3 (ref 0.1–0.9)
MONOCYTES NFR BLD AUTO: 1.7 % (ref 5–12)
NEUTROPHILS NFR BLD AUTO: 18.33 10*3/MM3 (ref 1.7–7)
NEUTROPHILS NFR BLD AUTO: 93.2 % (ref 42.7–76)
NRBC BLD AUTO-RTO: 0.1 /100 WBC (ref 0–0.2)
PHOSPHATE SERPL-MCNC: 2.5 MG/DL (ref 2.5–4.5)
PLATELET # BLD AUTO: 199 10*3/MM3 (ref 140–450)
PMV BLD AUTO: 11.3 FL (ref 6–12)
POTASSIUM SERPL-SCNC: 4.4 MMOL/L (ref 3.5–5.2)
PROT SERPL-MCNC: 5.6 G/DL (ref 6–8.5)
RBC # BLD AUTO: 4.87 10*6/MM3 (ref 4.14–5.8)
SODIUM SERPL-SCNC: 135 MMOL/L (ref 136–145)
WBC # BLD AUTO: 19.68 10*3/MM3 (ref 3.4–10.8)

## 2021-09-17 PROCEDURE — 94799 UNLISTED PULMONARY SVC/PX: CPT

## 2021-09-17 PROCEDURE — 80053 COMPREHEN METABOLIC PANEL: CPT | Performed by: INTERNAL MEDICINE

## 2021-09-17 PROCEDURE — 97110 THERAPEUTIC EXERCISES: CPT

## 2021-09-17 PROCEDURE — 82728 ASSAY OF FERRITIN: CPT | Performed by: INTERNAL MEDICINE

## 2021-09-17 PROCEDURE — 82962 GLUCOSE BLOOD TEST: CPT

## 2021-09-17 PROCEDURE — 25010000002 DEXAMETHASONE PER 1 MG: Performed by: INTERNAL MEDICINE

## 2021-09-17 PROCEDURE — 85025 COMPLETE CBC W/AUTO DIFF WBC: CPT | Performed by: INTERNAL MEDICINE

## 2021-09-17 PROCEDURE — 85379 FIBRIN DEGRADATION QUANT: CPT | Performed by: INTERNAL MEDICINE

## 2021-09-17 PROCEDURE — 94760 N-INVAS EAR/PLS OXIMETRY 1: CPT

## 2021-09-17 PROCEDURE — 99233 SBSQ HOSP IP/OBS HIGH 50: CPT | Performed by: INTERNAL MEDICINE

## 2021-09-17 PROCEDURE — 84100 ASSAY OF PHOSPHORUS: CPT | Performed by: HOSPITALIST

## 2021-09-17 PROCEDURE — 86140 C-REACTIVE PROTEIN: CPT | Performed by: INTERNAL MEDICINE

## 2021-09-17 PROCEDURE — 25010000002 ENOXAPARIN PER 10 MG: Performed by: INTERNAL MEDICINE

## 2021-09-17 RX ORDER — BUMETANIDE 0.25 MG/ML
1 INJECTION INTRAMUSCULAR; INTRAVENOUS ONCE
Status: COMPLETED | OUTPATIENT
Start: 2021-09-17 | End: 2021-09-17

## 2021-09-17 RX ADMIN — METOPROLOL TARTRATE 12.5 MG: 25 TABLET ORAL at 08:20

## 2021-09-17 RX ADMIN — AMLODIPINE BESYLATE 10 MG: 10 TABLET ORAL at 08:13

## 2021-09-17 RX ADMIN — DEXTROMETHORPHAN POLISTIREX 60 MG: 30 SUSPENSION, EXTENDED RELEASE ORAL at 19:58

## 2021-09-17 RX ADMIN — ENOXAPARIN SODIUM 70 MG: 80 INJECTION, SOLUTION INTRAVENOUS; SUBCUTANEOUS at 13:21

## 2021-09-17 RX ADMIN — ANTACID TABLETS 1 TABLET: 500 TABLET, CHEWABLE ORAL at 08:13

## 2021-09-17 RX ADMIN — FAMOTIDINE 20 MG: 20 TABLET, FILM COATED ORAL at 08:12

## 2021-09-17 RX ADMIN — BUMETANIDE 1 MG: 0.25 INJECTION, SOLUTION INTRAMUSCULAR; INTRAVENOUS at 13:20

## 2021-09-17 RX ADMIN — MIRTAZAPINE 7.5 MG: 15 TABLET, FILM COATED ORAL at 19:58

## 2021-09-17 RX ADMIN — METOPROLOL TARTRATE 12.5 MG: 25 TABLET ORAL at 19:57

## 2021-09-17 RX ADMIN — Medication 3 MG: at 23:26

## 2021-09-17 RX ADMIN — Medication 250 MG: at 08:13

## 2021-09-17 RX ADMIN — FINASTERIDE 5 MG: 5 TABLET, FILM COATED ORAL at 08:13

## 2021-09-17 RX ADMIN — PREDNISOLONE ACETATE 1 DROP: 10 SUSPENSION/ DROPS OPHTHALMIC at 08:13

## 2021-09-17 RX ADMIN — DEXAMETHASONE SODIUM PHOSPHATE 10 MG: 10 INJECTION INTRAMUSCULAR; INTRAVENOUS at 23:26

## 2021-09-17 RX ADMIN — Medication 250 MG: at 19:57

## 2021-09-17 RX ADMIN — DEXAMETHASONE SODIUM PHOSPHATE 10 MG: 10 INJECTION INTRAMUSCULAR; INTRAVENOUS at 11:44

## 2021-09-17 RX ADMIN — SODIUM CHLORIDE 1 DROP: 50 SOLUTION OPHTHALMIC at 08:20

## 2021-09-17 RX ADMIN — SODIUM BICARBONATE 1300 MG: 650 TABLET ORAL at 19:57

## 2021-09-17 RX ADMIN — SODIUM CHLORIDE 1 DROP: 50 SOLUTION OPHTHALMIC at 17:03

## 2021-09-17 RX ADMIN — DEXTROMETHORPHAN POLISTIREX 60 MG: 30 SUSPENSION, EXTENDED RELEASE ORAL at 08:12

## 2021-09-17 RX ADMIN — SODIUM CHLORIDE, PRESERVATIVE FREE 10 ML: 5 INJECTION INTRAVENOUS at 19:58

## 2021-09-17 RX ADMIN — SODIUM BICARBONATE 1300 MG: 650 TABLET ORAL at 08:13

## 2021-09-18 LAB
ALBUMIN SERPL-MCNC: 2.5 G/DL (ref 3.5–5.2)
ALBUMIN/GLOB SERPL: 0.9 G/DL
ALP SERPL-CCNC: 98 U/L (ref 39–117)
ALT SERPL W P-5'-P-CCNC: 65 U/L (ref 1–41)
ANION GAP SERPL CALCULATED.3IONS-SCNC: 7.1 MMOL/L (ref 5–15)
AST SERPL-CCNC: 18 U/L (ref 1–40)
BASOPHILS # BLD AUTO: 0.03 10*3/MM3 (ref 0–0.2)
BASOPHILS NFR BLD AUTO: 0.2 % (ref 0–1.5)
BILIRUB SERPL-MCNC: 0.4 MG/DL (ref 0–1.2)
BUN SERPL-MCNC: 43 MG/DL (ref 8–23)
BUN/CREAT SERPL: 30.3 (ref 7–25)
CALCIUM SPEC-SCNC: 8.1 MG/DL (ref 8.6–10.5)
CHLORIDE SERPL-SCNC: 102 MMOL/L (ref 98–107)
CO2 SERPL-SCNC: 25.9 MMOL/L (ref 22–29)
CREAT SERPL-MCNC: 1.42 MG/DL (ref 0.76–1.27)
CRP SERPL-MCNC: 7.15 MG/DL (ref 0–0.5)
DEPRECATED RDW RBC AUTO: 44.1 FL (ref 37–54)
EOSINOPHIL # BLD AUTO: 0 10*3/MM3 (ref 0–0.4)
EOSINOPHIL NFR BLD AUTO: 0 % (ref 0.3–6.2)
ERYTHROCYTE [DISTWIDTH] IN BLOOD BY AUTOMATED COUNT: 14 % (ref 12.3–15.4)
FERRITIN SERPL-MCNC: 1483 NG/ML (ref 30–400)
GFR SERPL CREATININE-BSD FRML MDRD: 49 ML/MIN/1.73
GLOBULIN UR ELPH-MCNC: 2.7 GM/DL
GLUCOSE BLDC GLUCOMTR-MCNC: 115 MG/DL (ref 70–130)
GLUCOSE BLDC GLUCOMTR-MCNC: 118 MG/DL (ref 70–130)
GLUCOSE BLDC GLUCOMTR-MCNC: 137 MG/DL (ref 70–130)
GLUCOSE BLDC GLUCOMTR-MCNC: 211 MG/DL (ref 70–130)
GLUCOSE SERPL-MCNC: 118 MG/DL (ref 65–99)
HCT VFR BLD AUTO: 37.6 % (ref 37.5–51)
HGB BLD-MCNC: 12.8 G/DL (ref 13–17.7)
IMM GRANULOCYTES # BLD AUTO: 0.39 10*3/MM3 (ref 0–0.05)
IMM GRANULOCYTES NFR BLD AUTO: 2.8 % (ref 0–0.5)
LDH SERPL-CCNC: 260 U/L (ref 135–225)
LYMPHOCYTES # BLD AUTO: 0.33 10*3/MM3 (ref 0.7–3.1)
LYMPHOCYTES NFR BLD AUTO: 2.4 % (ref 19.6–45.3)
MCH RBC QN AUTO: 29.3 PG (ref 26.6–33)
MCHC RBC AUTO-ENTMCNC: 34 G/DL (ref 31.5–35.7)
MCV RBC AUTO: 86 FL (ref 79–97)
MONOCYTES # BLD AUTO: 0.36 10*3/MM3 (ref 0.1–0.9)
MONOCYTES NFR BLD AUTO: 2.6 % (ref 5–12)
NEUTROPHILS NFR BLD AUTO: 12.72 10*3/MM3 (ref 1.7–7)
NEUTROPHILS NFR BLD AUTO: 92 % (ref 42.7–76)
NRBC BLD AUTO-RTO: 0 /100 WBC (ref 0–0.2)
PHOSPHATE SERPL-MCNC: 3.5 MG/DL (ref 2.5–4.5)
PLATELET # BLD AUTO: 175 10*3/MM3 (ref 140–450)
PMV BLD AUTO: 11.5 FL (ref 6–12)
POTASSIUM SERPL-SCNC: 4.3 MMOL/L (ref 3.5–5.2)
PROT SERPL-MCNC: 5.2 G/DL (ref 6–8.5)
RBC # BLD AUTO: 4.37 10*6/MM3 (ref 4.14–5.8)
SODIUM SERPL-SCNC: 135 MMOL/L (ref 136–145)
WBC # BLD AUTO: 13.83 10*3/MM3 (ref 3.4–10.8)

## 2021-09-18 PROCEDURE — 83615 LACTATE (LD) (LDH) ENZYME: CPT | Performed by: INTERNAL MEDICINE

## 2021-09-18 PROCEDURE — 85025 COMPLETE CBC W/AUTO DIFF WBC: CPT | Performed by: INTERNAL MEDICINE

## 2021-09-18 PROCEDURE — 84100 ASSAY OF PHOSPHORUS: CPT | Performed by: HOSPITALIST

## 2021-09-18 PROCEDURE — 82728 ASSAY OF FERRITIN: CPT | Performed by: INTERNAL MEDICINE

## 2021-09-18 PROCEDURE — 86140 C-REACTIVE PROTEIN: CPT | Performed by: INTERNAL MEDICINE

## 2021-09-18 PROCEDURE — 94799 UNLISTED PULMONARY SVC/PX: CPT

## 2021-09-18 PROCEDURE — 82962 GLUCOSE BLOOD TEST: CPT

## 2021-09-18 PROCEDURE — 25010000002 ENOXAPARIN PER 10 MG: Performed by: INTERNAL MEDICINE

## 2021-09-18 PROCEDURE — 80053 COMPREHEN METABOLIC PANEL: CPT | Performed by: INTERNAL MEDICINE

## 2021-09-18 PROCEDURE — 25010000002 DEXAMETHASONE PER 1 MG: Performed by: INTERNAL MEDICINE

## 2021-09-18 RX ORDER — BUMETANIDE 1 MG/1
1 TABLET ORAL ONCE
Status: COMPLETED | OUTPATIENT
Start: 2021-09-18 | End: 2021-09-18

## 2021-09-18 RX ADMIN — METOPROLOL TARTRATE 12.5 MG: 25 TABLET ORAL at 19:49

## 2021-09-18 RX ADMIN — DEXTROMETHORPHAN POLISTIREX 60 MG: 30 SUSPENSION, EXTENDED RELEASE ORAL at 10:02

## 2021-09-18 RX ADMIN — SODIUM BICARBONATE 1300 MG: 650 TABLET ORAL at 19:50

## 2021-09-18 RX ADMIN — FINASTERIDE 5 MG: 5 TABLET, FILM COATED ORAL at 10:02

## 2021-09-18 RX ADMIN — FAMOTIDINE 20 MG: 20 TABLET, FILM COATED ORAL at 10:02

## 2021-09-18 RX ADMIN — ENOXAPARIN SODIUM 70 MG: 80 INJECTION, SOLUTION INTRAVENOUS; SUBCUTANEOUS at 15:12

## 2021-09-18 RX ADMIN — ANTACID TABLETS 1 TABLET: 500 TABLET, CHEWABLE ORAL at 10:02

## 2021-09-18 RX ADMIN — SODIUM CHLORIDE, PRESERVATIVE FREE 10 ML: 5 INJECTION INTRAVENOUS at 19:50

## 2021-09-18 RX ADMIN — Medication 250 MG: at 10:02

## 2021-09-18 RX ADMIN — PREDNISOLONE ACETATE 1 DROP: 10 SUSPENSION/ DROPS OPHTHALMIC at 10:06

## 2021-09-18 RX ADMIN — SODIUM CHLORIDE 1 DROP: 50 SOLUTION OPHTHALMIC at 15:12

## 2021-09-18 RX ADMIN — AMLODIPINE BESYLATE 10 MG: 10 TABLET ORAL at 10:02

## 2021-09-18 RX ADMIN — METOPROLOL TARTRATE 12.5 MG: 25 TABLET ORAL at 10:02

## 2021-09-18 RX ADMIN — DEXAMETHASONE SODIUM PHOSPHATE 10 MG: 10 INJECTION INTRAMUSCULAR; INTRAVENOUS at 11:40

## 2021-09-18 RX ADMIN — MIRTAZAPINE 7.5 MG: 15 TABLET, FILM COATED ORAL at 19:56

## 2021-09-18 RX ADMIN — DEXTROMETHORPHAN POLISTIREX 60 MG: 30 SUSPENSION, EXTENDED RELEASE ORAL at 19:50

## 2021-09-18 RX ADMIN — SODIUM CHLORIDE 1 DROP: 50 SOLUTION OPHTHALMIC at 10:06

## 2021-09-18 RX ADMIN — SODIUM BICARBONATE 1300 MG: 650 TABLET ORAL at 10:02

## 2021-09-18 RX ADMIN — POLYETHYLENE GLYCOL 3350 17 G: 17 POWDER, FOR SOLUTION ORAL at 10:02

## 2021-09-18 RX ADMIN — Medication 3 MG: at 22:28

## 2021-09-18 RX ADMIN — DEXAMETHASONE SODIUM PHOSPHATE 10 MG: 10 INJECTION INTRAMUSCULAR; INTRAVENOUS at 22:28

## 2021-09-18 RX ADMIN — SODIUM CHLORIDE 1 DROP: 50 SOLUTION OPHTHALMIC at 19:51

## 2021-09-18 RX ADMIN — BUMETANIDE 1 MG: 1 TABLET ORAL at 11:40

## 2021-09-18 RX ADMIN — Medication 250 MG: at 19:49

## 2021-09-19 ENCOUNTER — APPOINTMENT (OUTPATIENT)
Dept: GENERAL RADIOLOGY | Facility: HOSPITAL | Age: 71
End: 2021-09-19

## 2021-09-19 LAB
ALBUMIN SERPL-MCNC: 2.9 G/DL (ref 3.5–5.2)
ALBUMIN/GLOB SERPL: 1 G/DL
ALP SERPL-CCNC: 109 U/L (ref 39–117)
ALT SERPL W P-5'-P-CCNC: 62 U/L (ref 1–41)
ANION GAP SERPL CALCULATED.3IONS-SCNC: 10.2 MMOL/L (ref 5–15)
AST SERPL-CCNC: 20 U/L (ref 1–40)
BASOPHILS # BLD AUTO: 0.06 10*3/MM3 (ref 0–0.2)
BASOPHILS NFR BLD AUTO: 0.4 % (ref 0–1.5)
BILIRUB SERPL-MCNC: 0.4 MG/DL (ref 0–1.2)
BUN SERPL-MCNC: 44 MG/DL (ref 8–23)
BUN/CREAT SERPL: 27.8 (ref 7–25)
CALCIUM SPEC-SCNC: 8.6 MG/DL (ref 8.6–10.5)
CHLORIDE SERPL-SCNC: 102 MMOL/L (ref 98–107)
CO2 SERPL-SCNC: 28.8 MMOL/L (ref 22–29)
CREAT SERPL-MCNC: 1.58 MG/DL (ref 0.76–1.27)
CRP SERPL-MCNC: 4.28 MG/DL (ref 0–0.5)
D DIMER PPP FEU-MCNC: 0.35 MCGFEU/ML (ref 0–0.49)
DEPRECATED RDW RBC AUTO: 46.1 FL (ref 37–54)
EOSINOPHIL # BLD AUTO: 0 10*3/MM3 (ref 0–0.4)
EOSINOPHIL NFR BLD AUTO: 0 % (ref 0.3–6.2)
ERYTHROCYTE [DISTWIDTH] IN BLOOD BY AUTOMATED COUNT: 14.3 % (ref 12.3–15.4)
FERRITIN SERPL-MCNC: 1273 NG/ML (ref 30–400)
GFR SERPL CREATININE-BSD FRML MDRD: 44 ML/MIN/1.73
GLOBULIN UR ELPH-MCNC: 2.9 GM/DL
GLUCOSE BLDC GLUCOMTR-MCNC: 120 MG/DL (ref 70–130)
GLUCOSE BLDC GLUCOMTR-MCNC: 128 MG/DL (ref 70–130)
GLUCOSE BLDC GLUCOMTR-MCNC: 133 MG/DL (ref 70–130)
GLUCOSE BLDC GLUCOMTR-MCNC: 195 MG/DL (ref 70–130)
GLUCOSE SERPL-MCNC: 103 MG/DL (ref 65–99)
HCT VFR BLD AUTO: 41.9 % (ref 37.5–51)
HGB BLD-MCNC: 14 G/DL (ref 13–17.7)
IMM GRANULOCYTES # BLD AUTO: 0.4 10*3/MM3 (ref 0–0.05)
IMM GRANULOCYTES NFR BLD AUTO: 2.7 % (ref 0–0.5)
LYMPHOCYTES # BLD AUTO: 0.34 10*3/MM3 (ref 0.7–3.1)
LYMPHOCYTES NFR BLD AUTO: 2.3 % (ref 19.6–45.3)
MCH RBC QN AUTO: 29.5 PG (ref 26.6–33)
MCHC RBC AUTO-ENTMCNC: 33.4 G/DL (ref 31.5–35.7)
MCV RBC AUTO: 88.4 FL (ref 79–97)
MONOCYTES # BLD AUTO: 0.47 10*3/MM3 (ref 0.1–0.9)
MONOCYTES NFR BLD AUTO: 3.2 % (ref 5–12)
NEUTROPHILS NFR BLD AUTO: 13.38 10*3/MM3 (ref 1.7–7)
NEUTROPHILS NFR BLD AUTO: 91.4 % (ref 42.7–76)
NRBC BLD AUTO-RTO: 0 /100 WBC (ref 0–0.2)
NT-PROBNP SERPL-MCNC: 265.6 PG/ML (ref 0–900)
PLATELET # BLD AUTO: 169 10*3/MM3 (ref 140–450)
PMV BLD AUTO: 11.8 FL (ref 6–12)
POTASSIUM SERPL-SCNC: 4.3 MMOL/L (ref 3.5–5.2)
PROCALCITONIN SERPL-MCNC: 1.75 NG/ML (ref 0–0.25)
PROT SERPL-MCNC: 5.8 G/DL (ref 6–8.5)
RBC # BLD AUTO: 4.74 10*6/MM3 (ref 4.14–5.8)
SODIUM SERPL-SCNC: 141 MMOL/L (ref 136–145)
WBC # BLD AUTO: 14.65 10*3/MM3 (ref 3.4–10.8)

## 2021-09-19 PROCEDURE — 82728 ASSAY OF FERRITIN: CPT | Performed by: INTERNAL MEDICINE

## 2021-09-19 PROCEDURE — 85025 COMPLETE CBC W/AUTO DIFF WBC: CPT | Performed by: INTERNAL MEDICINE

## 2021-09-19 PROCEDURE — 86022 PLATELET ANTIBODIES: CPT | Performed by: INTERNAL MEDICINE

## 2021-09-19 PROCEDURE — 80053 COMPREHEN METABOLIC PANEL: CPT | Performed by: INTERNAL MEDICINE

## 2021-09-19 PROCEDURE — 94799 UNLISTED PULMONARY SVC/PX: CPT

## 2021-09-19 PROCEDURE — 86140 C-REACTIVE PROTEIN: CPT | Performed by: INTERNAL MEDICINE

## 2021-09-19 PROCEDURE — 25010000002 CEFTRIAXONE PER 250 MG: Performed by: INTERNAL MEDICINE

## 2021-09-19 PROCEDURE — 25010000002 ENOXAPARIN PER 10 MG: Performed by: INTERNAL MEDICINE

## 2021-09-19 PROCEDURE — 71045 X-RAY EXAM CHEST 1 VIEW: CPT

## 2021-09-19 PROCEDURE — 84145 PROCALCITONIN (PCT): CPT | Performed by: INTERNAL MEDICINE

## 2021-09-19 PROCEDURE — 82962 GLUCOSE BLOOD TEST: CPT

## 2021-09-19 PROCEDURE — 25010000002 DEXAMETHASONE PER 1 MG: Performed by: INTERNAL MEDICINE

## 2021-09-19 PROCEDURE — 83880 ASSAY OF NATRIURETIC PEPTIDE: CPT | Performed by: INTERNAL MEDICINE

## 2021-09-19 PROCEDURE — 85379 FIBRIN DEGRADATION QUANT: CPT | Performed by: INTERNAL MEDICINE

## 2021-09-19 RX ORDER — BUMETANIDE 1 MG/1
1 TABLET ORAL ONCE
Status: COMPLETED | OUTPATIENT
Start: 2021-09-19 | End: 2021-09-19

## 2021-09-19 RX ADMIN — ANTACID TABLETS 1 TABLET: 500 TABLET, CHEWABLE ORAL at 09:29

## 2021-09-19 RX ADMIN — Medication 250 MG: at 19:31

## 2021-09-19 RX ADMIN — FAMOTIDINE 20 MG: 20 TABLET, FILM COATED ORAL at 09:29

## 2021-09-19 RX ADMIN — DEXTROMETHORPHAN POLISTIREX 60 MG: 30 SUSPENSION, EXTENDED RELEASE ORAL at 19:31

## 2021-09-19 RX ADMIN — METOPROLOL TARTRATE 12.5 MG: 25 TABLET ORAL at 09:29

## 2021-09-19 RX ADMIN — SODIUM CHLORIDE 1 DROP: 50 SOLUTION OPHTHALMIC at 18:23

## 2021-09-19 RX ADMIN — Medication 3 MG: at 22:59

## 2021-09-19 RX ADMIN — SODIUM CHLORIDE 1 DROP: 50 SOLUTION OPHTHALMIC at 09:30

## 2021-09-19 RX ADMIN — SODIUM CHLORIDE, PRESERVATIVE FREE 10 ML: 5 INJECTION INTRAVENOUS at 19:30

## 2021-09-19 RX ADMIN — ENOXAPARIN SODIUM 70 MG: 80 INJECTION, SOLUTION INTRAVENOUS; SUBCUTANEOUS at 14:11

## 2021-09-19 RX ADMIN — BENZONATATE 200 MG: 100 CAPSULE ORAL at 14:09

## 2021-09-19 RX ADMIN — SODIUM BICARBONATE 1300 MG: 650 TABLET ORAL at 19:31

## 2021-09-19 RX ADMIN — DEXTROMETHORPHAN POLISTIREX 60 MG: 30 SUSPENSION, EXTENDED RELEASE ORAL at 09:30

## 2021-09-19 RX ADMIN — Medication 250 MG: at 09:30

## 2021-09-19 RX ADMIN — DEXAMETHASONE SODIUM PHOSPHATE 10 MG: 10 INJECTION INTRAMUSCULAR; INTRAVENOUS at 12:29

## 2021-09-19 RX ADMIN — CEFTRIAXONE 1 G: 1 INJECTION, POWDER, FOR SOLUTION INTRAMUSCULAR; INTRAVENOUS at 18:22

## 2021-09-19 RX ADMIN — MIRTAZAPINE 7.5 MG: 15 TABLET, FILM COATED ORAL at 19:32

## 2021-09-19 RX ADMIN — METOPROLOL TARTRATE 12.5 MG: 25 TABLET ORAL at 19:31

## 2021-09-19 RX ADMIN — DEXAMETHASONE SODIUM PHOSPHATE 10 MG: 10 INJECTION INTRAMUSCULAR; INTRAVENOUS at 22:59

## 2021-09-19 RX ADMIN — AMLODIPINE BESYLATE 10 MG: 10 TABLET ORAL at 09:29

## 2021-09-19 RX ADMIN — BUMETANIDE 1 MG: 1 TABLET ORAL at 12:29

## 2021-09-19 RX ADMIN — SODIUM BICARBONATE 1300 MG: 650 TABLET ORAL at 09:29

## 2021-09-19 RX ADMIN — PREDNISOLONE ACETATE 1 DROP: 10 SUSPENSION/ DROPS OPHTHALMIC at 09:30

## 2021-09-19 RX ADMIN — FINASTERIDE 5 MG: 5 TABLET, FILM COATED ORAL at 09:30

## 2021-09-20 ENCOUNTER — APPOINTMENT (OUTPATIENT)
Dept: GENERAL RADIOLOGY | Facility: HOSPITAL | Age: 71
End: 2021-09-20

## 2021-09-20 LAB
ALBUMIN SERPL-MCNC: 2.7 G/DL (ref 3.5–5.2)
ALBUMIN/GLOB SERPL: 0.9 G/DL
ALP SERPL-CCNC: 112 U/L (ref 39–117)
ALT SERPL W P-5'-P-CCNC: 65 U/L (ref 1–41)
ANION GAP SERPL CALCULATED.3IONS-SCNC: 12.9 MMOL/L (ref 5–15)
AST SERPL-CCNC: 26 U/L (ref 1–40)
BASOPHILS # BLD AUTO: 0.03 10*3/MM3 (ref 0–0.2)
BASOPHILS NFR BLD AUTO: 0.2 % (ref 0–1.5)
BILIRUB SERPL-MCNC: 0.3 MG/DL (ref 0–1.2)
BUN SERPL-MCNC: 45 MG/DL (ref 8–23)
BUN/CREAT SERPL: 31.9 (ref 7–25)
CALCIUM SPEC-SCNC: 8.5 MG/DL (ref 8.6–10.5)
CHLORIDE SERPL-SCNC: 101 MMOL/L (ref 98–107)
CO2 SERPL-SCNC: 26.1 MMOL/L (ref 22–29)
CREAT SERPL-MCNC: 1.41 MG/DL (ref 0.76–1.27)
CRP SERPL-MCNC: 2.87 MG/DL (ref 0–0.5)
DEPRECATED RDW RBC AUTO: 44.5 FL (ref 37–54)
EOSINOPHIL # BLD AUTO: 0 10*3/MM3 (ref 0–0.4)
EOSINOPHIL NFR BLD AUTO: 0 % (ref 0.3–6.2)
ERYTHROCYTE [DISTWIDTH] IN BLOOD BY AUTOMATED COUNT: 14 % (ref 12.3–15.4)
FERRITIN SERPL-MCNC: 1345 NG/ML (ref 30–400)
GFR SERPL CREATININE-BSD FRML MDRD: 50 ML/MIN/1.73
GLOBULIN UR ELPH-MCNC: 3 GM/DL
GLUCOSE BLDC GLUCOMTR-MCNC: 105 MG/DL (ref 70–130)
GLUCOSE BLDC GLUCOMTR-MCNC: 106 MG/DL (ref 70–130)
GLUCOSE BLDC GLUCOMTR-MCNC: 114 MG/DL (ref 70–130)
GLUCOSE SERPL-MCNC: 111 MG/DL (ref 65–99)
HCT VFR BLD AUTO: 39.6 % (ref 37.5–51)
HGB BLD-MCNC: 13.5 G/DL (ref 13–17.7)
IMM GRANULOCYTES # BLD AUTO: 0.42 10*3/MM3 (ref 0–0.05)
IMM GRANULOCYTES NFR BLD AUTO: 2.9 % (ref 0–0.5)
LDH SERPL-CCNC: 323 U/L (ref 135–225)
LYMPHOCYTES # BLD AUTO: 0.47 10*3/MM3 (ref 0.7–3.1)
LYMPHOCYTES NFR BLD AUTO: 3.3 % (ref 19.6–45.3)
MAGNESIUM SERPL-MCNC: 2.3 MG/DL (ref 1.6–2.4)
MCH RBC QN AUTO: 29.7 PG (ref 26.6–33)
MCHC RBC AUTO-ENTMCNC: 34.1 G/DL (ref 31.5–35.7)
MCV RBC AUTO: 87 FL (ref 79–97)
MONOCYTES # BLD AUTO: 0.86 10*3/MM3 (ref 0.1–0.9)
MONOCYTES NFR BLD AUTO: 6 % (ref 5–12)
NEUTROPHILS NFR BLD AUTO: 12.53 10*3/MM3 (ref 1.7–7)
NEUTROPHILS NFR BLD AUTO: 87.6 % (ref 42.7–76)
NRBC BLD AUTO-RTO: 0 /100 WBC (ref 0–0.2)
PHOSPHATE SERPL-MCNC: 3.3 MG/DL (ref 2.5–4.5)
PLATELET # BLD AUTO: 144 10*3/MM3 (ref 140–450)
PMV BLD AUTO: 12.5 FL (ref 6–12)
POTASSIUM SERPL-SCNC: 4.4 MMOL/L (ref 3.5–5.2)
PROT SERPL-MCNC: 5.7 G/DL (ref 6–8.5)
RBC # BLD AUTO: 4.55 10*6/MM3 (ref 4.14–5.8)
SODIUM SERPL-SCNC: 140 MMOL/L (ref 136–145)
WBC # BLD AUTO: 14.31 10*3/MM3 (ref 3.4–10.8)

## 2021-09-20 PROCEDURE — 25010000002 CEFTRIAXONE PER 250 MG: Performed by: INTERNAL MEDICINE

## 2021-09-20 PROCEDURE — 25010000002 ENOXAPARIN PER 10 MG: Performed by: INTERNAL MEDICINE

## 2021-09-20 PROCEDURE — 71045 X-RAY EXAM CHEST 1 VIEW: CPT

## 2021-09-20 PROCEDURE — 82962 GLUCOSE BLOOD TEST: CPT

## 2021-09-20 PROCEDURE — 87205 SMEAR GRAM STAIN: CPT | Performed by: INTERNAL MEDICINE

## 2021-09-20 PROCEDURE — 84100 ASSAY OF PHOSPHORUS: CPT | Performed by: HOSPITALIST

## 2021-09-20 PROCEDURE — 87102 FUNGUS ISOLATION CULTURE: CPT | Performed by: INTERNAL MEDICINE

## 2021-09-20 PROCEDURE — 99232 SBSQ HOSP IP/OBS MODERATE 35: CPT | Performed by: INTERNAL MEDICINE

## 2021-09-20 PROCEDURE — 94799 UNLISTED PULMONARY SVC/PX: CPT

## 2021-09-20 PROCEDURE — 25010000002 DEXAMETHASONE PER 1 MG: Performed by: INTERNAL MEDICINE

## 2021-09-20 PROCEDURE — 82728 ASSAY OF FERRITIN: CPT | Performed by: INTERNAL MEDICINE

## 2021-09-20 PROCEDURE — 83615 LACTATE (LD) (LDH) ENZYME: CPT | Performed by: INTERNAL MEDICINE

## 2021-09-20 PROCEDURE — 87070 CULTURE OTHR SPECIMN AEROBIC: CPT | Performed by: INTERNAL MEDICINE

## 2021-09-20 PROCEDURE — 85025 COMPLETE CBC W/AUTO DIFF WBC: CPT | Performed by: INTERNAL MEDICINE

## 2021-09-20 PROCEDURE — 80053 COMPREHEN METABOLIC PANEL: CPT | Performed by: INTERNAL MEDICINE

## 2021-09-20 PROCEDURE — 83735 ASSAY OF MAGNESIUM: CPT | Performed by: HOSPITALIST

## 2021-09-20 PROCEDURE — 86140 C-REACTIVE PROTEIN: CPT | Performed by: INTERNAL MEDICINE

## 2021-09-20 RX ADMIN — SODIUM BICARBONATE 1300 MG: 650 TABLET ORAL at 09:25

## 2021-09-20 RX ADMIN — Medication 250 MG: at 09:25

## 2021-09-20 RX ADMIN — METOPROLOL TARTRATE 12.5 MG: 25 TABLET ORAL at 09:25

## 2021-09-20 RX ADMIN — SODIUM CHLORIDE 1 DROP: 50 SOLUTION OPHTHALMIC at 21:16

## 2021-09-20 RX ADMIN — FAMOTIDINE 20 MG: 20 TABLET, FILM COATED ORAL at 09:25

## 2021-09-20 RX ADMIN — FINASTERIDE 5 MG: 5 TABLET, FILM COATED ORAL at 09:25

## 2021-09-20 RX ADMIN — SODIUM CHLORIDE, PRESERVATIVE FREE 10 ML: 5 INJECTION INTRAVENOUS at 21:16

## 2021-09-20 RX ADMIN — METOPROLOL TARTRATE 12.5 MG: 25 TABLET ORAL at 21:16

## 2021-09-20 RX ADMIN — ANTACID TABLETS 1 TABLET: 500 TABLET, CHEWABLE ORAL at 09:25

## 2021-09-20 RX ADMIN — DEXTROMETHORPHAN POLISTIREX 60 MG: 30 SUSPENSION, EXTENDED RELEASE ORAL at 21:16

## 2021-09-20 RX ADMIN — BENZONATATE 200 MG: 100 CAPSULE ORAL at 21:20

## 2021-09-20 RX ADMIN — Medication 250 MG: at 21:15

## 2021-09-20 RX ADMIN — DEXAMETHASONE SODIUM PHOSPHATE 10 MG: 10 INJECTION INTRAMUSCULAR; INTRAVENOUS at 13:31

## 2021-09-20 RX ADMIN — DEXTROMETHORPHAN POLISTIREX 60 MG: 30 SUSPENSION, EXTENDED RELEASE ORAL at 09:26

## 2021-09-20 RX ADMIN — CEFTRIAXONE 1 G: 1 INJECTION, POWDER, FOR SOLUTION INTRAMUSCULAR; INTRAVENOUS at 18:29

## 2021-09-20 RX ADMIN — MIRTAZAPINE 7.5 MG: 15 TABLET, FILM COATED ORAL at 21:15

## 2021-09-20 RX ADMIN — SODIUM BICARBONATE 1300 MG: 650 TABLET ORAL at 21:15

## 2021-09-20 RX ADMIN — ENOXAPARIN SODIUM 70 MG: 80 INJECTION, SOLUTION INTRAVENOUS; SUBCUTANEOUS at 13:31

## 2021-09-20 RX ADMIN — DEXAMETHASONE SODIUM PHOSPHATE 10 MG: 10 INJECTION INTRAMUSCULAR; INTRAVENOUS at 21:16

## 2021-09-20 RX ADMIN — PREDNISOLONE ACETATE 1 DROP: 10 SUSPENSION/ DROPS OPHTHALMIC at 09:24

## 2021-09-20 RX ADMIN — Medication 3 MG: at 21:20

## 2021-09-20 RX ADMIN — AMLODIPINE BESYLATE 10 MG: 10 TABLET ORAL at 09:25

## 2021-09-20 RX ADMIN — SODIUM CHLORIDE 1 DROP: 50 SOLUTION OPHTHALMIC at 16:55

## 2021-09-20 RX ADMIN — SODIUM CHLORIDE 1 DROP: 50 SOLUTION OPHTHALMIC at 09:24

## 2021-09-21 LAB
GLUCOSE BLDC GLUCOMTR-MCNC: 114 MG/DL (ref 70–130)
GLUCOSE BLDC GLUCOMTR-MCNC: 134 MG/DL (ref 70–130)
GLUCOSE BLDC GLUCOMTR-MCNC: 151 MG/DL (ref 70–130)
GLUCOSE BLDC GLUCOMTR-MCNC: 161 MG/DL (ref 70–130)
MRSA DNA SPEC QL NAA+PROBE: NORMAL

## 2021-09-21 PROCEDURE — 25010000002 ENOXAPARIN PER 10 MG: Performed by: INTERNAL MEDICINE

## 2021-09-21 PROCEDURE — 82962 GLUCOSE BLOOD TEST: CPT

## 2021-09-21 PROCEDURE — 99232 SBSQ HOSP IP/OBS MODERATE 35: CPT | Performed by: INTERNAL MEDICINE

## 2021-09-21 PROCEDURE — 94799 UNLISTED PULMONARY SVC/PX: CPT

## 2021-09-21 PROCEDURE — 87641 MR-STAPH DNA AMP PROBE: CPT | Performed by: INTERNAL MEDICINE

## 2021-09-21 PROCEDURE — 25010000002 DEXAMETHASONE PER 1 MG: Performed by: INTERNAL MEDICINE

## 2021-09-21 PROCEDURE — 97110 THERAPEUTIC EXERCISES: CPT

## 2021-09-21 PROCEDURE — 25010000002 CEFTRIAXONE PER 250 MG: Performed by: INTERNAL MEDICINE

## 2021-09-21 RX ADMIN — FINASTERIDE 5 MG: 5 TABLET, FILM COATED ORAL at 10:56

## 2021-09-21 RX ADMIN — Medication 250 MG: at 10:56

## 2021-09-21 RX ADMIN — DEXAMETHASONE SODIUM PHOSPHATE 10 MG: 10 INJECTION INTRAMUSCULAR; INTRAVENOUS at 21:01

## 2021-09-21 RX ADMIN — SODIUM BICARBONATE 1300 MG: 650 TABLET ORAL at 10:56

## 2021-09-21 RX ADMIN — AMLODIPINE BESYLATE 10 MG: 10 TABLET ORAL at 10:56

## 2021-09-21 RX ADMIN — SODIUM CHLORIDE 1 DROP: 50 SOLUTION OPHTHALMIC at 17:51

## 2021-09-21 RX ADMIN — ENOXAPARIN SODIUM 70 MG: 80 INJECTION, SOLUTION INTRAVENOUS; SUBCUTANEOUS at 17:51

## 2021-09-21 RX ADMIN — SODIUM CHLORIDE 1 DROP: 50 SOLUTION OPHTHALMIC at 10:57

## 2021-09-21 RX ADMIN — METOPROLOL TARTRATE 12.5 MG: 25 TABLET ORAL at 10:57

## 2021-09-21 RX ADMIN — Medication 250 MG: at 21:02

## 2021-09-21 RX ADMIN — ACETAMINOPHEN 650 MG: 325 TABLET, FILM COATED ORAL at 21:02

## 2021-09-21 RX ADMIN — ANTACID TABLETS 1 TABLET: 500 TABLET, CHEWABLE ORAL at 10:56

## 2021-09-21 RX ADMIN — PREDNISOLONE ACETATE 1 DROP: 10 SUSPENSION/ DROPS OPHTHALMIC at 10:57

## 2021-09-21 RX ADMIN — DEXTROMETHORPHAN POLISTIREX 60 MG: 30 SUSPENSION, EXTENDED RELEASE ORAL at 21:01

## 2021-09-21 RX ADMIN — Medication 3 MG: at 21:02

## 2021-09-21 RX ADMIN — ACETAMINOPHEN 650 MG: 325 TABLET, FILM COATED ORAL at 17:51

## 2021-09-21 RX ADMIN — CEFTRIAXONE 1 G: 1 INJECTION, POWDER, FOR SOLUTION INTRAMUSCULAR; INTRAVENOUS at 17:51

## 2021-09-21 RX ADMIN — DEXTROMETHORPHAN POLISTIREX 60 MG: 30 SUSPENSION, EXTENDED RELEASE ORAL at 10:57

## 2021-09-21 RX ADMIN — MIRTAZAPINE 7.5 MG: 15 TABLET, FILM COATED ORAL at 21:02

## 2021-09-21 RX ADMIN — BENZONATATE 200 MG: 100 CAPSULE ORAL at 21:02

## 2021-09-21 RX ADMIN — SODIUM CHLORIDE 1 DROP: 50 SOLUTION OPHTHALMIC at 21:06

## 2021-09-21 RX ADMIN — FAMOTIDINE 20 MG: 20 TABLET, FILM COATED ORAL at 10:57

## 2021-09-21 RX ADMIN — METOPROLOL TARTRATE 12.5 MG: 25 TABLET ORAL at 21:01

## 2021-09-21 RX ADMIN — DEXAMETHASONE SODIUM PHOSPHATE 10 MG: 10 INJECTION INTRAMUSCULAR; INTRAVENOUS at 10:57

## 2021-09-22 ENCOUNTER — APPOINTMENT (OUTPATIENT)
Dept: GENERAL RADIOLOGY | Facility: HOSPITAL | Age: 71
End: 2021-09-22

## 2021-09-22 LAB
ANION GAP SERPL CALCULATED.3IONS-SCNC: 10.3 MMOL/L (ref 5–15)
BACTERIA SPEC RESP CULT: NORMAL
BASOPHILS # BLD AUTO: 0.03 10*3/MM3 (ref 0–0.2)
BASOPHILS NFR BLD AUTO: 0.3 % (ref 0–1.5)
BUN SERPL-MCNC: 45 MG/DL (ref 8–23)
BUN/CREAT SERPL: 33.3 (ref 7–25)
CALCIUM SPEC-SCNC: 8.1 MG/DL (ref 8.6–10.5)
CHLORIDE SERPL-SCNC: 103 MMOL/L (ref 98–107)
CO2 SERPL-SCNC: 24.7 MMOL/L (ref 22–29)
CREAT SERPL-MCNC: 1.35 MG/DL (ref 0.76–1.27)
CRP SERPL-MCNC: 1.18 MG/DL (ref 0–0.5)
DEPRECATED RDW RBC AUTO: 45.3 FL (ref 37–54)
EOSINOPHIL # BLD AUTO: 0 10*3/MM3 (ref 0–0.4)
EOSINOPHIL NFR BLD AUTO: 0 % (ref 0.3–6.2)
ERYTHROCYTE [DISTWIDTH] IN BLOOD BY AUTOMATED COUNT: 14.2 % (ref 12.3–15.4)
GFR SERPL CREATININE-BSD FRML MDRD: 52 ML/MIN/1.73
GLUCOSE BLDC GLUCOMTR-MCNC: 101 MG/DL (ref 70–130)
GLUCOSE BLDC GLUCOMTR-MCNC: 119 MG/DL (ref 70–130)
GLUCOSE BLDC GLUCOMTR-MCNC: 147 MG/DL (ref 70–130)
GLUCOSE BLDC GLUCOMTR-MCNC: 93 MG/DL (ref 70–130)
GLUCOSE SERPL-MCNC: 110 MG/DL (ref 65–99)
GRAM STN SPEC: NORMAL
HCT VFR BLD AUTO: 37.2 % (ref 37.5–51)
HGB BLD-MCNC: 12.7 G/DL (ref 13–17.7)
IMM GRANULOCYTES # BLD AUTO: 0.27 10*3/MM3 (ref 0–0.05)
IMM GRANULOCYTES NFR BLD AUTO: 2.3 % (ref 0–0.5)
LYMPHOCYTES # BLD AUTO: 0.4 10*3/MM3 (ref 0.7–3.1)
LYMPHOCYTES NFR BLD AUTO: 3.4 % (ref 19.6–45.3)
MCH RBC QN AUTO: 30 PG (ref 26.6–33)
MCHC RBC AUTO-ENTMCNC: 34.1 G/DL (ref 31.5–35.7)
MCV RBC AUTO: 87.7 FL (ref 79–97)
MONOCYTES # BLD AUTO: 0.25 10*3/MM3 (ref 0.1–0.9)
MONOCYTES NFR BLD AUTO: 2.1 % (ref 5–12)
NEUTROPHILS NFR BLD AUTO: 10.83 10*3/MM3 (ref 1.7–7)
NEUTROPHILS NFR BLD AUTO: 91.9 % (ref 42.7–76)
NRBC BLD AUTO-RTO: 0 /100 WBC (ref 0–0.2)
PF4 HEPARIN CMPLX IGG SERPL IA: 0.1 OD (ref 0–0.4)
PLATELET # BLD AUTO: 115 10*3/MM3 (ref 140–450)
PMV BLD AUTO: 12.1 FL (ref 6–12)
POTASSIUM SERPL-SCNC: 4.6 MMOL/L (ref 3.5–5.2)
PROCALCITONIN SERPL-MCNC: 0.24 NG/ML (ref 0–0.25)
RBC # BLD AUTO: 4.24 10*6/MM3 (ref 4.14–5.8)
SARS-COV-2 ORF1AB RESP QL NAA+PROBE: DETECTED
SODIUM SERPL-SCNC: 138 MMOL/L (ref 136–145)
URATE SERPL-MCNC: 7.2 MG/DL (ref 3.4–7)
WBC # BLD AUTO: 11.78 10*3/MM3 (ref 3.4–10.8)

## 2021-09-22 PROCEDURE — 85025 COMPLETE CBC W/AUTO DIFF WBC: CPT | Performed by: INTERNAL MEDICINE

## 2021-09-22 PROCEDURE — 84550 ASSAY OF BLOOD/URIC ACID: CPT | Performed by: INTERNAL MEDICINE

## 2021-09-22 PROCEDURE — 94760 N-INVAS EAR/PLS OXIMETRY 1: CPT

## 2021-09-22 PROCEDURE — 80048 BASIC METABOLIC PNL TOTAL CA: CPT | Performed by: INTERNAL MEDICINE

## 2021-09-22 PROCEDURE — 97166 OT EVAL MOD COMPLEX 45 MIN: CPT

## 2021-09-22 PROCEDURE — 97530 THERAPEUTIC ACTIVITIES: CPT

## 2021-09-22 PROCEDURE — 94799 UNLISTED PULMONARY SVC/PX: CPT

## 2021-09-22 PROCEDURE — 25010000002 DEXAMETHASONE PER 1 MG: Performed by: INTERNAL MEDICINE

## 2021-09-22 PROCEDURE — 71045 X-RAY EXAM CHEST 1 VIEW: CPT

## 2021-09-22 PROCEDURE — 84145 PROCALCITONIN (PCT): CPT | Performed by: INTERNAL MEDICINE

## 2021-09-22 PROCEDURE — 25010000002 ENOXAPARIN PER 10 MG: Performed by: INTERNAL MEDICINE

## 2021-09-22 PROCEDURE — U0004 COV-19 TEST NON-CDC HGH THRU: HCPCS | Performed by: INTERNAL MEDICINE

## 2021-09-22 PROCEDURE — 25010000002 CEFTRIAXONE PER 250 MG: Performed by: INTERNAL MEDICINE

## 2021-09-22 PROCEDURE — 82962 GLUCOSE BLOOD TEST: CPT

## 2021-09-22 PROCEDURE — 86140 C-REACTIVE PROTEIN: CPT | Performed by: INTERNAL MEDICINE

## 2021-09-22 RX ADMIN — Medication 3 MG: at 20:36

## 2021-09-22 RX ADMIN — SODIUM CHLORIDE 1 DROP: 50 SOLUTION OPHTHALMIC at 17:07

## 2021-09-22 RX ADMIN — DEXTROMETHORPHAN POLISTIREX 60 MG: 30 SUSPENSION, EXTENDED RELEASE ORAL at 20:36

## 2021-09-22 RX ADMIN — ENOXAPARIN SODIUM 70 MG: 80 INJECTION, SOLUTION INTRAVENOUS; SUBCUTANEOUS at 13:00

## 2021-09-22 RX ADMIN — AMLODIPINE BESYLATE 10 MG: 10 TABLET ORAL at 08:35

## 2021-09-22 RX ADMIN — ANTACID TABLETS 1 TABLET: 500 TABLET, CHEWABLE ORAL at 08:35

## 2021-09-22 RX ADMIN — PREDNISOLONE ACETATE 1 DROP: 10 SUSPENSION/ DROPS OPHTHALMIC at 08:36

## 2021-09-22 RX ADMIN — METOPROLOL TARTRATE 12.5 MG: 25 TABLET ORAL at 20:36

## 2021-09-22 RX ADMIN — FINASTERIDE 5 MG: 5 TABLET, FILM COATED ORAL at 08:35

## 2021-09-22 RX ADMIN — Medication 250 MG: at 20:36

## 2021-09-22 RX ADMIN — Medication 250 MG: at 08:35

## 2021-09-22 RX ADMIN — METOPROLOL TARTRATE 12.5 MG: 25 TABLET ORAL at 08:36

## 2021-09-22 RX ADMIN — SODIUM CHLORIDE 1 DROP: 50 SOLUTION OPHTHALMIC at 08:35

## 2021-09-22 RX ADMIN — DEXAMETHASONE SODIUM PHOSPHATE 10 MG: 10 INJECTION INTRAMUSCULAR; INTRAVENOUS at 08:36

## 2021-09-22 RX ADMIN — CEFTRIAXONE 1 G: 1 INJECTION, POWDER, FOR SOLUTION INTRAMUSCULAR; INTRAVENOUS at 17:07

## 2021-09-22 RX ADMIN — SODIUM CHLORIDE 1 DROP: 50 SOLUTION OPHTHALMIC at 20:36

## 2021-09-22 RX ADMIN — DEXAMETHASONE SODIUM PHOSPHATE 10 MG: 10 INJECTION INTRAMUSCULAR; INTRAVENOUS at 20:36

## 2021-09-22 RX ADMIN — MIRTAZAPINE 7.5 MG: 15 TABLET, FILM COATED ORAL at 20:36

## 2021-09-22 RX ADMIN — DEXTROMETHORPHAN POLISTIREX 60 MG: 30 SUSPENSION, EXTENDED RELEASE ORAL at 08:35

## 2021-09-22 RX ADMIN — FAMOTIDINE 20 MG: 20 TABLET, FILM COATED ORAL at 08:36

## 2021-09-22 RX ADMIN — BENZONATATE 200 MG: 100 CAPSULE ORAL at 20:36

## 2021-09-22 RX ADMIN — ACETAMINOPHEN 650 MG: 325 TABLET, FILM COATED ORAL at 20:36

## 2021-09-23 LAB
ANION GAP SERPL CALCULATED.3IONS-SCNC: 7.8 MMOL/L (ref 5–15)
BASOPHILS # BLD AUTO: 0.05 10*3/MM3 (ref 0–0.2)
BASOPHILS NFR BLD AUTO: 0.4 % (ref 0–1.5)
BUN SERPL-MCNC: 40 MG/DL (ref 8–23)
BUN/CREAT SERPL: 29 (ref 7–25)
CALCIUM SPEC-SCNC: 8 MG/DL (ref 8.6–10.5)
CHLORIDE SERPL-SCNC: 108 MMOL/L (ref 98–107)
CO2 SERPL-SCNC: 25.2 MMOL/L (ref 22–29)
CREAT SERPL-MCNC: 1.38 MG/DL (ref 0.76–1.27)
DEPRECATED RDW RBC AUTO: 45.2 FL (ref 37–54)
EOSINOPHIL # BLD AUTO: 0 10*3/MM3 (ref 0–0.4)
EOSINOPHIL NFR BLD AUTO: 0 % (ref 0.3–6.2)
ERYTHROCYTE [DISTWIDTH] IN BLOOD BY AUTOMATED COUNT: 13.7 % (ref 12.3–15.4)
GFR SERPL CREATININE-BSD FRML MDRD: 51 ML/MIN/1.73
GLUCOSE BLDC GLUCOMTR-MCNC: 105 MG/DL (ref 70–130)
GLUCOSE BLDC GLUCOMTR-MCNC: 108 MG/DL (ref 70–130)
GLUCOSE BLDC GLUCOMTR-MCNC: 115 MG/DL (ref 70–130)
GLUCOSE BLDC GLUCOMTR-MCNC: 99 MG/DL (ref 70–130)
GLUCOSE SERPL-MCNC: 103 MG/DL (ref 65–99)
HCT VFR BLD AUTO: 41.4 % (ref 37.5–51)
HGB BLD-MCNC: 13.6 G/DL (ref 13–17.7)
LYMPHOCYTES # BLD AUTO: 0.43 10*3/MM3 (ref 0.7–3.1)
LYMPHOCYTES NFR BLD AUTO: 3.1 % (ref 19.6–45.3)
MCH RBC QN AUTO: 29.8 PG (ref 26.6–33)
MCHC RBC AUTO-ENTMCNC: 32.9 G/DL (ref 31.5–35.7)
MCV RBC AUTO: 90.6 FL (ref 79–97)
MONOCYTES # BLD AUTO: 0.48 10*3/MM3 (ref 0.1–0.9)
MONOCYTES NFR BLD AUTO: 3.5 % (ref 5–12)
NEUTROPHILS NFR BLD AUTO: 12.45 10*3/MM3 (ref 1.7–7)
NEUTROPHILS NFR BLD AUTO: 90.7 % (ref 42.7–76)
PLATELET # BLD AUTO: 104 10*3/MM3 (ref 140–450)
PMV BLD AUTO: 11.7 FL (ref 6–12)
POTASSIUM SERPL-SCNC: 4.4 MMOL/L (ref 3.5–5.2)
RBC # BLD AUTO: 4.57 10*6/MM3 (ref 4.14–5.8)
SODIUM SERPL-SCNC: 141 MMOL/L (ref 136–145)
WBC # BLD AUTO: 13.72 10*3/MM3 (ref 3.4–10.8)

## 2021-09-23 PROCEDURE — 94799 UNLISTED PULMONARY SVC/PX: CPT

## 2021-09-23 PROCEDURE — 85025 COMPLETE CBC W/AUTO DIFF WBC: CPT | Performed by: INTERNAL MEDICINE

## 2021-09-23 PROCEDURE — 82962 GLUCOSE BLOOD TEST: CPT

## 2021-09-23 PROCEDURE — 25010000002 CEFTRIAXONE PER 250 MG: Performed by: INTERNAL MEDICINE

## 2021-09-23 PROCEDURE — 97110 THERAPEUTIC EXERCISES: CPT | Performed by: OCCUPATIONAL THERAPIST

## 2021-09-23 PROCEDURE — 25010000002 DEXAMETHASONE PER 1 MG: Performed by: INTERNAL MEDICINE

## 2021-09-23 PROCEDURE — 80048 BASIC METABOLIC PNL TOTAL CA: CPT | Performed by: INTERNAL MEDICINE

## 2021-09-23 PROCEDURE — 25010000002 ENOXAPARIN PER 10 MG: Performed by: INTERNAL MEDICINE

## 2021-09-23 PROCEDURE — 99232 SBSQ HOSP IP/OBS MODERATE 35: CPT | Performed by: INTERNAL MEDICINE

## 2021-09-23 PROCEDURE — 97530 THERAPEUTIC ACTIVITIES: CPT

## 2021-09-23 RX ADMIN — DEXAMETHASONE SODIUM PHOSPHATE 10 MG: 10 INJECTION INTRAMUSCULAR; INTRAVENOUS at 20:57

## 2021-09-23 RX ADMIN — METOPROLOL TARTRATE 12.5 MG: 25 TABLET ORAL at 20:46

## 2021-09-23 RX ADMIN — CEFTRIAXONE 1 G: 1 INJECTION, POWDER, FOR SOLUTION INTRAMUSCULAR; INTRAVENOUS at 16:59

## 2021-09-23 RX ADMIN — SODIUM CHLORIDE 1 DROP: 50 SOLUTION OPHTHALMIC at 09:00

## 2021-09-23 RX ADMIN — ENOXAPARIN SODIUM 70 MG: 80 INJECTION, SOLUTION INTRAVENOUS; SUBCUTANEOUS at 15:35

## 2021-09-23 RX ADMIN — DEXTROMETHORPHAN POLISTIREX 60 MG: 30 SUSPENSION, EXTENDED RELEASE ORAL at 08:59

## 2021-09-23 RX ADMIN — SODIUM CHLORIDE, PRESERVATIVE FREE 10 ML: 5 INJECTION INTRAVENOUS at 09:01

## 2021-09-23 RX ADMIN — DEXAMETHASONE SODIUM PHOSPHATE 10 MG: 10 INJECTION INTRAMUSCULAR; INTRAVENOUS at 09:01

## 2021-09-23 RX ADMIN — DEXTROMETHORPHAN POLISTIREX 60 MG: 30 SUSPENSION, EXTENDED RELEASE ORAL at 20:45

## 2021-09-23 RX ADMIN — METOPROLOL TARTRATE 12.5 MG: 25 TABLET ORAL at 09:01

## 2021-09-23 RX ADMIN — Medication 250 MG: at 09:00

## 2021-09-23 RX ADMIN — SODIUM CHLORIDE 1 DROP: 50 SOLUTION OPHTHALMIC at 15:35

## 2021-09-23 RX ADMIN — FAMOTIDINE 20 MG: 20 TABLET, FILM COATED ORAL at 09:00

## 2021-09-23 RX ADMIN — Medication 250 MG: at 20:46

## 2021-09-23 RX ADMIN — PREDNISOLONE ACETATE 1 DROP: 10 SUSPENSION/ DROPS OPHTHALMIC at 09:00

## 2021-09-23 RX ADMIN — MIRTAZAPINE 7.5 MG: 15 TABLET, FILM COATED ORAL at 20:46

## 2021-09-23 RX ADMIN — SODIUM CHLORIDE 1 DROP: 50 SOLUTION OPHTHALMIC at 21:07

## 2021-09-23 RX ADMIN — AMLODIPINE BESYLATE 10 MG: 10 TABLET ORAL at 08:59

## 2021-09-23 RX ADMIN — FINASTERIDE 5 MG: 5 TABLET, FILM COATED ORAL at 09:00

## 2021-09-24 ENCOUNTER — APPOINTMENT (OUTPATIENT)
Dept: GENERAL RADIOLOGY | Facility: HOSPITAL | Age: 71
End: 2021-09-24

## 2021-09-24 LAB
ANION GAP SERPL CALCULATED.3IONS-SCNC: 10.5 MMOL/L (ref 5–15)
BASOPHILS # BLD AUTO: 0.02 10*3/MM3 (ref 0–0.2)
BASOPHILS NFR BLD AUTO: 0.2 % (ref 0–1.5)
BUN SERPL-MCNC: 39 MG/DL (ref 8–23)
BUN/CREAT SERPL: 30.7 (ref 7–25)
CALCIUM SPEC-SCNC: 8.3 MG/DL (ref 8.6–10.5)
CHLORIDE SERPL-SCNC: 105 MMOL/L (ref 98–107)
CO2 SERPL-SCNC: 22.5 MMOL/L (ref 22–29)
CREAT SERPL-MCNC: 1.27 MG/DL (ref 0.76–1.27)
DEPRECATED RDW RBC AUTO: 43.3 FL (ref 37–54)
EOSINOPHIL # BLD AUTO: 0 10*3/MM3 (ref 0–0.4)
EOSINOPHIL NFR BLD AUTO: 0 % (ref 0.3–6.2)
ERYTHROCYTE [DISTWIDTH] IN BLOOD BY AUTOMATED COUNT: 13.9 % (ref 12.3–15.4)
GFR SERPL CREATININE-BSD FRML MDRD: 56 ML/MIN/1.73
GLUCOSE BLDC GLUCOMTR-MCNC: 100 MG/DL (ref 70–130)
GLUCOSE BLDC GLUCOMTR-MCNC: 235 MG/DL (ref 70–130)
GLUCOSE BLDC GLUCOMTR-MCNC: 87 MG/DL (ref 70–130)
GLUCOSE BLDC GLUCOMTR-MCNC: 91 MG/DL (ref 70–130)
GLUCOSE SERPL-MCNC: 98 MG/DL (ref 65–99)
HCT VFR BLD AUTO: 38.1 % (ref 37.5–51)
HGB BLD-MCNC: 12.9 G/DL (ref 13–17.7)
IMM GRANULOCYTES # BLD AUTO: 0.22 10*3/MM3 (ref 0–0.05)
IMM GRANULOCYTES NFR BLD AUTO: 2.1 % (ref 0–0.5)
LYMPHOCYTES # BLD AUTO: 0.54 10*3/MM3 (ref 0.7–3.1)
LYMPHOCYTES NFR BLD AUTO: 5 % (ref 19.6–45.3)
MCH RBC QN AUTO: 29.5 PG (ref 26.6–33)
MCHC RBC AUTO-ENTMCNC: 33.9 G/DL (ref 31.5–35.7)
MCV RBC AUTO: 87.2 FL (ref 79–97)
MONOCYTES # BLD AUTO: 0.26 10*3/MM3 (ref 0.1–0.9)
MONOCYTES NFR BLD AUTO: 2.4 % (ref 5–12)
NEUTROPHILS NFR BLD AUTO: 9.67 10*3/MM3 (ref 1.7–7)
NEUTROPHILS NFR BLD AUTO: 90.3 % (ref 42.7–76)
NRBC BLD AUTO-RTO: 0 /100 WBC (ref 0–0.2)
PLATELET # BLD AUTO: 103 10*3/MM3 (ref 140–450)
PMV BLD AUTO: 12 FL (ref 6–12)
POTASSIUM SERPL-SCNC: 4.5 MMOL/L (ref 3.5–5.2)
RBC # BLD AUTO: 4.37 10*6/MM3 (ref 4.14–5.8)
SODIUM SERPL-SCNC: 138 MMOL/L (ref 136–145)
WBC # BLD AUTO: 10.71 10*3/MM3 (ref 3.4–10.8)

## 2021-09-24 PROCEDURE — 94799 UNLISTED PULMONARY SVC/PX: CPT

## 2021-09-24 PROCEDURE — 25010000002 ENOXAPARIN PER 10 MG: Performed by: INTERNAL MEDICINE

## 2021-09-24 PROCEDURE — 97110 THERAPEUTIC EXERCISES: CPT

## 2021-09-24 PROCEDURE — 25010000002 DEXAMETHASONE PER 1 MG: Performed by: INTERNAL MEDICINE

## 2021-09-24 PROCEDURE — 71045 X-RAY EXAM CHEST 1 VIEW: CPT

## 2021-09-24 PROCEDURE — 80048 BASIC METABOLIC PNL TOTAL CA: CPT | Performed by: INTERNAL MEDICINE

## 2021-09-24 PROCEDURE — 97535 SELF CARE MNGMENT TRAINING: CPT

## 2021-09-24 PROCEDURE — 85025 COMPLETE CBC W/AUTO DIFF WBC: CPT | Performed by: INTERNAL MEDICINE

## 2021-09-24 PROCEDURE — 25010000002 CEFTRIAXONE PER 250 MG: Performed by: INTERNAL MEDICINE

## 2021-09-24 PROCEDURE — 82962 GLUCOSE BLOOD TEST: CPT

## 2021-09-24 RX ADMIN — DEXTROMETHORPHAN POLISTIREX 60 MG: 30 SUSPENSION, EXTENDED RELEASE ORAL at 20:17

## 2021-09-24 RX ADMIN — SODIUM CHLORIDE 1 DROP: 50 SOLUTION OPHTHALMIC at 15:20

## 2021-09-24 RX ADMIN — ENOXAPARIN SODIUM 70 MG: 80 INJECTION, SOLUTION INTRAVENOUS; SUBCUTANEOUS at 12:47

## 2021-09-24 RX ADMIN — METOPROLOL TARTRATE 12.5 MG: 25 TABLET ORAL at 20:17

## 2021-09-24 RX ADMIN — POLYETHYLENE GLYCOL 3350 17 G: 17 POWDER, FOR SOLUTION ORAL at 15:19

## 2021-09-24 RX ADMIN — Medication 250 MG: at 08:13

## 2021-09-24 RX ADMIN — Medication 250 MG: at 20:17

## 2021-09-24 RX ADMIN — AMLODIPINE BESYLATE 10 MG: 10 TABLET ORAL at 08:13

## 2021-09-24 RX ADMIN — SODIUM CHLORIDE, PRESERVATIVE FREE 10 ML: 5 INJECTION INTRAVENOUS at 08:14

## 2021-09-24 RX ADMIN — SODIUM CHLORIDE 1 DROP: 50 SOLUTION OPHTHALMIC at 20:17

## 2021-09-24 RX ADMIN — SODIUM CHLORIDE 1 DROP: 50 SOLUTION OPHTHALMIC at 08:14

## 2021-09-24 RX ADMIN — CEFTRIAXONE 1 G: 1 INJECTION, POWDER, FOR SOLUTION INTRAMUSCULAR; INTRAVENOUS at 17:12

## 2021-09-24 RX ADMIN — DEXAMETHASONE SODIUM PHOSPHATE 10 MG: 10 INJECTION INTRAMUSCULAR; INTRAVENOUS at 20:17

## 2021-09-24 RX ADMIN — FAMOTIDINE 20 MG: 20 TABLET, FILM COATED ORAL at 08:14

## 2021-09-24 RX ADMIN — ANTACID TABLETS 1 TABLET: 500 TABLET, CHEWABLE ORAL at 08:14

## 2021-09-24 RX ADMIN — METOPROLOL TARTRATE 12.5 MG: 25 TABLET ORAL at 08:14

## 2021-09-24 RX ADMIN — SODIUM CHLORIDE, PRESERVATIVE FREE 10 ML: 5 INJECTION INTRAVENOUS at 20:18

## 2021-09-24 RX ADMIN — DEXTROMETHORPHAN POLISTIREX 60 MG: 30 SUSPENSION, EXTENDED RELEASE ORAL at 08:14

## 2021-09-24 RX ADMIN — PREDNISOLONE ACETATE 1 DROP: 10 SUSPENSION/ DROPS OPHTHALMIC at 08:14

## 2021-09-24 RX ADMIN — MIRTAZAPINE 7.5 MG: 15 TABLET, FILM COATED ORAL at 20:17

## 2021-09-24 RX ADMIN — FINASTERIDE 5 MG: 5 TABLET, FILM COATED ORAL at 08:14

## 2021-09-24 RX ADMIN — DEXAMETHASONE SODIUM PHOSPHATE 10 MG: 10 INJECTION INTRAMUSCULAR; INTRAVENOUS at 08:14

## 2021-09-25 ENCOUNTER — APPOINTMENT (OUTPATIENT)
Dept: GENERAL RADIOLOGY | Facility: HOSPITAL | Age: 71
End: 2021-09-25

## 2021-09-25 LAB
ANION GAP SERPL CALCULATED.3IONS-SCNC: 9 MMOL/L (ref 5–15)
BUN SERPL-MCNC: 40 MG/DL (ref 8–23)
BUN/CREAT SERPL: 28.8 (ref 7–25)
CALCIUM SPEC-SCNC: 8.4 MG/DL (ref 8.6–10.5)
CHLORIDE SERPL-SCNC: 105 MMOL/L (ref 98–107)
CO2 SERPL-SCNC: 25 MMOL/L (ref 22–29)
CREAT SERPL-MCNC: 1.39 MG/DL (ref 0.76–1.27)
DEPRECATED RDW RBC AUTO: 44.8 FL (ref 37–54)
ERYTHROCYTE [DISTWIDTH] IN BLOOD BY AUTOMATED COUNT: 14 % (ref 12.3–15.4)
GFR SERPL CREATININE-BSD FRML MDRD: 51 ML/MIN/1.73
GLUCOSE BLDC GLUCOMTR-MCNC: 111 MG/DL (ref 70–130)
GLUCOSE BLDC GLUCOMTR-MCNC: 116 MG/DL (ref 70–130)
GLUCOSE BLDC GLUCOMTR-MCNC: 136 MG/DL (ref 70–130)
GLUCOSE BLDC GLUCOMTR-MCNC: 139 MG/DL (ref 70–130)
GLUCOSE SERPL-MCNC: 96 MG/DL (ref 65–99)
HCT VFR BLD AUTO: 38.8 % (ref 37.5–51)
HGB BLD-MCNC: 13.1 G/DL (ref 13–17.7)
LYMPHOCYTES # BLD MANUAL: 0.1 10*3/MM3 (ref 0.7–3.1)
LYMPHOCYTES NFR BLD MANUAL: 1 % (ref 19.6–45.3)
LYMPHOCYTES NFR BLD MANUAL: 1 % (ref 5–12)
MCH RBC QN AUTO: 29.6 PG (ref 26.6–33)
MCHC RBC AUTO-ENTMCNC: 33.8 G/DL (ref 31.5–35.7)
MCV RBC AUTO: 87.8 FL (ref 79–97)
MONOCYTES # BLD AUTO: 0.1 10*3/MM3 (ref 0.1–0.9)
NEUTROPHILS # BLD AUTO: 9.63 10*3/MM3 (ref 1.7–7)
NEUTROPHILS NFR BLD MANUAL: 97.9 % (ref 42.7–76)
NRBC SPEC MANUAL: 1 /100 WBC (ref 0–0.2)
PLAT MORPH BLD: NORMAL
PLATELET # BLD AUTO: 92 10*3/MM3 (ref 140–450)
PMV BLD AUTO: 11.6 FL (ref 6–12)
POLYCHROMASIA BLD QL SMEAR: ABNORMAL
POTASSIUM SERPL-SCNC: 5.2 MMOL/L (ref 3.5–5.2)
RBC # BLD AUTO: 4.42 10*6/MM3 (ref 4.14–5.8)
SODIUM SERPL-SCNC: 139 MMOL/L (ref 136–145)
WBC # BLD AUTO: 9.84 10*3/MM3 (ref 3.4–10.8)
WBC MORPH BLD: NORMAL

## 2021-09-25 PROCEDURE — 94799 UNLISTED PULMONARY SVC/PX: CPT

## 2021-09-25 PROCEDURE — 25010000002 CEFTRIAXONE PER 250 MG: Performed by: INTERNAL MEDICINE

## 2021-09-25 PROCEDURE — 85007 BL SMEAR W/DIFF WBC COUNT: CPT | Performed by: INTERNAL MEDICINE

## 2021-09-25 PROCEDURE — 71045 X-RAY EXAM CHEST 1 VIEW: CPT

## 2021-09-25 PROCEDURE — 25010000002 DEXAMETHASONE PER 1 MG: Performed by: INTERNAL MEDICINE

## 2021-09-25 PROCEDURE — 25010000002 ENOXAPARIN PER 10 MG: Performed by: INTERNAL MEDICINE

## 2021-09-25 PROCEDURE — 82962 GLUCOSE BLOOD TEST: CPT

## 2021-09-25 PROCEDURE — 80048 BASIC METABOLIC PNL TOTAL CA: CPT | Performed by: INTERNAL MEDICINE

## 2021-09-25 PROCEDURE — 85025 COMPLETE CBC W/AUTO DIFF WBC: CPT | Performed by: INTERNAL MEDICINE

## 2021-09-25 PROCEDURE — 97110 THERAPEUTIC EXERCISES: CPT

## 2021-09-25 RX ADMIN — DEXTROMETHORPHAN POLISTIREX 60 MG: 30 SUSPENSION, EXTENDED RELEASE ORAL at 22:52

## 2021-09-25 RX ADMIN — FINASTERIDE 5 MG: 5 TABLET, FILM COATED ORAL at 08:09

## 2021-09-25 RX ADMIN — CEFTRIAXONE 1 G: 1 INJECTION, POWDER, FOR SOLUTION INTRAMUSCULAR; INTRAVENOUS at 16:56

## 2021-09-25 RX ADMIN — ANTACID TABLETS 1 TABLET: 500 TABLET, CHEWABLE ORAL at 08:08

## 2021-09-25 RX ADMIN — METOPROLOL TARTRATE 12.5 MG: 25 TABLET ORAL at 21:13

## 2021-09-25 RX ADMIN — SODIUM CHLORIDE 1 DROP: 50 SOLUTION OPHTHALMIC at 21:16

## 2021-09-25 RX ADMIN — MIRTAZAPINE 7.5 MG: 15 TABLET, FILM COATED ORAL at 21:13

## 2021-09-25 RX ADMIN — METOPROLOL TARTRATE 12.5 MG: 25 TABLET ORAL at 08:08

## 2021-09-25 RX ADMIN — ENOXAPARIN SODIUM 70 MG: 80 INJECTION, SOLUTION INTRAVENOUS; SUBCUTANEOUS at 11:36

## 2021-09-25 RX ADMIN — Medication 250 MG: at 08:08

## 2021-09-25 RX ADMIN — DEXTROMETHORPHAN POLISTIREX 60 MG: 30 SUSPENSION, EXTENDED RELEASE ORAL at 08:08

## 2021-09-25 RX ADMIN — Medication 250 MG: at 21:13

## 2021-09-25 RX ADMIN — AMLODIPINE BESYLATE 10 MG: 10 TABLET ORAL at 08:08

## 2021-09-25 RX ADMIN — FAMOTIDINE 20 MG: 20 TABLET, FILM COATED ORAL at 08:08

## 2021-09-25 RX ADMIN — Medication 3 MG: at 22:18

## 2021-09-25 RX ADMIN — SODIUM CHLORIDE 1 DROP: 50 SOLUTION OPHTHALMIC at 08:07

## 2021-09-25 RX ADMIN — SODIUM CHLORIDE, PRESERVATIVE FREE 10 ML: 5 INJECTION INTRAVENOUS at 08:08

## 2021-09-25 RX ADMIN — SODIUM CHLORIDE 1 DROP: 50 SOLUTION OPHTHALMIC at 16:59

## 2021-09-25 RX ADMIN — PREDNISOLONE ACETATE 1 DROP: 10 SUSPENSION/ DROPS OPHTHALMIC at 08:07

## 2021-09-25 RX ADMIN — DEXAMETHASONE SODIUM PHOSPHATE 10 MG: 10 INJECTION INTRAMUSCULAR; INTRAVENOUS at 08:08

## 2021-09-25 RX ADMIN — DEXAMETHASONE SODIUM PHOSPHATE 10 MG: 10 INJECTION INTRAMUSCULAR; INTRAVENOUS at 21:13

## 2021-09-26 ENCOUNTER — APPOINTMENT (OUTPATIENT)
Dept: GENERAL RADIOLOGY | Facility: HOSPITAL | Age: 71
End: 2021-09-26

## 2021-09-26 LAB
ANION GAP SERPL CALCULATED.3IONS-SCNC: 15.3 MMOL/L (ref 5–15)
BASOPHILS # BLD AUTO: 0.02 10*3/MM3 (ref 0–0.2)
BASOPHILS NFR BLD AUTO: 0.1 % (ref 0–1.5)
BUN SERPL-MCNC: 39 MG/DL (ref 8–23)
BUN/CREAT SERPL: 29.5 (ref 7–25)
CALCIUM SPEC-SCNC: 8.6 MG/DL (ref 8.6–10.5)
CHLORIDE SERPL-SCNC: 104 MMOL/L (ref 98–107)
CO2 SERPL-SCNC: 19.7 MMOL/L (ref 22–29)
CREAT SERPL-MCNC: 1.32 MG/DL (ref 0.76–1.27)
CRP SERPL-MCNC: <0.3 MG/DL (ref 0–0.5)
D DIMER PPP FEU-MCNC: 0.52 MCGFEU/ML (ref 0–0.49)
DEPRECATED RDW RBC AUTO: 48.3 FL (ref 37–54)
EOSINOPHIL # BLD AUTO: 0 10*3/MM3 (ref 0–0.4)
EOSINOPHIL NFR BLD AUTO: 0 % (ref 0.3–6.2)
ERYTHROCYTE [DISTWIDTH] IN BLOOD BY AUTOMATED COUNT: 14.2 % (ref 12.3–15.4)
FERRITIN SERPL-MCNC: 1508 NG/ML (ref 30–400)
GFR SERPL CREATININE-BSD FRML MDRD: 54 ML/MIN/1.73
GLUCOSE BLDC GLUCOMTR-MCNC: 105 MG/DL (ref 70–130)
GLUCOSE BLDC GLUCOMTR-MCNC: 110 MG/DL (ref 70–130)
GLUCOSE BLDC GLUCOMTR-MCNC: 128 MG/DL (ref 70–130)
GLUCOSE BLDC GLUCOMTR-MCNC: 139 MG/DL (ref 70–130)
GLUCOSE SERPL-MCNC: 104 MG/DL (ref 65–99)
HCT VFR BLD AUTO: 46.9 % (ref 37.5–51)
HGB BLD-MCNC: 15 G/DL (ref 13–17.7)
LYMPHOCYTES # BLD AUTO: 1.07 10*3/MM3 (ref 0.7–3.1)
LYMPHOCYTES NFR BLD AUTO: 7.9 % (ref 19.6–45.3)
MCH RBC QN AUTO: 29.6 PG (ref 26.6–33)
MCHC RBC AUTO-ENTMCNC: 32 G/DL (ref 31.5–35.7)
MCV RBC AUTO: 92.7 FL (ref 79–97)
MONOCYTES # BLD AUTO: 0.32 10*3/MM3 (ref 0.1–0.9)
MONOCYTES NFR BLD AUTO: 2.4 % (ref 5–12)
NEUTROPHILS NFR BLD AUTO: 11.96 10*3/MM3 (ref 1.7–7)
NEUTROPHILS NFR BLD AUTO: 88.5 % (ref 42.7–76)
PLATELET # BLD AUTO: 109 10*3/MM3 (ref 140–450)
PMV BLD AUTO: 11.3 FL (ref 6–12)
POTASSIUM SERPL-SCNC: 5.1 MMOL/L (ref 3.5–5.2)
RBC # BLD AUTO: 5.06 10*6/MM3 (ref 4.14–5.8)
SODIUM SERPL-SCNC: 139 MMOL/L (ref 136–145)
WBC # BLD AUTO: 13.52 10*3/MM3 (ref 3.4–10.8)

## 2021-09-26 PROCEDURE — 94799 UNLISTED PULMONARY SVC/PX: CPT

## 2021-09-26 PROCEDURE — 71045 X-RAY EXAM CHEST 1 VIEW: CPT

## 2021-09-26 PROCEDURE — 82962 GLUCOSE BLOOD TEST: CPT

## 2021-09-26 PROCEDURE — 85379 FIBRIN DEGRADATION QUANT: CPT | Performed by: INTERNAL MEDICINE

## 2021-09-26 PROCEDURE — 80048 BASIC METABOLIC PNL TOTAL CA: CPT | Performed by: INTERNAL MEDICINE

## 2021-09-26 PROCEDURE — 82728 ASSAY OF FERRITIN: CPT | Performed by: HOSPITALIST

## 2021-09-26 PROCEDURE — 25010000002 DEXAMETHASONE PER 1 MG: Performed by: INTERNAL MEDICINE

## 2021-09-26 PROCEDURE — 86140 C-REACTIVE PROTEIN: CPT | Performed by: HOSPITALIST

## 2021-09-26 PROCEDURE — 25010000002 ENOXAPARIN PER 10 MG: Performed by: INTERNAL MEDICINE

## 2021-09-26 PROCEDURE — 85025 COMPLETE CBC W/AUTO DIFF WBC: CPT | Performed by: INTERNAL MEDICINE

## 2021-09-26 RX ADMIN — PREDNISOLONE ACETATE 1 DROP: 10 SUSPENSION/ DROPS OPHTHALMIC at 08:05

## 2021-09-26 RX ADMIN — AMLODIPINE BESYLATE 10 MG: 10 TABLET ORAL at 08:02

## 2021-09-26 RX ADMIN — Medication 250 MG: at 08:02

## 2021-09-26 RX ADMIN — DEXAMETHASONE SODIUM PHOSPHATE 10 MG: 10 INJECTION INTRAMUSCULAR; INTRAVENOUS at 08:06

## 2021-09-26 RX ADMIN — ANTACID TABLETS 1 TABLET: 500 TABLET, CHEWABLE ORAL at 08:03

## 2021-09-26 RX ADMIN — DEXTROMETHORPHAN POLISTIREX 60 MG: 30 SUSPENSION, EXTENDED RELEASE ORAL at 08:02

## 2021-09-26 RX ADMIN — POLYETHYLENE GLYCOL 3350 17 G: 17 POWDER, FOR SOLUTION ORAL at 12:05

## 2021-09-26 RX ADMIN — Medication 3 MG: at 21:21

## 2021-09-26 RX ADMIN — DEXTROMETHORPHAN POLISTIREX 60 MG: 30 SUSPENSION, EXTENDED RELEASE ORAL at 20:47

## 2021-09-26 RX ADMIN — MIRTAZAPINE 7.5 MG: 15 TABLET, FILM COATED ORAL at 20:47

## 2021-09-26 RX ADMIN — Medication 250 MG: at 20:47

## 2021-09-26 RX ADMIN — METOPROLOL TARTRATE 12.5 MG: 25 TABLET ORAL at 08:02

## 2021-09-26 RX ADMIN — SODIUM CHLORIDE 1 DROP: 50 SOLUTION OPHTHALMIC at 17:13

## 2021-09-26 RX ADMIN — FAMOTIDINE 20 MG: 20 TABLET, FILM COATED ORAL at 08:02

## 2021-09-26 RX ADMIN — ENOXAPARIN SODIUM 70 MG: 80 INJECTION, SOLUTION INTRAVENOUS; SUBCUTANEOUS at 12:28

## 2021-09-26 RX ADMIN — SODIUM CHLORIDE, PRESERVATIVE FREE 10 ML: 5 INJECTION INTRAVENOUS at 20:47

## 2021-09-26 RX ADMIN — FINASTERIDE 5 MG: 5 TABLET, FILM COATED ORAL at 08:03

## 2021-09-26 RX ADMIN — SODIUM CHLORIDE 1 DROP: 50 SOLUTION OPHTHALMIC at 08:05

## 2021-09-26 RX ADMIN — DEXAMETHASONE SODIUM PHOSPHATE 10 MG: 10 INJECTION INTRAMUSCULAR; INTRAVENOUS at 20:47

## 2021-09-26 RX ADMIN — SODIUM CHLORIDE 1 DROP: 50 SOLUTION OPHTHALMIC at 20:48

## 2021-09-26 RX ADMIN — METOPROLOL TARTRATE 12.5 MG: 25 TABLET ORAL at 20:47

## 2021-09-27 ENCOUNTER — APPOINTMENT (OUTPATIENT)
Dept: GENERAL RADIOLOGY | Facility: HOSPITAL | Age: 71
End: 2021-09-27

## 2021-09-27 LAB
ANION GAP SERPL CALCULATED.3IONS-SCNC: 11.6 MMOL/L (ref 5–15)
BASOPHILS # BLD AUTO: 0.02 10*3/MM3 (ref 0–0.2)
BASOPHILS NFR BLD AUTO: 0.1 % (ref 0–1.5)
BUN SERPL-MCNC: 44 MG/DL (ref 8–23)
BUN/CREAT SERPL: 31.2 (ref 7–25)
CALCIUM SPEC-SCNC: 8.2 MG/DL (ref 8.6–10.5)
CHLORIDE SERPL-SCNC: 107 MMOL/L (ref 98–107)
CO2 SERPL-SCNC: 20.4 MMOL/L (ref 22–29)
CREAT SERPL-MCNC: 1.41 MG/DL (ref 0.76–1.27)
DEPRECATED RDW RBC AUTO: 45.1 FL (ref 37–54)
EOSINOPHIL # BLD AUTO: 0 10*3/MM3 (ref 0–0.4)
EOSINOPHIL NFR BLD AUTO: 0 % (ref 0.3–6.2)
ERYTHROCYTE [DISTWIDTH] IN BLOOD BY AUTOMATED COUNT: 14.1 % (ref 12.3–15.4)
GFR SERPL CREATININE-BSD FRML MDRD: 50 ML/MIN/1.73
GLUCOSE BLDC GLUCOMTR-MCNC: 101 MG/DL (ref 70–130)
GLUCOSE BLDC GLUCOMTR-MCNC: 114 MG/DL (ref 70–130)
GLUCOSE BLDC GLUCOMTR-MCNC: 90 MG/DL (ref 70–130)
GLUCOSE BLDC GLUCOMTR-MCNC: 99 MG/DL (ref 70–130)
GLUCOSE SERPL-MCNC: 107 MG/DL (ref 65–99)
HCT VFR BLD AUTO: 39.3 % (ref 37.5–51)
HGB BLD-MCNC: 13.1 G/DL (ref 13–17.7)
LYMPHOCYTES # BLD AUTO: 0.49 10*3/MM3 (ref 0.7–3.1)
LYMPHOCYTES NFR BLD AUTO: 3 % (ref 19.6–45.3)
MCH RBC QN AUTO: 29.4 PG (ref 26.6–33)
MCHC RBC AUTO-ENTMCNC: 33.3 G/DL (ref 31.5–35.7)
MCV RBC AUTO: 88.1 FL (ref 79–97)
MONOCYTES # BLD AUTO: 0.32 10*3/MM3 (ref 0.1–0.9)
MONOCYTES NFR BLD AUTO: 1.9 % (ref 5–12)
NEUTROPHILS NFR BLD AUTO: 15.46 10*3/MM3 (ref 1.7–7)
NEUTROPHILS NFR BLD AUTO: 93.7 % (ref 42.7–76)
PLATELET # BLD AUTO: 96 10*3/MM3 (ref 140–450)
PMV BLD AUTO: 11.7 FL (ref 6–12)
POTASSIUM SERPL-SCNC: 4.7 MMOL/L (ref 3.5–5.2)
RBC # BLD AUTO: 4.46 10*6/MM3 (ref 4.14–5.8)
SODIUM SERPL-SCNC: 139 MMOL/L (ref 136–145)
WBC # BLD AUTO: 16.51 10*3/MM3 (ref 3.4–10.8)

## 2021-09-27 PROCEDURE — 25010000002 ENOXAPARIN PER 10 MG: Performed by: INTERNAL MEDICINE

## 2021-09-27 PROCEDURE — 80048 BASIC METABOLIC PNL TOTAL CA: CPT | Performed by: INTERNAL MEDICINE

## 2021-09-27 PROCEDURE — 94799 UNLISTED PULMONARY SVC/PX: CPT

## 2021-09-27 PROCEDURE — 97110 THERAPEUTIC EXERCISES: CPT

## 2021-09-27 PROCEDURE — 94760 N-INVAS EAR/PLS OXIMETRY 1: CPT

## 2021-09-27 PROCEDURE — 71045 X-RAY EXAM CHEST 1 VIEW: CPT

## 2021-09-27 PROCEDURE — 25010000002 DEXAMETHASONE PER 1 MG: Performed by: INTERNAL MEDICINE

## 2021-09-27 PROCEDURE — 85025 COMPLETE CBC W/AUTO DIFF WBC: CPT | Performed by: INTERNAL MEDICINE

## 2021-09-27 PROCEDURE — 82962 GLUCOSE BLOOD TEST: CPT

## 2021-09-27 RX ORDER — BISACODYL 10 MG
10 SUPPOSITORY, RECTAL RECTAL DAILY PRN
Status: DISCONTINUED | OUTPATIENT
Start: 2021-09-27 | End: 2021-10-16 | Stop reason: HOSPADM

## 2021-09-27 RX ADMIN — ENOXAPARIN SODIUM 70 MG: 80 INJECTION, SOLUTION INTRAVENOUS; SUBCUTANEOUS at 14:41

## 2021-09-27 RX ADMIN — DEXAMETHASONE SODIUM PHOSPHATE 10 MG: 10 INJECTION INTRAMUSCULAR; INTRAVENOUS at 20:43

## 2021-09-27 RX ADMIN — DEXTROMETHORPHAN POLISTIREX 60 MG: 30 SUSPENSION, EXTENDED RELEASE ORAL at 20:42

## 2021-09-27 RX ADMIN — SODIUM CHLORIDE 1 DROP: 50 SOLUTION OPHTHALMIC at 17:30

## 2021-09-27 RX ADMIN — SODIUM CHLORIDE, PRESERVATIVE FREE 10 ML: 5 INJECTION INTRAVENOUS at 20:43

## 2021-09-27 RX ADMIN — PREDNISOLONE ACETATE 1 DROP: 10 SUSPENSION/ DROPS OPHTHALMIC at 09:06

## 2021-09-27 RX ADMIN — SODIUM CHLORIDE 1 DROP: 50 SOLUTION OPHTHALMIC at 20:47

## 2021-09-27 RX ADMIN — FAMOTIDINE 20 MG: 20 TABLET, FILM COATED ORAL at 09:00

## 2021-09-27 RX ADMIN — METOPROLOL TARTRATE 12.5 MG: 25 TABLET ORAL at 09:00

## 2021-09-27 RX ADMIN — DEXTROMETHORPHAN POLISTIREX 60 MG: 30 SUSPENSION, EXTENDED RELEASE ORAL at 12:13

## 2021-09-27 RX ADMIN — ANTACID TABLETS 1 TABLET: 500 TABLET, CHEWABLE ORAL at 09:00

## 2021-09-27 RX ADMIN — BISACODYL 10 MG: 10 SUPPOSITORY RECTAL at 12:13

## 2021-09-27 RX ADMIN — SODIUM CHLORIDE, PRESERVATIVE FREE 10 ML: 5 INJECTION INTRAVENOUS at 09:00

## 2021-09-27 RX ADMIN — Medication 250 MG: at 08:59

## 2021-09-27 RX ADMIN — Medication 250 MG: at 20:43

## 2021-09-27 RX ADMIN — AMLODIPINE BESYLATE 10 MG: 10 TABLET ORAL at 08:59

## 2021-09-27 RX ADMIN — METOPROLOL TARTRATE 12.5 MG: 25 TABLET ORAL at 20:43

## 2021-09-27 RX ADMIN — DEXAMETHASONE SODIUM PHOSPHATE 10 MG: 10 INJECTION INTRAMUSCULAR; INTRAVENOUS at 08:59

## 2021-09-27 RX ADMIN — MIRTAZAPINE 7.5 MG: 15 TABLET, FILM COATED ORAL at 20:43

## 2021-09-27 RX ADMIN — SODIUM CHLORIDE 1 DROP: 50 SOLUTION OPHTHALMIC at 09:10

## 2021-09-27 RX ADMIN — FINASTERIDE 5 MG: 5 TABLET, FILM COATED ORAL at 09:00

## 2021-09-28 ENCOUNTER — APPOINTMENT (OUTPATIENT)
Dept: GENERAL RADIOLOGY | Facility: HOSPITAL | Age: 71
End: 2021-09-28

## 2021-09-28 LAB
ANION GAP SERPL CALCULATED.3IONS-SCNC: 10.6 MMOL/L (ref 5–15)
BASOPHILS # BLD AUTO: 0.01 10*3/MM3 (ref 0–0.2)
BASOPHILS NFR BLD AUTO: 0.1 % (ref 0–1.5)
BUN SERPL-MCNC: 43 MG/DL (ref 8–23)
BUN/CREAT SERPL: 33.1 (ref 7–25)
CALCIUM SPEC-SCNC: 8 MG/DL (ref 8.6–10.5)
CHLORIDE SERPL-SCNC: 108 MMOL/L (ref 98–107)
CO2 SERPL-SCNC: 22.4 MMOL/L (ref 22–29)
CREAT SERPL-MCNC: 1.3 MG/DL (ref 0.76–1.27)
D DIMER PPP FEU-MCNC: 0.45 MCGFEU/ML (ref 0–0.49)
DEPRECATED RDW RBC AUTO: 47.9 FL (ref 37–54)
EOSINOPHIL # BLD AUTO: 0 10*3/MM3 (ref 0–0.4)
EOSINOPHIL NFR BLD AUTO: 0 % (ref 0.3–6.2)
ERYTHROCYTE [DISTWIDTH] IN BLOOD BY AUTOMATED COUNT: 14.6 % (ref 12.3–15.4)
GFR SERPL CREATININE-BSD FRML MDRD: 55 ML/MIN/1.73
GLUCOSE BLDC GLUCOMTR-MCNC: 121 MG/DL (ref 70–130)
GLUCOSE BLDC GLUCOMTR-MCNC: 122 MG/DL (ref 70–130)
GLUCOSE BLDC GLUCOMTR-MCNC: 123 MG/DL (ref 70–130)
GLUCOSE BLDC GLUCOMTR-MCNC: 91 MG/DL (ref 70–130)
GLUCOSE SERPL-MCNC: 100 MG/DL (ref 65–99)
HCT VFR BLD AUTO: 37.8 % (ref 37.5–51)
HGB BLD-MCNC: 12.6 G/DL (ref 13–17.7)
IMM GRANULOCYTES # BLD AUTO: 0.12 10*3/MM3 (ref 0–0.05)
IMM GRANULOCYTES NFR BLD AUTO: 1.2 % (ref 0–0.5)
LYMPHOCYTES # BLD AUTO: 0.47 10*3/MM3 (ref 0.7–3.1)
LYMPHOCYTES NFR BLD AUTO: 4.6 % (ref 19.6–45.3)
MCH RBC QN AUTO: 29.7 PG (ref 26.6–33)
MCHC RBC AUTO-ENTMCNC: 33.3 G/DL (ref 31.5–35.7)
MCV RBC AUTO: 89.2 FL (ref 79–97)
MONOCYTES # BLD AUTO: 0.21 10*3/MM3 (ref 0.1–0.9)
MONOCYTES NFR BLD AUTO: 2.1 % (ref 5–12)
NEUTROPHILS NFR BLD AUTO: 9.32 10*3/MM3 (ref 1.7–7)
NEUTROPHILS NFR BLD AUTO: 92 % (ref 42.7–76)
NRBC BLD AUTO-RTO: 0 /100 WBC (ref 0–0.2)
PLATELET # BLD AUTO: 87 10*3/MM3 (ref 140–450)
PMV BLD AUTO: 11.1 FL (ref 6–12)
POTASSIUM SERPL-SCNC: 4.7 MMOL/L (ref 3.5–5.2)
RBC # BLD AUTO: 4.24 10*6/MM3 (ref 4.14–5.8)
SODIUM SERPL-SCNC: 141 MMOL/L (ref 136–145)
WBC # BLD AUTO: 10.13 10*3/MM3 (ref 3.4–10.8)

## 2021-09-28 PROCEDURE — 25010000002 ENOXAPARIN PER 10 MG: Performed by: INTERNAL MEDICINE

## 2021-09-28 PROCEDURE — 80048 BASIC METABOLIC PNL TOTAL CA: CPT | Performed by: INTERNAL MEDICINE

## 2021-09-28 PROCEDURE — 71045 X-RAY EXAM CHEST 1 VIEW: CPT

## 2021-09-28 PROCEDURE — 94799 UNLISTED PULMONARY SVC/PX: CPT

## 2021-09-28 PROCEDURE — 85025 COMPLETE CBC W/AUTO DIFF WBC: CPT | Performed by: INTERNAL MEDICINE

## 2021-09-28 PROCEDURE — 25010000002 DEXAMETHASONE PER 1 MG: Performed by: INTERNAL MEDICINE

## 2021-09-28 PROCEDURE — 85379 FIBRIN DEGRADATION QUANT: CPT | Performed by: INTERNAL MEDICINE

## 2021-09-28 PROCEDURE — 82962 GLUCOSE BLOOD TEST: CPT

## 2021-09-28 PROCEDURE — 97110 THERAPEUTIC EXERCISES: CPT

## 2021-09-28 RX ORDER — DEXAMETHASONE SODIUM PHOSPHATE 10 MG/ML
6 INJECTION INTRAMUSCULAR; INTRAVENOUS EVERY 12 HOURS
Status: DISCONTINUED | OUTPATIENT
Start: 2021-09-28 | End: 2021-10-04

## 2021-09-28 RX ADMIN — Medication 250 MG: at 20:47

## 2021-09-28 RX ADMIN — AMLODIPINE BESYLATE 10 MG: 10 TABLET ORAL at 09:40

## 2021-09-28 RX ADMIN — METOPROLOL TARTRATE 12.5 MG: 25 TABLET ORAL at 09:40

## 2021-09-28 RX ADMIN — METOPROLOL TARTRATE 12.5 MG: 25 TABLET ORAL at 20:52

## 2021-09-28 RX ADMIN — Medication 3 MG: at 20:47

## 2021-09-28 RX ADMIN — SODIUM CHLORIDE 1 DROP: 50 SOLUTION OPHTHALMIC at 20:52

## 2021-09-28 RX ADMIN — FINASTERIDE 5 MG: 5 TABLET, FILM COATED ORAL at 09:40

## 2021-09-28 RX ADMIN — DEXTROMETHORPHAN POLISTIREX 60 MG: 30 SUSPENSION, EXTENDED RELEASE ORAL at 20:47

## 2021-09-28 RX ADMIN — DEXAMETHASONE SODIUM PHOSPHATE 10 MG: 10 INJECTION INTRAMUSCULAR; INTRAVENOUS at 09:41

## 2021-09-28 RX ADMIN — SODIUM CHLORIDE 1 DROP: 50 SOLUTION OPHTHALMIC at 09:46

## 2021-09-28 RX ADMIN — Medication 250 MG: at 09:40

## 2021-09-28 RX ADMIN — FAMOTIDINE 20 MG: 20 TABLET, FILM COATED ORAL at 09:40

## 2021-09-28 RX ADMIN — PREDNISOLONE ACETATE 1 DROP: 10 SUSPENSION/ DROPS OPHTHALMIC at 09:46

## 2021-09-28 RX ADMIN — DEXAMETHASONE SODIUM PHOSPHATE 6 MG: 10 INJECTION INTRAMUSCULAR; INTRAVENOUS at 20:47

## 2021-09-28 RX ADMIN — ANTACID TABLETS 1 TABLET: 500 TABLET, CHEWABLE ORAL at 09:40

## 2021-09-28 RX ADMIN — MIRTAZAPINE 7.5 MG: 15 TABLET, FILM COATED ORAL at 20:47

## 2021-09-28 RX ADMIN — SODIUM CHLORIDE, PRESERVATIVE FREE 10 ML: 5 INJECTION INTRAVENOUS at 20:47

## 2021-09-28 RX ADMIN — SODIUM CHLORIDE 1 DROP: 50 SOLUTION OPHTHALMIC at 16:54

## 2021-09-28 RX ADMIN — DEXTROMETHORPHAN POLISTIREX 60 MG: 30 SUSPENSION, EXTENDED RELEASE ORAL at 14:29

## 2021-09-28 RX ADMIN — ENOXAPARIN SODIUM 70 MG: 80 INJECTION, SOLUTION INTRAVENOUS; SUBCUTANEOUS at 14:29

## 2021-09-29 ENCOUNTER — APPOINTMENT (OUTPATIENT)
Dept: GENERAL RADIOLOGY | Facility: HOSPITAL | Age: 71
End: 2021-09-29

## 2021-09-29 LAB
ANION GAP SERPL CALCULATED.3IONS-SCNC: 9.3 MMOL/L (ref 5–15)
BASOPHILS # BLD AUTO: 0.01 10*3/MM3 (ref 0–0.2)
BASOPHILS NFR BLD AUTO: 0.1 % (ref 0–1.5)
BUN SERPL-MCNC: 44 MG/DL (ref 8–23)
BUN/CREAT SERPL: 32.6 (ref 7–25)
CALCIUM SPEC-SCNC: 8.2 MG/DL (ref 8.6–10.5)
CHLORIDE SERPL-SCNC: 109 MMOL/L (ref 98–107)
CO2 SERPL-SCNC: 21.7 MMOL/L (ref 22–29)
CREAT SERPL-MCNC: 1.35 MG/DL (ref 0.76–1.27)
DEPRECATED RDW RBC AUTO: 46.6 FL (ref 37–54)
EOSINOPHIL # BLD AUTO: 0 10*3/MM3 (ref 0–0.4)
EOSINOPHIL NFR BLD AUTO: 0 % (ref 0.3–6.2)
ERYTHROCYTE [DISTWIDTH] IN BLOOD BY AUTOMATED COUNT: 14.5 % (ref 12.3–15.4)
GFR SERPL CREATININE-BSD FRML MDRD: 52 ML/MIN/1.73
GLUCOSE BLDC GLUCOMTR-MCNC: 109 MG/DL (ref 70–130)
GLUCOSE BLDC GLUCOMTR-MCNC: 113 MG/DL (ref 70–130)
GLUCOSE BLDC GLUCOMTR-MCNC: 123 MG/DL (ref 70–130)
GLUCOSE BLDC GLUCOMTR-MCNC: 87 MG/DL (ref 70–130)
GLUCOSE SERPL-MCNC: 112 MG/DL (ref 65–99)
HCT VFR BLD AUTO: 38 % (ref 37.5–51)
HGB BLD-MCNC: 12.8 G/DL (ref 13–17.7)
IMM GRANULOCYTES # BLD AUTO: 0.1 10*3/MM3 (ref 0–0.05)
IMM GRANULOCYTES NFR BLD AUTO: 0.9 % (ref 0–0.5)
LYMPHOCYTES # BLD AUTO: 0.55 10*3/MM3 (ref 0.7–3.1)
LYMPHOCYTES NFR BLD AUTO: 5.2 % (ref 19.6–45.3)
MCH RBC QN AUTO: 30 PG (ref 26.6–33)
MCHC RBC AUTO-ENTMCNC: 33.7 G/DL (ref 31.5–35.7)
MCV RBC AUTO: 89.2 FL (ref 79–97)
MONOCYTES # BLD AUTO: 0.27 10*3/MM3 (ref 0.1–0.9)
MONOCYTES NFR BLD AUTO: 2.6 % (ref 5–12)
NEUTROPHILS NFR BLD AUTO: 9.64 10*3/MM3 (ref 1.7–7)
NEUTROPHILS NFR BLD AUTO: 91.2 % (ref 42.7–76)
NRBC BLD AUTO-RTO: 0 /100 WBC (ref 0–0.2)
PLATELET # BLD AUTO: 94 10*3/MM3 (ref 140–450)
PMV BLD AUTO: 11.5 FL (ref 6–12)
POTASSIUM SERPL-SCNC: 4.4 MMOL/L (ref 3.5–5.2)
RBC # BLD AUTO: 4.26 10*6/MM3 (ref 4.14–5.8)
SODIUM SERPL-SCNC: 140 MMOL/L (ref 136–145)
WBC # BLD AUTO: 10.57 10*3/MM3 (ref 3.4–10.8)

## 2021-09-29 PROCEDURE — 25010000002 DEXAMETHASONE PER 1 MG: Performed by: INTERNAL MEDICINE

## 2021-09-29 PROCEDURE — 82962 GLUCOSE BLOOD TEST: CPT

## 2021-09-29 PROCEDURE — 94799 UNLISTED PULMONARY SVC/PX: CPT

## 2021-09-29 PROCEDURE — 71045 X-RAY EXAM CHEST 1 VIEW: CPT

## 2021-09-29 PROCEDURE — 80048 BASIC METABOLIC PNL TOTAL CA: CPT | Performed by: INTERNAL MEDICINE

## 2021-09-29 PROCEDURE — 85025 COMPLETE CBC W/AUTO DIFF WBC: CPT | Performed by: INTERNAL MEDICINE

## 2021-09-29 PROCEDURE — 25010000002 ENOXAPARIN PER 10 MG: Performed by: INTERNAL MEDICINE

## 2021-09-29 RX ADMIN — METOPROLOL TARTRATE 12.5 MG: 25 TABLET ORAL at 20:16

## 2021-09-29 RX ADMIN — Medication 250 MG: at 09:11

## 2021-09-29 RX ADMIN — SODIUM CHLORIDE 1 DROP: 50 SOLUTION OPHTHALMIC at 20:19

## 2021-09-29 RX ADMIN — SODIUM CHLORIDE 1 DROP: 50 SOLUTION OPHTHALMIC at 09:15

## 2021-09-29 RX ADMIN — PREDNISOLONE ACETATE 1 DROP: 10 SUSPENSION/ DROPS OPHTHALMIC at 09:15

## 2021-09-29 RX ADMIN — METOPROLOL TARTRATE 12.5 MG: 25 TABLET ORAL at 09:10

## 2021-09-29 RX ADMIN — MIRTAZAPINE 7.5 MG: 15 TABLET, FILM COATED ORAL at 20:17

## 2021-09-29 RX ADMIN — ENOXAPARIN SODIUM 70 MG: 80 INJECTION, SOLUTION INTRAVENOUS; SUBCUTANEOUS at 12:00

## 2021-09-29 RX ADMIN — FAMOTIDINE 20 MG: 20 TABLET, FILM COATED ORAL at 09:10

## 2021-09-29 RX ADMIN — AMLODIPINE BESYLATE 10 MG: 10 TABLET ORAL at 09:11

## 2021-09-29 RX ADMIN — SODIUM CHLORIDE 1 DROP: 50 SOLUTION OPHTHALMIC at 17:07

## 2021-09-29 RX ADMIN — FINASTERIDE 5 MG: 5 TABLET, FILM COATED ORAL at 09:11

## 2021-09-29 RX ADMIN — ANTACID TABLETS 1 TABLET: 500 TABLET, CHEWABLE ORAL at 09:10

## 2021-09-29 RX ADMIN — DEXAMETHASONE SODIUM PHOSPHATE 6 MG: 10 INJECTION INTRAMUSCULAR; INTRAVENOUS at 09:11

## 2021-09-29 RX ADMIN — Medication 3 MG: at 22:14

## 2021-09-29 RX ADMIN — Medication 250 MG: at 20:16

## 2021-09-29 RX ADMIN — DEXTROMETHORPHAN POLISTIREX 60 MG: 30 SUSPENSION, EXTENDED RELEASE ORAL at 20:17

## 2021-09-29 RX ADMIN — DEXAMETHASONE SODIUM PHOSPHATE 6 MG: 10 INJECTION INTRAMUSCULAR; INTRAVENOUS at 20:15

## 2021-09-29 RX ADMIN — DEXTROMETHORPHAN POLISTIREX 60 MG: 30 SUSPENSION, EXTENDED RELEASE ORAL at 09:10

## 2021-09-30 ENCOUNTER — APPOINTMENT (OUTPATIENT)
Dept: GENERAL RADIOLOGY | Facility: HOSPITAL | Age: 71
End: 2021-09-30

## 2021-09-30 LAB
ANION GAP SERPL CALCULATED.3IONS-SCNC: 10.1 MMOL/L (ref 5–15)
BASOPHILS # BLD AUTO: 0.01 10*3/MM3 (ref 0–0.2)
BASOPHILS NFR BLD AUTO: 0.1 % (ref 0–1.5)
BUN SERPL-MCNC: 44 MG/DL (ref 8–23)
BUN/CREAT SERPL: 31.7 (ref 7–25)
CALCIUM SPEC-SCNC: 8.3 MG/DL (ref 8.6–10.5)
CHLORIDE SERPL-SCNC: 109 MMOL/L (ref 98–107)
CO2 SERPL-SCNC: 19.9 MMOL/L (ref 22–29)
CREAT SERPL-MCNC: 1.39 MG/DL (ref 0.76–1.27)
CRP SERPL-MCNC: <0.3 MG/DL (ref 0–0.5)
DEPRECATED RDW RBC AUTO: 47.1 FL (ref 37–54)
EOSINOPHIL # BLD AUTO: 0 10*3/MM3 (ref 0–0.4)
EOSINOPHIL NFR BLD AUTO: 0 % (ref 0.3–6.2)
ERYTHROCYTE [DISTWIDTH] IN BLOOD BY AUTOMATED COUNT: 14.6 % (ref 12.3–15.4)
FERRITIN SERPL-MCNC: 1019 NG/ML (ref 30–400)
GFR SERPL CREATININE-BSD FRML MDRD: 51 ML/MIN/1.73
GLUCOSE BLDC GLUCOMTR-MCNC: 101 MG/DL (ref 70–130)
GLUCOSE BLDC GLUCOMTR-MCNC: 107 MG/DL (ref 70–130)
GLUCOSE BLDC GLUCOMTR-MCNC: 111 MG/DL (ref 70–130)
GLUCOSE BLDC GLUCOMTR-MCNC: 91 MG/DL (ref 70–130)
GLUCOSE SERPL-MCNC: 107 MG/DL (ref 65–99)
HCT VFR BLD AUTO: 37.9 % (ref 37.5–51)
HGB BLD-MCNC: 12.6 G/DL (ref 13–17.7)
LYMPHOCYTES # BLD AUTO: 0.56 10*3/MM3 (ref 0.7–3.1)
LYMPHOCYTES NFR BLD AUTO: 6 % (ref 19.6–45.3)
MCH RBC QN AUTO: 29.7 PG (ref 26.6–33)
MCHC RBC AUTO-ENTMCNC: 33.2 G/DL (ref 31.5–35.7)
MCV RBC AUTO: 89.4 FL (ref 79–97)
MONOCYTES # BLD AUTO: 0.18 10*3/MM3 (ref 0.1–0.9)
MONOCYTES NFR BLD AUTO: 1.9 % (ref 5–12)
NEUTROPHILS NFR BLD AUTO: 8.48 10*3/MM3 (ref 1.7–7)
NEUTROPHILS NFR BLD AUTO: 90.4 % (ref 42.7–76)
PLATELET # BLD AUTO: 99 10*3/MM3 (ref 140–450)
PMV BLD AUTO: 11.1 FL (ref 6–12)
POTASSIUM SERPL-SCNC: 4.8 MMOL/L (ref 3.5–5.2)
RBC # BLD AUTO: 4.24 10*6/MM3 (ref 4.14–5.8)
SODIUM SERPL-SCNC: 139 MMOL/L (ref 136–145)
WBC # BLD AUTO: 9.38 10*3/MM3 (ref 3.4–10.8)

## 2021-09-30 PROCEDURE — 82728 ASSAY OF FERRITIN: CPT | Performed by: HOSPITALIST

## 2021-09-30 PROCEDURE — 25010000002 ENOXAPARIN PER 10 MG: Performed by: INTERNAL MEDICINE

## 2021-09-30 PROCEDURE — 71045 X-RAY EXAM CHEST 1 VIEW: CPT

## 2021-09-30 PROCEDURE — 82962 GLUCOSE BLOOD TEST: CPT

## 2021-09-30 PROCEDURE — 94799 UNLISTED PULMONARY SVC/PX: CPT

## 2021-09-30 PROCEDURE — 80048 BASIC METABOLIC PNL TOTAL CA: CPT | Performed by: INTERNAL MEDICINE

## 2021-09-30 PROCEDURE — 97530 THERAPEUTIC ACTIVITIES: CPT

## 2021-09-30 PROCEDURE — 86140 C-REACTIVE PROTEIN: CPT | Performed by: HOSPITALIST

## 2021-09-30 PROCEDURE — 85025 COMPLETE CBC W/AUTO DIFF WBC: CPT | Performed by: INTERNAL MEDICINE

## 2021-09-30 PROCEDURE — 25010000002 DEXAMETHASONE PER 1 MG: Performed by: INTERNAL MEDICINE

## 2021-09-30 RX ADMIN — AMLODIPINE BESYLATE 10 MG: 10 TABLET ORAL at 08:24

## 2021-09-30 RX ADMIN — METOPROLOL TARTRATE 12.5 MG: 25 TABLET ORAL at 08:24

## 2021-09-30 RX ADMIN — ENOXAPARIN SODIUM 70 MG: 80 INJECTION, SOLUTION INTRAVENOUS; SUBCUTANEOUS at 12:00

## 2021-09-30 RX ADMIN — SODIUM CHLORIDE 1 DROP: 50 SOLUTION OPHTHALMIC at 17:29

## 2021-09-30 RX ADMIN — DEXTROMETHORPHAN POLISTIREX 60 MG: 30 SUSPENSION, EXTENDED RELEASE ORAL at 08:23

## 2021-09-30 RX ADMIN — FAMOTIDINE 20 MG: 20 TABLET, FILM COATED ORAL at 08:24

## 2021-09-30 RX ADMIN — ANTACID TABLETS 1 TABLET: 500 TABLET, CHEWABLE ORAL at 09:46

## 2021-09-30 RX ADMIN — Medication 250 MG: at 08:24

## 2021-09-30 RX ADMIN — Medication 3 MG: at 22:14

## 2021-09-30 RX ADMIN — PREDNISOLONE ACETATE 1 DROP: 10 SUSPENSION/ DROPS OPHTHALMIC at 08:23

## 2021-09-30 RX ADMIN — DEXAMETHASONE SODIUM PHOSPHATE 6 MG: 10 INJECTION INTRAMUSCULAR; INTRAVENOUS at 08:24

## 2021-09-30 RX ADMIN — DEXTROMETHORPHAN POLISTIREX 60 MG: 30 SUSPENSION, EXTENDED RELEASE ORAL at 20:27

## 2021-09-30 RX ADMIN — SODIUM CHLORIDE 1 DROP: 50 SOLUTION OPHTHALMIC at 20:28

## 2021-09-30 RX ADMIN — Medication 250 MG: at 20:27

## 2021-09-30 RX ADMIN — FINASTERIDE 5 MG: 5 TABLET, FILM COATED ORAL at 08:24

## 2021-09-30 RX ADMIN — SODIUM CHLORIDE 1 DROP: 50 SOLUTION OPHTHALMIC at 08:23

## 2021-09-30 RX ADMIN — DEXAMETHASONE SODIUM PHOSPHATE 6 MG: 10 INJECTION INTRAMUSCULAR; INTRAVENOUS at 20:27

## 2021-09-30 RX ADMIN — METOPROLOL TARTRATE 12.5 MG: 25 TABLET ORAL at 20:27

## 2021-09-30 RX ADMIN — MIRTAZAPINE 7.5 MG: 15 TABLET, FILM COATED ORAL at 20:27

## 2021-09-30 RX ADMIN — BISACODYL 10 MG: 10 SUPPOSITORY RECTAL at 08:24

## 2021-10-01 ENCOUNTER — APPOINTMENT (OUTPATIENT)
Dept: GENERAL RADIOLOGY | Facility: HOSPITAL | Age: 71
End: 2021-10-01

## 2021-10-01 LAB
ANION GAP SERPL CALCULATED.3IONS-SCNC: 9.7 MMOL/L (ref 5–15)
BASOPHILS # BLD AUTO: 0.01 10*3/MM3 (ref 0–0.2)
BASOPHILS NFR BLD AUTO: 0.1 % (ref 0–1.5)
BUN SERPL-MCNC: 46 MG/DL (ref 8–23)
BUN/CREAT SERPL: 30.3 (ref 7–25)
CALCIUM SPEC-SCNC: 8.1 MG/DL (ref 8.6–10.5)
CHLORIDE SERPL-SCNC: 109 MMOL/L (ref 98–107)
CO2 SERPL-SCNC: 20.3 MMOL/L (ref 22–29)
CREAT SERPL-MCNC: 1.52 MG/DL (ref 0.76–1.27)
DEPRECATED RDW RBC AUTO: 45.7 FL (ref 37–54)
EOSINOPHIL # BLD AUTO: 0 10*3/MM3 (ref 0–0.4)
EOSINOPHIL NFR BLD AUTO: 0 % (ref 0.3–6.2)
ERYTHROCYTE [DISTWIDTH] IN BLOOD BY AUTOMATED COUNT: 14.1 % (ref 12.3–15.4)
GFR SERPL CREATININE-BSD FRML MDRD: 46 ML/MIN/1.73
GLUCOSE BLDC GLUCOMTR-MCNC: 104 MG/DL (ref 70–130)
GLUCOSE BLDC GLUCOMTR-MCNC: 115 MG/DL (ref 70–130)
GLUCOSE BLDC GLUCOMTR-MCNC: 152 MG/DL (ref 70–130)
GLUCOSE BLDC GLUCOMTR-MCNC: 97 MG/DL (ref 70–130)
GLUCOSE SERPL-MCNC: 110 MG/DL (ref 65–99)
HCT VFR BLD AUTO: 36 % (ref 37.5–51)
HGB BLD-MCNC: 12.1 G/DL (ref 13–17.7)
IMM GRANULOCYTES # BLD AUTO: 0.1 10*3/MM3 (ref 0–0.05)
IMM GRANULOCYTES NFR BLD AUTO: 1.3 % (ref 0–0.5)
LYMPHOCYTES # BLD AUTO: 0.35 10*3/MM3 (ref 0.7–3.1)
LYMPHOCYTES NFR BLD AUTO: 4.7 % (ref 19.6–45.3)
MCH RBC QN AUTO: 29.9 PG (ref 26.6–33)
MCHC RBC AUTO-ENTMCNC: 33.6 G/DL (ref 31.5–35.7)
MCV RBC AUTO: 88.9 FL (ref 79–97)
MONOCYTES # BLD AUTO: 0.2 10*3/MM3 (ref 0.1–0.9)
MONOCYTES NFR BLD AUTO: 2.7 % (ref 5–12)
NEUTROPHILS NFR BLD AUTO: 6.75 10*3/MM3 (ref 1.7–7)
NEUTROPHILS NFR BLD AUTO: 91.2 % (ref 42.7–76)
NRBC BLD AUTO-RTO: 0 /100 WBC (ref 0–0.2)
PLATELET # BLD AUTO: 91 10*3/MM3 (ref 140–450)
PMV BLD AUTO: 11.2 FL (ref 6–12)
POTASSIUM SERPL-SCNC: 4.8 MMOL/L (ref 3.5–5.2)
RBC # BLD AUTO: 4.05 10*6/MM3 (ref 4.14–5.8)
SODIUM SERPL-SCNC: 139 MMOL/L (ref 136–145)
WBC # BLD AUTO: 7.41 10*3/MM3 (ref 3.4–10.8)

## 2021-10-01 PROCEDURE — 94799 UNLISTED PULMONARY SVC/PX: CPT

## 2021-10-01 PROCEDURE — 25010000002 ENOXAPARIN PER 10 MG: Performed by: INTERNAL MEDICINE

## 2021-10-01 PROCEDURE — 82962 GLUCOSE BLOOD TEST: CPT

## 2021-10-01 PROCEDURE — 80048 BASIC METABOLIC PNL TOTAL CA: CPT | Performed by: INTERNAL MEDICINE

## 2021-10-01 PROCEDURE — 94760 N-INVAS EAR/PLS OXIMETRY 1: CPT

## 2021-10-01 PROCEDURE — 71045 X-RAY EXAM CHEST 1 VIEW: CPT

## 2021-10-01 PROCEDURE — 85025 COMPLETE CBC W/AUTO DIFF WBC: CPT | Performed by: INTERNAL MEDICINE

## 2021-10-01 PROCEDURE — 25010000002 DEXAMETHASONE PER 1 MG: Performed by: INTERNAL MEDICINE

## 2021-10-01 PROCEDURE — 63710000001 INSULIN LISPRO (HUMAN) PER 5 UNITS: Performed by: INTERNAL MEDICINE

## 2021-10-01 RX ADMIN — DEXTROMETHORPHAN POLISTIREX 60 MG: 30 SUSPENSION, EXTENDED RELEASE ORAL at 08:17

## 2021-10-01 RX ADMIN — DEXAMETHASONE SODIUM PHOSPHATE 6 MG: 10 INJECTION INTRAMUSCULAR; INTRAVENOUS at 21:41

## 2021-10-01 RX ADMIN — PREDNISOLONE ACETATE 1 DROP: 10 SUSPENSION/ DROPS OPHTHALMIC at 08:17

## 2021-10-01 RX ADMIN — SODIUM CHLORIDE 1 DROP: 50 SOLUTION OPHTHALMIC at 21:41

## 2021-10-01 RX ADMIN — SODIUM CHLORIDE 1 DROP: 50 SOLUTION OPHTHALMIC at 08:17

## 2021-10-01 RX ADMIN — METOPROLOL TARTRATE 12.5 MG: 25 TABLET ORAL at 08:17

## 2021-10-01 RX ADMIN — DEXTROMETHORPHAN POLISTIREX 60 MG: 30 SUSPENSION, EXTENDED RELEASE ORAL at 21:41

## 2021-10-01 RX ADMIN — INSULIN LISPRO 2 UNITS: 100 INJECTION, SOLUTION INTRAVENOUS; SUBCUTANEOUS at 12:09

## 2021-10-01 RX ADMIN — ENOXAPARIN SODIUM 70 MG: 80 INJECTION, SOLUTION INTRAVENOUS; SUBCUTANEOUS at 14:29

## 2021-10-01 RX ADMIN — FAMOTIDINE 20 MG: 20 TABLET, FILM COATED ORAL at 08:17

## 2021-10-01 RX ADMIN — Medication 3 MG: at 23:08

## 2021-10-01 RX ADMIN — FINASTERIDE 5 MG: 5 TABLET, FILM COATED ORAL at 08:17

## 2021-10-01 RX ADMIN — Medication 250 MG: at 21:40

## 2021-10-01 RX ADMIN — AMLODIPINE BESYLATE 10 MG: 10 TABLET ORAL at 08:17

## 2021-10-01 RX ADMIN — DEXAMETHASONE SODIUM PHOSPHATE 6 MG: 10 INJECTION INTRAMUSCULAR; INTRAVENOUS at 08:16

## 2021-10-01 RX ADMIN — MIRTAZAPINE 7.5 MG: 15 TABLET, FILM COATED ORAL at 21:40

## 2021-10-01 RX ADMIN — ANTACID TABLETS 1 TABLET: 500 TABLET, CHEWABLE ORAL at 08:17

## 2021-10-01 RX ADMIN — Medication 250 MG: at 08:17

## 2021-10-01 RX ADMIN — SODIUM CHLORIDE 1 DROP: 50 SOLUTION OPHTHALMIC at 16:23

## 2021-10-01 RX ADMIN — METOPROLOL TARTRATE 12.5 MG: 25 TABLET ORAL at 21:40

## 2021-10-01 NOTE — PLAN OF CARE
Problem: Adult Inpatient Plan of Care  Goal: Plan of Care Review  Flowsheets (Taken 10/1/2021 2999)  Progress: no change  Outcome Summary: Patient remains on Optiflow, 60L/70%. AOx4. NSR to sinus kerline on monitor. Bladder scanned, PVR 0. No complaints of pain. Will continue to monitor.   Goal Outcome Evaluation:  Plan of Care Reviewed With: patient        Progress: no change  Outcome Summary: Patient remains on Optiflow, 60L/70%. AOx4. NSR to sinus kerline on monitor. Bladder scanned, PVR 0. No complaints of pain. Will continue to monitor.

## 2021-10-01 NOTE — PROGRESS NOTES
"DAILY PROGRESS NOTE  [facility]    Patient Identification:  Name: Angel Cisneros  Age: 71 y.o.  Sex: male  :  1950  MRN: [id number]         Primary Care Physician: Ayaan Amin MD    Subjective:  Interval History:He is short of air. On 60 L Optiflow.  And also uses 100% nonrebreather mask.    Objective:    Scheduled Meds:amLODIPine, 10 mg, Oral, Q24H  calcium carbonate, 1 tablet, Oral, Daily  dexamethasone, 6 mg, Intravenous, Q12H  dextromethorphan polistirex ER, 60 mg, Oral, Q12H  enoxaparin, 1 mg/kg, Subcutaneous, Q24H  famotidine, 20 mg, Oral, Daily  finasteride, 5 mg, Oral, Daily  insulin lispro, 0-9 Units, Subcutaneous, TID With Meals  metoprolol tartrate, 12.5 mg, Oral, Q12H  mirtazapine, 7.5 mg, Oral, Nightly  prednisoLONE acetate, 1 drop, Both Eyes, Daily  saccharomyces boulardii, 250 mg, Oral, BID  sodium chloride, 1 drop, Left Eye, TID      Continuous Infusions:     Vital signs in last 24 hours:  Temp:  [96 °F (35.6 °C)-98 °F (36.7 °C)] 97.9 °F (36.6 °C)  Heart Rate:  [53-78] 64  Resp:  [18-20] 18  BP: (129-140)/(72-81) 135/72    Intake/Output:    Intake/Output Summary (Last 24 hours) at 10/1/2021 1608  Last data filed at 10/1/2021 1439  Gross per 24 hour   Intake 1080 ml   Output 1100 ml   Net -20 ml       Exam:  /72 (BP Location: Left arm, Patient Position: Lying)   Pulse 64   Temp 97.9 °F (36.6 °C) (Oral)   Resp 18   Ht 170.2 cm (67.01\")   Wt 62.9 kg (138 lb 11.2 oz)   SpO2 90%   BMI 21.72 kg/m²     General Appearance:    Alert, cooperative, no distress   Head:    Normocephalic, without obvious abnormality, atraumatic   Eyes:       Throat:   Lips, tongue, gums normal   Neck:   Supple, symmetrical, trachea midline, no JVD   Lungs:     Clear to auscultation bilaterally, respirations unlabored   Chest Wall:    No tenderness or deformity    Heart:    Regular rate and rhythm, S1 and S2 normal, no murmur,no  Rub or gallop   Abdomen:     Soft, nontender, bowel " sounds active, no masses, no organomegaly    Extremities:   Extremities normal, atraumatic, no cyanosis or edema   Pulses:      Skin:   Skin is warm and dry,  no rashes or palpable lesions   Neurologic:   no focal deficits noted      Lab Results (last 72 hours)     Procedure Component Value Units Date/Time    POC Glucose Once [[id number]]  (Abnormal) Collected: 09/25/21 1625    Specimen: Blood Updated: 09/25/21 1627     Glucose 139 mg/dL      Comment: Meter: [id number] : [id number] Aniket Naldo YOHAN       POC Glucose Once [[id number]]  (Abnormal) Collected: 09/25/21 1112    Specimen: Blood Updated: 09/25/21 1114     Glucose 136 mg/dL      Comment: Meter: [id number] : [id number] Aniket Naldo YOHAN       Manual Differential [[id number]]  (Abnormal) Collected: 09/25/21 0457    Specimen: Blood Updated: 09/25/21 0615     Neutrophil % 97.9 %      Lymphocyte % 1.0 %      Monocyte % 1.0 %      Neutrophils Absolute 9.63 10*3/mm3      Lymphocytes Absolute 0.10 10*3/mm3      Monocytes Absolute 0.10 10*3/mm3      nRBC 1.0 /100 WBC      Polychromasia Slight/1+     WBC Morphology Normal     Platelet Morphology Normal    CBC & Differential [[id number]]  (Abnormal) Collected: 09/25/21 0457    Specimen: Blood Updated: 09/25/21 0615    Narrative:      The following orders were created for panel order CBC & Differential.  Procedure                               Abnormality         Status                     ---------                               -----------         ------                     CBC Auto Differential[[id number]]        Abnormal            Final result                 Please view results for these tests on the individual orders.    CBC Auto Differential [[id number]]  (Abnormal) Collected: 09/25/21 0457    Specimen: Blood Updated: 09/25/21 0615     WBC 9.84 10*3/mm3      RBC 4.42 10*6/mm3      Hemoglobin 13.1 g/dL      Hematocrit 38.8 %      MCV 87.8 fL      MCH 29.6 pg      MCHC 33.8 g/dL      RDW 14.0 %      RDW-SD 44.8 fl       MPV 11.6 fL      Platelets 92 10*3/mm3     POC Glucose Once [[id number]]  (Normal) Collected: 09/25/21 0555    Specimen: Blood Updated: 09/25/21 0557     Glucose 111 mg/dL      Comment: Meter: [id number] : [id number] Yakov Galeano NA       Basic Metabolic Panel [[id number]]  (Abnormal) Collected: 09/25/21 0457    Specimen: Blood Updated: 09/25/21 0556     Glucose 96 mg/dL      BUN 40 mg/dL      Creatinine 1.39 mg/dL      Sodium 139 mmol/L      Potassium 5.2 mmol/L      Comment: Slight hemolysis detected by analyzer. Results may be affected.        Chloride 105 mmol/L      CO2 25.0 mmol/L      Calcium 8.4 mg/dL      eGFR Non African Amer 51 mL/min/1.73      BUN/Creatinine Ratio 28.8     Anion Gap 9.0 mmol/L     Narrative:      GFR Normal >60  Chronic Kidney Disease <60  Kidney Failure <15      POC Glucose Once [[id number]]  (Abnormal) Collected: 09/24/21 1916    Specimen: Blood Updated: 09/24/21 1917     Glucose 235 mg/dL      Comment: Meter: [id number] : [id number] Yakovcain ChuaWaleska NA       POC Glucose Once [[id number]]  (Normal) Collected: 09/24/21 1612    Specimen: Blood Updated: 09/24/21 1621     Glucose 91 mg/dL      Comment: Meter: [id number] : [id number] Estelita Veras NA       POC Glucose Once [[id number]]  (Normal) Collected: 09/24/21 1130    Specimen: Blood Updated: 09/24/21 1134     Glucose 87 mg/dL      Comment: Meter: [id number] : [id number] Aniket ALMANZAR       Basic Metabolic Panel [[id number]]  (Abnormal) Collected: 09/24/21 0606    Specimen: Blood Updated: 09/24/21 0730     Glucose 98 mg/dL      BUN 39 mg/dL      Creatinine 1.27 mg/dL      Sodium 138 mmol/L      Potassium 4.5 mmol/L      Chloride 105 mmol/L      CO2 22.5 mmol/L      Calcium 8.3 mg/dL      eGFR Non African Amer 56 mL/min/1.73      BUN/Creatinine Ratio 30.7     Anion Gap 10.5 mmol/L     Narrative:      GFR Normal >60  Chronic Kidney Disease <60  Kidney Failure <15      CBC & Differential [[id number]]  (Abnormal) Collected:  09/24/21 0606    Specimen: Blood Updated: 09/24/21 0701    Narrative:      The following orders were created for panel order CBC & Differential.  Procedure                               Abnormality         Status                     ---------                               -----------         ------                     CBC Auto Differential[[id number]]        Abnormal            Final result                 Please view results for these tests on the individual orders.    CBC Auto Differential [[id number]]  (Abnormal) Collected: 09/24/21 0606    Specimen: Blood Updated: 09/24/21 0701     WBC 10.71 10*3/mm3      RBC 4.37 10*6/mm3      Hemoglobin 12.9 g/dL      Hematocrit 38.1 %      MCV 87.2 fL      MCH 29.5 pg      MCHC 33.9 g/dL      RDW 13.9 %      RDW-SD 43.3 fl      MPV 12.0 fL      Platelets 103 10*3/mm3      Neutrophil % 90.3 %      Lymphocyte % 5.0 %      Monocyte % 2.4 %      Eosinophil % 0.0 %      Basophil % 0.2 %      Immature Grans % 2.1 %      Neutrophils, Absolute 9.67 10*3/mm3      Lymphocytes, Absolute 0.54 10*3/mm3      Monocytes, Absolute 0.26 10*3/mm3      Eosinophils, Absolute 0.00 10*3/mm3      Basophils, Absolute 0.02 10*3/mm3      Immature Grans, Absolute 0.22 10*3/mm3      nRBC 0.0 /100 WBC     POC Glucose Once [[id number]]  (Normal) Collected: 09/24/21 0621    Specimen: Blood Updated: 09/24/21 0622     Glucose 100 mg/dL      Comment: Meter: [id number] : [id number] Luis Armando ALMANZAR       POC Glucose Once [[id number]]  (Normal) Collected: 09/23/21 2013    Specimen: Blood Updated: 09/23/21 2015     Glucose 115 mg/dL      Comment: Meter: [id number] : [id number] Luis Armando Sethilipanfilo ALMANZAR       CBC & Differential [[id number]]  (Abnormal) Collected: 09/23/21 1742    Specimen: Blood Updated: 09/23/21 1805    Narrative:      The following orders were created for panel order CBC & Differential.  Procedure                               Abnormality         Status                     ---------                                -----------         ------                     CBC Auto Differential[[id number]]        Abnormal            Final result                 Please view results for these tests on the individual orders.    CBC Auto Differential [[id number]]  (Abnormal) Collected: 09/23/21 1742    Specimen: Blood Updated: 09/23/21 1805     WBC 13.72 10*3/mm3      RBC 4.57 10*6/mm3      Hemoglobin 13.6 g/dL      Hematocrit 41.4 %      MCV 90.6 fL      MCH 29.8 pg      MCHC 32.9 g/dL      RDW 13.7 %      RDW-SD 45.2 fl      MPV 11.7 fL      Platelets 104 10*3/mm3      Neutrophil % 90.7 %      Lymphocyte % 3.1 %      Monocyte % 3.5 %      Eosinophil % 0.0 %      Basophil % 0.4 %      Neutrophils, Absolute 12.45 10*3/mm3      Lymphocytes, Absolute 0.43 10*3/mm3      Monocytes, Absolute 0.48 10*3/mm3      Eosinophils, Absolute 0.00 10*3/mm3      Basophils, Absolute 0.05 10*3/mm3     POC Glucose Once [[id number]]  (Normal) Collected: 09/23/21 1633    Specimen: Blood Updated: 09/23/21 1635     Glucose 108 mg/dL      Comment: Meter: [id number] : [id number] Javelino NA       POC Glucose Once [[id number]]  (Normal) Collected: 09/23/21 1132    Specimen: Blood Updated: 09/23/21 1135     Glucose 105 mg/dL      Comment: Meter: [id number] : [id number] LitNetstoryo NA       Basic Metabolic Panel [[id number]]  (Abnormal) Collected: 09/23/21 0547    Specimen: Blood Updated: 09/23/21 0741     Glucose 103 mg/dL      BUN 40 mg/dL      Creatinine 1.38 mg/dL      Sodium 141 mmol/L      Potassium 4.4 mmol/L      Chloride 108 mmol/L      CO2 25.2 mmol/L      Calcium 8.0 mg/dL      eGFR Non African Amer 51 mL/min/1.73      BUN/Creatinine Ratio 29.0     Anion Gap 7.8 mmol/L     Narrative:      GFR Normal >60  Chronic Kidney Disease <60  Kidney Failure <15      POC Glucose Once [[id number]]  (Normal) Collected: 09/23/21 0554    Specimen: Blood Updated: 09/23/21 0557     Glucose 99 mg/dL      Comment: Meter: [id number] : [id number] Luis Armando  Ya YOHAN           Data Review:  Results from last 7 days   Lab Units 10/01/21  0445 09/30/21  0441 09/30/21  0441 09/29/21  0906 09/29/21  0906   SODIUM mmol/L 139  --  139  --  140   POTASSIUM mmol/L 4.8  --  4.8  --  4.4   CHLORIDE mmol/L 109*  --  109*  --  109*   CO2 mmol/L 20.3*  --  19.9*  --  21.7*   BUN mg/dL 46*  --  44*  --  44*   CREATININE mg/dL 1.52*  --  1.39*  --  1.35*   GLUCOSE mg/dL 110*   < > 107*   < > 112*   CALCIUM mg/dL 8.1*  --  8.3*  --  8.2*    < > = values in this interval not displayed.     Results from last 7 days   Lab Units 10/01/21  0445 09/30/21  0441 09/29/21  0906   WBC 10*3/mm3 7.41 9.38 10.57   HEMOGLOBIN g/dL 12.1* 12.6* 12.8*   HEMATOCRIT % 36.0* 37.9 38.0   PLATELETS 10*3/mm3 91* 99* 94*             Lab Results   Lab Value Date/Time    TROPONINT 0.011 09/05/2021 1653    TROPONINT <0.010 09/08/2018 0450    TROPONINT <0.010 09/07/2018 2232    TROPONINT <0.010 09/07/2018 1426               Invalid input(s): PROT, LABALBU          Glucose   Date/Time Value Ref Range Status   10/01/2021 1538 97 70 - 130 mg/dL Final     Comment:     Meter: [id number] : [id number] Wilian Lu ROCÍO   10/01/2021 1049 152 (H) 70 - 130 mg/dL Final     Comment:     Meter: [id number] : [id number] Wilian Lu ROCÍO   10/01/2021 0540 104 70 - 130 mg/dL Final     Comment:     Meter: [id number] : [id number] Ashby Gonzálezlizz NA   09/30/2021 2032 107 70 - 130 mg/dL Final     Comment:     Meter: [id number] : [id number] Isma Rocíoaia NA   09/30/2021 1558 101 70 - 130 mg/dL Final     Comment:     Meter: [id number] : [id number] Estelita ALMANZAR   09/30/2021 1059 111 70 - 130 mg/dL Final     Comment:     Meter: [id number] : [id number] Estelita ALMANZAR   09/30/2021 0547 91 70 - 130 mg/dL Final     Comment:     Meter: [id number] : [id number] Johann ALMANZAR   09/29/2021 2008 123 70 - 130 mg/dL Final     Comment:     Meter: [id number] : [id number] Johann ALMANZAR           Past  Medical History:   Diagnosis Date   • Arthritis     both knees   • Cancer (CMS/HCC)     dx with lymphoma 2009/ radiation   • Cataracts, bilateral    • Hx of cornea transplant     both eyes   • Leukemia (CMS/HCC)    • Lymphoma (CMS/HCC)     dx in 2009. treated with radiation.   • Lymphoma (CMS/HCC)     treated with radiation. dx in 2009       Assessment:  Active Hospital Problems    Diagnosis  POA   • **Pneumonia due to COVID-19 virus [U07.1, J12.82]  Yes   • Paroxysmal atrial fibrillation (HCC) [I48.0]  No   • Acute respiratory failure with hypoxia (HCC) [J96.01]  Yes   • BPH (benign prostatic hyperplasia) [N40.0]  Yes   • CLL (chronic lymphocytic leukemia) (HCC) [C91.10]  Yes   • Chronic kidney disease, stage 3b (HCC) [N18.32]  Yes   • HTN (hypertension) [I10]  Yes   • GERD (gastroesophageal reflux disease) [K21.9]  Yes   • Hodgkin's disease of lymph nodes of head, face, and neck (HCC) [C81.91]  Yes      Resolved Hospital Problems   No resolved problems to display.       Plan:  Continue with decadron. Finished remdesivir. Follow lab.  Continue supportive care.  Try to wean oxygen when able.      Fer Urbano MD  10/1/2021  16:08 EDT

## 2021-10-01 NOTE — PROGRESS NOTES
LOS: 26 days   Patient Care Team:  Ayaan Amin MD as PCP - General (Family Medicine)  Following for Covid pneumonia and acute respiratory failure  Subjective     Patient says he feels about the same he feels pretty good he thinks his oxygenation is improving.  Minimal to no cough no chest pain no nausea vomiting diarrhea no lower extremity pain or swelling  Review of Systems:          Objective     Vital Signs  Vital Sign Min/Max for last 24 hours  Temp  Min: 96 °F (35.6 °C)  Max: 98 °F (36.7 °C)   BP  Min: 129/78  Max: 140/81   Pulse  Min: 53  Max: 78   Resp  Min: 18  Max: 20   SpO2  Min: 84 %  Max: 98 %   Flow (L/min)  Min: 60  Max: 60   Weight  Min: 62.9 kg (138 lb 11.2 oz)  Max: 62.9 kg (138 lb 11.2 oz)        Ventilator/Non-Invasive Ventilation Settings (From admission, onward)    None                       Body mass index is 21.72 kg/m².  I/O last 3 completed shifts:  In: 1320 [P.O.:1320]  Out: 2425 [Urine:2425]  I/O this shift:  In: 720 [P.O.:720]  Out: 300 [Urine:300]        Physical Exam:  General Appearance: Well-developed white male he is resting in bed on heated high flow O2 and does not appear in any acute distress oxygen saturations are 95%  on 60 L and 60% O2.    Eyes: Conjunctiva are clear and anicteric  ENT: Mucous membranes are moist no erythema no exudate  Neck: No jugular venous distension trachea midline no crepitance  Lungs: Clear nonlabored symmetric expansion no wheezes no rales no rhonchi  Cardiac: Regular rate rhythm no murmur no gallop  Abdomen: Soft no palpable hepatosplenomegaly or masses  : Not examined  Musculoskeletal: Grossly normal there is no calf tenderness or palpable cords  Skin: No jaundice no petechiae skin is warm and dry  Neuro: He is alert oriented cooperative following commands moving all extremities grossly intact  Extremities/P Vascular: No clubbing no cyanosis no edema palpable radial dorsalis pedis pulses bilaterally  MSE: He seems to be in better  [facility]       Labs:  Results from last 7 days   Lab Units 10/01/21  0445 09/30/21  0441 09/29/21  0906 09/28/21 0625 09/27/21 0619 09/26/21  0812 09/25/21  0457   GLUCOSE mg/dL 110* 107* 112* 100* 107* 104* 96   SODIUM mmol/L 139 139 140 141 139 139 139   POTASSIUM mmol/L 4.8 4.8 4.4 4.7 4.7 5.1 5.2   CO2 mmol/L 20.3* 19.9* 21.7* 22.4 20.4* 19.7* 25.0   CHLORIDE mmol/L 109* 109* 109* 108* 107 104 105   ANION GAP mmol/L 9.7 10.1 9.3 10.6 11.6 15.3* 9.0   CREATININE mg/dL 1.52* 1.39* 1.35* 1.30* 1.41* 1.32* 1.39*   BUN mg/dL 46* 44* 44* 43* 44* 39* 40*   BUN / CREAT RATIO  30.3* 31.7* 32.6* 33.1* 31.2* 29.5* 28.8*   CALCIUM mg/dL 8.1* 8.3* 8.2* 8.0* 8.2* 8.6 8.4*   EGFR IF NONAFRICN AM mL/min/1.73 46* 51* 52* 55* 50* 54* 51*     Estimated Creatinine Clearance: 40.2 mL/min (A) (by C-G formula based on SCr of 1.52 mg/dL (H)).      Results from last 7 days   Lab Units 10/01/21  0445 09/30/21  0441 09/29/21  0906 09/28/21  0625 09/27/21  0619 09/26/21  0812 09/25/21 0457   WBC 10*3/mm3 7.41 9.38 10.57 10.13 16.51* 13.52* 9.84   RBC 10*6/mm3 4.05* 4.24 4.26 4.24 4.46 5.06 4.42   HEMOGLOBIN g/dL 12.1* 12.6* 12.8* 12.6* 13.1 15.0 13.1   HEMATOCRIT % 36.0* 37.9 38.0 37.8 39.3 46.9 38.8   MCV fL 88.9 89.4 89.2 89.2 88.1 92.7 87.8   MCH pg 29.9 29.7 30.0 29.7 29.4 29.6 29.6   MCHC g/dL 33.6 33.2 33.7 33.3 33.3 32.0 33.8   RDW % 14.1 14.6 14.5 14.6 14.1 14.2 14.0   RDW-SD fl 45.7 47.1 46.6 47.9 45.1 48.3 44.8   MPV fL 11.2 11.1 11.5 11.1 11.7 11.3 11.6   PLATELETS 10*3/mm3 91* 99* 94* 87* 96* 109* 92*   NEUTROPHIL % % 91.2* 90.4* 91.2* 92.0* 93.7* 88.5*  --    LYMPHOCYTE % % 4.7* 6.0* 5.2* 4.6* 3.0* 7.9*  --    MONOCYTES % % 2.7* 1.9* 2.6* 2.1* 1.9* 2.4*  --    EOSINOPHIL % % 0.0* 0.0* 0.0* 0.0* 0.0* 0.0*  --    BASOPHIL % % 0.1 0.1 0.1 0.1 0.1 0.1  --    IMM GRAN % % 1.3*  --  0.9* 1.2*  --   --   --    NEUTROS ABS 10*3/mm3 6.75 8.48* 9.64* 9.32* 15.46* 11.96* 9.63*   LYMPHS ABS 10*3/mm3 0.35* 0.56* 0.55* 0.47* 0.49*  1.07  --    MONOS ABS 10*3/mm3 0.20 0.18 0.27 0.21 0.32 0.32  --    EOS ABS 10*3/mm3 0.00 0.00 0.00 0.00 0.00 0.00  --    BASOS ABS 10*3/mm3 0.01 0.01 0.01 0.01 0.02 0.02  --    IMMATURE GRANS (ABS) 10*3/mm3 0.10*  --  0.10* 0.12*  --   --   --    NRBC /100 WBC 0.0  --  0.0 0.0  --   --  1.0*                             Microbiology Results (last 10 days)     Procedure Component Value - Date/Time    COVID PRE-OP / PRE-PROCEDURE SCREENING ORDER (NO ISOLATION) - Swab, Nasopharynx [[id number]]  (Abnormal) Collected: 09/22/21 1300    Lab Status: Final result Specimen: Swab from Nasopharynx Updated: 09/22/21 1825    Narrative:      The following orders were created for panel order COVID PRE-OP / PRE-PROCEDURE SCREENING ORDER (NO ISOLATION) - Swab, Nasopharynx.  Procedure                               Abnormality         Status                     ---------                               -----------         ------                     COVID-19,APTIMA PANTHER(...[[id number]]  Abnormal            Final result                 Please view results for these tests on the individual orders.    COVID-19,APTIMA PANTHER(CHUN),[facility], NP/OP SWAB IN UTM/VTM/SALINE TRANSPORT MEDIA,24 HR TAT - Swab, Nasopharynx [[id number]]  (Abnormal) Collected: 09/22/21 1300    Lab Status: Final result Specimen: Swab from Nasopharynx Updated: 09/22/21 1825     COVID19 Detected    Narrative:      Fact sheet for providers: https://www.fda.gov/media/640240/download     Fact sheet for patients: https://www.fda.gov/media/447829/download    Test performed by RT PCR.    MRSA Screen, PCR (Inpatient) - Swab, Nares [[id number]]  (Normal) Collected: 09/21/21 1752    Lab Status: Final result Specimen: Swab from Nares Updated: 09/21/21 2218     MRSA PCR No MRSA Detected              amLODIPine, 10 mg, Oral, Q24H  calcium carbonate, 1 tablet, Oral, Daily  dexamethasone, 6 mg, Intravenous, Q12H  dextromethorphan polistirex ER, 60 mg, Oral, Q12H  enoxaparin, 1 mg/kg,  Subcutaneous, Q24H  famotidine, 20 mg, Oral, Daily  finasteride, 5 mg, Oral, Daily  insulin lispro, 0-9 Units, Subcutaneous, TID With Meals  metoprolol tartrate, 12.5 mg, Oral, Q12H  mirtazapine, 7.5 mg, Oral, Nightly  prednisoLONE acetate, 1 drop, Both Eyes, Daily  saccharomyces boulardii, 250 mg, Oral, BID  sodium chloride, 1 drop, Left Eye, TID           Diagnostics:  CT Head Without Contrast    Result Date: 9/8/2021  CT HEAD WITHOUT CONTRAST  HISTORY: Increasing confusion  COMPARISON: None available.  TECHNIQUE: Axial CT imaging was obtained through the brain. No IV contrast was administered.  FINDINGS: No acute intracranial hemorrhage is seen. Ventricles are normal in size. There is no midline shift or mass effect. Mild mucosal thickening is seen within the left maxillary sinus. There is some minimal opacification of the right mastoid air cells.      No acute intracranial findings.  Radiation dose reduction techniques were utilized, including automated exposure control and exposure modulation based on body size.  This report was finalized on 9/8/2021 4:38 AM by Dr. Sobeida Jones M.D.      XR Chest 1 View    Result Date: 9/25/2021  XR CHEST 1 VW-  HISTORY: Male who is 71 years-old,  pneumomediastinum follow-up  TECHNIQUE: Frontal view of the chest  COMPARISON: 09/24/2021  FINDINGS: The heart is enlarged. Pneumomediastinum is again apparent, with soft tissue gas again extending to the right neck base. Bilateral infiltrates appear similar to prior exam. No pleural effusion, or pneumothorax. No acute osseous process.      No significant change.  This report was finalized on 9/25/2021 7:08 AM by Dr. Parminder Bradshaw M.D.      XR Chest 1 View    Result Date: 9/24/2021  ONE VIEW PORTABLE CHEST AT 5:50 AM  HISTORY: Follow-up of pneumomediastinum. Covid 19 pneumonia.  FINDINGS: The lungs are well-expanded with extensive pneumonia in both lungs, particularly in the lower lobes and showing no significant change from  2 days ago. There has been nearly complete resolution of some pneumomediastinum and there remains a tiny amount of subcutaneous emphysema at the base of the right neck. There is no evidence of pneumothorax. The heart remains mildly enlarged.  This report was finalized on 9/24/2021 7:13 AM by Dr. Fidel Madison M.D.      XR Chest 1 View    Result Date: 9/22/2021  XR CHEST 1 VW-  HISTORY: Male who is 71 years-old,  Covid pneumonia  TECHNIQUE: Frontal view of the chest  COMPARISON: 09/20/2021  FINDINGS: Heart size is normal. Pneumomediastinum is noted, extending to the right neck base. Pulmonary vasculature is unremarkable. Bilateral infiltrates do not appear significantly changed. No pleural effusion, or pneumothorax. Right hemidiaphragm remains elevated. No acute osseous process.      Persistent bilateral infiltrates. Pneumomediastinum extends into the right neck base.  This report was finalized on 9/22/2021 1:00 PM by Dr. Parminder Bradshaw M.D.      XR Chest 1 View    Result Date: 9/20/2021  XR CHEST 1 VW-  09/20/2021  HISTORY: Follow-up pneumonia.  Heart size is mildly enlarged. There are moderately severe patchy infiltrates in the bilateral mid to lower lungs right worse than left. FINDINGS are consistent with Covid 19 and appears similar to yesterday's study.  No pneumothorax is seen. Bony structures appear unremarkable.      Mild cardiomegaly. 2. Moderately severe bilateral pulmonary infiltrates right worse than left. FINDINGS are consistent with Covid 19. 3. Chest appears similar to yesterday's study.  This report was finalized on 9/20/2021 7:37 AM by Dr. Norman Montalvo M.D.      XR Chest 1 View    Result Date: 9/19/2021  ONE VIEW PORTABLE CHEST AT 5:10 AM  HISTORY: Covid 19 pneumonia. Shortness of breath.  FINDINGS: There is extensive pneumonia predominantly in both lower lobes and consistent with Covid 19 pneumonia. This shows no significant change from 3 days ago. However, there is now some mediastinal  air present that extends into the base of the right neck. No definite pneumothorax is seen. The heart remains slightly enlarged.  This report was finalized on 9/19/2021 7:07 AM by Dr. Fidel Madison M.D.      XR Chest 1 View    Result Date: 9/16/2021  EXAMINATION: SINGLE VIEW CHEST RADIOGRAPH  HISTORY: 71-year-old male with history of worsening hypoxia.  FINDINGS: An upright AP portable chest radiograph was obtained. Comparison is made to chest radiograph dated 09/07/2021. There has been interval progression of opacification in the bilateral mid and lower lung fields since the prior examination. The cardiac silhouette is obscured to a greater degree than on the prior examination. The visualized osseous structures appear normal.      There is evidence of interval progression of bilateral pulmonary infiltrates.  This report was finalized on 9/16/2021 11:08 AM by Dr. Matthew Bradley M.D.      XR Chest 1 View    Result Date: 9/7/2021  SINGLE VIEW OF THE CHEST  HISTORY: Worsening respiratory failure  COMPARISON: 09/05/2021  FINDINGS: Cardiomegaly is present. There is no vascular congestion. Bilateral pulmonary infiltrates are seen. Appearance is in keeping with COVID. They have worsened when compared to prior exam. No pneumothorax or pleural effusion is seen.      Worsening bilateral pulmonary infiltrates.  This report was finalized on 9/7/2021 10:44 PM by Dr. Sobeida Jones M.D.      XR Chest AP    Result Date: 9/5/2021  PORTABLE CHEST 09/05/2021 AT 4:25 PM  CLINICAL HISTORY: Cough. Fever. COVID 19 evaluation.  Compared to the previous chest dated 09/07/2018.  There is patchy ill-defined increased density in the inferior half of the right lung and to lesser extent in the inferior aspect of the left lung consistent with pneumonia due to COVID 19 infection. There are no pleural effusions. The heart is borderline enlarged.  This report was finalized on 9/5/2021 5:01 PM by Dr. Blu Mendez M.D.      Results for orders  placed during the hospital encounter of 09/07/18    Adult Transthoracic Echo Complete W/ Cont if Necessary Per Protocol    Interpretation Summary  · EF = 65%.  · Left ventricular systolic function is normal.  · Mild tricuspid valve regurgitation is present.  · Calculated right ventricular systolic pressure from tricuspid regurgitation is 37 mmHg.  · Estimated right ventricular systolic pressure from tricuspid regurgitation is mildly elevated (35-45 mmHg).          Active Hospital Problems    Diagnosis  POA   • **Pneumonia due to COVID-19 virus [U07.1, J12.82]  Yes   • Paroxysmal atrial fibrillation (HCC) [I48.0]  No   • Acute respiratory failure with hypoxia (HCC) [J96.01]  Yes   • BPH (benign prostatic hyperplasia) [N40.0]  Yes   • CLL (chronic lymphocytic leukemia) ([facility]) [C91.10]  Yes   • Chronic kidney disease, stage 3b (HCC) [N18.32]  Yes   • HTN (hypertension) [I10]  Yes   • GERD (gastroesophageal reflux disease) [K21.9]  Yes   • Hodgkin's disease of lymph nodes of head, face, and neck ([facility]) [C81.91]  Yes      Resolved Hospital Problems   No resolved problems to display.     Chest x-ray really does not look markedly different than it has for the last week or more still trace pneumomediastinum extensive infiltrates bilaterally    Assessment/Plan     1. COVID-19 infection and pneumonia he is on Decadron 10 milligrams twice daily and has completed remdesivir I think is probably time to start dropping his steroids now on 6 mg twice daily  2. Acute hypoxic respiratory failure secondary #1 wean O2 as tolerated still on heated high flow O2 at 60 L and on 60% we really have not made significant progress over the past week.  3. Elevated procalcitonin completed antibiotics for possible secondary bacterial pneumonia.  4. Pneumomediastinum may be minimal residual  5. CLL  6. History of non-Hodgkin's lymphoma  7. Chronic kidney disease stage IIIb  8. Paroxysmal atrial fibrillation  9. Thrombocytopenia mild not sure  etiology, D-dimer normal unlikely clot consuming platelets or DIC questions is related to his CLL, at least they have been stable.      I am really concerned that this patient is developing significant fibrosis we really have not seen much progress lately, he has and has been getting steroids there is really little else we can do.  I would like to get a CT scan of his chest just to see if he is developing fibrosis, and we will see if 100% nonrebreather 15 L nasal cannula O2 transport to CT scan    Plan for disposition:    Clay Truong MD  10/01/21  15:49 EDT    Time:

## 2021-10-01 NOTE — PLAN OF CARE
Goal Outcome Evaluation:              Outcome Summary: optiflow at 60L/60%; anxious at times; SOA with little exertion; voiding per urinal; up in chair today with time allowed to rest on side of bed prior to transfer

## 2021-10-02 ENCOUNTER — APPOINTMENT (OUTPATIENT)
Dept: GENERAL RADIOLOGY | Facility: HOSPITAL | Age: 71
End: 2021-10-02

## 2021-10-02 LAB
ANION GAP SERPL CALCULATED.3IONS-SCNC: 10.4 MMOL/L (ref 5–15)
BUN SERPL-MCNC: 46 MG/DL (ref 8–23)
BUN/CREAT SERPL: 34.3 (ref 7–25)
CALCIUM SPEC-SCNC: 8.1 MG/DL (ref 8.6–10.5)
CHLORIDE SERPL-SCNC: 108 MMOL/L (ref 98–107)
CO2 SERPL-SCNC: 20.6 MMOL/L (ref 22–29)
CREAT SERPL-MCNC: 1.34 MG/DL (ref 0.76–1.27)
DEPRECATED RDW RBC AUTO: 49.5 FL (ref 37–54)
ERYTHROCYTE [DISTWIDTH] IN BLOOD BY AUTOMATED COUNT: 14.8 % (ref 12.3–15.4)
GFR SERPL CREATININE-BSD FRML MDRD: 53 ML/MIN/1.73
GLUCOSE BLDC GLUCOMTR-MCNC: 110 MG/DL (ref 70–130)
GLUCOSE BLDC GLUCOMTR-MCNC: 115 MG/DL (ref 70–130)
GLUCOSE BLDC GLUCOMTR-MCNC: 117 MG/DL (ref 70–130)
GLUCOSE BLDC GLUCOMTR-MCNC: 96 MG/DL (ref 70–130)
GLUCOSE SERPL-MCNC: 102 MG/DL (ref 65–99)
HCT VFR BLD AUTO: 37.4 % (ref 37.5–51)
HGB BLD-MCNC: 12.5 G/DL (ref 13–17.7)
LYMPHOCYTES # BLD MANUAL: 0.25 10*3/MM3 (ref 0.7–3.1)
LYMPHOCYTES NFR BLD MANUAL: 4 % (ref 19.6–45.3)
MCH RBC QN AUTO: 30.3 PG (ref 26.6–33)
MCHC RBC AUTO-ENTMCNC: 33.4 G/DL (ref 31.5–35.7)
MCV RBC AUTO: 90.8 FL (ref 79–97)
NEUTROPHILS # BLD AUTO: 6.09 10*3/MM3 (ref 1.7–7)
NEUTROPHILS NFR BLD MANUAL: 96 % (ref 42.7–76)
PLAT MORPH BLD: NORMAL
PLATELET # BLD AUTO: 104 10*3/MM3 (ref 140–450)
PMV BLD AUTO: 11.4 FL (ref 6–12)
POTASSIUM SERPL-SCNC: 4.8 MMOL/L (ref 3.5–5.2)
RBC # BLD AUTO: 4.12 10*6/MM3 (ref 4.14–5.8)
RBC MORPH BLD: NORMAL
SODIUM SERPL-SCNC: 139 MMOL/L (ref 136–145)
WBC # BLD AUTO: 6.34 10*3/MM3 (ref 3.4–10.8)
WBC MORPH BLD: NORMAL

## 2021-10-02 PROCEDURE — 71045 X-RAY EXAM CHEST 1 VIEW: CPT

## 2021-10-02 PROCEDURE — 85007 BL SMEAR W/DIFF WBC COUNT: CPT | Performed by: INTERNAL MEDICINE

## 2021-10-02 PROCEDURE — 85025 COMPLETE CBC W/AUTO DIFF WBC: CPT | Performed by: INTERNAL MEDICINE

## 2021-10-02 PROCEDURE — 80048 BASIC METABOLIC PNL TOTAL CA: CPT | Performed by: INTERNAL MEDICINE

## 2021-10-02 PROCEDURE — 82962 GLUCOSE BLOOD TEST: CPT

## 2021-10-02 PROCEDURE — 25010000002 DEXAMETHASONE PER 1 MG: Performed by: INTERNAL MEDICINE

## 2021-10-02 PROCEDURE — 25010000002 ENOXAPARIN PER 10 MG: Performed by: INTERNAL MEDICINE

## 2021-10-02 PROCEDURE — 94799 UNLISTED PULMONARY SVC/PX: CPT

## 2021-10-02 RX ADMIN — ANTACID TABLETS 1 TABLET: 500 TABLET, CHEWABLE ORAL at 08:33

## 2021-10-02 RX ADMIN — DEXTROMETHORPHAN POLISTIREX 60 MG: 30 SUSPENSION, EXTENDED RELEASE ORAL at 21:13

## 2021-10-02 RX ADMIN — PREDNISOLONE ACETATE 1 DROP: 10 SUSPENSION/ DROPS OPHTHALMIC at 08:35

## 2021-10-02 RX ADMIN — AMLODIPINE BESYLATE 10 MG: 10 TABLET ORAL at 08:33

## 2021-10-02 RX ADMIN — SODIUM CHLORIDE, PRESERVATIVE FREE 10 ML: 5 INJECTION INTRAVENOUS at 21:15

## 2021-10-02 RX ADMIN — DEXAMETHASONE SODIUM PHOSPHATE 6 MG: 10 INJECTION INTRAMUSCULAR; INTRAVENOUS at 08:32

## 2021-10-02 RX ADMIN — SODIUM CHLORIDE 1 DROP: 50 SOLUTION OPHTHALMIC at 21:13

## 2021-10-02 RX ADMIN — METOPROLOL TARTRATE 12.5 MG: 25 TABLET ORAL at 21:14

## 2021-10-02 RX ADMIN — Medication 250 MG: at 21:15

## 2021-10-02 RX ADMIN — BISACODYL 10 MG: 10 SUPPOSITORY RECTAL at 13:52

## 2021-10-02 RX ADMIN — MIRTAZAPINE 7.5 MG: 15 TABLET, FILM COATED ORAL at 21:15

## 2021-10-02 RX ADMIN — Medication 3 MG: at 21:14

## 2021-10-02 RX ADMIN — Medication 250 MG: at 08:33

## 2021-10-02 RX ADMIN — DEXAMETHASONE SODIUM PHOSPHATE 6 MG: 10 INJECTION INTRAMUSCULAR; INTRAVENOUS at 21:15

## 2021-10-02 RX ADMIN — METOPROLOL TARTRATE 12.5 MG: 25 TABLET ORAL at 08:34

## 2021-10-02 RX ADMIN — ENOXAPARIN SODIUM 70 MG: 80 INJECTION, SOLUTION INTRAVENOUS; SUBCUTANEOUS at 13:52

## 2021-10-02 RX ADMIN — FAMOTIDINE 20 MG: 20 TABLET, FILM COATED ORAL at 08:33

## 2021-10-02 RX ADMIN — SODIUM CHLORIDE 1 DROP: 50 SOLUTION OPHTHALMIC at 08:34

## 2021-10-02 RX ADMIN — SODIUM CHLORIDE 1 DROP: 50 SOLUTION OPHTHALMIC at 17:20

## 2021-10-02 RX ADMIN — DEXTROMETHORPHAN POLISTIREX 60 MG: 30 SUSPENSION, EXTENDED RELEASE ORAL at 08:33

## 2021-10-02 RX ADMIN — FINASTERIDE 5 MG: 5 TABLET, FILM COATED ORAL at 08:33

## 2021-10-02 NOTE — PLAN OF CARE
Goal Outcome Evaluation:  Plan of Care Reviewed With: patient        Progress: no change   Remains on heated hi flow oxygen 60 L/60 %. Occasional nonproductive cough. Voids per urinal. Denies pain. Rested well after taking melatonin. Will continue to monitor.

## 2021-10-02 NOTE — PLAN OF CARE
Goal Outcome Evaluation:  Pt alert and oriented, continues on optiflow 02 was 60/60 but now 60/50. Pt does well until he gets up to [facility] or worked with PT today at which time his 02 sats dropped into 70's. I did put pt on 15 L HiFlow NC and 15 L Nonrebreather mask for 20 minutes to see if he would tolerate being off optiflow in order to go downstairs to have CT chest done today. Pt tolerated very well and sats stayed at 100% until I got pt up to BSC to have BM and his sats dropped. I put pt back on the optiflow. Shortly after that he recovered and PT went to work with pt and his sats dropped again into the 70's this time he was already on the optiflow. Pt given suppository today which was effective, pt had large BM. Will continue to monitor pt and follow MD orders.

## 2021-10-02 NOTE — PROGRESS NOTES
[facility]  [phone]  Cheo Kraft MD    Subjective:  LOS: 27    Chief Complaint: Acute respiratory failure    Currently on 50% OptiFlow with saturation 91%.  His oxygen level dropped significantly if he moves even in bed.  However he seems to recover within 5 minutes or so.  Denies much cough or phlegm.  Otherwise looks comfortable.    Objective   Vital Signs past 24hrs    Temp range: Temp (24hrs), Av.8 °F (36.6 °C), Min:97.5 °F (36.4 °C), Max:98 °F (36.7 °C)    BP range: BP: (126-146)/(73-94) 126/73  Pulse range: Heart Rate:  [59-83] 59  Resp rate range: Resp:  [18] 18    Device (Oxygen Therapy): humidified;high-flow nasal cannulaFlow (L/min):  [60] 60  Oxygen range:SpO2:  [79 %-96 %] 95 %      64.5 kg (142 lb 4.8 oz); Body mass index is 22.28 kg/m².    Intake/Output Summary (Last 24 hours) at 10/2/2021 1328  Last data filed at 10/2/2021 1116  Gross per 24 hour   Intake 480 ml   Output 1220 ml   Net -740 ml       Physical Exam  Eyes:      Pupils: Pupils are equal, round, and reactive to light.   Cardiovascular:      Rate and Rhythm: Normal rate and regular rhythm.      Heart sounds: No murmur heard.     Pulmonary:      Effort: Pulmonary effort is normal.      Breath sounds: Normal breath sounds.   Abdominal:      General: Bowel sounds are normal.      Palpations: Abdomen is soft. There is no mass.      Tenderness: There is no abdominal tenderness.   Musculoskeletal:         General: No swelling.   Neurological:      Mental Status: He is alert.       Results Review:    I have reviewed the laboratory and imaging data since the last note by [facility] physician.  My annotations are noted in assessment and plan.    Results from last 7 days   Lab Units 21  0441 21  0625 21  0812   FERRITIN ng/mL 1,019.00*  --  1,508.00*   D DIMER QUANT MCGFEU/mL  --  0.45 0.52*   CRP mg/dL <0.30  --  <0.30       Medication Review:  I have reviewed the current MAR.  My annotations are noted in assessment  and plan.       Plan   PCCM Problems  Acute hypoxic respiratory failure  SARS-CoV-2 infection  Bilateral pulmonary infiltrates  Secondary bacterial infection  Pneumomediastinum  CLL  History NHL  CKD 3  Paroxysmal A. fib  Low platelets    THESE ARE NEW MEDICAL PROBLEMS TO ME.    Plan of Treatment    Continue supplemental oxygen.  Currently 50% OptiFlow and hopefully can wean.    Supportive treatment for SARS-CoV-2 infection.  Currently on dexamethasone 6 mg twice daily.  Previously on 10 mg twice daily.  Can continue to lower dose as long as CRP remains within normal limits.    Concern for secondary bacterial infection.  Patient initially treated with antibiotics.  Repeat CT chest is pending as per orders of Dr. Truong.    Pneumomediastinum noted.  No significant pneumothorax at this time.    Immunocompromised host due to CLL and history NHL.    CKD 3 further complicates care.    Remains on full anticoagulation for A. fib.    Guarded prognosis.    Cheo Kraft MD  10/02/21  13:28 EDT    While in the room and during my examination of the patient I wore gloves, gown, mask, eye protection and followed enhanced droplet/contact isolation protocol and precautions.  I washed my hands before and after this patient encounter.    Part of this note may be an electronic transcription/translation of spoken language to printed text using the Dragon Dictation System.

## 2021-10-02 NOTE — PROGRESS NOTES
"DAILY PROGRESS NOTE  [facility]    Patient Identification:  Name: Angel Cisneros  Age: 71 y.o.  Sex: male  :  1950  MRN: [id number]         Primary Care Physician: Ayaan Amin MD    Subjective:  Interval History:He is short of air. On 60 L Optiflow.  And also uses 100% nonrebreather mask.    Objective:    Scheduled Meds:amLODIPine, 10 mg, Oral, Q24H  calcium carbonate, 1 tablet, Oral, Daily  dexamethasone, 6 mg, Intravenous, Q12H  dextromethorphan polistirex ER, 60 mg, Oral, Q12H  enoxaparin, 1 mg/kg, Subcutaneous, Q24H  famotidine, 20 mg, Oral, Daily  finasteride, 5 mg, Oral, Daily  insulin lispro, 0-9 Units, Subcutaneous, TID With Meals  metoprolol tartrate, 12.5 mg, Oral, Q12H  mirtazapine, 7.5 mg, Oral, Nightly  prednisoLONE acetate, 1 drop, Both Eyes, Daily  saccharomyces boulardii, 250 mg, Oral, BID  sodium chloride, 1 drop, Left Eye, TID      Continuous Infusions:     Vital signs in last 24 hours:  Temp:  [97.3 °F (36.3 °C)-98 °F (36.7 °C)] 97.3 °F (36.3 °C)  Heart Rate:  [59-83] 70  Resp:  [18-20] 20  BP: (126-146)/(73-94) 139/75    Intake/Output:    Intake/Output Summary (Last 24 hours) at 10/2/2021 1829  Last data filed at 10/2/2021 1728  Gross per 24 hour   Intake 680 ml   Output 1395 ml   Net -715 ml       Exam:  /75 (BP Location: Left arm, Patient Position: Sitting)   Pulse 70   Temp 97.3 °F (36.3 °C) (Oral)   Resp 20   Ht 170.2 cm (67.01\")   Wt 64.5 kg (142 lb 4.8 oz)   SpO2 95%   BMI 22.28 kg/m²     General Appearance:    Alert, cooperative, no distress   Head:    Normocephalic, without obvious abnormality, atraumatic   Eyes:       Throat:   Lips, tongue, gums normal   Neck:   Supple, symmetrical, trachea midline, no JVD   Lungs:     Clear to auscultation bilaterally, respirations unlabored   Chest Wall:    No tenderness or deformity    Heart:    Regular rate and rhythm, S1 and S2 normal, no murmur,no  Rub or gallop   Abdomen:     Soft, nontender, " bowel sounds active, no masses, no organomegaly    Extremities:   Extremities normal, atraumatic, no cyanosis or edema   Pulses:      Skin:   Skin is warm and dry,  no rashes or palpable lesions   Neurologic:   no focal deficits noted      Lab Results (last 72 hours)     Procedure Component Value Units Date/Time    POC Glucose Once [[id number]]  (Abnormal) Collected: 09/25/21 1625    Specimen: Blood Updated: 09/25/21 1627     Glucose 139 mg/dL      Comment: Meter: [id number] : [id number] Aniket Naldo YOHAN       POC Glucose Once [[id number]]  (Abnormal) Collected: 09/25/21 1112    Specimen: Blood Updated: 09/25/21 1114     Glucose 136 mg/dL      Comment: Meter: [id number] : [id number] Aniket ALMANZAR       Manual Differential [[id number]]  (Abnormal) Collected: 09/25/21 0457    Specimen: Blood Updated: 09/25/21 0615     Neutrophil % 97.9 %      Lymphocyte % 1.0 %      Monocyte % 1.0 %      Neutrophils Absolute 9.63 10*3/mm3      Lymphocytes Absolute 0.10 10*3/mm3      Monocytes Absolute 0.10 10*3/mm3      nRBC 1.0 /100 WBC      Polychromasia Slight/1+     WBC Morphology Normal     Platelet Morphology Normal    CBC & Differential [[id number]]  (Abnormal) Collected: 09/25/21 0457    Specimen: Blood Updated: 09/25/21 0615    Narrative:      The following orders were created for panel order CBC & Differential.  Procedure                               Abnormality         Status                     ---------                               -----------         ------                     CBC Auto Differential[[id number]]        Abnormal            Final result                 Please view results for these tests on the individual orders.    CBC Auto Differential [[id number]]  (Abnormal) Collected: 09/25/21 0457    Specimen: Blood Updated: 09/25/21 0615     WBC 9.84 10*3/mm3      RBC 4.42 10*6/mm3      Hemoglobin 13.1 g/dL      Hematocrit 38.8 %      MCV 87.8 fL      MCH 29.6 pg      MCHC 33.8 g/dL      RDW 14.0 %      RDW-SD  44.8 fl      MPV 11.6 fL      Platelets 92 10*3/mm3     POC Glucose Once [[id number]]  (Normal) Collected: 09/25/21 0555    Specimen: Blood Updated: 09/25/21 0557     Glucose 111 mg/dL      Comment: Meter: [id number] : [id number] Yakov Galeano NA       Basic Metabolic Panel [[id number]]  (Abnormal) Collected: 09/25/21 0457    Specimen: Blood Updated: 09/25/21 0556     Glucose 96 mg/dL      BUN 40 mg/dL      Creatinine 1.39 mg/dL      Sodium 139 mmol/L      Potassium 5.2 mmol/L      Comment: Slight hemolysis detected by analyzer. Results may be affected.        Chloride 105 mmol/L      CO2 25.0 mmol/L      Calcium 8.4 mg/dL      eGFR Non African Amer 51 mL/min/1.73      BUN/Creatinine Ratio 28.8     Anion Gap 9.0 mmol/L     Narrative:      GFR Normal >60  Chronic Kidney Disease <60  Kidney Failure <15      POC Glucose Once [[id number]]  (Abnormal) Collected: 09/24/21 1916    Specimen: Blood Updated: 09/24/21 1917     Glucose 235 mg/dL      Comment: Meter: [id number] : [id number] Yakovcain Galeano NA       POC Glucose Once [[id number]]  (Normal) Collected: 09/24/21 1612    Specimen: Blood Updated: 09/24/21 1621     Glucose 91 mg/dL      Comment: Meter: [id number] : [id number] Estelita Veras NA       POC Glucose Once [[id number]]  (Normal) Collected: 09/24/21 1130    Specimen: Blood Updated: 09/24/21 1134     Glucose 87 mg/dL      Comment: Meter: [id number] : [id number] Aniket ALMANZAR       Basic Metabolic Panel [[id number]]  (Abnormal) Collected: 09/24/21 0606    Specimen: Blood Updated: 09/24/21 0730     Glucose 98 mg/dL      BUN 39 mg/dL      Creatinine 1.27 mg/dL      Sodium 138 mmol/L      Potassium 4.5 mmol/L      Chloride 105 mmol/L      CO2 22.5 mmol/L      Calcium 8.3 mg/dL      eGFR Non African Amer 56 mL/min/1.73      BUN/Creatinine Ratio 30.7     Anion Gap 10.5 mmol/L     Narrative:      GFR Normal >60  Chronic Kidney Disease <60  Kidney Failure <15      CBC & Differential [[id number]]  (Abnormal)  Collected: 09/24/21 0606    Specimen: Blood Updated: 09/24/21 0701    Narrative:      The following orders were created for panel order CBC & Differential.  Procedure                               Abnormality         Status                     ---------                               -----------         ------                     CBC Auto Differential[[id number]]        Abnormal            Final result                 Please view results for these tests on the individual orders.    CBC Auto Differential [[id number]]  (Abnormal) Collected: 09/24/21 0606    Specimen: Blood Updated: 09/24/21 0701     WBC 10.71 10*3/mm3      RBC 4.37 10*6/mm3      Hemoglobin 12.9 g/dL      Hematocrit 38.1 %      MCV 87.2 fL      MCH 29.5 pg      MCHC 33.9 g/dL      RDW 13.9 %      RDW-SD 43.3 fl      MPV 12.0 fL      Platelets 103 10*3/mm3      Neutrophil % 90.3 %      Lymphocyte % 5.0 %      Monocyte % 2.4 %      Eosinophil % 0.0 %      Basophil % 0.2 %      Immature Grans % 2.1 %      Neutrophils, Absolute 9.67 10*3/mm3      Lymphocytes, Absolute 0.54 10*3/mm3      Monocytes, Absolute 0.26 10*3/mm3      Eosinophils, Absolute 0.00 10*3/mm3      Basophils, Absolute 0.02 10*3/mm3      Immature Grans, Absolute 0.22 10*3/mm3      nRBC 0.0 /100 WBC     POC Glucose Once [[id number]]  (Normal) Collected: 09/24/21 0621    Specimen: Blood Updated: 09/24/21 0622     Glucose 100 mg/dL      Comment: Meter: [id number] : [id number] Luis Armando ALMANZAR       POC Glucose Once [[id number]]  (Normal) Collected: 09/23/21 2013    Specimen: Blood Updated: 09/23/21 2015     Glucose 115 mg/dL      Comment: Meter: [id number] : [id number] Luis Armando Sethilie YOHAN       CBC & Differential [[id number]]  (Abnormal) Collected: 09/23/21 1742    Specimen: Blood Updated: 09/23/21 1805    Narrative:      The following orders were created for panel order CBC & Differential.  Procedure                               Abnormality         Status                     ---------                                -----------         ------                     CBC Auto Differential[[id number]]        Abnormal            Final result                 Please view results for these tests on the individual orders.    CBC Auto Differential [[id number]]  (Abnormal) Collected: 09/23/21 1742    Specimen: Blood Updated: 09/23/21 1805     WBC 13.72 10*3/mm3      RBC 4.57 10*6/mm3      Hemoglobin 13.6 g/dL      Hematocrit 41.4 %      MCV 90.6 fL      MCH 29.8 pg      MCHC 32.9 g/dL      RDW 13.7 %      RDW-SD 45.2 fl      MPV 11.7 fL      Platelets 104 10*3/mm3      Neutrophil % 90.7 %      Lymphocyte % 3.1 %      Monocyte % 3.5 %      Eosinophil % 0.0 %      Basophil % 0.4 %      Neutrophils, Absolute 12.45 10*3/mm3      Lymphocytes, Absolute 0.43 10*3/mm3      Monocytes, Absolute 0.48 10*3/mm3      Eosinophils, Absolute 0.00 10*3/mm3      Basophils, Absolute 0.05 10*3/mm3     POC Glucose Once [[id number]]  (Normal) Collected: 09/23/21 1633    Specimen: Blood Updated: 09/23/21 1635     Glucose 108 mg/dL      Comment: Meter: [id number] : [id number] Taggso NA       POC Glucose Once [[id number]]  (Normal) Collected: 09/23/21 1132    Specimen: Blood Updated: 09/23/21 1135     Glucose 105 mg/dL      Comment: Meter: [id number] : [id number] [email or web address] NA       Basic Metabolic Panel [[id number]]  (Abnormal) Collected: 09/23/21 0547    Specimen: Blood Updated: 09/23/21 0741     Glucose 103 mg/dL      BUN 40 mg/dL      Creatinine 1.38 mg/dL      Sodium 141 mmol/L      Potassium 4.4 mmol/L      Chloride 108 mmol/L      CO2 25.2 mmol/L      Calcium 8.0 mg/dL      eGFR Non African Amer 51 mL/min/1.73      BUN/Creatinine Ratio 29.0     Anion Gap 7.8 mmol/L     Narrative:      GFR Normal >60  Chronic Kidney Disease <60  Kidney Failure <15      POC Glucose Once [[id number]]  (Normal) Collected: 09/23/21 0554    Specimen: Blood Updated: 09/23/21 0557     Glucose 99 mg/dL      Comment: Meter: [id number] :  [id number] Luis Armando ALMANZAR           Data Review:  Results from last 7 days   Lab Units 10/02/21  0516 10/01/21  0445 10/01/21  0445 09/30/21  0441 09/30/21  0441   SODIUM mmol/L 139  --  139  --  139   POTASSIUM mmol/L 4.8  --  4.8  --  4.8   CHLORIDE mmol/L 108*  --  109*  --  109*   CO2 mmol/L 20.6*  --  20.3*  --  19.9*   BUN mg/dL 46*  --  46*  --  44*   CREATININE mg/dL 1.34*  --  1.52*  --  1.39*   GLUCOSE mg/dL 102*   < > 110*   < > 107*   CALCIUM mg/dL 8.1*  --  8.1*  --  8.3*    < > = values in this interval not displayed.     Results from last 7 days   Lab Units 10/02/21  0516 10/01/21  0445 09/30/21  0441   WBC 10*3/mm3 6.34 7.41 9.38   HEMOGLOBIN g/dL 12.5* 12.1* 12.6*   HEMATOCRIT % 37.4* 36.0* 37.9   PLATELETS 10*3/mm3 104* 91* 99*             Lab Results   Lab Value Date/Time    TROPONINT 0.011 09/05/2021 1653    TROPONINT <0.010 09/08/2018 0450    TROPONINT <0.010 09/07/2018 2232    TROPONINT <0.010 09/07/2018 1426               Invalid input(s): PROT, LABALBU          Glucose   Date/Time Value Ref Range Status   10/02/2021 1620 96 70 - 130 mg/dL Final     Comment:     Meter: [id number] : [id number] Cj Santiago NA   10/02/2021 1116 115 70 - 130 mg/dL Final     Comment:     Meter: [id number] : [id number] Estelita Veras NA   10/02/2021 0606 110 70 - 130 mg/dL Final     Comment:     Meter: [id number] : [id number] Felecia Noland    10/01/2021 2133 115 70 - 130 mg/dL Final     Comment:     Meter: [id number] : [id number] Felecia Noland    10/01/2021 1538 97 70 - 130 mg/dL Final     Comment:     Meter: [id number] : [id number] Wilian Lu ROCÍO   10/01/2021 1049 152 (H) 70 - 130 mg/dL Final     Comment:     Meter: [id number] : [id number] Wilian Lu ROCÍO   10/01/2021 0540 104 70 - 130 mg/dL Final     Comment:     Meter: [id number] : [id number] ALMANZAR   09/30/2021 2032 107 70 - 130 mg/dL Final     Comment:     Meter: [id number] : [id number] Isma ALMANZAR            Past Medical History:   Diagnosis Date   • Arthritis     both knees   • Cancer (CMS/HCC)     dx with lymphoma 2009/ radiation   • Cataracts, bilateral    • Hx of cornea transplant     both eyes   • Leukemia (CMS/HCC)    • Lymphoma (CMS/HCC)     dx in 2009. treated with radiation.   • Lymphoma (CMS/HCC)     treated with radiation. dx in 2009       Assessment:  Active Hospital Problems    Diagnosis  POA   • **Pneumonia due to COVID-19 virus [U07.1, J12.82]  Yes   • Paroxysmal atrial fibrillation (HCC) [I48.0]  No   • Acute respiratory failure with hypoxia (HCC) [J96.01]  Yes   • BPH (benign prostatic hyperplasia) [N40.0]  Yes   • CLL (chronic lymphocytic leukemia) (HCC) [C91.10]  Yes   • Chronic kidney disease, stage 3b (HCC) [N18.32]  Yes   • HTN (hypertension) [I10]  Yes   • GERD (gastroesophageal reflux disease) [K21.9]  Yes   • Hodgkin's disease of lymph nodes of head, face, and neck (HCC) [C81.91]  Yes      Resolved Hospital Problems   No resolved problems to display.       Plan:  Continue with decadron. Finished remdesivir. Follow lab.  Continue supportive care.  Try to wean oxygen when able.      Fer Urbano MD  10/2/2021  18:29 EDT

## 2021-10-02 NOTE — THERAPY TREATMENT NOTE
Patient Name: Angel Cisneros  : 1950    MRN: [id number]                              Today's Date: 10/2/2021       Admit Date: 2021    Visit Dx:     ICD-10-CM ICD-9-CM   1. COVID-19 virus infection  U07.1 079.89   2. Acute hypoxemic respiratory failure (CMS/HCC)  J96.01 518.81   3. Acute renal failure superimposed on chronic kidney disease, unspecified CKD stage, unspecified acute renal failure type (CMS/HCC)  N17.9 584.9    N18.9 585.9     Patient Active Problem List   Diagnosis   • Lymphoma (HCC)   • Leukemia (HCC)   • Chest pain   • GERD (gastroesophageal reflux disease)   • HTN (hypertension)   • Chronic kidney disease, stage 3b (HCC)   • Generalized abdominal pain   • CLL (chronic lymphocytic leukemia) (HCC)   • Gastroesophageal reflux disease   • Hodgkin's disease of lymph nodes of head, face, and neck (HCC)   • Oral thrush   • Medicare annual wellness visit, subsequent   • Primary insomnia   • Adjustment disorder with depressed mood   • Dysuria   • Gross hematuria   • Acute non-recurrent maxillary sinusitis   • Cough   • BPH (benign prostatic hyperplasia)   • Elevated PSA   • Clinical diagnosis of COVID-19   • Pneumonia due to COVID-19 virus   • Paroxysmal atrial fibrillation (HCC)   • Acute respiratory failure with hypoxia (HCC)     Past Medical History:   Diagnosis Date   • Arthritis     both knees   • Cancer (CMS/HCC)     dx with lymphoma / radiation   • Cataracts, bilateral    • Hx of cornea transplant     both eyes   • Leukemia (CMS/HCC)    • Lymphoma (CMS/HCC)     dx in . treated with radiation.   • Lymphoma (CMS/HCC)     treated with radiation. dx in      Past Surgical History:   Procedure Laterality Date   • BREAST LUMPECTOMY Right    • COLONOSCOPY N/A 3/8/2016    Procedure: COLONOSCOPY with cold polypectomy X 3;  Surgeon: Chris Harden MD;  Location: [facility] ENDOSCOPY;  Service:    • ENDOSCOPY N/A 10/23/2018    Procedure: ESOPHAGOGASTRODUODENOSCOPY WITH BIOPSY;   Surgeon: Chris Harden MD;  Location: [facility] ENDOSCOPY;  Service: Gastroenterology   • EYE SURGERY      partial cornea transplant 4-5 years ago   • KNEE SURGERY Left    • TOE SURGERY Bilateral    • TONSILLECTOMY       General Information     Row Name 10/02/21 1622          Physical Therapy Time and Intention    Document Type  therapy note (daily note)  -RS     Mode of Treatment  physical therapy;individual therapy  -RS     Row Name 10/02/21 1622          General Information    Patient Profile Reviewed  yes  -RS     Existing Precautions/Restrictions  fall;oxygen therapy device and L/min optiflow and NRB  -RS       User Key  (r) = Recorded By, (t) = Taken By, (c) = Cosigned By    Initials Name Provider Type    RS Coni Hamilton PT Physical Therapist        Mobility     Row Name 10/02/21 1622          Bed Mobility    Bed Mobility  sit-supine;scooting/bridging  -RS     Scooting/Bridging [place] (Bed Mobility)  supervision;verbal cues  -RS     Supine-Sit [place] (Bed Mobility)  standby assist  -RS     Sit-Supine [place] (Bed Mobility)  standby assist  -RS     Assistive Device (Bed Mobility)  bed rails  -RS     Row Name 10/02/21 1622          Transfers    Comment (Transfers)  performed STS, stood for 25 sec, initially SpO2 86%, quickly dropped to 74% therefore pt transferred back to supine  -RS     Row Name 10/02/21 1622          Sit-Stand Transfer    Sit-Stand [place] (Transfers)  verbal cues;nonverbal cues (demo/gesture);contact guard  -RS     Row Name 10/02/21 1622          Gait/Stairs (Locomotion)    Distance in Feet (Gait)  held gait due to SpO2  -RS       User Key  (r) = Recorded By, (t) = Taken By, (c) = Cosigned By    Initials Name Provider Type    RS Coni Hamilton PT Physical Therapist        Obj/Interventions    No documentation.       Goals/Plan    No documentation.       Clinical Impression     Row Name 10/02/21 1623          Plan of Care Review    Outcome Summary  Pt motivated  to participate with PT although anxious regarding O2 sats. He performed supine to sit and sat EOB x 3 min prior to O2 decreasing into high 70s, after 2.5 min rest break, O2 recovered. Pt them performed STS and stood for 25 seconds prior to O2 decreasing to 74%, therefore pt transitioned back to supine and Sp)2 recovered in  seconds on optiflow. Pt remains appropriate for skilled PT.  -RS     Row Name 10/02/21 1623          Vital Signs    Pre SpO2 (%)  91  -RS     O2 Delivery Pre Treatment  -- optiflow throughout  -RS     Intra SpO2 (%)  74  -RS     Post SpO2 (%)  89  -RS     Row Name 10/02/21 1623          Positioning and Restraints    Pre-Treatment Position  in bed  -RS     Post Treatment Position  bed  -RS     In Bed  supine;call light within reach;encouraged to call for assist;exit alarm on  -RS       User Key  (r) = Recorded By, (t) = Taken By, (c) = Cosigned By    Initials Name Provider Type    RS Coni Hamilton PT Physical Therapist        Outcome Measures     Row Name 10/02/21 1626          How much help from another person do you currently need...    Turning from your back to your side while in flat bed without using bedrails?  4  -RS     Moving from lying on back to sitting on the side of a flat bed without bedrails?  3  -RS     Moving to and from a bed to a chair (including a wheelchair)?  3  -RS     Standing up from a chair using your arms (e.g., wheelchair, bedside chair)?  3  -RS     Climbing 3-5 steps with a railing?  2  -RS     To walk in hospital room?  3  -RS     AM-PAC 6 Clicks Score (PT)  18  -RS     Row Name 10/02/21 1626          Functional Assessment    Outcome Measure Options  AM-PAC 6 Clicks Basic Mobility (PT)  -RS       User Key  (r) = Recorded By, (t) = Taken By, (c) = Cosigned By    Initials Name Provider Type    Coni Card PT Physical Therapist                       Physical Therapy Education                 Title: PT OT SLP Therapies (Done)     Topic: Physical  Therapy (Done)     Point: Mobility training (Done)     Learning Progress Summary           Patient Acceptance, E, VU by RS at 10/2/2021 1626    Acceptance, E,TB,D, VU,NR by  at 9/30/2021 1638    Acceptance, E, VU by AG at 9/30/2021 1550    Acceptance, E, VU by AG at 9/29/2021 1534    Acceptance, E,D, VU by MS at 9/28/2021 1128    Acceptance, E, VU by AG at 9/26/2021 1614    Acceptance, E, VU by AG at 9/25/2021 1546    Acceptance, E,D, VU,NR by MS at 9/25/2021 1152    Acceptance, E, VU by AG at 9/24/2021 1542    Acceptance, E,TB,D, VU,NR,DU by  at 9/23/2021 1329    Acceptance, E, VU by AG at 9/22/2021 1531    Acceptance, E,D, VU,NR by MS at 9/21/2021 1533    Acceptance, E,TB,D, VU,NR by  at 9/17/2021 1836    Acceptance, E, NR by  at 9/15/2021 1551    Acceptance, E, VU by SR at 9/13/2021 1246    Acceptance, E,D, NR by  at 9/10/2021 1933    Acceptance, E,TB,D, VU by  at 9/8/2021 1725    Acceptance, E,D, VU,DU by  at 9/6/2021 0926                   Point: Home exercise program (Done)     Learning Progress Summary           Patient Acceptance, E, VU by RS at 10/2/2021 1626    Acceptance, E,TB,D, VU,NR by  at 9/30/2021 1638    Acceptance, E, VU by AG at 9/30/2021 1550    Acceptance, E, VU by AG at 9/29/2021 1534    Acceptance, E,D, VU by MS at 9/28/2021 1128    Acceptance, E, VU by AG at 9/26/2021 1614    Acceptance, E, VU by AG at 9/25/2021 1546    Acceptance, E,D, VU,NR by MS at 9/25/2021 1152    Acceptance, E, VU by AG at 9/24/2021 1542    Acceptance, E,TB,D, VU,NR,DU by CB at 9/23/2021 1329    Acceptance, E, VU by AG at 9/22/2021 1531    Acceptance, E,D, VU,NR by MS at 9/21/2021 1533    Acceptance, E,TB,D, VU,NR by JM at 9/17/2021 1836    Acceptance, E, NR by LH at 9/15/2021 1551    Acceptance, E, VU by SR at 9/13/2021 1246    Acceptance, E,D, NR by JM at 9/10/2021 1933    Acceptance, E,TB,D, VU by JM at 9/8/2021 1725    Acceptance, E,D, VU,DU by LC at 9/6/2021 0926                   Point: Body  mechanics (Done)     Learning Progress Summary           Patient Acceptance, E, VU by RS at 10/2/2021 1626    Acceptance, E,TB,D, VU,NR by CH at 9/30/2021 1638    Acceptance, E, VU by AG at 9/30/2021 1550    Acceptance, E, VU by AG at 9/29/2021 1534    Acceptance, E,D, VU by MS at 9/28/2021 1128    Acceptance, E, VU by AG at 9/26/2021 1614    Acceptance, E, VU by AG at 9/25/2021 1546    Acceptance, E,D, VU,NR by MS at 9/25/2021 1152    Acceptance, E, VU by AG at 9/24/2021 1542    Acceptance, E,TB,D, VU,NR,DU by  at 9/23/2021 1329    Acceptance, E, VU by AG at 9/22/2021 1531    Acceptance, E,D, VU,NR by MS at 9/21/2021 1533    Acceptance, E,TB,D, VU,NR by JM at 9/17/2021 1836    Acceptance, E, NR by  at 9/15/2021 1551    Acceptance, E, VU by SR at 9/13/2021 1246    Acceptance, E,D, NR by JM at 9/10/2021 1933    Acceptance, E,TB,D, VU by JM at 9/8/2021 1725    Acceptance, E,D, VU,DU by LC at 9/6/2021 0926                   Point: Precautions (Done)     Learning Progress Summary           Patient Acceptance, E, VU by RS at 10/2/2021 1626    Acceptance, E,TB,D, VU,NR by  at 9/30/2021 1638    Acceptance, E, VU by AG at 9/30/2021 1550    Acceptance, E, VU by AG at 9/29/2021 1534    Acceptance, E,D, VU by MS at 9/28/2021 1128    Acceptance, E, VU by AG at 9/26/2021 1614    Acceptance, E, VU by AG at 9/25/2021 1546    Acceptance, E,D, VU,NR by MS at 9/25/2021 1152    Acceptance, E, VU by AG at 9/24/2021 1542    Acceptance, E,TB,D, VU,NR,DU by CB at 9/23/2021 1329    Acceptance, E, VU by AG at 9/22/2021 1531    Acceptance, E,D, VU,NR by MS at 9/21/2021 1533    Acceptance, E,TB,D, VU,NR by JM at 9/17/2021 1836    Acceptance, E, NR by LH at 9/15/2021 1551    Acceptance, E, VU by SR at 9/13/2021 1246    Acceptance, E,D, NR by JM at 9/10/2021 1933    Acceptance, E,TB,D, VU by JM at 9/8/2021 1725    Acceptance, E,D, VU,DU by LC at 9/6/2021 0926                               User Key     Initials Effective Dates Name  Provider Type Discipline     06/16/21 -  Neida Abbott, PT Physical Therapist PT    LH 06/16/21 -  Iman Archer, PT Physical Therapist PT    JM 03/07/18 -  Breana Almonte, MIGUE Physical Therapy Assistant PT    MS 06/16/21 -  Angel Vergara, PT Physical Therapist PT    LC 07/02/20 -  Gino Bolden, PT DPT Physical Therapist PT    RS 06/16/21 -  Coni Hamilton, CATARINO Physical Therapist PT    AG 06/16/21 -  Giuseppe Beasley, RN Registered Nurse Nurse    CB 12/30/20 -  Brigid Marinelli Physical Therapist PT    SR 02/08/21 -  Lucretia Weiss Physical Therapist PT              PT Recommendation and Plan     Outcome Summary: Pt motivated to participate with PT although anxious regarding O2 sats. He performed supine to sit and sat EOB x 3 min prior to O2 decreasing into high 70s, after 2.5 min rest break, O2 recovered. Pt them performed STS and stood for 25 seconds prior to O2 decreasing to 74%, therefore pt transitioned back to supine and Sp)2 recovered in  seconds on optiflow. Pt remains appropriate for skilled PT.     Time Calculation:         PT G-Codes  Outcome Measure Options: AM-PAC 6 Clicks Basic Mobility (PT)  AM-PAC 6 Clicks Score (PT): 18  AM-PAC 6 Clicks Score (OT): 19    Coni Hamilton, PT  10/2/2021

## 2021-10-03 ENCOUNTER — APPOINTMENT (OUTPATIENT)
Dept: CT IMAGING | Facility: HOSPITAL | Age: 71
End: 2021-10-03

## 2021-10-03 ENCOUNTER — APPOINTMENT (OUTPATIENT)
Dept: GENERAL RADIOLOGY | Facility: HOSPITAL | Age: 71
End: 2021-10-03

## 2021-10-03 LAB
ANION GAP SERPL CALCULATED.3IONS-SCNC: 10.1 MMOL/L (ref 5–15)
BASOPHILS # BLD AUTO: 0.01 10*3/MM3 (ref 0–0.2)
BASOPHILS NFR BLD AUTO: 0.2 % (ref 0–1.5)
BUN SERPL-MCNC: 45 MG/DL (ref 8–23)
BUN/CREAT SERPL: 33.1 (ref 7–25)
CALCIUM SPEC-SCNC: 8.5 MG/DL (ref 8.6–10.5)
CHLORIDE SERPL-SCNC: 109 MMOL/L (ref 98–107)
CO2 SERPL-SCNC: 21.9 MMOL/L (ref 22–29)
CREAT SERPL-MCNC: 1.36 MG/DL (ref 0.76–1.27)
DEPRECATED RDW RBC AUTO: 45.6 FL (ref 37–54)
EOSINOPHIL # BLD AUTO: 0 10*3/MM3 (ref 0–0.4)
EOSINOPHIL NFR BLD AUTO: 0 % (ref 0.3–6.2)
ERYTHROCYTE [DISTWIDTH] IN BLOOD BY AUTOMATED COUNT: 14.5 % (ref 12.3–15.4)
GFR SERPL CREATININE-BSD FRML MDRD: 52 ML/MIN/1.73
GLUCOSE BLDC GLUCOMTR-MCNC: 102 MG/DL (ref 70–130)
GLUCOSE BLDC GLUCOMTR-MCNC: 110 MG/DL (ref 70–130)
GLUCOSE BLDC GLUCOMTR-MCNC: 111 MG/DL (ref 70–130)
GLUCOSE BLDC GLUCOMTR-MCNC: 116 MG/DL (ref 70–130)
GLUCOSE SERPL-MCNC: 101 MG/DL (ref 65–99)
HCT VFR BLD AUTO: 37.2 % (ref 37.5–51)
HGB BLD-MCNC: 12.4 G/DL (ref 13–17.7)
LYMPHOCYTES # BLD AUTO: 0.36 10*3/MM3 (ref 0.7–3.1)
LYMPHOCYTES NFR BLD AUTO: 6.2 % (ref 19.6–45.3)
MCH RBC QN AUTO: 29.6 PG (ref 26.6–33)
MCHC RBC AUTO-ENTMCNC: 33.3 G/DL (ref 31.5–35.7)
MCV RBC AUTO: 88.8 FL (ref 79–97)
MONOCYTES # BLD AUTO: 0.2 10*3/MM3 (ref 0.1–0.9)
MONOCYTES NFR BLD AUTO: 3.5 % (ref 5–12)
NEUTROPHILS NFR BLD AUTO: 5.13 10*3/MM3 (ref 1.7–7)
NEUTROPHILS NFR BLD AUTO: 88.5 % (ref 42.7–76)
PLATELET # BLD AUTO: 107 10*3/MM3 (ref 140–450)
PMV BLD AUTO: 10.9 FL (ref 6–12)
POTASSIUM SERPL-SCNC: 4.9 MMOL/L (ref 3.5–5.2)
RBC # BLD AUTO: 4.19 10*6/MM3 (ref 4.14–5.8)
SODIUM SERPL-SCNC: 141 MMOL/L (ref 136–145)
WBC # BLD AUTO: 5.79 10*3/MM3 (ref 3.4–10.8)

## 2021-10-03 PROCEDURE — 80048 BASIC METABOLIC PNL TOTAL CA: CPT | Performed by: INTERNAL MEDICINE

## 2021-10-03 PROCEDURE — 25010000002 ENOXAPARIN PER 10 MG: Performed by: INTERNAL MEDICINE

## 2021-10-03 PROCEDURE — 94799 UNLISTED PULMONARY SVC/PX: CPT

## 2021-10-03 PROCEDURE — 71045 X-RAY EXAM CHEST 1 VIEW: CPT

## 2021-10-03 PROCEDURE — 25010000002 DEXAMETHASONE PER 1 MG: Performed by: INTERNAL MEDICINE

## 2021-10-03 PROCEDURE — 71250 CT THORAX DX C-: CPT

## 2021-10-03 PROCEDURE — 85025 COMPLETE CBC W/AUTO DIFF WBC: CPT | Performed by: INTERNAL MEDICINE

## 2021-10-03 PROCEDURE — 82962 GLUCOSE BLOOD TEST: CPT

## 2021-10-03 PROCEDURE — 94760 N-INVAS EAR/PLS OXIMETRY 1: CPT

## 2021-10-03 RX ADMIN — PREDNISOLONE ACETATE 1 DROP: 10 SUSPENSION/ DROPS OPHTHALMIC at 09:36

## 2021-10-03 RX ADMIN — AMLODIPINE BESYLATE 10 MG: 10 TABLET ORAL at 09:35

## 2021-10-03 RX ADMIN — METOPROLOL TARTRATE 12.5 MG: 25 TABLET ORAL at 20:50

## 2021-10-03 RX ADMIN — SODIUM CHLORIDE 1 DROP: 50 SOLUTION OPHTHALMIC at 16:30

## 2021-10-03 RX ADMIN — DEXTROMETHORPHAN POLISTIREX 60 MG: 30 SUSPENSION, EXTENDED RELEASE ORAL at 20:49

## 2021-10-03 RX ADMIN — ANTACID TABLETS 1 TABLET: 500 TABLET, CHEWABLE ORAL at 09:35

## 2021-10-03 RX ADMIN — DEXTROMETHORPHAN POLISTIREX 60 MG: 30 SUSPENSION, EXTENDED RELEASE ORAL at 09:35

## 2021-10-03 RX ADMIN — ENOXAPARIN SODIUM 70 MG: 80 INJECTION, SOLUTION INTRAVENOUS; SUBCUTANEOUS at 14:28

## 2021-10-03 RX ADMIN — METOPROLOL TARTRATE 12.5 MG: 25 TABLET ORAL at 09:34

## 2021-10-03 RX ADMIN — DEXAMETHASONE SODIUM PHOSPHATE 6 MG: 10 INJECTION INTRAMUSCULAR; INTRAVENOUS at 09:35

## 2021-10-03 RX ADMIN — Medication 3 MG: at 22:09

## 2021-10-03 RX ADMIN — FAMOTIDINE 20 MG: 20 TABLET, FILM COATED ORAL at 09:34

## 2021-10-03 RX ADMIN — Medication 250 MG: at 20:49

## 2021-10-03 RX ADMIN — SODIUM CHLORIDE 1 DROP: 50 SOLUTION OPHTHALMIC at 09:36

## 2021-10-03 RX ADMIN — Medication 250 MG: at 09:35

## 2021-10-03 RX ADMIN — MIRTAZAPINE 7.5 MG: 15 TABLET, FILM COATED ORAL at 20:49

## 2021-10-03 RX ADMIN — DEXAMETHASONE SODIUM PHOSPHATE 6 MG: 10 INJECTION INTRAMUSCULAR; INTRAVENOUS at 20:53

## 2021-10-03 RX ADMIN — SODIUM CHLORIDE, PRESERVATIVE FREE 10 ML: 5 INJECTION INTRAVENOUS at 20:50

## 2021-10-03 RX ADMIN — FINASTERIDE 5 MG: 5 TABLET, FILM COATED ORAL at 09:35

## 2021-10-03 NOTE — PROGRESS NOTES
[facility]  [phone]  Cheo Kraft MD    Subjective:  LOS: 28    Chief Complaint: Acute respiratory failure    Comfortable on Optiflow. Sats drop very quickly and easily with any exertion, even movement in bed.    Objective   Vital Signs past 24hrs    Temp range: Temp (24hrs), Av.5 °F (36.4 °C), Min:96.6 °F (35.9 °C), Max:98.6 °F (37 °C)    BP range: BP: (142-156)/(77-95) 142/77  Pulse range: Heart Rate:  [62-84] 66  Resp rate range: Resp:  [20-22] 20    Device (Oxygen Therapy): heated;high-flow nasal cannula;humidifiedFlow (L/min):  [6-60] 60  Oxygen range:SpO2:  [90 %-97 %] 94 %      62.6 kg (137 lb 14.4 oz); Body mass index is 21.59 kg/m².    Intake/Output Summary (Last 24 hours) at 10/3/2021 1503  Last data filed at 10/3/2021 1426  Gross per 24 hour   Intake 560 ml   Output 2075 ml   Net -1515 ml       Physical Exam  Eyes:      Pupils: Pupils are equal, round, and reactive to light.   Cardiovascular:      Rate and Rhythm: Normal rate and regular rhythm.      Heart sounds: No murmur heard.     Pulmonary:      Effort: Pulmonary effort is normal.      Breath sounds: Normal breath sounds.      Comments: No adventitious sounds  Abdominal:      General: Bowel sounds are normal.      Palpations: Abdomen is soft. There is no mass.      Tenderness: There is no abdominal tenderness.   Musculoskeletal:         General: No swelling.   Neurological:      Mental Status: He is alert.       Results Review:    I have reviewed the laboratory and imaging data since the last note by [facility] physician.  My annotations are noted in assessment and plan.    Results from last 7 days   Lab Units 21  0441 21  0625   FERRITIN ng/mL 1,019.00*  --    D DIMER QUANT MCGFEU/mL  --  0.45   CRP mg/dL <0.30  --        Medication Review:  I have reviewed the current MAR.  My annotations are noted in assessment and plan.       Plan   PCCM Problems  Acute hypoxic respiratory failure  SARS-CoV-2 infection  Bilateral  pulmonary infiltrates  Secondary bacterial infection  Pneumomediastinum  CLL  History NHL  CKD 3  Paroxysmal A. fib  Low platelets          Plan of Treatment    Continue supplemental oxygen.  Currently 70% OptiFlow and will try 60%. Needs to use +NRB with any exertion.    Supportive treatment for SARS-CoV-2 infection.  Currently on dexamethasone 6 mg twice daily.  Previously on 10 mg twice daily.  Can continue to lower dose as long as CRP remains within normal limits.    Concern for secondary bacterial infection.  Patient initially treated with antibiotics.      CT 10/2/21 noted.    Pneumomediastinum not reported on CT.  No significant pneumothorax at this time.    Immunocompromised host due to CLL and history NHL.    CKD 3 further complicates care.    Remains on full anticoagulation for A. fib.    Guarded prognosis.    Cheo Kraft MD  10/03/21  15:03 EDT    While in the room and during my examination of the patient I wore gloves, gown, mask, eye protection and followed enhanced droplet/contact isolation protocol and precautions.  I washed my hands before and after this patient encounter.    Part of this note may be an electronic transcription/translation of spoken language to printed text using the Dragon Dictation System.

## 2021-10-03 NOTE — PLAN OF CARE
Goal Outcome Evaluation:  Plan of Care Reviewed With: patient        Progress: no change   Remains on heated high flow nasal cannula 60L/50%. Occasional nonproductive cough. Stood on scale with assistance, very weak and intolerant of any activity. Becomes very anxious with activity and increased oxygen demands. Encouraged relaxation and breathing exercises when feeling SOA. Explained importance of turning side to side for pressure relief on coccyx area. Verbalized understanding. Rested well at intervals. Will continue to monitor.

## 2021-10-03 NOTE — PROGRESS NOTES
Name: Angel Cisneros ADMIT: 2021   : 1950  PCP: Ayaan Amin MD    MRN: [id number] LOS: 28 days   AGE/SEX: 71 y.o. male  ROOM: [facility]   Subjective   Chief Complaint   Patient presents with   • positive covid   • Headache   • Abdominal Pain   • Shortness of Breath      Stated he was not sure if dyspnea had improved much or not compared to yesterday.  No productive cough.  No chest pain.  No nausea vomiting or diarrhea.  When I was in the room even with talking to me his O2 saturation would drop to the mid to low 80s.  With rest and deep inspiration he would recover after some time.     Objective   Vital Signs  Temp:  [96.6 °F (35.9 °C)-98.6 °F (37 °C)] 97.8 °F (36.6 °C)  Heart Rate:  [62-84] 66  Resp:  [20-22] 20  BP: (142-156)/(77-95) 142/77  SpO2:  [90 %-97 %] 94 %  on  Flow (L/min):  [6-60] 60;   Device (Oxygen Therapy): heated;high-flow nasal cannula;humidified  Body mass index is 21.59 kg/m².    Physical Exam  Vitals and nursing note reviewed.   Constitutional:       Appearance: He is ill-appearing. He is not diaphoretic.   HENT:      Head: Normocephalic and atraumatic.   Eyes:      General:         Right eye: No discharge.         Left eye: No discharge.      Conjunctiva/sclera: Conjunctivae normal.   Cardiovascular:      Rate and Rhythm: Normal rate and regular rhythm.      Pulses: Normal pulses.   Pulmonary:      Breath sounds: Decreased breath sounds present. No wheezing or rales.   Abdominal:      General: There is no distension.      Palpations: Abdomen is soft.      Tenderness: There is no abdominal tenderness. There is no guarding or rebound.   Musculoskeletal:         General: No swelling or tenderness.      Cervical back: Neck supple. No tenderness.   Skin:     General: Skin is warm and dry.   Neurological:      Mental Status: He is alert. Mental status is at baseline.   Psychiatric:         Mood and Affect: Mood normal.         Behavior: Behavior normal.         Results  Review:       I reviewed the patient's new clinical results.     I reviewed imaging, agree with interpretation.     I reviewed telemetry/EKG results, sinus      Results from last 7 days   Lab Units 10/03/21  0545 10/02/21  0516 10/01/21  0445 09/30/21  0441   WBC 10*3/mm3 5.79 6.34 7.41 9.38   HEMOGLOBIN g/dL 12.4* 12.5* 12.1* 12.6*   PLATELETS 10*3/mm3 107* 104* 91* 99*     Results from last 7 days   Lab Units 10/03/21  0545 10/02/21  0516 10/01/21  0445 09/30/21  0441   SODIUM mmol/L 141 139 139 139   POTASSIUM mmol/L 4.9 4.8 4.8 4.8   CHLORIDE mmol/L 109* 108* 109* 109*   CO2 mmol/L 21.9* 20.6* 20.3* 19.9*   BUN mg/dL 45* 46* 46* 44*   CREATININE mg/dL 1.36* 1.34* 1.52* 1.39*   GLUCOSE mg/dL 101* 102* 110* 107*   Estimated Creatinine Clearance: 44.8 mL/min (A) (by C-G formula based on SCr of 1.36 mg/dL (H)).  Results from last 7 days   Lab Units 10/03/21  0545 10/02/21  0516 10/01/21  0445 09/30/21  0441   CALCIUM mg/dL 8.5* 8.1* 8.1* 8.3*          amLODIPine, 10 mg, Oral, Q24H  calcium carbonate, 1 tablet, Oral, Daily  dexamethasone, 6 mg, Intravenous, Q12H  dextromethorphan polistirex ER, 60 mg, Oral, Q12H  enoxaparin, 1 mg/kg, Subcutaneous, Q24H  famotidine, 20 mg, Oral, Daily  finasteride, 5 mg, Oral, Daily  insulin lispro, 0-9 Units, Subcutaneous, TID With Meals  metoprolol tartrate, 12.5 mg, Oral, Q12H  mirtazapine, 7.5 mg, Oral, Nightly  prednisoLONE acetate, 1 drop, Both Eyes, Daily  saccharomyces boulardii, 250 mg, Oral, BID  sodium chloride, 1 drop, Left Eye, TID       Diet Regular; Consistent Carbohydrate, Low Potassium; 2 gm (50 mEq)    Assessment/Plan      Active Hospital Problems    Diagnosis  POA   • **Pneumonia due to COVID-19 virus [U07.1, J12.82]  Yes   • Paroxysmal atrial fibrillation (HCC) [I48.0]  No   • Acute respiratory failure with hypoxia (HCC) [J96.01]  Yes   • BPH (benign prostatic hyperplasia) [N40.0]  Yes   • CLL (chronic lymphocytic leukemia) (HCC) [C91.10]  Yes   • Chronic kidney  disease, stage 3b (HCC) [N18.32]  Yes   • HTN (hypertension) [I10]  Yes   • GERD (gastroesophageal reflux disease) [K21.9]  Yes   • Hodgkin's disease of lymph nodes of head, face, and neck (HCC) [C81.91]  Yes      Resolved Hospital Problems   No resolved problems to display.       · COVID-19 pneumonia with resulting acute hypoxic respiratory failure: Still having significant O2 requirements.  Is on dexamethasone twice daily.  He completed 7-day course of Rocephin due to elevated procalcitonin/pneumomediastinum previously during this admission and infectious disease evaluated.  Pulmonology following.  · PAF: Sinus rhythm.  Continue metoprolol.  Does need full anticoagulation with active Covid infection.  Cardiology evaluated and are anticipating about additional 1 month of anticoagulation at discharge.  Transition to Eliquis/Xarelto at that time.  · Fibrosis noted on CT.  · Hypertension: Acceptable acutely.  Continue current regimen and monitor.  · CKD  · CLL: Follows with [place] [facility]  · GERD: PPI  · Disposition: TBD    Osito Alcantara MD  [facility]  10/03/21  16:02 EDT    Dictated portions using Dragon dictation software.    During the entire encounter, I was wearing recommended PPE including face mask and eye protection. Hand sanitization was performed prior to entering room and upon exit.

## 2021-10-03 NOTE — PLAN OF CARE
Problem: Adult Inpatient Plan of Care  Goal: Patient-Specific Goal (Individualized)  10/3/2021 1744 by Tiff Bowden, RN  Flowsheets (Taken 10/3/2021 1744)  Patient-Specific Goals (Include Timeframe): VSS, 12L High Flow NC, accu max on bed, q2 turn, No c/o pain or distress  10/3/2021 1451 by Tiff Bowden, RN  Outcome: Ongoing, Progressing   Goal Outcome Evaluation:

## 2021-10-04 ENCOUNTER — APPOINTMENT (OUTPATIENT)
Dept: GENERAL RADIOLOGY | Facility: HOSPITAL | Age: 71
End: 2021-10-04

## 2021-10-04 LAB
ANION GAP SERPL CALCULATED.3IONS-SCNC: 9.5 MMOL/L (ref 5–15)
BASOPHILS # BLD AUTO: 0.01 10*3/MM3 (ref 0–0.2)
BASOPHILS NFR BLD AUTO: 0.2 % (ref 0–1.5)
BUN SERPL-MCNC: 44 MG/DL (ref 8–23)
BUN/CREAT SERPL: 33.6 (ref 7–25)
CALCIUM SPEC-SCNC: 8.3 MG/DL (ref 8.6–10.5)
CHLORIDE SERPL-SCNC: 108 MMOL/L (ref 98–107)
CO2 SERPL-SCNC: 20.5 MMOL/L (ref 22–29)
CREAT SERPL-MCNC: 1.31 MG/DL (ref 0.76–1.27)
CRP SERPL-MCNC: <0.3 MG/DL (ref 0–0.5)
DEPRECATED RDW RBC AUTO: 47.8 FL (ref 37–54)
EOSINOPHIL # BLD AUTO: 0 10*3/MM3 (ref 0–0.4)
EOSINOPHIL NFR BLD AUTO: 0 % (ref 0.3–6.2)
ERYTHROCYTE [DISTWIDTH] IN BLOOD BY AUTOMATED COUNT: 14.5 % (ref 12.3–15.4)
GFR SERPL CREATININE-BSD FRML MDRD: 54 ML/MIN/1.73
GLUCOSE BLDC GLUCOMTR-MCNC: 103 MG/DL (ref 70–130)
GLUCOSE BLDC GLUCOMTR-MCNC: 105 MG/DL (ref 70–130)
GLUCOSE BLDC GLUCOMTR-MCNC: 123 MG/DL (ref 70–130)
GLUCOSE BLDC GLUCOMTR-MCNC: 133 MG/DL (ref 70–130)
GLUCOSE SERPL-MCNC: 99 MG/DL (ref 65–99)
HCT VFR BLD AUTO: 38.1 % (ref 37.5–51)
HGB BLD-MCNC: 12.5 G/DL (ref 13–17.7)
LYMPHOCYTES # BLD AUTO: 0.44 10*3/MM3 (ref 0.7–3.1)
LYMPHOCYTES NFR BLD AUTO: 7.9 % (ref 19.6–45.3)
MCH RBC QN AUTO: 29.6 PG (ref 26.6–33)
MCHC RBC AUTO-ENTMCNC: 32.8 G/DL (ref 31.5–35.7)
MCV RBC AUTO: 90.3 FL (ref 79–97)
MONOCYTES # BLD AUTO: 0.2 10*3/MM3 (ref 0.1–0.9)
MONOCYTES NFR BLD AUTO: 3.6 % (ref 5–12)
NEUTROPHILS NFR BLD AUTO: 4.8 10*3/MM3 (ref 1.7–7)
NEUTROPHILS NFR BLD AUTO: 86.5 % (ref 42.7–76)
PLATELET # BLD AUTO: 126 10*3/MM3 (ref 140–450)
PMV BLD AUTO: 11 FL (ref 6–12)
POTASSIUM SERPL-SCNC: 4.7 MMOL/L (ref 3.5–5.2)
RBC # BLD AUTO: 4.22 10*6/MM3 (ref 4.14–5.8)
SODIUM SERPL-SCNC: 138 MMOL/L (ref 136–145)
WBC # BLD AUTO: 5.55 10*3/MM3 (ref 3.4–10.8)

## 2021-10-04 PROCEDURE — 63710000001 DEXAMETHASONE PER 0.25 MG: Performed by: INTERNAL MEDICINE

## 2021-10-04 PROCEDURE — 25010000002 DEXAMETHASONE PER 1 MG: Performed by: INTERNAL MEDICINE

## 2021-10-04 PROCEDURE — 94799 UNLISTED PULMONARY SVC/PX: CPT

## 2021-10-04 PROCEDURE — 80048 BASIC METABOLIC PNL TOTAL CA: CPT | Performed by: INTERNAL MEDICINE

## 2021-10-04 PROCEDURE — 71045 X-RAY EXAM CHEST 1 VIEW: CPT

## 2021-10-04 PROCEDURE — 86140 C-REACTIVE PROTEIN: CPT | Performed by: INTERNAL MEDICINE

## 2021-10-04 PROCEDURE — 25010000002 ENOXAPARIN PER 10 MG: Performed by: INTERNAL MEDICINE

## 2021-10-04 PROCEDURE — 82962 GLUCOSE BLOOD TEST: CPT

## 2021-10-04 PROCEDURE — 97110 THERAPEUTIC EXERCISES: CPT

## 2021-10-04 PROCEDURE — 85025 COMPLETE CBC W/AUTO DIFF WBC: CPT | Performed by: INTERNAL MEDICINE

## 2021-10-04 RX ADMIN — METOPROLOL TARTRATE 12.5 MG: 25 TABLET ORAL at 21:30

## 2021-10-04 RX ADMIN — PREDNISOLONE ACETATE 1 DROP: 10 SUSPENSION/ DROPS OPHTHALMIC at 09:14

## 2021-10-04 RX ADMIN — Medication 3 MG: at 22:45

## 2021-10-04 RX ADMIN — AMLODIPINE BESYLATE 10 MG: 10 TABLET ORAL at 09:12

## 2021-10-04 RX ADMIN — FINASTERIDE 5 MG: 5 TABLET, FILM COATED ORAL at 09:12

## 2021-10-04 RX ADMIN — MIRTAZAPINE 7.5 MG: 15 TABLET, FILM COATED ORAL at 21:30

## 2021-10-04 RX ADMIN — BISACODYL 10 MG: 10 SUPPOSITORY RECTAL at 15:33

## 2021-10-04 RX ADMIN — SODIUM CHLORIDE 1 DROP: 50 SOLUTION OPHTHALMIC at 15:33

## 2021-10-04 RX ADMIN — METOPROLOL TARTRATE 12.5 MG: 25 TABLET ORAL at 09:13

## 2021-10-04 RX ADMIN — SODIUM CHLORIDE 1 DROP: 50 SOLUTION OPHTHALMIC at 09:11

## 2021-10-04 RX ADMIN — FAMOTIDINE 20 MG: 20 TABLET, FILM COATED ORAL at 09:13

## 2021-10-04 RX ADMIN — SODIUM CHLORIDE 1 DROP: 50 SOLUTION OPHTHALMIC at 21:30

## 2021-10-04 RX ADMIN — DEXAMETHASONE SODIUM PHOSPHATE 6 MG: 10 INJECTION INTRAMUSCULAR; INTRAVENOUS at 09:13

## 2021-10-04 RX ADMIN — ENOXAPARIN SODIUM 60 MG: 60 INJECTION, SOLUTION INTRAVENOUS; SUBCUTANEOUS at 21:29

## 2021-10-04 RX ADMIN — DEXAMETHASONE 6 MG: 4 TABLET ORAL at 21:29

## 2021-10-04 RX ADMIN — Medication 250 MG: at 21:29

## 2021-10-04 RX ADMIN — ENOXAPARIN SODIUM 60 MG: 60 INJECTION, SOLUTION INTRAVENOUS; SUBCUTANEOUS at 11:49

## 2021-10-04 RX ADMIN — Medication 250 MG: at 09:12

## 2021-10-04 RX ADMIN — DEXTROMETHORPHAN POLISTIREX 60 MG: 30 SUSPENSION, EXTENDED RELEASE ORAL at 09:12

## 2021-10-04 RX ADMIN — DEXTROMETHORPHAN POLISTIREX 60 MG: 30 SUSPENSION, EXTENDED RELEASE ORAL at 21:30

## 2021-10-04 NOTE — PROGRESS NOTES
Name: Angel Cisneros ADMIT: 2021   : 1950  PCP: Ayaan Amin MD    MRN: [id number] LOS: 29 days   AGE/SEX: 71 y.o. male  ROOM: [facility]   Subjective   Chief Complaint   Patient presents with   • positive covid   • Headache   • Abdominal Pain   • Shortness of Breath      Dyspnea about the same.  No chest pain nausea vomiting or diarrhea.  No productive cough.    Objective   Vital Signs  Temp:  [97.6 °F (36.4 °C)-99 °F (37.2 °C)] 99 °F (37.2 °C)  Heart Rate:  [54-79] 79  Resp:  [20] 20  BP: (118-142)/(64-93) 118/64  SpO2:  [90 %-99 %] 90 %  on  Flow (L/min):  [10-60] 10;   Device (Oxygen Therapy): high-flow nasal cannula;humidified  Body mass index is 21.53 kg/m².    Physical Exam  Vitals and nursing note reviewed.   Constitutional:       Appearance: He is ill-appearing. He is not diaphoretic.   HENT:      Head: Normocephalic and atraumatic.   Eyes:      General:         Right eye: No discharge.         Left eye: No discharge.      Conjunctiva/sclera: Conjunctivae normal.   Cardiovascular:      Rate and Rhythm: Normal rate and regular rhythm.      Pulses: Normal pulses.   Pulmonary:      Breath sounds: Decreased breath sounds present. No wheezing or rales.   Abdominal:      General: There is no distension.      Palpations: Abdomen is soft.      Tenderness: There is no abdominal tenderness. There is no guarding or rebound.   Musculoskeletal:         General: No swelling or tenderness.      Cervical back: Neck supple. No tenderness.   Skin:     General: Skin is warm and dry.   Neurological:      Mental Status: He is alert. Mental status is at baseline.   Psychiatric:         Mood and Affect: Mood normal.         Behavior: Behavior normal.         Results Review:       I reviewed the patient's new clinical results.     I reviewed imaging, agree with interpretation.     I reviewed telemetry/EKG results, sinus      Results from last 7 days   Lab Units 10/04/21  0700 10/03/21  0545 10/02/21  7946  10/01/21  0445   WBC 10*3/mm3 5.55 5.79 6.34 7.41   HEMOGLOBIN g/dL 12.5* 12.4* 12.5* 12.1*   PLATELETS 10*3/mm3 126* 107* 104* 91*     Results from last 7 days   Lab Units 10/04/21  0700 10/03/21  0545 10/02/21  0516 10/01/21  0445   SODIUM mmol/L 138 141 139 139   POTASSIUM mmol/L 4.7 4.9 4.8 4.8   CHLORIDE mmol/L 108* 109* 108* 109*   CO2 mmol/L 20.5* 21.9* 20.6* 20.3*   BUN mg/dL 44* 45* 46* 46*   CREATININE mg/dL 1.31* 1.36* 1.34* 1.52*   GLUCOSE mg/dL 99 101* 102* 110*   Estimated Creatinine Clearance: 46.3 mL/min (A) (by C-G formula based on SCr of 1.31 mg/dL (H)).  Results from last 7 days   Lab Units 10/04/21  0700 10/03/21  0545 10/02/21  0516 10/01/21  0445   CALCIUM mg/dL 8.3* 8.5* 8.1* 8.1*          amLODIPine, 10 mg, Oral, Q24H  calcium carbonate, 1 tablet, Oral, Daily  dexamethasone, 6 mg, Intravenous, Q12H  dextromethorphan polistirex ER, 60 mg, Oral, Q12H  enoxaparin, 1 mg/kg, Subcutaneous, Q12H  famotidine, 20 mg, Oral, Daily  finasteride, 5 mg, Oral, Daily  insulin lispro, 0-9 Units, Subcutaneous, TID With Meals  metoprolol tartrate, 12.5 mg, Oral, Q12H  mirtazapine, 7.5 mg, Oral, Nightly  prednisoLONE acetate, 1 drop, Both Eyes, Daily  saccharomyces boulardii, 250 mg, Oral, BID  sodium chloride, 1 drop, Left Eye, TID       Diet Regular; Consistent Carbohydrate, Low Potassium; 2 gm (50 mEq)    Assessment/Plan      Active Hospital Problems    Diagnosis  POA   • **Pneumonia due to COVID-19 virus [U07.1, J12.82]  Yes   • Paroxysmal atrial fibrillation (HCC) [I48.0]  No   • Acute respiratory failure with hypoxia (HCC) [J96.01]  Yes   • BPH (benign prostatic hyperplasia) [N40.0]  Yes   • CLL (chronic lymphocytic leukemia) (HCC) [C91.10]  Yes   • Chronic kidney disease, stage 3b (HCC) [N18.32]  Yes   • HTN (hypertension) [I10]  Yes   • GERD (gastroesophageal reflux disease) [K21.9]  Yes   • Hodgkin's disease of lymph nodes of head, face, and neck (HCC) [C81.91]  Yes      Resolved Hospital Problems   No  resolved problems to display.       · COVID-19 pneumonia with resulting acute hypoxic respiratory failure: Still having significant O2 requirements.  He completed 7-day course of Rocephin due to elevated procalcitonin/pneumomediastinum previously during this admission and infectious disease evaluated.  Completed remdesivir course.  Has been on high-dose dexamethasone for some time.  Going to transition to oral dexamethasone.  Pulmonology was wanting to use CRP to help guide titration.  We will repeat that level.  Pulmonology following.  · PAF: Sinus rhythm.  Continue metoprolol.  Does need full anticoagulation with active Covid infection.  Cardiology evaluated and are anticipating about additional 1 month of anticoagulation at discharge.  Transition to Eliquis/Xarelto at that time.  · Fibrosis noted on CT.  · Hypertension: Acceptable acutely.  Continue current regimen and monitor.  · CKD  · CLL: Follows with [place] [facility]  · GERD: PPI  · Disposition: TBD    Osito Alcantara MD  [facility]  10/04/21  13:39 EDT    Dictated portions using Dragon dictation software.    During the entire encounter, I was wearing recommended PPE including face mask and eye protection. Hand sanitization was performed prior to entering room and upon exit.

## 2021-10-04 NOTE — PLAN OF CARE
Goal Outcome Evaluation:  Plan of Care Reviewed With: patient        Progress: improving  Outcome Summary: Pt really wanting to do well this session, trying not to get anxious if SATS decr w/activity; offered multiple rest breaks and gave pt verbal feedback on monitoring of SATS, he felt more at ease knowing he was recovering more quickly today; pt plans SNU at [place] vs home w/HH    Patient was wearing a face mask during this therapy encounter. Therapist used appropriate personal protective equipment including eye protection, mask, and gloves.  Mask used was N95/duckbill. Appropriate PPE was worn during the entire therapy session. Hand hygiene was completed before and after therapy session. Patient is in enhanced droplet precautions.

## 2021-10-04 NOTE — PLAN OF CARE
Problem: Adult Inpatient Plan of Care  Goal: Patient-Specific Goal (Individualized)  10/4/2021 1631 by Tiff Bowden, RN  Flowsheets (Taken 10/4/2021 1631)  Patient-Specific Goals (Include Timeframe): VSS, 10L high Flow, Assist X 1 to BSC or chair. Up in chair this afternoon, Bowel movement today.  10/4/2021 1631 by Tiff Bowden, RN  Outcome: Ongoing, Progressing  Flowsheets (Taken 10/4/2021 1631)  Patient-Specific Goals (Include Timeframe): VSS, 10L high Flow, Assist X 1 to BSC or chair. Up in chair this afternoon, Bowel movement today.  10/4/2021 1241 by Tiff Bowden, RN  Outcome: Ongoing, Progressing   Goal Outcome Evaluation:

## 2021-10-04 NOTE — PLAN OF CARE
Problem: Adult Inpatient Plan of Care  Goal: Patient-Specific Goal (Individualized)  Flowsheets (Taken 10/4/2021 0406)  Patient-Specific Goals (Include Timeframe): VSS. Was able to wean oxygen down to 10 L. NSR on monitor. Patient encouraged to turn. New mepilex and barrier cream applied to coccyx. No complaints of pain. Will continue to monitor.   Goal Outcome Evaluation:  Plan of Care Reviewed With: patient     VSS. Was able to wean oxygen down to 10 L. NSR on monitor. Patient encouraged to turn. New mepilex and barrier cream applied to coccyx. No complaints of pain. Will continue to monitor.

## 2021-10-04 NOTE — PROGRESS NOTES
[facility]  [phone]  Cheo Kraft MD    Subjective:  LOS: 29    Chief Complaint: Acute respiratory failure    Now on HF. Doing better. No sig cough. No BM few days.    Objective   Vital Signs past 24hrs    Temp range: Temp (24hrs), Av.1 °F (36.7 °C), Min:97.6 °F (36.4 °C), Max:99 °F (37.2 °C)    BP range: BP: (118-139)/(64-93) 118/64  Pulse range: Heart Rate:  [54-79] 79  Resp rate range: Resp:  [20] 20    Device (Oxygen Therapy): high-flow nasal cannula;humidifiedFlow (L/min):  [10-12] 10  Oxygen range:SpO2:  [90 %-99 %] 90 %      62.4 kg (137 lb 8 oz); Body mass index is 21.53 kg/m².    Intake/Output Summary (Last 24 hours) at 10/4/2021 1509  Last data filed at 10/4/2021 1328  Gross per 24 hour   Intake 720 ml   Output 1505 ml   Net -785 ml       Physical Exam  Eyes:      Pupils: Pupils are equal, round, and reactive to light.   Cardiovascular:      Rate and Rhythm: Normal rate and regular rhythm.      Heart sounds: No murmur heard.     Pulmonary:      Effort: Pulmonary effort is normal.      Breath sounds: Normal breath sounds.      Comments: No adventitious sounds  Abdominal:      General: Bowel sounds are normal.      Palpations: Abdomen is soft. There is no mass.      Tenderness: There is no abdominal tenderness.   Musculoskeletal:         General: No swelling.   Neurological:      Mental Status: He is alert.       Results Review:    I have reviewed the laboratory and imaging data since the last note by [facility] physician.  My annotations are noted in assessment and plan.    Results from last 7 days   Lab Units 10/04/21  0700 21  0441 21  0625   FERRITIN ng/mL  --  1,019.00*  --    D DIMER QUANT MCGFEU/mL  --   --  0.45   CRP mg/dL <0.30 <0.30  --        Medication Review:  I have reviewed the current MAR.  My annotations are noted in assessment and plan.       Plan   PCCM Problems  Acute hypoxic respiratory failure  SARS-CoV-2 infection  Bilateral pulmonary infiltrates  Secondary  bacterial infection  Pneumomediastinum  CLL  History NHL  CKD 3  Paroxysmal A. fib  Low platelets          Plan of Treatment    Now on 8L HF. Clearly better.    Supportive treatment for SARS-CoV-2 infection. Previously on 10 mg twice daily.   Currently on dexamethasone 6 mg twice daily.  Lower dose as long as CRP remains within normal limits.    Concern for secondary bacterial infection.  Patient initially treated with antibiotics.      CT 10/2/21 noted. CxR today no change.     Pneumomediastinum not reported on CT.  No significant pneumothorax at this time.    Immunocompromised host due to CLL and history NHL.    CKD 3 further complicates care.    Remains on full anticoagulation for A. fib.    Appears better.    Cheo Kraft MD  10/04/21  15:09 EDT    While in the room and during my examination of the patient I wore gloves, gown, mask, eye protection and followed enhanced droplet/contact isolation protocol and precautions.  I washed my hands before and after this patient encounter.    Part of this note may be an electronic transcription/translation of spoken language to printed text using the Dragon Dictation System.

## 2021-10-04 NOTE — CASE MANAGEMENT/SOCIAL WORK
Continued Stay Note  [facility]     Patient Name: Angel Cisneros  MRN: [id number]  Today's Date: 10/4/2021    Admit Date: 9/5/2021    Discharge Plan     Row Name 10/04/21 0912       Plan    Plan Comments  Patient now on 10 L. Referral re-sent to [facility]. Spoke with [facility] and she will review referral. Patient will need to be seen by PT/OT. Suzi Neville RN CCP        Discharge Codes    No documentation.       Expected Discharge Date and Time     Expected Discharge Date Expected Discharge Time    Oct 5, 2021             Suzi Neville RN

## 2021-10-04 NOTE — THERAPY TREATMENT NOTE
Patient Name: Angel Cisneros  : 1950    MRN: [id number]                              Today's Date: 10/4/2021       Admit Date: 2021    Visit Dx:     ICD-10-CM ICD-9-CM   1. COVID-19 virus infection  U07.1 079.89   2. Acute hypoxemic respiratory failure (CMS/HCC)  J96.01 518.81   3. Acute renal failure superimposed on chronic kidney disease, unspecified CKD stage, unspecified acute renal failure type (CMS/HCC)  N17.9 584.9    N18.9 585.9     Patient Active Problem List   Diagnosis   • Lymphoma (HCC)   • Leukemia (HCC)   • Chest pain   • GERD (gastroesophageal reflux disease)   • HTN (hypertension)   • Chronic kidney disease, stage 3b (HCC)   • Generalized abdominal pain   • CLL (chronic lymphocytic leukemia) (HCC)   • Gastroesophageal reflux disease   • Hodgkin's disease of lymph nodes of head, face, and neck (HCC)   • Oral thrush   • Medicare annual wellness visit, subsequent   • Primary insomnia   • Adjustment disorder with depressed mood   • Dysuria   • Gross hematuria   • Acute non-recurrent maxillary sinusitis   • Cough   • BPH (benign prostatic hyperplasia)   • Elevated PSA   • Clinical diagnosis of COVID-19   • Pneumonia due to COVID-19 virus   • Paroxysmal atrial fibrillation (HCC)   • Acute respiratory failure with hypoxia (HCC)     Past Medical History:   Diagnosis Date   • Arthritis     both knees   • Cancer (CMS/HCC)     dx with lymphoma / radiation   • Cataracts, bilateral    • Hx of cornea transplant     both eyes   • Leukemia (CMS/HCC)    • Lymphoma (CMS/HCC)     dx in . treated with radiation.   • Lymphoma (CMS/HCC)     treated with radiation. dx in      Past Surgical History:   Procedure Laterality Date   • BREAST LUMPECTOMY Right    • COLONOSCOPY N/A 3/8/2016    Procedure: COLONOSCOPY with cold polypectomy X 3;  Surgeon: Chris Harden MD;  Location: [facility] ENDOSCOPY;  Service:    • ENDOSCOPY N/A 10/23/2018    Procedure: ESOPHAGOGASTRODUODENOSCOPY WITH BIOPSY;   Surgeon: Chris Harden MD;  Location: [facility] ENDOSCOPY;  Service: Gastroenterology   • EYE SURGERY      partial cornea transplant 4-5 years ago   • KNEE SURGERY Left    • TOE SURGERY Bilateral    • TONSILLECTOMY       General Information     [facility] Name 10/04/21 1718          Physical Therapy Time and Intention    Document Type  therapy note (daily note)  -     Mode of Treatment  individual therapy;physical therapy  -[facility] Name 10/04/21 1718          General Information    Patient Profile Reviewed  yes  -     Existing Precautions/Restrictions  fall;oxygen therapy device and L/min 10L HF; off optiflow  -[facility] Name 10/04/21 1718          Cognition    Orientation Status (Cognition)  oriented x 3  -[facility] Name 10/04/21 1718          Safety Issues, Functional Mobility    Impairments Affecting Function (Mobility)  endurance/activity tolerance;shortness of breath;strength  -       User Key  (r) = Recorded By, (t) = Taken By, (c) = Cosigned By    Initials Name Provider Type    Breana Jones PTA Physical Therapy Assistant        Mobility     Row Name 10/04/21 1719          Bed Mobility    Comment (Bed Mobility)  up in chair  -[facility] Name 10/04/21 1719          Transfers    Comment (Transfers)  STS , able to gabriel 1 min standing , SATS dropped to 83%, but used non-rebreather for 3 breaths , then SATS back up to 89%  -[facility] Name 10/04/21 1719          Sit-Stand Transfer    Sit-Stand [place] (Transfers)  contact guard;verbal cues  -     Assistive Device (Sit-Stand Transfers)  -- HHA, and bedrail near chair  -[facility] Name 10/04/21 1719          Gait/Stairs (Locomotion)    Comment (Gait/Stairs)  unable to ambulate, but incr standing time, SATS recovered more quickly this session  -       User Key  (r) = Recorded By, (t) = Taken By, (c) = Cosigned By    Initials Name Provider Type    Breana Jones PTA Physical Therapy Assistant        Obj/Interventions     Row Name 10/04/21  1722          Motor Skills    Therapeutic Exercise  -- AP, QS, hip abd/add, seated MIP, LAQS x10 reps-rested after 5 of each except APs  -       User Key  (r) = Recorded By, (t) = Taken By, (c) = Cosigned By    Initials Name Provider Type    Breana Jones PTA Physical Therapy Assistant        Goals/Plan    No documentation.       Clinical Impression     Row Name 10/04/21 1723          Pain Scale: Numbers Pre/Post-Treatment    Pretreatment Pain Rating  0/10 - no pain  -     Posttreatment Pain Rating  3/10  -     Pain Location - Orientation  generalized  -     Pain Intervention(s)  Repositioned;Rest  -     Row Name 10/04/21 1723          Plan of Care Review    Plan of Care Reviewed With  patient  -     Progress  improving  -     Outcome Summary  Pt really wanting to do well this session, trying not to get anxious if SATS decr w/activity; offered multiple rest breaks and gave pt verbal feedback on monitoring of SATS, he felt more at ease knowing he was recovering more quickly today; pt [place]  -     Row Name 10/04/21 1723          Therapy Assessment/Plan (PT)    Rehab Potential (PT)  fair, will monitor progress closely  -     Criteria for Skilled Interventions Met (PT)  yes  -     Row Name 10/04/21 1723          Vital Signs    Pre SpO2 (%)  97  -     O2 Delivery Pre Treatment  hi-flow 10L  -JM     Intra SpO2 (%)  83  -JM     O2 Delivery Intra Treatment  non-rebreather and 10L HF, 3 breaths  -     Post SpO2 (%)  92  -     O2 Delivery Post Treatment  hi-flow  -     Row Name 10/04/21 1723          Positioning and Restraints    Pre-Treatment Position  sitting in chair/recliner  -     Post Treatment Position  chair  -     In Chair  reclined;call light within reach;encouraged to call for assist;exit alarm on;notified nsg  -       User Key  (r) = Recorded By, (t) = Taken By, (c) = Cosigned By    Initials Name Provider Type    Breana Jones PTA Physical Therapy Assistant         Outcome Measures     Row Name 10/04/21 1728          How much help from another person do you currently need...    Turning from your back to your side while in flat bed without using bedrails?  4  -JM     Moving from lying on back to sitting on the side of a flat bed without bedrails?  3  -JM     Moving to and from a bed to a chair (including a wheelchair)?  3  -JM     Standing up from a chair using your arms (e.g., wheelchair, bedside chair)?  3  -JM     Climbing 3-5 steps with a railing?  1  -JM     To walk in hospital room?  1  -JM     AM-PAC 6 Clicks Score (PT)  15  -JM       User Key  (r) = Recorded By, (t) = Taken By, (c) = Cosigned By    Initials Name Provider Type    Breana Jones PTA Physical Therapy Assistant                       Physical Therapy Education                 Title: PT OT SLP Therapies (Done)     Topic: Physical Therapy (Done)     Point: Mobility training (Done)     Learning Progress Summary           Patient Acceptance, E,TB,D, VU,NR by JEROME at 10/4/2021 1728    Acceptance, E, VU by RS at 10/2/2021 1626    Acceptance, E,TB,D, VU,NR by  at 9/30/2021 1638    Acceptance, E, VU by AG at 9/30/2021 1550    Acceptance, E, VU by AG at 9/29/2021 1534    Acceptance, E,D, VU by MS at 9/28/2021 1128    Acceptance, E, VU by AG at 9/26/2021 1614    Acceptance, E, VU by AG at 9/25/2021 1546    Acceptance, E,D, VU,NR by MS at 9/25/2021 1152    Acceptance, E, VU by AG at 9/24/2021 1542    Acceptance, E,TB,D, VU,NR,DU by CB at 9/23/2021 1329    Acceptance, E, VU by AG at 9/22/2021 1531    Acceptance, E,D, VU,NR by MS at 9/21/2021 1533    Acceptance, E,TB,D, VU,NR by JM at 9/17/2021 1836    Acceptance, E, NR by  at 9/15/2021 1551    Acceptance, E, VU by SR at 9/13/2021 1246    Acceptance, E,D, NR by JEROME at 9/10/2021 1933    Acceptance, NANETTE,TB,RENUKA, VU by JEROME at 9/8/2021 1725    Acceptance, RENUKA FITZPATRICK VU,DU by SAIGE at 9/6/2021 0999                   Point: Home exercise program (Done)     Learning Progress  Summary           Patient Acceptance, E,TB,D, VU,NR by JM at 10/4/2021 1728    Acceptance, E, VU by RS at 10/2/2021 1626    Acceptance, E,TB,D, VU,NR by  at 9/30/2021 1638    Acceptance, E, VU by AG at 9/30/2021 1550    Acceptance, E, VU by AG at 9/29/2021 1534    Acceptance, E,D, VU by MS at 9/28/2021 1128    Acceptance, E, VU by AG at 9/26/2021 1614    Acceptance, E, VU by AG at 9/25/2021 1546    Acceptance, E,D, VU,NR by MS at 9/25/2021 1152    Acceptance, E, VU by AG at 9/24/2021 1542    Acceptance, E,TB,D, VU,NR,DU by  at 9/23/2021 1329    Acceptance, E, VU by AG at 9/22/2021 1531    Acceptance, E,D, VU,NR by MS at 9/21/2021 1533    Acceptance, E,TB,D, VU,NR by JM at 9/17/2021 1836    Acceptance, E, NR by  at 9/15/2021 1551    Acceptance, E, VU by SR at 9/13/2021 1246    Acceptance, E,D, NR by JM at 9/10/2021 1933    Acceptance, E,TB,D, VU by  at 9/8/2021 1725    Acceptance, E,D, VU,DU by  at 9/6/2021 0926                   Point: Body mechanics (Done)     Learning Progress Summary           Patient Acceptance, E,TB,D, VU,NR by JM at 10/4/2021 1728    Acceptance, E, VU by RS at 10/2/2021 1626    Acceptance, E,TB,D, VU,NR by  at 9/30/2021 1638    Acceptance, E, VU by AG at 9/30/2021 1550    Acceptance, E, VU by AG at 9/29/2021 1534    Acceptance, E,D, VU by MS at 9/28/2021 1128    Acceptance, E, VU by AG at 9/26/2021 1614    Acceptance, E, VU by AG at 9/25/2021 1546    Acceptance, E,D, VU,NR by MS at 9/25/2021 1152    Acceptance, E, VU by AG at 9/24/2021 1542    Acceptance, E,TB,D, VU,NR,DU by CB at 9/23/2021 1329    Acceptance, E, VU by AG at 9/22/2021 1531    Acceptance, E,D, VU,NR by MS at 9/21/2021 1533    Acceptance, E,TB,D, VU,NR by JM at 9/17/2021 1836    Acceptance, E, NR by  at 9/15/2021 1551    Acceptance, E, VU by SR at 9/13/2021 1246    Acceptance, E,D, NR by JM at 9/10/2021 1933    Acceptance, E,TB,D, VU by JM at 9/8/2021 1725    Acceptance, E,D, VU,DU by LC at 9/6/2021 0931                    Point: Precautions (Done)     Learning Progress Summary           Patient Acceptance, E,TB,D, VU,NR by  at 10/4/2021 1728    Acceptance, E, VU by  at 10/2/2021 1626    Acceptance, E,TB,D, VU,NR by  at 9/30/2021 1638    Acceptance, E, VU by AG at 9/30/2021 1550    Acceptance, E, VU by AG at 9/29/2021 1534    Acceptance, E,D, VU by MS at 9/28/2021 1128    Acceptance, E, VU by AG at 9/26/2021 1614    Acceptance, E, VU by AG at 9/25/2021 1546    Acceptance, E,D, VU,NR by MS at 9/25/2021 1152    Acceptance, E, VU by AG at 9/24/2021 1542    Acceptance, E,TB,D, VU,NR,DU by  at 9/23/2021 1329    Acceptance, E, VU by AG at 9/22/2021 1531    Acceptance, E,D, VU,NR by MS at 9/21/2021 1533    Acceptance, E,TB,D, VU,NR by  at 9/17/2021 1836    Acceptance, E, NR by  at 9/15/2021 1551    Acceptance, E, VU by  at 9/13/2021 1246    Acceptance, E,D, NR by  at 9/10/2021 1933    Acceptance, E,TB,D, VU by  at 9/8/2021 1725    Acceptance, E,D, VU,DU by  at 9/6/2021 0926                               User Key     Initials Effective Dates Name Provider Type Discipline     06/16/21 -  Neida Abbott, PT Physical Therapist PT    LH 06/16/21 -  Iman Archer, PT Physical Therapist PT    JM 03/07/18 -  Breana Almonte, MIGUE Physical Therapy Assistant PT    MS 06/16/21 -  Angel Vergara, PT Physical Therapist PT    LC 07/02/20 -  Gino Bolden, PT DPT Physical Therapist PT    RS 06/16/21 -  Coni Hamilton, PT Physical Therapist PT    AG 06/16/21 -  Giuseppe Beasley, RN Registered Nurse Nurse    CB 12/30/20 -  Brigid Marinelli Physical Therapist PT    SR 02/08/21 -  Lucretia Weiss Physical Therapist PT              PT Recommendation and Plan     Plan of Care Reviewed With: patient  Progress: improving  Outcome Summary: Pt really wanting to do well this session, trying not to get anxious if SATS decr w/activity; offered multiple rest breaks and gave pt verbal feedback on monitoring of SATS, he felt  more at ease knowing he was recovering more quickly today; pt [place]     Time Calculation:   PT Charges     Row Name 10/04/21 1616             Time Calculation    Start Time  1410  -JEROME      Stop Time  1439  -JEROME      Time Calculation (min)  29 min  -JEROME      PT Received On  10/04/21  -JEROME      PT - Next Appointment  10/06/21  -JEROME        User Key  (r) = Recorded By, (t) = Taken By, (c) = Cosigned By    Initials Name Provider Type    Breana Jones PTA Physical Therapy Assistant        Therapy Charges for Today     Code Description Service Date Service Provider Modifiers Qty    11036323813 HC PT THER PROC EA 15 MIN 10/4/2021 Breana Almonte PTA GP 2          PT G-Codes  Outcome Measure Options: AM-PAC 6 Clicks Basic Mobility (PT)  AM-PAC 6 Clicks Score (PT): 15  AM-PAC 6 Clicks Score (OT): 19    Breana Almonte PTA  10/4/2021

## 2021-10-04 NOTE — DISCHARGE PLACEMENT REQUEST
"Angel Cisneros (71 y.o. Male)     Date of Birth Social Security Number Address Home Phone MRN    1950  [id number] [id number] [phone] [id number]    Yarsani Marital Status          None        Admission Date Admission Type Admitting Provider Attending Provider Department, Room/Bed    9/5/21 Emergency Stingl, MD Maricruz Rosas Patrick D, MD [address], [address]    Discharge Date Discharge Disposition Discharge Destination                       Attending Provider: Osito Alcantara MD    Allergies: Ambien [Zolpidem Tartrate], Penicillins    Isolation: Enh Drop/Con   Infection: COVID (confirmed) (09/03/21)   Code Status: CPR    Ht: 170.2 cm (67.01\")   Wt: 62.4 kg (137 lb 8 oz)    Admission Cmt: None   Principal Problem: Pneumonia due to COVID-19 virus [U07.1,J12.82]                 Active Insurance as of 9/5/2021     Primary Coverage     Payor Plan Insurance Group Employer/Plan Group    [employer] MEDICARE REPLACEMENT ANTHEM MEDICARE ADVANTAGE [id number]     Payor Plan Address Payor Plan Phone Number Payor Plan Fax Number Effective Dates    [address] [phone]  1/1/2018 - None Entered    [facility] [id number]       Subscriber Name Subscriber Birth Date Member ID       ANGEL CISNEROS 1950 [id number]                 Emergency Contacts      (Rel.) Home Phone Work Phone Mobile Phone    MARIBEL CISNEROS IRMA (Son) -- -- [phone]    Suzanne Olivares (Significant Other) -- -- [phone]    Perri Cisneros (Sister) [phone] -- --            {Outbreak/Travel/Exposure Documentation......;  Question Available Choices Patient Response   COVID-19 Outbreak Screen:  Do you currently have a new onset of the following symptoms?        Fever/Chills, Cough, Shortness of air, Loss of taste or smell, No, Unknown  (!) Shortness of Air (09/05/21 1457)   COVID-19 Outbreak Screen: In the last 14 days, have you had contact with " anyone who is ill, has show any of the symptoms listed above and/or has been diagnosis with the 2019 Novel Coronavirus? This includes any immediate household members but excludes any patients with whom you have been in contact within your normal work duties wearing proper PPE, if you are a healthcare worker.  Yes, No, Unknown              Unknown (09/05/21 1457)   COVID-19 Outbreak Screen: Who was notified? Free text (not recorded)   Ebola Screening Outbreak Screen: Have you traveled to the [place] the [place] or [place] within the past 21 days?  Yes, No, Unknown (not recorded)   Ebola Screening Outbreak Screen: Do you have ANY of the following symptoms: Fever/Chills, Vomiting, Diarrhea, Fatigue, Headache, Muscle pain, Unexplained bleeding, Abdominal (stomach) pain, No, Unknown (not recorded)   Ebola Screening Outbreak Screen: Name of Person notified Free text (not recorded)   Travel Screen: Have you traveled in the last month? If so, to what country have you traveled? If [place] what state? Yes, No, Unknown  List of all countries  List of all States No (09/05/21 1457)  (not recorded)  (not recorded)   Infection Risk: Do you currently have the following symptoms?  (If cough is selected, the Tuberculosis Screen is performed.) Cough, Fever, Rash, No No (09/05/21 1457)   Tuberculosis Screen: Do you have any of the following Tuberculosis Risks?  · Have you lived or spent time with anyone who had or may have TB?  · Have you lived in or visited any of the following areas for more than one month: [place], [place], [place], [place] or [place], the [place] or [place]?  · Do you have HIV/AIDS?  · Have you lived in or worked in a nursing home, homeless shelter, correctional facility, or substance abuse treatment facility?   · No    If Yes do you have any of the following symptoms? Yes responses display to the right    If Yes, symptoms listed are:  Cough greater than or equal to 3 weeks, Loss of appetite,  Unexplained weight loss, Night sweats, Bloody sputum or hemoptysis, Hoarseness, Fever, Fatigue, Chest pain, No (not recorded)  (not recorded)   Exposure Screen: Have you been exposed to any of these contagious diseases in the last month? Measles, Chickenpox, Meningitis, Pertussis, Whooping Cough, No No (09/05/21 5076)

## 2021-10-05 ENCOUNTER — APPOINTMENT (OUTPATIENT)
Dept: GENERAL RADIOLOGY | Facility: HOSPITAL | Age: 71
End: 2021-10-05

## 2021-10-05 LAB
ANION GAP SERPL CALCULATED.3IONS-SCNC: 9.9 MMOL/L (ref 5–15)
BASOPHILS # BLD AUTO: 0 10*3/MM3 (ref 0–0.2)
BASOPHILS NFR BLD AUTO: 0 % (ref 0–1.5)
BUN SERPL-MCNC: 42 MG/DL (ref 8–23)
BUN/CREAT SERPL: 35.3 (ref 7–25)
CALCIUM SPEC-SCNC: 8.4 MG/DL (ref 8.6–10.5)
CHLORIDE SERPL-SCNC: 109 MMOL/L (ref 98–107)
CO2 SERPL-SCNC: 23.1 MMOL/L (ref 22–29)
CREAT SERPL-MCNC: 1.19 MG/DL (ref 0.76–1.27)
CRP SERPL-MCNC: <0.3 MG/DL (ref 0–0.5)
DEPRECATED RDW RBC AUTO: 46.1 FL (ref 37–54)
EOSINOPHIL # BLD AUTO: 0 10*3/MM3 (ref 0–0.4)
EOSINOPHIL NFR BLD AUTO: 0 % (ref 0.3–6.2)
ERYTHROCYTE [DISTWIDTH] IN BLOOD BY AUTOMATED COUNT: 14.5 % (ref 12.3–15.4)
GFR SERPL CREATININE-BSD FRML MDRD: 60 ML/MIN/1.73
GLUCOSE BLDC GLUCOMTR-MCNC: 100 MG/DL (ref 70–130)
GLUCOSE SERPL-MCNC: 92 MG/DL (ref 65–99)
HCT VFR BLD AUTO: 38.4 % (ref 37.5–51)
HGB BLD-MCNC: 12.9 G/DL (ref 13–17.7)
LYMPHOCYTES # BLD AUTO: 0.27 10*3/MM3 (ref 0.7–3.1)
LYMPHOCYTES NFR BLD AUTO: 5 % (ref 19.6–45.3)
MCH RBC QN AUTO: 29.8 PG (ref 26.6–33)
MCHC RBC AUTO-ENTMCNC: 33.6 G/DL (ref 31.5–35.7)
MCV RBC AUTO: 88.7 FL (ref 79–97)
MONOCYTES # BLD AUTO: 0.18 10*3/MM3 (ref 0.1–0.9)
MONOCYTES NFR BLD AUTO: 3.4 % (ref 5–12)
NEUTROPHILS NFR BLD AUTO: 4.85 10*3/MM3 (ref 1.7–7)
NEUTROPHILS NFR BLD AUTO: 90.3 % (ref 42.7–76)
PLATELET # BLD AUTO: 124 10*3/MM3 (ref 140–450)
PMV BLD AUTO: 11.3 FL (ref 6–12)
POTASSIUM SERPL-SCNC: 4.6 MMOL/L (ref 3.5–5.2)
RBC # BLD AUTO: 4.33 10*6/MM3 (ref 4.14–5.8)
SODIUM SERPL-SCNC: 142 MMOL/L (ref 136–145)
WBC # BLD AUTO: 5.37 10*3/MM3 (ref 3.4–10.8)

## 2021-10-05 PROCEDURE — 97535 SELF CARE MNGMENT TRAINING: CPT

## 2021-10-05 PROCEDURE — 86140 C-REACTIVE PROTEIN: CPT | Performed by: INTERNAL MEDICINE

## 2021-10-05 PROCEDURE — 85025 COMPLETE CBC W/AUTO DIFF WBC: CPT | Performed by: INTERNAL MEDICINE

## 2021-10-05 PROCEDURE — 25010000002 ENOXAPARIN PER 10 MG: Performed by: INTERNAL MEDICINE

## 2021-10-05 PROCEDURE — 97168 OT RE-EVAL EST PLAN CARE: CPT

## 2021-10-05 PROCEDURE — 63710000001 DEXAMETHASONE PER 0.25 MG: Performed by: INTERNAL MEDICINE

## 2021-10-05 PROCEDURE — 94799 UNLISTED PULMONARY SVC/PX: CPT

## 2021-10-05 PROCEDURE — 80048 BASIC METABOLIC PNL TOTAL CA: CPT | Performed by: INTERNAL MEDICINE

## 2021-10-05 PROCEDURE — 97110 THERAPEUTIC EXERCISES: CPT

## 2021-10-05 PROCEDURE — 71045 X-RAY EXAM CHEST 1 VIEW: CPT

## 2021-10-05 PROCEDURE — 82962 GLUCOSE BLOOD TEST: CPT

## 2021-10-05 RX ADMIN — SODIUM CHLORIDE 1 DROP: 50 SOLUTION OPHTHALMIC at 08:51

## 2021-10-05 RX ADMIN — ENOXAPARIN SODIUM 60 MG: 60 INJECTION, SOLUTION INTRAVENOUS; SUBCUTANEOUS at 22:08

## 2021-10-05 RX ADMIN — AMLODIPINE BESYLATE 10 MG: 10 TABLET ORAL at 08:46

## 2021-10-05 RX ADMIN — DEXAMETHASONE 6 MG: 4 TABLET ORAL at 21:32

## 2021-10-05 RX ADMIN — METOPROLOL TARTRATE 12.5 MG: 25 TABLET ORAL at 21:33

## 2021-10-05 RX ADMIN — DEXAMETHASONE 6 MG: 4 TABLET ORAL at 08:46

## 2021-10-05 RX ADMIN — SODIUM CHLORIDE, PRESERVATIVE FREE 10 ML: 5 INJECTION INTRAVENOUS at 21:32

## 2021-10-05 RX ADMIN — DEXTROMETHORPHAN POLISTIREX 60 MG: 30 SUSPENSION, EXTENDED RELEASE ORAL at 08:47

## 2021-10-05 RX ADMIN — FAMOTIDINE 20 MG: 20 TABLET, FILM COATED ORAL at 08:46

## 2021-10-05 RX ADMIN — DEXTROMETHORPHAN POLISTIREX 60 MG: 30 SUSPENSION, EXTENDED RELEASE ORAL at 21:30

## 2021-10-05 RX ADMIN — METOPROLOL TARTRATE 12.5 MG: 25 TABLET ORAL at 08:46

## 2021-10-05 RX ADMIN — Medication 250 MG: at 21:32

## 2021-10-05 RX ADMIN — SODIUM CHLORIDE 1 DROP: 50 SOLUTION OPHTHALMIC at 15:14

## 2021-10-05 RX ADMIN — PREDNISOLONE ACETATE 1 DROP: 10 SUSPENSION/ DROPS OPHTHALMIC at 08:52

## 2021-10-05 RX ADMIN — Medication 250 MG: at 08:46

## 2021-10-05 RX ADMIN — ENOXAPARIN SODIUM 60 MG: 60 INJECTION, SOLUTION INTRAVENOUS; SUBCUTANEOUS at 08:46

## 2021-10-05 RX ADMIN — MIRTAZAPINE 7.5 MG: 15 TABLET, FILM COATED ORAL at 21:30

## 2021-10-05 RX ADMIN — FINASTERIDE 5 MG: 5 TABLET, FILM COATED ORAL at 08:46

## 2021-10-05 RX ADMIN — Medication 3 MG: at 22:08

## 2021-10-05 NOTE — PLAN OF CARE
Problem: Adult Inpatient Plan of Care  Goal: Patient-Specific Goal (Individualized)  10/5/2021 0432 by Shamika Valladares RN  Flowsheets (Taken 10/5/2021 0432)  Patient-Specific Goals (Include Timeframe): AOx4. 8 L high flow nasal cannula. Sinus kerline on monitor. No complaints of pain or SOB. Foam padding applied behind the ears. New Mepilex and cream applied to coccyx. Will continue to monitor.

## 2021-10-05 NOTE — PLAN OF CARE
Goal Outcome Evaluation:  Plan of Care Reviewed With: patient        Progress: no change  Outcome Summary: OT re eval on this date. Pt required CGA for bed mob and set up EOB adl task. Pt with lots of anxiety this date, hindering his ability to perform at his maximal potential during OT eval. Pt with decreased strength and activity tolerance and would benefit from skilled OT services to address these deficits. Pt may benefit from GERALD upon dc.    Pt  wore face mask. OT wore all PPE and hand hygiene completed before and after.

## 2021-10-05 NOTE — PROGRESS NOTES
[facility]  [phone]  Cheo Kraft MD    Subjective:  LOS: 30    Chief Complaint: Acute respiratory failure    On HF cannula. Feels better. No cough and on Tessalon.    Objective   Vital Signs past 24hrs    Temp range: Temp (24hrs), Av.5 °F (36.4 °C), Min:96 °F (35.6 °C), Max:99 °F (37.2 °C)    BP range: BP: (118-165)/(64-93) 154/83  Pulse range: Heart Rate:  [53-81] 59  Resp rate range: Resp:  [18-20] 18    Device (Oxygen Therapy): humidified;high-flow nasal cannulaFlow (L/min):  [8-10] 8  Oxygen range:SpO2:  [90 %-98 %] 94 %      62.1 kg (136 lb 12.8 oz); Body mass index is 21.42 kg/m².    Intake/Output Summary (Last 24 hours) at 10/5/2021 1304  Last data filed at 10/5/2021 1247  Gross per 24 hour   Intake 760 ml   Output 2100 ml   Net -1340 ml       Physical Exam  Eyes:      Pupils: Pupils are equal, round, and reactive to light.   Cardiovascular:      Rate and Rhythm: Normal rate and regular rhythm.      Heart sounds: No murmur heard.     Pulmonary:      Effort: Pulmonary effort is normal.      Breath sounds: Normal breath sounds.      Comments: No adventitious sounds  Abdominal:      General: Bowel sounds are normal.      Palpations: Abdomen is soft. There is no mass.      Tenderness: There is no abdominal tenderness.   Musculoskeletal:         General: No swelling.   Neurological:      Mental Status: He is alert.       Results Review:    I have reviewed the laboratory and imaging data since the last note by [facility] physician.  My annotations are noted in assessment and plan.    Results from last 7 days   Lab Units 10/05/21  0524 10/04/21  0700 21  0441   FERRITIN ng/mL  --   --  1,019.00*   CRP mg/dL <0.30 <0.30 <0.30       Medication Review:  I have reviewed the current MAR.  My annotations are noted in assessment and plan.       Plan   PCCM Problems  Acute hypoxic respiratory failure  SARS-CoV-2 infection  Bilateral pulmonary infiltrates  Secondary bacterial  infection  Pneumomediastinum  CLL  History NHL  CKD 3  Paroxysmal A. fib  Low platelets          Plan of Treatment    Unfortunately back on 15L HF with sats about 92%. Monitor.    Supportive treatment for SARS-CoV-2 infection. Previously on 10 mg twice daily.   Currently on dexamethasone 6 mg twice daily.  Lower dose as long as CRP remains within normal limits.    Concern for secondary bacterial infection.  Patient initially treated with antibiotics.      CT 10/2/21 noted. CxR noted.    Pneumomediastinum not reported on CT but seen on CxR 10/5/21.  No significant pneumothorax at this time.    Immunocompromised host due to CLL and history NHL.    CKD 3 further complicates care.    Remains on full anticoagulation for A. Fib.    Prognosis remains guarded.    Cheo Kraft MD  10/05/21  13:04 EDT    While in the room and during my examination of the patient I wore gloves, gown, mask, eye protection and followed enhanced droplet/contact isolation protocol and precautions.  I washed my hands before and after this patient encounter.    Part of this note may be an electronic transcription/translation of spoken language to printed text using the Dragon Dictation System.

## 2021-10-05 NOTE — THERAPY RE-EVALUATION
Patient Name: Angel Cisneros  : 1950    MRN: [id number]                              Today's Date: 10/5/2021       Admit Date: 2021    Visit Dx:     ICD-10-CM ICD-9-CM   1. COVID-19 virus infection  U07.1 079.89   2. Acute hypoxemic respiratory failure (CMS/HCC)  J96.01 518.81   3. Acute renal failure superimposed on chronic kidney disease, unspecified CKD stage, unspecified acute renal failure type (CMS/HCC)  N17.9 584.9    N18.9 585.9     Patient Active Problem List   Diagnosis   • Lymphoma (HCC)   • Leukemia (HCC)   • Chest pain   • GERD (gastroesophageal reflux disease)   • HTN (hypertension)   • Chronic kidney disease, stage 3b (HCC)   • Generalized abdominal pain   • CLL (chronic lymphocytic leukemia) (HCC)   • Gastroesophageal reflux disease   • Hodgkin's disease of lymph nodes of head, face, and neck (HCC)   • Oral thrush   • Medicare annual wellness visit, subsequent   • Primary insomnia   • Adjustment disorder with depressed mood   • Dysuria   • Gross hematuria   • Acute non-recurrent maxillary sinusitis   • Cough   • BPH (benign prostatic hyperplasia)   • Elevated PSA   • Clinical diagnosis of COVID-19   • Pneumonia due to COVID-19 virus   • Paroxysmal atrial fibrillation (HCC)   • Acute respiratory failure with hypoxia (HCC)     Past Medical History:   Diagnosis Date   • Arthritis     both knees   • Cancer (CMS/HCC)     dx with lymphoma / radiation   • Cataracts, bilateral    • Hx of cornea transplant     both eyes   • Leukemia (CMS/HCC)    • Lymphoma (CMS/HCC)     dx in . treated with radiation.   • Lymphoma (CMS/HCC)     treated with radiation. dx in      Past Surgical History:   Procedure Laterality Date   • BREAST LUMPECTOMY Right    • COLONOSCOPY N/A 3/8/2016    Procedure: COLONOSCOPY with cold polypectomy X 3;  Surgeon: Chris Harden MD;  Location: [facility] ENDOSCOPY;  Service:    • ENDOSCOPY N/A 10/23/2018    Procedure: ESOPHAGOGASTRODUODENOSCOPY WITH BIOPSY;   Surgeon: Chris Harden MD;  Location: [facility] ENDOSCOPY;  Service: Gastroenterology   • EYE SURGERY      partial cornea transplant 4-5 years ago   • KNEE SURGERY Left    • TOE SURGERY Bilateral    • TONSILLECTOMY       General Information     Row Name 10/05/21 1043          OT Time and Intention    Document Type  evaluation  -BT     Mode of Treatment  individual therapy;occupational therapy  -BT     Row Name 10/05/21 1043          General Information    Patient Profile Reviewed  yes  -BT     Prior Level of Function  independent:;ADL's;transfer no AD  -BT     Existing Precautions/Restrictions  fall;oxygen therapy device and L/min on 8L high flow  -BT     Barriers to Rehab  none identified  -BT     Row Name 10/05/21 1043          Living Environment    Lives With  alone  -BT     Row Name 10/05/21 1043          Home Main Entrance    Number of Stairs, Main Entrance  one  -BT     Row Name 10/05/21 1043          Cognition    Orientation Status (Cognition)  oriented x 3  -BT     Row Name 10/05/21 1043          Safety Issues, Functional Mobility    Impairments Affecting Function (Mobility)  endurance/activity tolerance;shortness of breath;strength  -BT       User Key  (r) = Recorded By, (t) = Taken By, (c) = Cosigned By    Initials Name Provider Type    BT Deena Saunders OT Occupational Therapist          Mobility/ADL's     Row Name 10/05/21 1057          Bed Mobility    Bed Mobility  supine-sit;sit-supine  -BT     All Activities, [place] (Bed Mobility)  contact guard assist;verbal cues  -BT     Supine-Sit [place] (Bed Mobility)  contact guard  -BT     Assistive Device (Bed Mobility)  bed rails;head of bed elevated  -BT     Row Name 10/05/21 1057          Transfers    Comment (Transfers)  NT on this date due to patient anxiety  -BT     Row Name 10/05/21 1057          Activities of Daily Living    BADL Assessment/Intervention  lower body dressing  -BT     Row Name 10/05/21 1057          Lower Body Dressing  Assessment/Training    [place] Level (Lower Body Dressing)  lower body dressing skills;doff;don;set up  -BT     Position (Lower Body Dressing)  edge of bed sitting  -BT       User Key  (r) = Recorded By, (t) = Taken By, (c) = Cosigned By    Initials Name Provider Type    BT Deena Saunders OT Occupational Therapist        Obj/Interventions     Row Name 10/05/21 1058          Range of Motion Comprehensive    General Range of Motion  no range of motion deficits identified  -BT     Row Name 10/05/21 1058          Strength Comprehensive (MMT)    Comment, General Manual Muscle Testing (MMT) Assessment  generalized weakness  -BT       User Key  (r) = Recorded By, (t) = Taken By, (c) = Cosigned By    Initials Name Provider Type    BT Deena Saunders, OT Occupational Therapist        Goals/Plan     Row Name 10/05/21 1110          Transfer Goal 1 (OT)    Activity/Assistive Device (Transfer Goal 1, OT)  transfers, all  -BT     [place] Level/Cues Needed (Transfer Goal 1, OT)  set-up required  -BT     Time Frame (Transfer Goal 1, OT)  short term goal (STG);2 weeks  -BT     Progress/Outcome (Transfer Goal 1, OT)  continuing progress toward goal  -BT     Row Name 10/05/21 1110          Dressing Goal 1 (OT)    Activity/Device (Dressing Goal 1, OT)  dressing skills, all  -BT     [place]/Cues Needed (Dressing Goal 1, OT)  set-up required  -BT     Time Frame (Dressing Goal 1, OT)  short term goal (STG);2 weeks  -BT     Progress/Outcome (Dressing Goal 1, OT)  continuing progress toward goal  -BT     Row Name 10/05/21 1110          Toileting Goal 1 (OT)    Activity/Device (Toileting Goal 1, OT)  toileting skills, all  -BT     [place] Level/Cues Needed (Toileting Goal 1, OT)  set-up required  -BT     Time Frame (Toileting Goal 1, OT)  2 weeks  -BT     Progress/Outcome (Toileting Goal 1, OT)  continuing progress toward goal  -BT     Row Name 10/05/21 1110          Strength Goal 1 (OT)    Strength Goal 1 (OT)  Pt will  participate in UB therex to promote strength needed for ADLs and fxl mobility  -BT     Time Frame (Strength Goal 1, OT)  short term goal (STG);2 weeks  -BT     Progress/Outcome (Strength Goal 1, OT)  continuing progress toward goal  -BT     Row Name 10/05/21 1110          Therapy Assessment/Plan (OT)    Planned Therapy Interventions (OT)  activity tolerance training;BADL retraining;patient/caregiver education/training;strengthening exercise  -BT       User Key  (r) = Recorded By, (t) = Taken By, (c) = Cosigned By    Initials Name Provider Type    BT Deena Saunders OT Occupational Therapist        Clinical Impression     Row Name 10/05/21 105          Pain Assessment    Additional Documentation  Pain Scale: Numbers Pre/Post-Treatment (Group)  -BT     Row Name 10/05/21 1059          Pain Scale: Numbers Pre/Post-Treatment    Pretreatment Pain Rating  0/10 - no pain  -BT     Posttreatment Pain Rating  0/10 - no pain  -BT     Row Name 10/05/21 1051          Plan of Care Review    Plan of Care Reviewed With  patient  -BT     Outcome Summary  OT re eval on this date. Pt required CGA for bed mob and set up EOB adl task. Pt with lots of anxiety this date, hindering his ability to perform at his maximal potential during OT eval. Pt with decreased strength and activity tolerance and would benefit from skilled OT services to address these deficits. Pt may benefit from GERALD upon dc.  -BT     Row Name 10/05/21 1051          Therapy Assessment/Plan (OT)    Rehab Potential (OT)  good, to achieve stated therapy goals  -BT     Criteria for Skilled Therapeutic Interventions Met (OT)  yes;skilled treatment is necessary  -BT     Therapy Frequency (OT)  5 times/wk  -BT     Row Name 10/05/21 1050          Positioning and Restraints    Pre-Treatment Position  in bed  -BT     Post Treatment Position  bed  -BT     In Bed  supine;call light within reach;encouraged to call for assist;exit alarm on  -BT       User Key  (r) = Recorded By, (t) =  Taken By, (c) = Cosigned By    Initials Name Provider Type    BT Deena Saunders OT Occupational Therapist        Outcome Measures     Row Name 10/05/21 1111          How much help from another is currently needed...    Putting on and taking off regular lower body clothing?  3  -BT     Bathing (including washing, rinsing, and drying)  3  -BT     Toileting (which includes using toilet bed pan or urinal)  3  -BT     Putting on and taking off regular upper body clothing  3  -BT     Taking care of personal grooming (such as brushing teeth)  3  -BT     Eating meals  4  -BT     AM-PAC 6 Clicks Score (OT)  19  -BT       User Key  (r) = Recorded By, (t) = Taken By, (c) = Cosigned By    Initials Name Provider Type    BT Deena Saunders OT Occupational Therapist          Occupational Therapy Education                 Title: PT OT SLP Therapies (Done)     Topic: Occupational Therapy (Done)     Point: ADL training (Done)     Description:   Instruct learner(s) on proper safety adaptation and remediation techniques during self care or transfers.   Instruct in proper use of assistive devices.              Learning Progress Summary           Patient Acceptance, E, VU by BT at 10/5/2021 1111    Comment: ot re eval    Acceptance, E, VU by AG at 9/30/2021 1550    Acceptance, E, VU by AG at 9/29/2021 1534    Acceptance, E, VU by AG at 9/26/2021 1614    Acceptance, E, VU by AG at 9/25/2021 1546    Acceptance, E, VU by AG at 9/24/2021 1542    Acceptance, E, VU by AG at 9/22/2021 1531    Acceptance, E, VU by JW at 9/22/2021 1316    Comment: role of OT, plan of care, safety, PLB                   Point: Home exercise program (Done)     Description:   Instruct learner(s) on appropriate technique for monitoring, assisting and/or progressing therapeutic exercises/activities.              Learning Progress Summary           Patient Acceptance, E, VU by BT at 10/5/2021 1111    Comment: ot re eval    Acceptance, E, VU by AG at 9/30/2021 1550     Acceptance, E, VU by AG at 9/29/2021 1534    Acceptance, E, VU by AG at 9/26/2021 1614    Acceptance, E, VU by AG at 9/25/2021 1546    Acceptance, E, VU by AG at 9/24/2021 1542    Acceptance, E, VU by AG at 9/22/2021 1531    Acceptance, E, VU by JW at 9/22/2021 1316    Comment: role of OT, plan of care, safety, PLB                   Point: Precautions (Done)     Description:   Instruct learner(s) on prescribed precautions during self-care and functional transfers.              Learning Progress Summary           Patient Acceptance, E, VU by BT at 10/5/2021 1111    Comment: ot re eval    Acceptance, E, VU by AG at 9/30/2021 1550    Acceptance, E, VU by AG at 9/29/2021 1534    Acceptance, E, VU by AG at 9/26/2021 1614    Acceptance, E, VU by AG at 9/25/2021 1546    Acceptance, E, VU by AG at 9/24/2021 1542    Acceptance, E, VU by AG at 9/22/2021 1531    Acceptance, E, VU by JW at 9/22/2021 1316    Comment: role of OT, plan of care, safety, PLB                   Point: Body mechanics (Done)     Description:   Instruct learner(s) on proper positioning and spine alignment during self-care, functional mobility activities and/or exercises.              Learning Progress Summary           Patient Acceptance, E, VU by BT at 10/5/2021 1111    Comment: ot re eval    Acceptance, E, VU by AG at 9/30/2021 1550    Acceptance, E, VU by AG at 9/29/2021 1534    Acceptance, E, VU by AG at 9/26/2021 1614    Acceptance, E, VU by AG at 9/25/2021 1546    Acceptance, E, VU by AG at 9/24/2021 1542    Acceptance, E, VU by AG at 9/22/2021 1531                               User Key     Initials Effective Dates Name Provider Type Discipline     06/16/21 -  Giuseppe Beasley, RN Registered Nurse Nurse     11/16/20 -  Deena Saunders OT Occupational Therapist OT    [place] 06/10/21 -  Roshni Nelson OT Occupational Therapist OT              OT Recommendation and Plan  Planned Therapy Interventions (OT): activity tolerance training, BADL  retraining, patient/caregiver education/training, strengthening exercise  Therapy Frequency (OT): 5 times/wk  Plan of Care Review  Plan of Care Reviewed With: patient  Progress: no change  Outcome Summary: OT re eval on this date. Pt required CGA for bed mob and set up EOB adl task. Pt with lots of anxiety this date, hindering his ability to perform at his maximal potential during OT eval. Pt with decreased strength and activity tolerance and would benefit from skilled OT services to address these deficits. Pt may benefit from GERALD upon dc.     Time Calculation:   Time Calculation- OT     Row Name 10/05/21 1112             Time Calculation- OT    OT Start Time  1036  -BT      OT Stop Time  1056  -BT      OT Time Calculation (min)  20 min  -BT      Total Timed Code Minutes- OT  15 minute(s)  -BT      OT Received On  10/05/21  -BT      OT - Next Appointment  10/06/21  -BT      OT Goal Re-Cert Due Date  10/19/21  -BT         Timed Charges    55808 - OT Self Care/Mgmt Minutes  15  -BT         Untimed Charges    OT Eval/Re-eval Minutes  5  -BT         Total Minutes    Timed Charges Total Minutes  15  -BT      Untimed Charges Total Minutes  5  -BT       Total Minutes  20  -BT        User Key  (r) = Recorded By, (t) = Taken By, (c) = Cosigned By    Initials Name Provider Type    BT Deena Saunders OT Occupational Therapist        Therapy Charges for Today     Code Description Service Date Service Provider Modifiers Qty    [id number]  OT RE-EVAL 2 10/5/2021 Deena Saunders OT GO 1    [id number]  OT SELF CARE/MGMT/TRAIN EA 15 MIN 10/5/2021 Deena Saunders OT GO 1               Deena Saunders OT  10/5/2021

## 2021-10-05 NOTE — PROGRESS NOTES
Name: Angel Cisneros ADMIT: 2021   : 1950  PCP: Ayaan Amin MD    MRN: [id number] LOS: 30 days   AGE/SEX: 71 y.o. male  ROOM: [facility]     Subjective   Subjective   Patient resting comfortably in bed.  Stable shortness of breath with minimal exertion.  Cough improving.  Denies fevers, abdominal pain, nausea or diarrhea.  Looking forward to getting out of the hospital    Review of Systems  As above     Objective   Objective   Vital Signs  Temp:  [96 °F (35.6 °C)-97.8 °F (36.6 °C)] 96 °F (35.6 °C)  Heart Rate:  [53-81] 59  Resp:  [18] 18  BP: (122-165)/(71-93) 154/83  SpO2:  [92 %-98 %] 94 %  on  Flow (L/min):  [8-10] 8;   Device (Oxygen Therapy): humidified;high-flow nasal cannula  Body mass index is 21.42 kg/m².  Physical Exam  Constitutional:       General: He is not in acute distress.     Appearance: He is not diaphoretic.   HENT:      Mouth/Throat:      Mouth: Mucous membranes are moist.   Eyes:      General: No scleral icterus.  Cardiovascular:      Rate and Rhythm: Normal rate and regular rhythm.      Heart sounds: No murmur heard.   No gallop.    Pulmonary:      Effort: Pulmonary effort is normal. No respiratory distress.      Breath sounds: No wheezing or rhonchi.   Abdominal:      General: There is no distension.      Palpations: Abdomen is soft. There is no mass.      Tenderness: There is no abdominal tenderness. There is no guarding.   Skin:     General: Skin is warm and dry.   Neurological:      Mental Status: He is alert.         Results Review     I reviewed the patient's new clinical results.  Results from last 7 days   Lab Units 10/05/21  0524 10/04/21  0700 10/03/21  0545 10/02/21  0516   WBC 10*3/mm3 5.37 5.55 5.79 6.34   HEMOGLOBIN g/dL 12.9* 12.5* 12.4* 12.5*   PLATELETS 10*3/mm3 124* 126* 107* 104*     Results from last 7 days   Lab Units 10/05/21  0524 10/04/21  0700 10/03/21  0545 10/02/21  0516   SODIUM mmol/L 142 138 141 139   POTASSIUM mmol/L 4.6 4.7 4.9 4.8    CHLORIDE mmol/L 109* 108* 109* 108*   CO2 mmol/L 23.1 20.5* 21.9* 20.6*   BUN mg/dL 42* 44* 45* 46*   CREATININE mg/dL 1.19 1.31* 1.36* 1.34*   GLUCOSE mg/dL 92 99 101* 102*   Estimated Creatinine Clearance: 50.7 mL/min (by C-G formula based on SCr of 1.19 mg/dL).    Results from last 7 days   Lab Units 10/05/21  0524 10/04/21  0700 10/03/21  0545 10/02/21  0516   CALCIUM mg/dL 8.4* 8.3* 8.5* 8.1*       COVID19   Date Value Ref Range Status   09/22/2021 Detected (C) Not Detected - Ref. Range Final   09/05/2021 Detected (C) Not Detected - Ref. Range Final     Glucose   Date/Time Value Ref Range Status   10/05/2021 0555 100 70 - 130 mg/dL Final     Comment:     Meter: [id number] : [id number] Luis Armando Pandya NA   10/04/2021 2009 123 70 - 130 mg/dL Final     Comment:     Meter: [id number] : [id number] Luis Armando Pandya NA   10/04/2021 1606 103 70 - 130 mg/dL Final     Comment:     Meter: [id number] : [id number] Kely Livingston NA   10/04/2021 1126 133 (H) 70 - 130 mg/dL Final     Comment:     Meter: [id number] : [id number] Kely Livingston NA   10/04/2021 0558 105 70 - 130 mg/dL Final     Comment:     Meter: [id number] : [id number] Johann Anderson NA   10/03/2021 2035 110 70 - 130 mg/dL Final     Comment:     Meter: [id number] : [id number] Johann Monica NA   10/03/2021 1632 111 70 - 130 mg/dL Final     Comment:     Meter: [id number] : [id number] Kely ALMANZAR       XR Chest 1 View  Narrative: Portable chest radiograph     HISTORY:Covid, follow-up pneumomediastinum     TECHNIQUE: Single AP portable radiograph of the chest     COMPARISON:Multiple chest radiographs dating back to 10/01/2021 and  chest CT 10/03/2021     Impression: FINDINGS AND IMPRESSION:  Discrete areas of pneumomediastinum overlying the mid to upper chest are  present, not seen on 10/04/2021. Moderate to severe asymmetric elevation  of the right hemidiaphragm is present, as before. There is extensive  pulmonary  opacification within the bilateral lungs which appears grossly  unchanged since 10/03/2021. No pneumothorax or pleural effusion is seen.  Cardiac silhouette is obscured by adjacent pulmonary opacification and  cannot be accurately assessed.     This report was finalized on 10/5/2021 8:04 AM by Dr. Jose Freedman M.D.       I reviewed the patient's daily medications.  Scheduled Medications  amLODIPine, 10 mg, Oral, Q24H  calcium carbonate, 1 tablet, Oral, Daily  dexamethasone, 6 mg, Oral, BID  dextromethorphan polistirex ER, 60 mg, Oral, Q12H  enoxaparin, 1 mg/kg, Subcutaneous, Q12H  famotidine, 20 mg, Oral, Daily  finasteride, 5 mg, Oral, Daily  metoprolol tartrate, 12.5 mg, Oral, Q12H  mirtazapine, 7.5 mg, Oral, Nightly  prednisoLONE acetate, 1 drop, Both Eyes, Daily  saccharomyces boulardii, 250 mg, Oral, BID  sodium chloride, 1 drop, Left Eye, TID    Infusions   Diet  Diet Regular; Consistent Carbohydrate, Low Potassium; 2 gm (50 mEq)       Assessment/Plan     Active Hospital Problems    Diagnosis  POA   • **Pneumonia due to COVID-19 virus [U07.1, J12.82]  Yes   • Paroxysmal atrial fibrillation (HCC) [I48.0]  No   • Acute respiratory failure with hypoxia (HCC) [J96.01]  Yes   • BPH (benign prostatic hyperplasia) [N40.0]  Yes   • CLL (chronic lymphocytic leukemia) (HCC) [C91.10]  Yes   • Chronic kidney disease, stage 3b (HCC) [N18.32]  Yes   • HTN (hypertension) [I10]  Yes   • GERD (gastroesophageal reflux disease) [K21.9]  Yes   • Hodgkin's disease of lymph nodes of head, face, and neck (HCC) [C81.91]  Yes      Resolved Hospital Problems   No resolved problems to display.       71 y.o. male with history of CLL admitted with acute hypoxic respiratory failure due to COVID-19 pneumonia he was fully vaccinated    COVID-19 pneumonia with resulting acute hypoxic respiratory failure:   - He completed 7-day course of Rocephin due to elevated procalcitonin/pneumomediastinum previously during this admission and infectious  disease evaluated.  Completed remdesivir course.  Has been on high-dose dexamethasone for some time.    Weaning down today.  Pulmonology following.  -Oxygen requirements are increasing again today.  He is currently on 15 L.  Continue supportive care    PAF:   -Sinus rhythm.  Continue metoprolol.    On therapeutic Lovenox for anticoagulation.  Cardiology evaluated and are anticipating about additional 1 month of anticoagulation at discharge.  Transition to Eliquis/Xarelto at that time.    Fibrosis noted on CT. outpatient follow-up    Hypertension: Acceptable acutely.  Continue current regimen and monitor.    CKD3: Stable at baseline, continue daily BMP    CLL: Follows with [facility]    GERD: PPI    Lovenox 60 twice daily for DVT prophylaxis.  Full code.  Discussed with patient and nursing staff.  Anticipate discharge to SNU facility in 1-2 days.  Noted pre-CERT started to facility.      Sandra Zurita DO  [facility]  10/05/21  14:45 EDT    Patient was wearing facemask when I entered the room and throughout our encounter.  I wore protective equipment throughout this patient encounter including a face mask, gloves and protective eyewear.  Hand hygiene was performed before donning protective equipment and after removal when leaving the room.

## 2021-10-05 NOTE — CASE MANAGEMENT/SOCIAL WORK
Continued Stay Note  [facility]     Patient Name: Angel Cisneros  MRN: [id number]  Today's Date: 10/5/2021    Admit Date: 9/5/2021    Discharge Plan     Row Name 10/05/21 1330       Plan    Plan Comments  OT notes in Epic. Spoke with Christiano/Keila and she will review notes. She stated patient does need a traditional [place] Medicare pre-cert. She will update CCP once she reviews notes. Suzi Neville RN CCP        Discharge Codes    No documentation.       Expected Discharge Date and Time     Expected Discharge Date Expected Discharge Time    Oct 7, 2021             Suzi Neville RN

## 2021-10-05 NOTE — CASE MANAGEMENT/SOCIAL WORK
Continued Stay Note  [facility]     Patient Name: Angel Cisneros  MRN: [id number]  Today's Date: 10/5/2021    Admit Date: 9/5/2021    Discharge Plan     Row Name 10/05/21 1443       Plan    Plan Comments  Patient called CCP back and stated he does have plenty of Imbruvica at home and his sister can bring that if it is restarted at DC so that he will have it at [facility]. Updated Christiano. Suzi Neville RN [facility]    Row Name 10/05/21 1434       Plan    Plan  [facility], pre-cert pending.    Patient/Family in Agreement with Plan  yes    Plan Comments  Spoke with patient via cell phone and his sister. Explained [facility] can accept and will start pre-cert. Both agreeable. Spoke with Christiano at [facility] and she will stat cert. Patient will need to bring his Imbruvica from home if restarted at DC. Attempted to reach patient again to tell him this and he did not answer his cell phone and his room phone is busy. Christiano will reach out to the patient and his sister. Suzi Neville RN [facility]    Row Name 10/05/21 7300       Plan    Plan Comments  OT notes in Epic. Spoke with Christiano/Keila and she will review notes. She stated patient does need a traditional [place] Medicare pre-cert. She will update [facility] once she reviews notes. Suzi Neville RN [facility]        Discharge Codes    No documentation.       Expected Discharge Date and Time     Expected Discharge Date Expected Discharge Time    Oct 7, 2021             Suzi Neville RN

## 2021-10-05 NOTE — CASE MANAGEMENT/SOCIAL WORK
Continued Stay Note  [facility]     Patient Name: Angel Cisneros  MRN: [id number]  Today's Date: 10/5/2021    Admit Date: 9/5/2021    Discharge Plan     Row Name 10/05/21 1434       Plan    Plan  [facility], pre-cert pending.    Patient/Family in Agreement with Plan  yes    Plan Comments  Spoke with patient via cell phone and his sister. Explained [facility] can accept and will start pre-cert. Both agreeable. Spoke with Christiano at [facility] and she will stat cert. Patient will need to bring his Imbruvica from home if restarted at DC. Attempted to reach patient again to tell him this and he did not answer his cell phone and his room phone is busy. Christiano will reach out to the patient and his sister. Suzi Neville RN [facility]    Row Name 10/05/21 0930       Plan    Plan Comments  OT notes in Epic. Spoke with Christiano/Keila and she will review notes. She stated patient does need a traditional [place] Medicare pre-cert. She will update CCP once she reviews notes. Suzi Neville RN CCP        Discharge Codes    No documentation.       Expected Discharge Date and Time     Expected Discharge Date Expected Discharge Time    Oct 7, 2021             Suzi Neville RN

## 2021-10-06 ENCOUNTER — APPOINTMENT (OUTPATIENT)
Dept: GENERAL RADIOLOGY | Facility: HOSPITAL | Age: 71
End: 2021-10-06

## 2021-10-06 LAB
ANION GAP SERPL CALCULATED.3IONS-SCNC: 8.2 MMOL/L (ref 5–15)
BUN SERPL-MCNC: 40 MG/DL (ref 8–23)
BUN/CREAT SERPL: 30.1 (ref 7–25)
CALCIUM SPEC-SCNC: 8.5 MG/DL (ref 8.6–10.5)
CHLORIDE SERPL-SCNC: 109 MMOL/L (ref 98–107)
CO2 SERPL-SCNC: 23.8 MMOL/L (ref 22–29)
CREAT SERPL-MCNC: 1.33 MG/DL (ref 0.76–1.27)
DEPRECATED RDW RBC AUTO: 46.8 FL (ref 37–54)
ERYTHROCYTE [DISTWIDTH] IN BLOOD BY AUTOMATED COUNT: 14.6 % (ref 12.3–15.4)
GFR SERPL CREATININE-BSD FRML MDRD: 53 ML/MIN/1.73
GLUCOSE SERPL-MCNC: 95 MG/DL (ref 65–99)
HCT VFR BLD AUTO: 39.1 % (ref 37.5–51)
HGB BLD-MCNC: 13.2 G/DL (ref 13–17.7)
LYMPHOCYTES # BLD MANUAL: 0.17 10*3/MM3 (ref 0.7–3.1)
LYMPHOCYTES NFR BLD MANUAL: 3 % (ref 19.6–45.3)
LYMPHOCYTES NFR BLD MANUAL: 4 % (ref 5–12)
MCH RBC QN AUTO: 29.7 PG (ref 26.6–33)
MCHC RBC AUTO-ENTMCNC: 33.8 G/DL (ref 31.5–35.7)
MCV RBC AUTO: 88.1 FL (ref 79–97)
MONOCYTES # BLD AUTO: 0.23 10*3/MM3 (ref 0.1–0.9)
NEUTROPHILS # BLD AUTO: 5.29 10*3/MM3 (ref 1.7–7)
NEUTROPHILS NFR BLD MANUAL: 93 % (ref 42.7–76)
PLAT MORPH BLD: NORMAL
PLATELET # BLD AUTO: 143 10*3/MM3 (ref 140–450)
PMV BLD AUTO: 10.8 FL (ref 6–12)
POTASSIUM SERPL-SCNC: 4.9 MMOL/L (ref 3.5–5.2)
RBC # BLD AUTO: 4.44 10*6/MM3 (ref 4.14–5.8)
RBC MORPH BLD: NORMAL
SODIUM SERPL-SCNC: 141 MMOL/L (ref 136–145)
WBC # BLD AUTO: 5.69 10*3/MM3 (ref 3.4–10.8)
WBC MORPH BLD: NORMAL

## 2021-10-06 PROCEDURE — 97110 THERAPEUTIC EXERCISES: CPT

## 2021-10-06 PROCEDURE — 80048 BASIC METABOLIC PNL TOTAL CA: CPT | Performed by: INTERNAL MEDICINE

## 2021-10-06 PROCEDURE — 94799 UNLISTED PULMONARY SVC/PX: CPT

## 2021-10-06 PROCEDURE — 94760 N-INVAS EAR/PLS OXIMETRY 1: CPT

## 2021-10-06 PROCEDURE — 63710000001 DEXAMETHASONE PER 0.25 MG: Performed by: INTERNAL MEDICINE

## 2021-10-06 PROCEDURE — 71045 X-RAY EXAM CHEST 1 VIEW: CPT

## 2021-10-06 PROCEDURE — 85007 BL SMEAR W/DIFF WBC COUNT: CPT | Performed by: INTERNAL MEDICINE

## 2021-10-06 PROCEDURE — 97530 THERAPEUTIC ACTIVITIES: CPT

## 2021-10-06 PROCEDURE — 85025 COMPLETE CBC W/AUTO DIFF WBC: CPT | Performed by: INTERNAL MEDICINE

## 2021-10-06 PROCEDURE — 25010000002 ENOXAPARIN PER 10 MG: Performed by: INTERNAL MEDICINE

## 2021-10-06 RX ADMIN — FAMOTIDINE 20 MG: 20 TABLET, FILM COATED ORAL at 08:36

## 2021-10-06 RX ADMIN — Medication 250 MG: at 08:35

## 2021-10-06 RX ADMIN — ENOXAPARIN SODIUM 60 MG: 60 INJECTION, SOLUTION INTRAVENOUS; SUBCUTANEOUS at 21:06

## 2021-10-06 RX ADMIN — METOPROLOL TARTRATE 12.5 MG: 25 TABLET ORAL at 21:06

## 2021-10-06 RX ADMIN — Medication 250 MG: at 21:07

## 2021-10-06 RX ADMIN — SODIUM CHLORIDE 1 DROP: 50 SOLUTION OPHTHALMIC at 15:14

## 2021-10-06 RX ADMIN — AMLODIPINE BESYLATE 10 MG: 10 TABLET ORAL at 08:36

## 2021-10-06 RX ADMIN — DEXTROMETHORPHAN POLISTIREX 60 MG: 30 SUSPENSION, EXTENDED RELEASE ORAL at 08:37

## 2021-10-06 RX ADMIN — MIRTAZAPINE 7.5 MG: 15 TABLET, FILM COATED ORAL at 21:07

## 2021-10-06 RX ADMIN — FINASTERIDE 5 MG: 5 TABLET, FILM COATED ORAL at 08:36

## 2021-10-06 RX ADMIN — Medication 3 MG: at 21:45

## 2021-10-06 RX ADMIN — SODIUM CHLORIDE 1 DROP: 50 SOLUTION OPHTHALMIC at 21:07

## 2021-10-06 RX ADMIN — PREDNISOLONE ACETATE 1 DROP: 10 SUSPENSION/ DROPS OPHTHALMIC at 08:38

## 2021-10-06 RX ADMIN — DEXTROMETHORPHAN POLISTIREX 60 MG: 30 SUSPENSION, EXTENDED RELEASE ORAL at 21:08

## 2021-10-06 RX ADMIN — METOPROLOL TARTRATE 12.5 MG: 25 TABLET ORAL at 08:35

## 2021-10-06 RX ADMIN — SODIUM CHLORIDE 1 DROP: 50 SOLUTION OPHTHALMIC at 08:33

## 2021-10-06 RX ADMIN — ENOXAPARIN SODIUM 60 MG: 60 INJECTION, SOLUTION INTRAVENOUS; SUBCUTANEOUS at 08:34

## 2021-10-06 RX ADMIN — DEXAMETHASONE 6 MG: 4 TABLET ORAL at 21:07

## 2021-10-06 RX ADMIN — DEXAMETHASONE 6 MG: 4 TABLET ORAL at 08:36

## 2021-10-06 NOTE — PROGRESS NOTES
[facility]  [phone]  Cheo Kraft MD    Subjective:  LOS: 31    Chief Complaint: Acute respiratory failure    Remains on high flow cannula.  Denies much cough or phlegm.  Remains in good spirits.    Objective   Vital Signs past 24hrs    Temp range: Temp (24hrs), Av.5 °F (36.4 °C), Min:97 °F (36.1 °C), Max:98.1 °F (36.7 °C)    BP range: BP: (127-141)/(77-88) 139/77  Pulse range: Heart Rate:  [59-86] 73  Resp rate range: Resp:  [18] 18    Device (Oxygen Therapy): humidified;high-flow nasal cannulaFlow (L/min):  [6-9] 6  Oxygen range:SpO2:  [88 %-100 %] 94 %      62.1 kg (137 lb); Body mass index is 21.45 kg/m².    Intake/Output Summary (Last 24 hours) at 10/6/2021 1605  Last data filed at 10/6/2021 1300  Gross per 24 hour   Intake 480 ml   Output 1180 ml   Net -700 ml       Physical Exam  Eyes:      Pupils: Pupils are equal, round, and reactive to light.   Cardiovascular:      Rate and Rhythm: Normal rate and regular rhythm.      Heart sounds: No murmur heard.     Pulmonary:      Effort: Pulmonary effort is normal.      Breath sounds: Normal breath sounds.      Comments: No adventitious sounds  Abdominal:      General: Bowel sounds are normal.      Palpations: Abdomen is soft. There is no mass.      Tenderness: There is no abdominal tenderness.   Musculoskeletal:         General: No swelling.   Neurological:      Mental Status: He is alert.       Results Review:    I have reviewed the laboratory and imaging data since the last note by [facility] physician.  My annotations are noted in assessment and plan.    Results from last 7 days   Lab Units 10/05/21  0524 10/04/21  0700 21  0441   FERRITIN ng/mL  --   --  1,019.00*   CRP mg/dL <0.30 <0.30 <0.30       Medication Review:  I have reviewed the current MAR.  My annotations are noted in assessment and plan.       Plan   PCCM Problems  Acute hypoxic respiratory failure  SARS-CoV-2 infection  Bilateral pulmonary infiltrates  Secondary bacterial  infection  Pneumomediastinum  CLL  History NHL  CKD 3  Paroxysmal A. fib  Low platelets        Plan of Treatment    Remains on 15L HF with sats 91-95%. Monitor.    Supportive treatment for SARS-CoV-2 infection. Previously on 10 mg twice daily.   Currently on dexamethasone 6 mg twice daily.  Lower dose as long as CRP remains within normal limits.    Concern for secondary bacterial infection.  Patient initially treated with antibiotics.      CT 10/2/21 noted. CxR noted.    Pneumomediastinum not reported on CT but seen on CxR 10/5/21.  No significant pneumothorax at this time.    Immunocompromised host due to CLL and history NHL.    CKD 3 further complicates care.    Remains on full anticoagulation for A. Fib.    Prognosis remains guarded.    Cheo Kraft MD  10/06/21  16:05 EDT    While in the room and during my examination of the patient I wore gloves, gown, mask, eye protection and followed enhanced droplet/contact isolation protocol and precautions.  I washed my hands before and after this patient encounter.    Part of this note may be an electronic transcription/translation of spoken language to printed text using the Dragon Dictation System.

## 2021-10-06 NOTE — PLAN OF CARE
Problem: Adult Inpatient Plan of Care  Goal: Patient-Specific Goal (Individualized)  Flowsheets (Taken 10/6/2021 0405)  Patient-Specific Goals (Include Timeframe): AOx4. Now on 7 L high flow nasal cannula. VSS. Sinus kerline on monitor. Will continue to monitor.   Goal Outcome Evaluation:    AOx4. Now on 7 L high flow nasal cannula. VSS. Sinus kerline on monitor. Will continue to monitor.

## 2021-10-06 NOTE — CASE MANAGEMENT/SOCIAL WORK
Continued Stay Note  [facility]     Patient Name: Angel Cisneros  MRN: [id number]  Today's Date: 10/6/2021    Admit Date: 9/5/2021    Discharge Plan     Row Name 10/06/21 1250       Plan    Plan Comments  Nurse to [facility] office stating the patient's son Wayne had questions regarding the facility and why he couldn't stay in [place]. Outbound call to patient's son. He did not answer. Left  requesting a call back. Suzi Neville RN CCP        Discharge Codes    No documentation.       Expected Discharge Date and Time     Expected Discharge Date Expected Discharge Time    Oct 7, 2021             Suzi Neville RN

## 2021-10-06 NOTE — THERAPY TREATMENT NOTE
Patient Name: Angel Cisneros  : 1950    MRN: [id number]                              Today's Date: 10/6/2021       Admit Date: 2021    Visit Dx:     ICD-10-CM ICD-9-CM   1. COVID-19 virus infection  U07.1 079.89   2. Acute hypoxemic respiratory failure (CMS/HCC)  J96.01 518.81   3. Acute renal failure superimposed on chronic kidney disease, unspecified CKD stage, unspecified acute renal failure type (CMS/HCC)  N17.9 584.9    N18.9 585.9     Patient Active Problem List   Diagnosis   • Lymphoma (HCC)   • Leukemia (HCC)   • Chest pain   • GERD (gastroesophageal reflux disease)   • HTN (hypertension)   • Chronic kidney disease, stage 3b (HCC)   • Generalized abdominal pain   • CLL (chronic lymphocytic leukemia) (HCC)   • Gastroesophageal reflux disease   • Hodgkin's disease of lymph nodes of head, face, and neck (HCC)   • Oral thrush   • Medicare annual wellness visit, subsequent   • Primary insomnia   • Adjustment disorder with depressed mood   • Dysuria   • Gross hematuria   • Acute non-recurrent maxillary sinusitis   • Cough   • BPH (benign prostatic hyperplasia)   • Elevated PSA   • Clinical diagnosis of COVID-19   • Pneumonia due to COVID-19 virus   • Paroxysmal atrial fibrillation (HCC)   • Acute respiratory failure with hypoxia (HCC)     Past Medical History:   Diagnosis Date   • Arthritis     both knees   • Cancer (CMS/HCC)     dx with lymphoma / radiation   • Cataracts, bilateral    • Hx of cornea transplant     both eyes   • Leukemia (CMS/HCC)    • Lymphoma (CMS/HCC)     dx in . treated with radiation.   • Lymphoma (CMS/HCC)     treated with radiation. dx in      Past Surgical History:   Procedure Laterality Date   • BREAST LUMPECTOMY Right    • COLONOSCOPY N/A 3/8/2016    Procedure: COLONOSCOPY with cold polypectomy X 3;  Surgeon: Chris Harden MD;  Location: [facility] ENDOSCOPY;  Service:    • ENDOSCOPY N/A 10/23/2018    Procedure: ESOPHAGOGASTRODUODENOSCOPY WITH BIOPSY;   Surgeon: Chris Harden MD;  Location: [facility] ENDOSCOPY;  Service: Gastroenterology   • EYE SURGERY      partial cornea transplant 4-5 years ago   • KNEE SURGERY Left    • TOE SURGERY Bilateral    • TONSILLECTOMY       General Information     Row Name 10/06/21 1159          Physical Therapy Time and Intention    Document Type  therapy note (daily note)  -EB     Mode of Treatment  individual therapy;physical therapy  -EB     Row Name 10/06/21 1159          General Information    Existing Precautions/Restrictions  fall;oxygen therapy device and L/min uses non re-breather  -EB     Row Name 10/06/21 1159          Cognition    Orientation Status (Cognition)  oriented x 3  -EB     Row Name 10/06/21 1159          Safety Issues, Functional Mobility    Impairments Affecting Function (Mobility)  endurance/activity tolerance;strength;shortness of breath  -EB       User Key  (r) = Recorded By, (t) = Taken By, (c) = Cosigned By    Initials Name Provider Type     Sabrina Flores PTA Physical Therapy Assistant        Mobility     Row Name 10/06/21 1201          Bed Mobility    Scooting/Bridging [place] (Bed Mobility)  standby assist;verbal cues;nonverbal cues (demo/gesture) able to scoot towards HOB.  -EB     Supine-Sit [place] (Bed Mobility)  standby assist;verbal cues;nonverbal cues (demo/gesture)  -EB     Sit-Supine [place] (Bed Mobility)  standby assist;verbal cues;nonverbal cues (demo/gesture)  -EB     Assistive Device (Bed Mobility)  bed rails;head of bed elevated  -     Row Name 10/06/21 1201          Transfers    Comment (Transfers)  pt declined to attempt standing despite encouragement. Pt became very anxious.  -EB       User Key  (r) = Recorded By, (t) = Taken By, (c) = Cosigned By    Initials Name Provider Type    EB Sabrina Flores PTA Physical Therapy Assistant        Obj/Interventions     Row Name 10/06/21 1203          Motor Skills    Therapeutic Exercise  -- BLE: AP, seated George chapin (2X10)  rest breaks in between due to O2 stats.  -EB     Row Name 10/06/21 1203          Balance    Balance Assessment  sitting static balance  -EB     Static Sitting Balance  WNL;unsupported;sitting, edge of bed  -EB     Comment, Balance  pt able to tolerate sitting on EOB for increased time about 10minutes. Pt O2 would drop down to lowest 87%. pt would use non re breather. pt has increased anxiety despite reassurance.  -EB       User Key  (r) = Recorded By, (t) = Taken By, (c) = Cosigned By    Initials Name Provider Type    Sabrina Sheets PTA Physical Therapy Assistant        Goals/Plan    No documentation.       Clinical Impression     Row Name 10/06/21 1211          Plan of Care Review    Plan of Care Reviewed With  patient  -EB     Progress  improving  -EB     Outcome Summary  Pt tolerated treatment with no complaints but has lots of anxiety with mobility.  Pt is SBA with all mobility and able to scoot to HOB without assist. Pt declined to attempt stands or ambulation today due to his anxiety and worried about O2 stats dropping. Educated pt but still declined. Pt able to tolerate sitting on EOB for about 10 minutes and perform bilateral LE exercises with rest breaks between each set. Pt's lowest O2 reading was 87% but would quickly recover. Will continue to progress pt as tolerated.  -EB     Row Name 10/06/21 1211          Positioning and Restraints    Pre-Treatment Position  in bed  -EB     Post Treatment Position  bed  -EB     In Bed  supine;call light within reach;encouraged to call for assist;exit alarm on HOB elevated  -EB       User Key  (r) = Recorded By, (t) = Taken By, (c) = Cosigned By    Initials Name Provider Type    Sabrina Sheets PTA Physical Therapy Assistant        Outcome Measures     Row Name 10/06/21 1214          How much help from another person do you currently need...    Turning from your back to your side while in flat bed without using bedrails?  4  -EB     Moving from lying on back to  sitting on the side of a flat bed without bedrails?  3  -EB     Moving to and from a bed to a chair (including a wheelchair)?  3  -EB     Standing up from a chair using your arms (e.g., wheelchair, bedside chair)?  3  -EB     Climbing 3-5 steps with a railing?  1  -EB     To walk in hospital room?  1  -EB     AM-PAC 6 Clicks Score (PT)  15  -EB       User Key  (r) = Recorded By, (t) = Taken By, (c) = Cosigned By    Initials Name Provider Type    Sabrina Sheets PTA Physical Therapy Assistant                       Physical Therapy Education                 Title: PT OT SLP Therapies (Done)     Topic: Physical Therapy (Done)     Point: Mobility training (Done)     Learning Progress Summary           Patient Acceptance, E,D, VU,NR by ABBY at 10/6/2021 1214    Acceptance, E,TB,D, VU,NR by JM at 10/4/2021 1728    Acceptance, E, VU by RS at 10/2/2021 1626    Acceptance, E,TB,D, VU,NR by  at 9/30/2021 1638    Acceptance, E, VU by AG at 9/30/2021 1550    Acceptance, E, VU by AG at 9/29/2021 1534    Acceptance, E,D, VU by MS at 9/28/2021 1128    Acceptance, E, VU by AG at 9/26/2021 1614    Acceptance, E, VU by AG at 9/25/2021 1546    Acceptance, E,D, VU,NR by MS at 9/25/2021 1152    Acceptance, E, VU by AG at 9/24/2021 1542    Acceptance, E,TB,D, VU,NR,DU by CB at 9/23/2021 1329    Acceptance, E, VU by AG at 9/22/2021 1531    Acceptance, E,D, VU,NR by MS at 9/21/2021 1533    Acceptance, E,TB,D, VU,NR by JM at 9/17/2021 1836    Acceptance, E, NR by  at 9/15/2021 1551    Acceptance, E, VU by SR at 9/13/2021 1246    Acceptance, E,D, NR by JM at 9/10/2021 1933    Acceptance, E,TB,D, VU by JM at 9/8/2021 1725    Acceptance, E,D, VU,DU by SAIGE at 9/6/2021 0926                   Point: Home exercise program (Done)     Learning Progress Summary           Patient Acceptance, E,D, VU,NR by ABBY at 10/6/2021 1214    Acceptance, E,TB,D, VU,NR by JEROME at 10/4/2021 1728    Acceptance, E, VU by LESLY at 10/2/2021 1626    Acceptance, E,TB,D,  VU,NR by CH at 9/30/2021 1638    Acceptance, E, VU by AG at 9/30/2021 1550    Acceptance, E, VU by AG at 9/29/2021 1534    Acceptance, E,D, VU by MS at 9/28/2021 1128    Acceptance, E, VU by AG at 9/26/2021 1614    Acceptance, E, VU by AG at 9/25/2021 1546    Acceptance, E,D, VU,NR by MS at 9/25/2021 1152    Acceptance, E, VU by AG at 9/24/2021 1542    Acceptance, E,TB,D, VU,NR,DU by CB at 9/23/2021 1329    Acceptance, E, VU by AG at 9/22/2021 1531    Acceptance, E,D, VU,NR by MS at 9/21/2021 1533    Acceptance, E,TB,D, VU,NR by JM at 9/17/2021 1836    Acceptance, E, NR by  at 9/15/2021 1551    Acceptance, E, VU by SR at 9/13/2021 1246    Acceptance, E,D, NR by JM at 9/10/2021 1933    Acceptance, E,TB,D, VU by JM at 9/8/2021 1725    Acceptance, E,D, VU,DU by  at 9/6/2021 0926                   Point: Body mechanics (Done)     Learning Progress Summary           Patient Acceptance, E,D, VU,NR by EB at 10/6/2021 1214    Acceptance, E,TB,D, VU,NR by JM at 10/4/2021 1728    Acceptance, E, VU by RS at 10/2/2021 1626    Acceptance, E,TB,D, VU,NR by  at 9/30/2021 1638    Acceptance, E, VU by AG at 9/30/2021 1550    Acceptance, E, VU by AG at 9/29/2021 1534    Acceptance, E,D, VU by MS at 9/28/2021 1128    Acceptance, E, VU by AG at 9/26/2021 1614    Acceptance, E, VU by AG at 9/25/2021 1546    Acceptance, E,D, VU,NR by MS at 9/25/2021 1152    Acceptance, E, VU by AG at 9/24/2021 1542    Acceptance, E,TB,D, VU,NR,DU by CB at 9/23/2021 1329    Acceptance, E, VU by AG at 9/22/2021 1531    Acceptance, E,D, VU,NR by MS at 9/21/2021 1533    Acceptance, E,TB,D, VU,NR by JM at 9/17/2021 1836    Acceptance, E, NR by LH at 9/15/2021 1551    Acceptance, E, VU by SR at 9/13/2021 1246    Acceptance, E,D, NR by JM at 9/10/2021 1933    Acceptance, E,TB,D, VU by JM at 9/8/2021 1725    Acceptance, E,D, VU,DU by  at 9/6/2021 0926                   Point: Precautions (Done)     Learning Progress Summary           Patient Acceptance,  E,D, VU,NR by EB at 10/6/2021 1214    Acceptance, E,TB,D, VU,NR by JM at 10/4/2021 1728    Acceptance, E, VU by RS at 10/2/2021 1626    Acceptance, E,TB,D, VU,NR by  at 9/30/2021 1638    Acceptance, E, VU by AG at 9/30/2021 1550    Acceptance, E, VU by AG at 9/29/2021 1534    Acceptance, E,D, VU by MS at 9/28/2021 1128    Acceptance, E, VU by AG at 9/26/2021 1614    Acceptance, E, VU by AG at 9/25/2021 1546    Acceptance, E,D, VU,NR by MS at 9/25/2021 1152    Acceptance, E, VU by AG at 9/24/2021 1542    Acceptance, E,TB,D, VU,NR,DU by  at 9/23/2021 1329    Acceptance, E, VU by AG at 9/22/2021 1531    Acceptance, E,D, VU,NR by MS at 9/21/2021 1533    Acceptance, E,TB,D, VU,NR by  at 9/17/2021 1836    Acceptance, E, NR by  at 9/15/2021 1551    Acceptance, E, VU by  at 9/13/2021 1246    Acceptance, E,D, NR by  at 9/10/2021 1933    Acceptance, E,TB,D, VU by  at 9/8/2021 1725    Acceptance, E,D, VU,DU by  at 9/6/2021 0926                               User Key     Initials Effective Dates Name Provider Type Discipline     06/16/21 -  Neida Abbott, PT Physical Therapist PT    LH 06/16/21 -  Iman Archer, PT Physical Therapist PT    JM 03/07/18 -  Breana Almonte, PTA Physical Therapy Assistant PT    MS 06/16/21 -  Angel Vergara, PT Physical Therapist PT    LC 07/02/20 -  Gino Bolden, PT DPT Physical Therapist PT    EB 06/16/21 -  Sabrina Flores, PTA Physical Therapy Assistant PT    RS 06/16/21 -  Coni Hamilton, PT Physical Therapist PT    AG 06/16/21 -  Giuseppe Beasley, RN Registered Nurse Nurse    CB 12/30/20 -  Brigid Marinelli Physical Therapist PT    SR 02/08/21 -  Lucretia Weiss Physical Therapist PT              PT Recommendation and Plan     Plan of Care Reviewed With: patient  Progress: improving  Outcome Summary: Pt tolerated treatment with no complaints but has lots of anxiety with mobility.  Pt is SBA with all mobility and able to scoot to HOB without assist. Pt declined  to attempt stands or ambulation today due to his anxiety and worried about O2 stats dropping. Educated pt but still declined. Pt able to tolerate sitting on EOB for about 10 minutes and perform bilateral LE exercises with rest breaks between each set. Pt's lowest O2 reading was 87% but would quickly recover. Will continue to progress pt as tolerated.     Time Calculation:   PT Charges     Row Name 10/06/21 1158             Time Calculation    Start Time  0945  -EB      Stop Time  1008  -EB      Time Calculation (min)  23 min  -EB      PT Received On  10/06/21  -EB      PT - Next Appointment  10/08/21  -EB         Time Calculation- PT    Total Timed Code Minutes- PT  23 minute(s)  -EB        User Key  (r) = Recorded By, (t) = Taken By, (c) = Cosigned By    Initials Name Provider Type    Sabrina Sheets PTA Physical Therapy Assistant        Therapy Charges for Today     Code Description Service Date Service Provider Modifiers Qty    66588589711 HC PT THER PROC EA 15 MIN 10/6/2021 Sabrina Flores PTA GP 1    21127546583 HC PT THERAPEUTIC ACT EA 15 MIN 10/6/2021 Sabrina Flores PTA GP 1          PT G-Codes  Outcome Measure Options: AM-PAC 6 Clicks Basic Mobility (PT)  AM-PAC 6 Clicks Score (PT): 15  AM-PAC 6 Clicks Score (OT): 19    Sabrina Flores PTA  10/6/2021

## 2021-10-06 NOTE — PLAN OF CARE
Goal Outcome Evaluation:  Plan of Care Reviewed With: patient  Progress: improving  Outcome Summary: Patient seen for OT tx session this date with no c/o of pain, however continued anxiety with OOB activity, declining bed mobility and bed <> chair on this date. Patient agreeable to complete BUE ther ex sitting up in bed with HOB elevated 3 sets x 10 reps in shoulder and elbow planes, significant time for RBs due to stats dropping to 83-84% with quick recovery ~91%. OT to continue to progress with patient as tolerated.    Therapist wearing mask and eye protection. Patient not wearing mask. Thorough hand hygiene completed before and after tx session.

## 2021-10-06 NOTE — THERAPY TREATMENT NOTE
Patient Name: Angel Cisneros  : 1950    MRN: [id number]                              Today's Date: 10/6/2021       Admit Date: 2021    Visit Dx:     ICD-10-CM ICD-9-CM   1. COVID-19 virus infection  U07.1 079.89   2. Acute hypoxemic respiratory failure (CMS/HCC)  J96.01 518.81   3. Acute renal failure superimposed on chronic kidney disease, unspecified CKD stage, unspecified acute renal failure type (CMS/HCC)  N17.9 584.9    N18.9 585.9     Patient Active Problem List   Diagnosis   • Lymphoma (HCC)   • Leukemia (HCC)   • Chest pain   • GERD (gastroesophageal reflux disease)   • HTN (hypertension)   • Chronic kidney disease, stage 3b (HCC)   • Generalized abdominal pain   • CLL (chronic lymphocytic leukemia) (HCC)   • Gastroesophageal reflux disease   • Hodgkin's disease of lymph nodes of head, face, and neck (HCC)   • Oral thrush   • Medicare annual wellness visit, subsequent   • Primary insomnia   • Adjustment disorder with depressed mood   • Dysuria   • Gross hematuria   • Acute non-recurrent maxillary sinusitis   • Cough   • BPH (benign prostatic hyperplasia)   • Elevated PSA   • Clinical diagnosis of COVID-19   • Pneumonia due to COVID-19 virus   • Paroxysmal atrial fibrillation (HCC)   • Acute respiratory failure with hypoxia (HCC)     Past Medical History:   Diagnosis Date   • Arthritis     both knees   • Cancer (HCC)     dx with lymphoma / radiation   • Cataracts, bilateral    • Hx of cornea transplant     both eyes   • Leukemia (HCC)    • Lymphoma (HCC)     dx in . treated with radiation.   • Lymphoma (HCC)     treated with radiation. dx in      Past Surgical History:   Procedure Laterality Date   • BREAST LUMPECTOMY Right    • COLONOSCOPY N/A 3/8/2016    Procedure: COLONOSCOPY with cold polypectomy X 3;  Surgeon: Chris Harden MD;  Location: [facility] ENDOSCOPY;  Service:    • ENDOSCOPY N/A 10/23/2018    Procedure: ESOPHAGOGASTRODUODENOSCOPY WITH BIOPSY;  Surgeon: Fina  "Chris GRAYSON MD;  Location: [facility] ENDOSCOPY;  Service: Gastroenterology   • EYE SURGERY      partial cornea transplant 4-5 years ago   • KNEE SURGERY Left    • TOE SURGERY Bilateral    • TONSILLECTOMY       General Information     Row Name 10/06/21 1429          OT Time and Intention    Document Type  therapy note (daily note)  -MW     Mode of Treatment  individual therapy;occupational therapy  -     Row Name 10/06/21 1429          General Information    Patient Profile Reviewed  yes  -MW     Existing Precautions/Restrictions  fall;oxygen therapy device and L/min  -MW     Row Name 10/06/21 1429          Cognition    Orientation Status (Cognition)  oriented x 3  -MW     Row Name 10/06/21 1429          Safety Issues, Functional Mobility    Impairments Affecting Function (Mobility)  endurance/activity tolerance;strength;shortness of breath  -MW       User Key  (r) = Recorded By, (t) = Taken By, (c) = Cosigned By    Initials Name Provider Type    Kary Martino OT Occupational Therapist          Mobility/ADL's     Row Name 10/06/21 1429          Bed Mobility    Comment (Bed Mobility)  Patient requesting to remain in supine to complete ther ex due to anxiety and stating \"I already sat on the EOB with PT this morning.\"  -     Row Name 10/06/21 1429          Transfers    Comment (Transfers)  Patient declined all OOB activity, despite encouragement and education, increased anxiety with conversation  -MW     Bed-Chair [place] (Transfers)  unable to assess  -MW     Sit-Stand [place] (Transfers)  unable to assess  -MW       User Key  (r) = Recorded By, (t) = Taken By, (c) = Cosigned By    Initials Name Provider Type    Kary Martino OT Occupational Therapist        Obj/Interventions     Row Name 10/06/21 1431          Shoulder (Therapeutic Exercise)    Shoulder (Therapeutic Exercise)  AROM (active range of motion)  -     Shoulder AROM (Therapeutic Exercise)  " bilateral;flexion;extension;aBduction;aDduction;3 sets;10 repetitions  -     Row Name 10/06/21 1431          Elbow/Forearm (Therapeutic Exercise)    Elbow/Forearm (Therapeutic Exercise)  AROM (active range of motion)  -     Elbow/Forearm AROM (Therapeutic Exercise)  bilateral;flexion;extension;10 repetitions;3 sets  -     Row Name 10/06/21 1431          Motor Skills    Motor Skills  functional endurance  -     Functional Endurance  poor  -     Row Name 10/06/21 1431          Balance    Static Sitting Balance  not tested  -MW     Dynamic Sitting Balance  not tested  -MW     Static Standing Balance  not tested  -MW     Dynamic Standing Balance  not tested  -MW     Comment, Balance  Patient declining completing ther ex at EOB today due to patient anxious about O2 levels dropping  -     Row Name 10/06/21 1431          Therapeutic Exercise    Therapeutic Exercise  elbow/forearm;shoulder  -       User Key  (r) = Recorded By, (t) = Taken By, (c) = Cosigned By    Initials Name Provider Type    Kary Martino OT Occupational Therapist        Goals/Plan    No documentation.       Clinical Impression     Row Name 10/06/21 [facility]          Pain Assessment    Additional Documentation  Pain Scale: Numbers Pre/Post-Treatment (Group)  -[facility] Name 10/06/21 [facility]          Pain Scale: Numbers Pre/Post-Treatment    Pretreatment Pain Rating  0/10 - no pain  -     Posttreatment Pain Rating  0/10 - no pain  -[facility] Name 10/06/21 [facility]          Plan of Care Review    Plan of Care Reviewed With  patient  -MW     Progress  improving  -     Outcome Summary  Patient seen for OT tx session this date with no c/o of pain, however continued anxiety with OOB activity, declining bed mobility and bed <> chair on this date. Patient agreeable to complete BUE ther ex sitting up in bed with HOB elevated 3 sets x 10 reps in shoulder and elbow planes, significant time for RBs due to stats dropping to 83-84% with quick recovery  ~91%. OT to continue to progress with patient as tolerated.  -MW     Row Name 10/06/21 1432          Vital Signs    Pre SpO2 (%)  91  -MW     O2 Delivery Pre Treatment  hi-flow  -MW     Intra SpO2 (%)  83  -MW     O2 Delivery Intra Treatment  hi-flow  -MW     Post SpO2 (%)  92  -MW     O2 Delivery Post Treatment  hi-flow  -MW     Pre Patient Position  Supine  -MW     Intra Patient Position  Supine  -MW     Post Patient Position  Supine  -MW     Row Name 10/06/21 1432          Positioning and Restraints    Pre-Treatment Position  in bed  -MW     Post Treatment Position  bed  -MW     In Bed  notified nsg;call light within reach;encouraged to call for assist;exit alarm on;supine  -MW       User Key  (r) = Recorded By, (t) = Taken By, (c) = Cosigned By    Initials Name Provider Type    Kary Martino, OT Occupational Therapist        Outcome Measures     Row Name 10/06/21 1435          How much help from another is currently needed...    Putting on and taking off regular lower body clothing?  3  -MW     Bathing (including washing, rinsing, and drying)  3  -MW     Toileting (which includes using toilet bed pan or urinal)  3  -MW     Putting on and taking off regular upper body clothing  3  -MW     Taking care of personal grooming (such as brushing teeth)  3  -MW     Eating meals  4  -MW     AM-PAC 6 Clicks Score (OT)  19  -MW     Row Name 10/06/21 1214          How much help from another person do you currently need...    Turning from your back to your side while in flat bed without using bedrails?  4  -EB     Moving from lying on back to sitting on the side of a flat bed without bedrails?  3  -EB     Moving to and from a bed to a chair (including a wheelchair)?  3  -EB     Standing up from a chair using your arms (e.g., wheelchair, bedside chair)?  3  -EB     Climbing 3-5 steps with a railing?  1  -EB     To walk in hospital room?  1  -EB     AM-PAC 6 Clicks Score (PT)  15  -EB     Row Name 10/06/21 2688           Functional Assessment    Outcome Measure Options  AM-PAC 6 Clicks Daily Activity (OT)  -       User Key  (r) = Recorded By, (t) = Taken By, (c) = Cosigned By    Initials Name Provider Type    Sabrina Sheets PTA Physical Therapy Assistant    Kary Martino OT Occupational Therapist          Occupational Therapy Education                 Title: PT OT SLP Therapies (Done)     Topic: Occupational Therapy (Done)     Point: ADL training (Done)     Description:   Instruct learner(s) on proper safety adaptation and remediation techniques during self care or transfers.   Instruct in proper use of assistive devices.              Learning Progress Summary           Patient Acceptance, E, VU by BT at 10/5/2021 1111    Comment: ot re eval    Acceptance, E, VU by AG at 9/30/2021 1550    Acceptance, E, VU by AG at 9/29/2021 1534    Acceptance, E, VU by AG at 9/26/2021 1614    Acceptance, E, VU by AG at 9/25/2021 1546    Acceptance, E, VU by AG at 9/24/2021 1542    Acceptance, E, VU by AG at 9/22/2021 1531    Acceptance, E, VU by  at 9/22/2021 1316    Comment: role of OT, plan of care, safety, PLB                   Point: Home exercise program (Done)     Description:   Instruct learner(s) on appropriate technique for monitoring, assisting and/or progressing therapeutic exercises/activities.              Learning Progress Summary           Patient Acceptance, E, VU by BT at 10/5/2021 1111    Comment: ot re eval    Acceptance, E, VU by AG at 9/30/2021 1550    Acceptance, E, VU by AG at 9/29/2021 1534    Acceptance, E, VU by AG at 9/26/2021 1614    Acceptance, E, VU by AG at 9/25/2021 1546    Acceptance, E, VU by AG at 9/24/2021 1542    Acceptance, E, VU by AG at 9/22/2021 1531    Acceptance, E, VU by JW at 9/22/2021 1316    Comment: role of OT, plan of care, safety, PLB                   Point: Precautions (Done)     Description:   Instruct learner(s) on prescribed precautions during self-care and functional transfers.               Learning Progress Summary           Patient Acceptance, E, VU by BT at 10/5/2021 1111    Comment: ot re eval    Acceptance, E, VU by AG at 9/30/2021 1550    Acceptance, E, VU by AG at 9/29/2021 1534    Acceptance, E, VU by AG at 9/26/2021 1614    Acceptance, E, VU by AG at 9/25/2021 1546    Acceptance, E, VU by AG at 9/24/2021 1542    Acceptance, E, VU by AG at 9/22/2021 1531    Acceptance, E, VU by JW at 9/22/2021 1316    Comment: role of OT, plan of care, safety, PLB                   Point: Body mechanics (Done)     Description:   Instruct learner(s) on proper positioning and spine alignment during self-care, functional mobility activities and/or exercises.              Learning Progress Summary           Patient Acceptance, E, VU by BT at 10/5/2021 1111    Comment: ot re eval    Acceptance, E, VU by AG at 9/30/2021 1550    Acceptance, E, VU by AG at 9/29/2021 1534    Acceptance, E, VU by AG at 9/26/2021 1614    Acceptance, E, VU by AG at 9/25/2021 1546    Acceptance, E, VU by AG at 9/24/2021 1542    Acceptance, E, VU by AG at 9/22/2021 1531                               User Key     Initials Effective Dates Name Provider Type Discipline     06/16/21 -  Giuseppe Beasley, RN Registered Nurse Nurse     11/16/20 -  Deena Saunders OT Occupational Therapist OT     06/10/21 -  Roshni Nelson OT Occupational Therapist OT              OT Recommendation and Plan     Plan of Care Review  Plan of Care Reviewed With: patient  Progress: improving  Outcome Summary: Patient seen for OT tx session this date with no c/o of pain, however continued anxiety with OOB activity, declining bed mobility and bed <> chair on this date. Patient agreeable to complete BUE ther ex sitting up in bed with HOB elevated 3 sets x 10 reps in shoulder and elbow planes, significant time for RBs due to stats dropping to 83-84% with quick recovery ~91%. OT to continue to progress with patient as tolerated.     Time Calculation:    Time Calculation- OT     Row Name 10/06/21 1436             Time Calculation- OT    OT Start Time  1315  -MW      OT Stop Time  1338  -MW      OT Time Calculation (min)  23 min  -MW      Total Timed Code Minutes- OT  23 minute(s)  -MW      OT Received On  10/06/21  -MW      OT - Next Appointment  10/07/21  -MW         Timed Charges    03098 - OT Therapeutic Exercise Minutes  23  -MW         Total Minutes    Timed Charges Total Minutes  23  -MW       Total Minutes  23  -MW        User Key  (r) = Recorded By, (t) = Taken By, (c) = Cosigned By    Initials Name Provider Type    Kary Martino OT Occupational Therapist        Therapy Charges for Today     Code Description Service Date Service Provider Modifiers Qty    78110161655 HC OT THER PROC EA 15 MIN 10/6/2021 Kary Williamson OT GO 2               Kary Williamson OT  10/6/2021

## 2021-10-06 NOTE — NURSING NOTE
CWON note: pt seen for evaluation of zander buttocks. Pt has been in-patient for an extended period of time, he is alert and oriented, able to turn and position self in bed. Left buttocks with superficial abrasion 0.2x 0.2cms, zander buttocks with dry peeling skin secondary to friction. Sacral Optifoam placed for protection/ prevention, order placed in Epic. Will have staff add a waffle mattress to his bed and prevention standing orders have been placed in Epic. Educated pt on need to turn side to side and prevent prolonged pressure to his buttocks. Pt verbalized understanding. Discussed all with pt's RN, Maryse.

## 2021-10-06 NOTE — PLAN OF CARE
Goal Outcome Evaluation:  Plan of Care Reviewed With: patient        Progress: improving  Outcome Summary: Pt tolerated treatment with no complaints but has lots of anxiety with mobility.  Pt is SBA with all mobility and able to scoot to HOB without assist. Pt declined to attempt stands or ambulation today due to his anxiety and worried about O2 stats dropping. Educated pt but still declined. Pt able to tolerate sitting on EOB for about 10 minutes and perform bilateral LE exercises with rest breaks between each set. Pt's lowest O2 reading was 87% but would quickly recover. Will continue to progress pt as tolerated.    ..Patient was not wearing a face mask during this therapy encounter. Therapist used appropriate personal protective equipment including gown, eye protection, mask and gloves.  Mask used was N95/duckbill. Appropriate PPE was worn during the entire therapy session. Hand hygiene was completed before and after therapy session. Patient is in enhanced droplet precautions.

## 2021-10-06 NOTE — PROGRESS NOTES
Name: Angel Cisneros ADMIT: 2021   : 1950  PCP: Ayaan Amin MD    MRN: [id number] LOS: 31 days   AGE/SEX: 71 y.o. male  ROOM: [facility]     Subjective   Subjective   Oxygen requirements decreasing.  He is feeling fine just anxious to leave the hospital.  No new complaints today.  No chest pain, cough, nausea or diarrhea    Review of Systems  As above     Objective   Objective   Vital Signs  Temp:  [97 °F (36.1 °C)-97.6 °F (36.4 °C)] 97.6 °F (36.4 °C)  Heart Rate:  [59-86] 59  Resp:  [18] 18  BP: (127-141)/(76-88) 141/82  SpO2:  [88 %-100 %] 97 %  on  Flow (L/min):  [6-9] 6;   Device (Oxygen Therapy): humidified;high-flow nasal cannula  Body mass index is 21.45 kg/m².  Physical Exam  Constitutional:       General: He is not in acute distress.     Appearance: He is not diaphoretic.   HENT:      Mouth/Throat:      Mouth: Mucous membranes are moist.   Eyes:      General: No scleral icterus.  Cardiovascular:      Rate and Rhythm: Normal rate and regular rhythm.      Heart sounds: No murmur heard.   No gallop.    Pulmonary:      Effort: Pulmonary effort is normal. No respiratory distress.      Breath sounds: No wheezing or rhonchi.   Abdominal:      General: There is no distension.      Palpations: Abdomen is soft. There is no mass.      Tenderness: There is no abdominal tenderness. There is no guarding.   Skin:     General: Skin is warm and dry.   Neurological:      Mental Status: He is alert.         Results Review     I reviewed the patient's new clinical results.  Results from last 7 days   Lab Units 10/06/21  0634 10/05/21  0524 10/04/21  0700 10/03/21  0545   WBC 10*3/mm3 5.69 5.37 5.55 5.79   HEMOGLOBIN g/dL 13.2 12.9* 12.5* 12.4*   PLATELETS 10*3/mm3 143 124* 126* 107*     Results from last 7 days   Lab Units 10/06/21  0634 10/05/21  0524 10/04/21  0700 10/03/21  0545   SODIUM mmol/L 141 142 138 141   POTASSIUM mmol/L 4.9 4.6 4.7 4.9   CHLORIDE mmol/L 109* 109* 108* 109*   CO2 mmol/L  23.8 23.1 20.5* 21.9*   BUN mg/dL 40* 42* 44* 45*   CREATININE mg/dL 1.33* 1.19 1.31* 1.36*   GLUCOSE mg/dL 95 92 99 101*   Estimated Creatinine Clearance: 45.4 mL/min (A) (by C-G formula based on SCr of 1.33 mg/dL (H)).    Results from last 7 days   Lab Units 10/06/21  0634 10/05/21  0524 10/04/21  0700 10/03/21  0545   CALCIUM mg/dL 8.5* 8.4* 8.3* 8.5*       COVID19   Date Value Ref Range Status   09/22/2021 Detected (C) Not Detected - Ref. Range Final   09/05/2021 Detected (C) Not Detected - Ref. Range Final     Glucose   Date/Time Value Ref Range Status   10/05/2021 0555 100 70 - 130 mg/dL Final     Comment:     Meter: [id number] : [id number] Luis Armando Pandya NA   10/04/2021 2009 123 70 - 130 mg/dL Final     Comment:     Meter: [id number] : [id number] Luis Armando Pandya NA   10/04/2021 1606 103 70 - 130 mg/dL Final     Comment:     Meter: [id number] : [id number] Kely Livingston NA   10/04/2021 1126 133 (H) 70 - 130 mg/dL Final     Comment:     Meter: [id number] : [id number] Keyl [place]ston NA   10/04/2021 0558 105 70 - 130 mg/dL Final     Comment:     Meter: [id number] : [id number] Johann Anderson NA   10/03/2021 2035 110 70 - 130 mg/dL Final     Comment:     Meter: [id number] : [id number] Johann Monica NA   10/03/2021 1632 111 70 - 130 mg/dL Final     Comment:     Meter: [id number] : [id number] Kely ALMANZAR       XR Chest 1 View  XR CHEST 1 VW-     HISTORY: 71-year-old male with pneumomediastinum. Follow-up.     FINDINGS: The resolving bilateral COVID pneumonia with developing  scarring appears unchanged. The tiny pneumomediastinum seen on the  10/03/2021 chest CT is not seen on the subsequent or current radiograph.  There is no pneumothorax.     This report was finalized on 10/6/2021 1:30 PM by Dr. Jessica Calderon M.D.       I reviewed the patient's daily medications.  Scheduled Medications  amLODIPine, 10 mg, Oral, Q24H  calcium carbonate, 1 tablet, Oral, Daily  dexamethasone, 6  mg, Oral, BID  dextromethorphan polistirex ER, 60 mg, Oral, Q12H  enoxaparin, 1 mg/kg, Subcutaneous, Q12H  famotidine, 20 mg, Oral, Daily  finasteride, 5 mg, Oral, Daily  metoprolol tartrate, 12.5 mg, Oral, Q12H  mirtazapine, 7.5 mg, Oral, Nightly  prednisoLONE acetate, 1 drop, Both Eyes, Daily  saccharomyces boulardii, 250 mg, Oral, BID  sodium chloride, 1 drop, Left Eye, TID    Infusions   Diet  Diet Regular; Consistent Carbohydrate, Low Potassium; 2 gm (50 mEq)       Assessment/Plan     Active Hospital Problems    Diagnosis  POA   • **Pneumonia due to COVID-19 virus [U07.1, J12.82]  Yes   • Paroxysmal atrial fibrillation (HCC) [I48.0]  No   • Acute respiratory failure with hypoxia (HCC) [J96.01]  Yes   • BPH (benign prostatic hyperplasia) [N40.0]  Yes   • CLL (chronic lymphocytic leukemia) (HCC) [C91.10]  Yes   • Chronic kidney disease, stage 3b (HCC) [N18.32]  Yes   • HTN (hypertension) [I10]  Yes   • GERD (gastroesophageal reflux disease) [K21.9]  Yes   • Hodgkin's disease of lymph nodes of head, face, and neck (HCC) [C81.91]  Yes      Resolved Hospital Problems   No resolved problems to display.       71 y.o. male with history of CLL admitted with acute hypoxic respiratory failure due to COVID-19 pneumonia.  He was fully vaccinated    COVID-19 pneumonia with acute hypoxic respiratory failure:   - He completed 7-day course of Rocephin due to elevated procalcitonin/pneumomediastinum previously during this admission and infectious disease evaluated.  Completed remdesivir course.  Has been on high-dose dexamethasone for some time.    Slowly weaning down.  Pulmonology following.  -Oxygen requirements are decreasing today.  He is currently on 7 L nasal cannula.    PAF:   -Sinus rhythm.  Continue metoprolol.    On therapeutic Lovenox for anticoagulation.  Cardiology evaluated and are anticipating about additional 1 month of anticoagulation at discharge.  Transition to Eliquis/Xarelto at that time.    Fibrosis noted  on CT. outpatient follow-up    Hypertension: Acceptable acutely.  Continue current regimen and monitor.    CKD3: Stable at baseline, continue daily BMP    CLL: Follows with [facility]    GERD: PPI    Lovenox 60 twice daily   Full code.  Discussed with patient and nursing staff.  Anticipate discharge to SNU facility in 1-2 days.  Noted pre-CERT started to facility.      Sandra Zurita DO  [facility]  10/06/21  14:04 EDT    Patient was wearing facemask when I entered the room and throughout our encounter.  I wore protective equipment throughout this patient encounter including a face mask, gloves and protective eyewear.  Hand hygiene was performed before donning protective equipment and after removal when leaving the room.

## 2021-10-06 NOTE — CASE MANAGEMENT/SOCIAL WORK
Continued Stay Note  [facility]     Patient Name: Angel Cisneros  MRN: [id number]  Today's Date: 10/6/2021    Admit Date: 9/5/2021    Discharge Plan     Row Name 10/06/21 1448       Plan    Plan Comments  Inbound call from patient's son. Updated him regarding DC plans. He verbalized understanding. Awaiting insurance pre-cert. Suzi Neville RN [facility]    Row Name 10/06/21 1250       Plan    Plan Comments  Nurse to [facility] office stating the patient's son Wayne had questions regarding the facility and why he couldn't stay in [place]. Outbound call to patient's son. He did not answer. Left  requesting a call back. Suzi Neville RN [facility]        Discharge Codes    No documentation.       Expected Discharge Date and Time     Expected Discharge Date Expected Discharge Time    Oct 7, 2021             Suzi Neville RN

## 2021-10-07 ENCOUNTER — APPOINTMENT (OUTPATIENT)
Dept: GENERAL RADIOLOGY | Facility: HOSPITAL | Age: 71
End: 2021-10-07

## 2021-10-07 PROBLEM — K59.00 ACUTE CONSTIPATION: Status: ACTIVE | Noted: 2021-10-07

## 2021-10-07 LAB
NT-PROBNP SERPL-MCNC: 288 PG/ML (ref 0–900)
PROCALCITONIN SERPL-MCNC: 0.05 NG/ML (ref 0–0.25)

## 2021-10-07 PROCEDURE — 94799 UNLISTED PULMONARY SVC/PX: CPT

## 2021-10-07 PROCEDURE — 63710000001 DEXAMETHASONE PER 0.25 MG: Performed by: INTERNAL MEDICINE

## 2021-10-07 PROCEDURE — 25010000002 ENOXAPARIN PER 10 MG: Performed by: INTERNAL MEDICINE

## 2021-10-07 PROCEDURE — 97110 THERAPEUTIC EXERCISES: CPT | Performed by: OCCUPATIONAL THERAPIST

## 2021-10-07 PROCEDURE — 71045 X-RAY EXAM CHEST 1 VIEW: CPT

## 2021-10-07 PROCEDURE — 83880 ASSAY OF NATRIURETIC PEPTIDE: CPT | Performed by: INTERNAL MEDICINE

## 2021-10-07 PROCEDURE — 84145 PROCALCITONIN (PCT): CPT | Performed by: INTERNAL MEDICINE

## 2021-10-07 PROCEDURE — 94760 N-INVAS EAR/PLS OXIMETRY 1: CPT

## 2021-10-07 RX ORDER — POLYETHYLENE GLYCOL 3350 17 G/17G
17 POWDER, FOR SOLUTION ORAL DAILY PRN
Status: DISCONTINUED | OUTPATIENT
Start: 2021-10-07 | End: 2021-10-07 | Stop reason: SDUPTHER

## 2021-10-07 RX ORDER — AMOXICILLIN 250 MG
2 CAPSULE ORAL DAILY
Status: DISCONTINUED | OUTPATIENT
Start: 2021-10-07 | End: 2021-10-11

## 2021-10-07 RX ADMIN — FINASTERIDE 5 MG: 5 TABLET, FILM COATED ORAL at 08:38

## 2021-10-07 RX ADMIN — DEXTROMETHORPHAN POLISTIREX 60 MG: 30 SUSPENSION, EXTENDED RELEASE ORAL at 20:44

## 2021-10-07 RX ADMIN — DEXTROMETHORPHAN POLISTIREX 60 MG: 30 SUSPENSION, EXTENDED RELEASE ORAL at 08:38

## 2021-10-07 RX ADMIN — ANTACID TABLETS 1 TABLET: 500 TABLET, CHEWABLE ORAL at 08:38

## 2021-10-07 RX ADMIN — METOPROLOL TARTRATE 12.5 MG: 25 TABLET ORAL at 08:38

## 2021-10-07 RX ADMIN — Medication 250 MG: at 20:44

## 2021-10-07 RX ADMIN — Medication 3 MG: at 22:14

## 2021-10-07 RX ADMIN — FAMOTIDINE 20 MG: 20 TABLET, FILM COATED ORAL at 08:38

## 2021-10-07 RX ADMIN — METOPROLOL TARTRATE 12.5 MG: 25 TABLET ORAL at 20:44

## 2021-10-07 RX ADMIN — Medication 250 MG: at 08:38

## 2021-10-07 RX ADMIN — ENOXAPARIN SODIUM 60 MG: 60 INJECTION, SOLUTION INTRAVENOUS; SUBCUTANEOUS at 20:44

## 2021-10-07 RX ADMIN — ENOXAPARIN SODIUM 60 MG: 60 INJECTION, SOLUTION INTRAVENOUS; SUBCUTANEOUS at 08:38

## 2021-10-07 RX ADMIN — SODIUM CHLORIDE 1 DROP: 50 SOLUTION OPHTHALMIC at 20:46

## 2021-10-07 RX ADMIN — PREDNISOLONE ACETATE 1 DROP: 10 SUSPENSION/ DROPS OPHTHALMIC at 08:42

## 2021-10-07 RX ADMIN — SODIUM CHLORIDE 1 DROP: 50 SOLUTION OPHTHALMIC at 08:42

## 2021-10-07 RX ADMIN — DOCUSATE SODIUM 50MG AND SENNOSIDES 8.6MG 2 TABLET: 8.6; 5 TABLET, FILM COATED ORAL at 12:18

## 2021-10-07 RX ADMIN — MIRTAZAPINE 7.5 MG: 15 TABLET, FILM COATED ORAL at 20:44

## 2021-10-07 RX ADMIN — DEXAMETHASONE 6 MG: 4 TABLET ORAL at 08:38

## 2021-10-07 RX ADMIN — AMLODIPINE BESYLATE 10 MG: 10 TABLET ORAL at 08:38

## 2021-10-07 RX ADMIN — SODIUM CHLORIDE 1 DROP: 50 SOLUTION OPHTHALMIC at 17:15

## 2021-10-07 NOTE — CASE MANAGEMENT/SOCIAL WORK
Continued Stay Note  [facility]     Patient Name: Angel Cisneros  MRN: [id number]  Today's Date: 10/7/2021    Admit Date: 9/5/2021    Discharge Plan     Row Name 10/07/21 0739       Plan    Plan Comments  Inbound VM from Christiano/Keila. She said pre-cert was denied bc the insurance claimed the patient is not medically stable but did offer a peer to peer. Update Dr. Zurita and she is willing to go the peer to peer. Will update Christiano and kevinsion to get the peer to peer set up. priyanka Neville RN CCP        Discharge Codes    No documentation.       Expected Discharge Date and Time     Expected Discharge Date Expected Discharge Time    Oct 7, 2021             Priyanka Neville RN

## 2021-10-07 NOTE — PROGRESS NOTES
Name: Angel Cisneros ADMIT: 2021   : 1950  PCP: Ayaan Amin MD    MRN: [id number] LOS: 32 days   AGE/SEX: 71 y.o. male  ROOM: [facility]     Subjective   Subjective   Oxygen requirements were down to 4 L yesterday.  Now back up slightly to 6 L.  He still appears comfortable with no increased work of breathing.  Denies any cough, chest pain, fevers or chills.  No other complaints today    Review of Systems  As above     Objective   Objective   Vital Signs  Temp:  [97 °F (36.1 °C)-98.1 °F (36.7 °C)] 97 °F (36.1 °C)  Heart Rate:  [56-82] 66  Resp:  [18] 18  BP: (119-139)/(60-81) 139/81  SpO2:  [82 %-97 %] 92 %  on  Flow (L/min):  [4-8] 8;   Device (Oxygen Therapy): humidified;high-flow nasal cannula  Body mass index is 22.03 kg/m².  Physical Exam  Constitutional:       General: He is not in acute distress.     Appearance: He is not diaphoretic.   HENT:      Mouth/Throat:      Mouth: Mucous membranes are moist.   Eyes:      General: No scleral icterus.  Cardiovascular:      Rate and Rhythm: Normal rate and regular rhythm.      Heart sounds: No murmur heard.   No gallop.    Pulmonary:      Effort: Pulmonary effort is normal. No respiratory distress.      Breath sounds: No wheezing or rhonchi.   Abdominal:      General: There is no distension.      Palpations: Abdomen is soft. There is no mass.      Tenderness: There is no abdominal tenderness. There is no guarding.   Skin:     General: Skin is warm and dry.   Neurological:      Mental Status: He is alert.         Results Review     I reviewed the patient's new clinical results.  Results from last 7 days   Lab Units 10/06/21  0634 10/05/21  0524 10/04/21  0700 10/03/21  0545   WBC 10*3/mm3 5.69 5.37 5.55 5.79   HEMOGLOBIN g/dL 13.2 12.9* 12.5* 12.4*   PLATELETS 10*3/mm3 143 124* 126* 107*     Results from last 7 days   Lab Units 10/06/21  0634 10/05/21  0524 10/04/21  0700 10/03/21  0545   SODIUM mmol/L 141 142 138 141   POTASSIUM mmol/L 4.9 4.6  4.7 4.9   CHLORIDE mmol/L 109* 109* 108* 109*   CO2 mmol/L 23.8 23.1 20.5* 21.9*   BUN mg/dL 40* 42* 44* 45*   CREATININE mg/dL 1.33* 1.19 1.31* 1.36*   GLUCOSE mg/dL 95 92 99 101*   Estimated Creatinine Clearance: 46.6 mL/min (A) (by C-G formula based on SCr of 1.33 mg/dL (H)).    Results from last 7 days   Lab Units 10/06/21  0634 10/05/21  0524 10/04/21  0700 10/03/21  0545   CALCIUM mg/dL 8.5* 8.4* 8.3* 8.5*     Results from last 7 days   Lab Units 10/07/21  0730   PROCALCITONIN ng/mL 0.05     COVID19   Date Value Ref Range Status   09/22/2021 Detected (C) Not Detected - Ref. Range Final   09/05/2021 Detected (C) Not Detected - Ref. Range Final     Glucose   Date/Time Value Ref Range Status   10/05/2021 0555 100 70 - 130 mg/dL Final     Comment:     Meter: [id number] : [id number] Luis Armando Ya YOHAN   10/04/2021 2009 123 70 - 130 mg/dL Final     Comment:     Meter: [id number] : [id number] Luis Armando Ya YOHAN   10/04/2021 1606 103 70 - 130 mg/dL Final     Comment:     Meter: [id number] : [id number] Edgetim Dongbal ALMANZAR       XR Chest 1 View  ONE VIEW PORTABLE CHEST AT 5:51 AM     HISTORY: Covid 19 pneumonia. Follow-up evaluation. Pneumomediastinum.     FINDINGS: There is considerable bilateral pneumonia predominantly in the  lower lobes and consistent with Covid 19 pneumonia and this shows no  change from yesterday. There is no evidence of pneumothorax. There may  be some minimal trace pneumomediastinum which is unchanged. The heart  remains slightly enlarged.     This report was finalized on 10/7/2021 8:22 AM by Dr. Fidel Madison M.D.       I reviewed the patient's daily medications.  Scheduled Medications  amLODIPine, 10 mg, Oral, Q24H  calcium carbonate, 1 tablet, Oral, Daily  dexamethasone, 6 mg, Oral, BID  dextromethorphan polistirex ER, 60 mg, Oral, Q12H  enoxaparin, 1 mg/kg, Subcutaneous, Q12H  famotidine, 20 mg, Oral, Daily  finasteride, 5 mg, Oral, Daily  metoprolol tartrate, 12.5 mg, Oral,  Q12H  mirtazapine, 7.5 mg, Oral, Nightly  prednisoLONE acetate, 1 drop, Both Eyes, Daily  saccharomyces boulardii, 250 mg, Oral, BID  sodium chloride, 1 drop, Left Eye, TID    Infusions   Diet  Diet Regular; Low Potassium; 2 gm (50 mEq)       Assessment/Plan     Active Hospital Problems    Diagnosis  POA   • **Pneumonia due to COVID-19 virus [U07.1, J12.82]  Yes   • Paroxysmal atrial fibrillation (HCC) [I48.0]  No   • Acute respiratory failure with hypoxia (HCC) [J96.01]  Yes   • BPH (benign prostatic hyperplasia) [N40.0]  Yes   • CLL (chronic lymphocytic leukemia) (HCC) [C91.10]  Yes   • Chronic kidney disease, stage 3b (HCC) [N18.32]  Yes   • HTN (hypertension) [I10]  Yes   • GERD (gastroesophageal reflux disease) [K21.9]  Yes   • Hodgkin's disease of lymph nodes of head, face, and neck (HCC) [C81.91]  Yes      Resolved Hospital Problems   No resolved problems to display.       71 y.o. male with history of CLL admitted with acute hypoxic respiratory failure due to COVID-19 pneumonia.  He was fully vaccinated.    COVID-19 pneumonia with acute hypoxic respiratory failure:   - He completed 7-day course of Rocephin due to elevated procalcitonin/pneumomediastinum previously during this admission and infectious disease evaluated.  Completed remdesivir course.  Has been on high-dose dexamethasone for some time.    Slowly weaning down.  Pulmonology following.  -Oxygen requirements fluctuating.  He is currently comfortable on 6 L.  I expect him to continue to improve    Constipation  -Bisacodyl suppository today.  Continue Doc senna daily    Paroxysmal A. fib  -Sinus rhythm.  Continue metoprolol.    On therapeutic Lovenox for anticoagulation.  Cardiology evaluated and are anticipating about additional 1 month of anticoagulation at discharge.  Transition to Eliquis/Xarelto at that time.    Fibrosis noted on CT. outpatient follow-up    Hypertension: Acceptable acutely.  Continue current regimen and monitor.    CKD3: Stable  at baseline, continue daily BMP    CLL: Follows with [facility]    GERD: PPI    Lovenox 60 twice daily   Full code.  Discussed with patient and nursing staff.  Anticipate discharge to SNU facility in 1-2 days.       Sandra Zurita DO  [facility]  10/07/21  11:59 EDT    Patient was wearing facemask when I entered the room and throughout our encounter.  I wore protective equipment throughout this patient encounter including a face mask, gloves and protective eyewear.  Hand hygiene was performed before donning protective equipment and after removal when leaving the room.         Female Pregnancy Counseling Text: Female patients should also be on two forms of birth control while taking this medication and for one month after their last dose.

## 2021-10-07 NOTE — THERAPY TREATMENT NOTE
Patient Name: Angel Cisneros  : 1950    MRN: [id number]                              Today's Date: 10/7/2021       Admit Date: 2021    Visit Dx:     ICD-10-CM ICD-9-CM   1. COVID-19 virus infection  U07.1 079.89   2. Acute hypoxemic respiratory failure (CMS/HCC)  J96.01 518.81   3. Acute renal failure superimposed on chronic kidney disease, unspecified CKD stage, unspecified acute renal failure type (CMS/HCC)  N17.9 584.9    N18.9 585.9     Patient Active Problem List   Diagnosis   • Lymphoma (HCC)   • Leukemia (HCC)   • Chest pain   • GERD (gastroesophageal reflux disease)   • HTN (hypertension)   • Chronic kidney disease, stage 3b (HCC)   • Generalized abdominal pain   • CLL (chronic lymphocytic leukemia) (HCC)   • Gastroesophageal reflux disease   • Hodgkin's disease of lymph nodes of head, face, and neck (HCC)   • Oral thrush   • Medicare annual wellness visit, subsequent   • Primary insomnia   • Adjustment disorder with depressed mood   • Dysuria   • Gross hematuria   • Acute non-recurrent maxillary sinusitis   • Cough   • BPH (benign prostatic hyperplasia)   • Elevated PSA   • Clinical diagnosis of COVID-19   • Pneumonia due to COVID-19 virus   • Paroxysmal atrial fibrillation (HCC)   • Acute respiratory failure with hypoxia (HCC)   • Acute constipation     Past Medical History:   Diagnosis Date   • Arthritis     both knees   • Cancer (HCC)     dx with lymphoma / radiation   • Cataracts, bilateral    • Hx of cornea transplant     both eyes   • Leukemia (HCC)    • Lymphoma (HCC)     dx in . treated with radiation.   • Lymphoma (HCC)     treated with radiation. dx in      Past Surgical History:   Procedure Laterality Date   • BREAST LUMPECTOMY Right    • COLONOSCOPY N/A 3/8/2016    Procedure: COLONOSCOPY with cold polypectomy X 3;  Surgeon: Chris Harden MD;  Location: [facility] ENDOSCOPY;  Service:    • ENDOSCOPY N/A 10/23/2018    Procedure: ESOPHAGOGASTRODUODENOSCOPY WITH BIOPSY;   Surgeon: Chris Harden MD;  Location: [facility] ENDOSCOPY;  Service: Gastroenterology   • EYE SURGERY      partial cornea transplant 4-5 years ago   • KNEE SURGERY Left    • TOE SURGERY Bilateral    • TONSILLECTOMY       General Information     Row Name 10/07/21 1230          OT Time and Intention    Document Type  therapy note (daily note)  -     Mode of Treatment  individual therapy;occupational therapy  -     Row Name 10/07/21 1230          General Information    Existing Precautions/Restrictions  fall;oxygen therapy device and L/min COVID isloation  -     Row Name 10/07/21 1230          Cognition    Orientation Status (Cognition)  oriented x 3  -     Row Name 10/07/21 1230          Safety Issues, Functional Mobility    Impairments Affecting Function (Mobility)  strength;endurance/activity tolerance;balance  -       User Key  (r) = Recorded By, (t) = Taken By, (c) = Cosigned By    Initials Name Provider Type     Destiney Wells, OTR Occupational Therapist          Mobility/ADL's     Row Name 10/07/21 1230          Bed Mobility    Comment (Bed Mobility)  pt declined to get to EOB  -     Row Name 10/07/21 1230          Transfers    Comment (Transfers)  states he will later  -     Row Name 10/07/21 1230          Bathing Assessment/Intervention    Comment (Bathing)  pt did not want to wash up this date  -       User Key  (r) = Recorded By, (t) = Taken By, (c) = Cosigned By    Initials Name Provider Type     Destiney Wells, OTR Occupational Therapist        Obj/Interventions     Row Name 10/07/21 1231          Shoulder (Therapeutic Exercise)    Shoulder AROM (Therapeutic Exercise)  bilateral;flexion;extension;10 repetitions;2 sets;scapular retraction;scapular protraction  -     Row Name 10/07/21 1231          Elbow/Forearm (Therapeutic Exercise)    Elbow/Forearm (Therapeutic Exercise)  AROM (active range of motion)  -     Elbow/Forearm AROM (Therapeutic Exercise)   bilateral;flexion;extension;supination;pronation;10 repetitions;supine;2 sets  -KP     Row Name 10/07/21 1231          Balance    Comment, Balance  NT as pt refused to get to EOB, states going to rehab sometime soon and will get up later to chair w staff A  -KP       User Key  (r) = Recorded By, (t) = Taken By, (c) = Cosigned By    Initials Name Provider Type    Destiney Park OTR Occupational Therapist        Goals/Plan    No documentation.       Clinical Impression     Row Name 10/07/21 1232          Pain Scale: Numbers Pre/Post-Treatment    Pretreatment Pain Rating  0/10 - no pain  -KP     Posttreatment Pain Rating  0/10 - no pain  -KP     Row Name 10/07/21 1232          Plan of Care Review    Plan of Care Reviewed With  patient  -KP     Progress  improving  -KP     Outcome Summary  pt worked w OT on strength/endurance, pt tolerated 10x2 UE ex w rest breaks in between. Pt did well w his O2 level maintaining in the low to mid 90s, went down once to 89 then right back up. Pt refused to get to EOB or chair stating he will later, doesn't want to yet. cont OT to incr ADL, strength, balance, and tsf.  -KP     Row Name 10/07/21 1232          Vital Signs    Pre SpO2 (%)  95  -KP     O2 Delivery Pre Treatment  supplemental O2  -KP     Intra SpO2 (%)  92  -KP     O2 Delivery Intra Treatment  supplemental O2  -KP     Post SpO2 (%)  93  -KP     O2 Delivery Post Treatment  supplemental O2  -KP     Pre Patient Position  Supine  -KP     Intra Patient Position  Supine  -KP     Post Patient Position  Supine  -KP     Row Name 10/07/21 1232          Positioning and Restraints    Pre-Treatment Position  in bed  -KP     Post Treatment Position  bed  -KP     In Bed  supine;call light within reach;encouraged to call for assist;exit alarm on  -KP       User Key  (r) = Recorded By, (t) = Taken By, (c) = Cosigned By    Initials Name Provider Type    Destiney Park OTR Occupational Therapist        Outcome Measures      Row Name 10/07/21 1233          How much help from another is currently needed...    Putting on and taking off regular lower body clothing?  3  -KP     Bathing (including washing, rinsing, and drying)  3  -KP     Toileting (which includes using toilet bed pan or urinal)  3  -KP     Putting on and taking off regular upper body clothing  3  -KP     Taking care of personal grooming (such as brushing teeth)  3  -KP     Eating meals  4  -KP     AM-PAC 6 Clicks Score (OT)  19  -KP     Row Name 10/07/21 0036          How much help from another person do you currently need...    Turning from your back to your side while in flat bed without using bedrails?  4  -CB     Moving from lying on back to sitting on the side of a flat bed without bedrails?  3  -CB     Moving to and from a bed to a chair (including a wheelchair)?  3  -CB     Standing up from a chair using your arms (e.g., wheelchair, bedside chair)?  3  -CB     Climbing 3-5 steps with a railing?  1  -CB     To walk in hospital room?  2  -CB     AM-PAC 6 Clicks Score (PT)  16  -CB       User Key  (r) = Recorded By, (t) = Taken By, (c) = Cosigned By    Initials Name Provider Type    Destiney Park OTR Occupational Therapist    Joey Real, RN Registered Nurse          Occupational Therapy Education                 Title: PT OT SLP Therapies (Done)     Topic: Occupational Therapy (Done)     Point: ADL training (Done)     Description:   Instruct learner(s) on proper safety adaptation and remediation techniques during self care or transfers.   Instruct in proper use of assistive devices.              Learning Progress Summary           Patient Acceptance, E,TB, VU by CB at 10/7/2021 0452    Acceptance, E, VU by BT at 10/5/2021 1111    Comment: ot re eval    Acceptance, E, VU by AG at 9/30/2021 1550    Acceptance, E, VU by AG at 9/29/2021 1534    Acceptance, E, VU by AG at 9/26/2021 1614    Acceptance, E, VU by AG at 9/25/2021 1546    Acceptance, E, VU by  AG at 9/24/2021 1542    Acceptance, E, VU by AG at 9/22/2021 1531    Acceptance, E, VU by JW at 9/22/2021 1316    Comment: role of OT, plan of care, safety, PLB                   Point: Home exercise program (Done)     Description:   Instruct learner(s) on appropriate technique for monitoring, assisting and/or progressing therapeutic exercises/activities.              Learning Progress Summary           Patient Acceptance, E,TB, VU by CB at 10/7/2021 0452    Acceptance, E, VU by BT at 10/5/2021 1111    Comment: ot re eval    Acceptance, E, VU by AG at 9/30/2021 1550    Acceptance, E, VU by AG at 9/29/2021 1534    Acceptance, E, VU by AG at 9/26/2021 1614    Acceptance, E, VU by AG at 9/25/2021 1546    Acceptance, E, VU by AG at 9/24/2021 1542    Acceptance, E, VU by AG at 9/22/2021 1531    Acceptance, E, VU by JW at 9/22/2021 1316    Comment: role of OT, plan of care, safety, PLB                   Point: Precautions (Done)     Description:   Instruct learner(s) on prescribed precautions during self-care and functional transfers.              Learning Progress Summary           Patient Acceptance, E,TB, VU by CB at 10/7/2021 0452    Acceptance, E, VU by BT at 10/5/2021 1111    Comment: ot re eval    Acceptance, E, VU by AG at 9/30/2021 1550    Acceptance, E, VU by AG at 9/29/2021 1534    Acceptance, E, VU by AG at 9/26/2021 1614    Acceptance, E, VU by AG at 9/25/2021 1546    Acceptance, E, VU by AG at 9/24/2021 1542    Acceptance, E, VU by AG at 9/22/2021 1531    Acceptance, E, VU by JW at 9/22/2021 1316    Comment: role of OT, plan of care, safety, PLB                   Point: Body mechanics (Done)     Description:   Instruct learner(s) on proper positioning and spine alignment during self-care, functional mobility activities and/or exercises.              Learning Progress Summary           Patient Acceptance, E,TB, VU by CB at 10/7/2021 0452    Acceptance, E VU by BT at 10/5/2021 1111    Comment: ot re jose     Acceptance, E, VU by AG at 9/30/2021 1550    Acceptance, E, VU by AG at 9/29/2021 1534    Acceptance, E, VU by AG at 9/26/2021 1614    Acceptance, E, VU by AG at 9/25/2021 1546    Acceptance, E, VU by AG at 9/24/2021 1542    Acceptance, E, VU by AG at 9/22/2021 1531                               User Key     Initials Effective Dates Name Provider Type Discipline     06/16/21 -  Giuseppe Beasley, RN Registered Nurse Nurse    CB 07/28/21 -  Joey Yates, RN Registered Nurse Nurse    BT 11/16/20 -  Deena Saunders OT Occupational Therapist OT    JW 06/10/21 -  Roshni Nelson OT Occupational Therapist OT              OT Recommendation and Plan     Plan of Care Review  Plan of Care Reviewed With: patient  Progress: improving  Outcome Summary: pt worked w OT on strength/endurance, pt tolerated 10x2 UE ex w rest breaks in between. Pt did well w his O2 level maintaining in the low to mid 90s, went down once to 89 then right back up. Pt refused to get to EOB or chair stating he will later, doesn't want to yet. cont OT to incr ADL, strength, balance, and tsf.     Time Calculation:   Time Calculation- OT     Row Name 10/07/21 1234             Time Calculation- OT    OT Start Time  1134  -      OT Stop Time  1151  -      OT Time Calculation (min)  17 min  -      Total Timed Code Minutes- OT  17 minute(s)  -      OT Received On  10/07/21  -      OT - Next Appointment  10/08/21  -         Timed Charges    17650 - OT Therapeutic Exercise Minutes  17  -         Total Minutes    Timed Charges Total Minutes  17  -       Total Minutes  17  -        User Key  (r) = Recorded By, (t) = Taken By, (c) = Cosigned By    Initials Name Provider Type     Destiney Wells OTR Occupational Therapist        Therapy Charges for Today     Code Description Service Date Service Provider Modifiers Qty    [id number]  OT THER PROC EA 15 MIN 10/7/2021 Destiney Wells OTR GO 1               Destiney Wells  OTR  10/7/2021

## 2021-10-07 NOTE — PLAN OF CARE
Goal Outcome Evaluation:  Plan of Care Reviewed With: patient  Pt's O2 titrated up to 7L HHFNC with sats 91-93%. Laxative administered. No c/o pain, no signs of distress. Waffle cushion in place. Tolerating meals. Decadron changed to daily. Monitoring respiratory status and O2 titration as tolerated  Problem: Adult Inpatient Plan of Care  Goal: Patient-Specific Goal (Individualized)  Outcome: Ongoing, Progressing  Goal: Absence of Hospital-Acquired Illness or Injury  Outcome: Ongoing, Progressing  Intervention: Identify and Manage Fall Risk  Recent Flowsheet Documentation  Taken 10/7/2021 1422 by Giuseppe Beasley, RN  Safety Promotion/Fall Prevention:   activity supervised   assistive device/personal items within reach   clutter free environment maintained   fall prevention program maintained   lighting adjusted   nonskid shoes/slippers when out of bed   room organization consistent   safety round/check completed  Taken 10/7/2021 1218 by Giuseppe Beasley, RN  Safety Promotion/Fall Prevention:   activity supervised   assistive device/personal items within reach   clutter free environment maintained   fall prevention program maintained   nonskid shoes/slippers when out of bed   lighting adjusted   room organization consistent   safety round/check completed  Taken 10/7/2021 1004 by Giuseppe Beasley, RN  Safety Promotion/Fall Prevention:   activity supervised   assistive device/personal items within reach   clutter free environment maintained   fall prevention program maintained   nonskid shoes/slippers when out of bed   room organization consistent   safety round/check completed   muscle strengthening facilitated  Taken 10/7/2021 0840 by Giuseppe Beasley, RN  Safety Promotion/Fall Prevention:   activity supervised   assistive device/personal items within reach   clutter free environment maintained   fall prevention program maintained   nonskid shoes/slippers when out of bed   room organization  consistent   safety round/check completed   lighting adjusted  Intervention: Prevent Skin Injury  Recent Flowsheet Documentation  Taken 10/7/2021 1422 by Giuseppe Beasley RN  Body Position: dangle, side of bed  Skin Protection:   adhesive use limited   transparent dressing maintained   tubing/devices free from skin contact  Taken 10/7/2021 1218 by Giuseppe Beasley RN  Body Position: position changed independently  Taken 10/7/2021 1004 by Giuseppe Beasley RN  Body Position: position changed independently  Taken 10/7/2021 0840 by Giuseppe Beasley RN  Body Position: position changed independently  Skin Protection:   adhesive use limited   transparent dressing maintained   tubing/devices free from skin contact  Intervention: Prevent and Manage VTE (venous thromboembolism) Risk  Recent Flowsheet Documentation  Taken 10/7/2021 1422 by Giuseppe Beasley RN  VTE Prevention/Management: (lovenox )   bilateral   dorsiflexion/plantar flexion performed  Taken 10/7/2021 0840 by Giuseppe Beasley RN  VTE Prevention/Management: (pt on lovenox )   bilateral   dorsiflexion/plantar flexion performed  Intervention: Prevent Infection  Recent Flowsheet Documentation  Taken 10/7/2021 1422 by Giuseppe Beasley RN  Infection Prevention:   visitors restricted/screened   personal protective equipment utilized   rest/sleep promoted   single patient room provided  Taken 10/7/2021 1218 by Giuseppe Beasley RN  Infection Prevention:   visitors restricted/screened   single patient room provided   rest/sleep promoted   personal protective equipment utilized  Taken 10/7/2021 1004 by Giuseppe Beasley RN  Infection Prevention:   visitors restricted/screened   single patient room provided   rest/sleep promoted   personal protective equipment utilized  Taken 10/7/2021 0840 by Giuseppe Beasley RN  Infection Prevention:   visitors restricted/screened   single patient room provided   rest/sleep  promoted   personal protective equipment utilized  Goal: Optimal Comfort and Wellbeing  Outcome: Ongoing, Progressing  Intervention: Provide Person-Centered Care  Recent Flowsheet Documentation  Taken 10/7/2021 0840 by Giuseppe Beasley RN  Trust Relationship/Rapport: care explained  Goal: Readiness for Transition of Care  Outcome: Ongoing, Progressing     Problem: Infection  Goal: Infection Symptom Resolution  Outcome: Ongoing, Progressing  Intervention: Prevent or Manage Infection  Recent Flowsheet Documentation  Taken 10/7/2021 1422 by Giuseppe Beasley RN  Isolation Precautions:   contact precautions maintained   droplet precautions maintained  Taken 10/7/2021 1218 by Giuseppe Beasley RN  Isolation Precautions:   contact precautions maintained   droplet precautions maintained  Taken 10/7/2021 1004 by Giuseppe Beasley RN  Isolation Precautions:   contact precautions maintained   droplet precautions maintained  Taken 10/7/2021 0840 by Giuseppe Beasley RN  Isolation Precautions:   contact precautions maintained   droplet precautions maintained     Problem: Fall Injury Risk  Goal: Absence of Fall and Fall-Related Injury  Outcome: Ongoing, Progressing  Intervention: Identify and Manage Contributors to Fall Injury Risk  Recent Flowsheet Documentation  Taken 10/7/2021 1422 by Giuseppe Beasley RN  Medication Review/Management: medications reviewed  Taken 10/7/2021 1218 by Giuseppe Beasley RN  Medication Review/Management: medications reviewed  Taken 10/7/2021 1004 by Giuseppe Beasley RN  Medication Review/Management: medications reviewed  Taken 10/7/2021 0840 by Giuseppe Beasley RN  Medication Review/Management: medications reviewed  Intervention: Promote Injury-Free Environment  Recent Flowsheet Documentation  Taken 10/7/2021 1422 by Giuseppe Beasley RN  Safety Promotion/Fall Prevention:   activity supervised   assistive device/personal items within reach    clutter free environment maintained   fall prevention program maintained   lighting adjusted   nonskid shoes/slippers when out of bed   room organization consistent   safety round/check completed  Taken 10/7/2021 1218 by Giuseppe Beasley RN  Safety Promotion/Fall Prevention:   activity supervised   assistive device/personal items within reach   clutter free environment maintained   fall prevention program maintained   nonskid shoes/slippers when out of bed   lighting adjusted   room organization consistent   safety round/check completed  Taken 10/7/2021 1004 by Giuseppe Beasley RN  Safety Promotion/Fall Prevention:   activity supervised   assistive device/personal items within reach   clutter free environment maintained   fall prevention program maintained   nonskid shoes/slippers when out of bed   room organization consistent   safety round/check completed   muscle strengthening facilitated  Taken 10/7/2021 0840 by Giuseppe Beasley RN  Safety Promotion/Fall Prevention:   activity supervised   assistive device/personal items within reach   clutter free environment maintained   fall prevention program maintained   nonskid shoes/slippers when out of bed   room organization consistent   safety round/check completed   lighting adjusted     Problem: Skin Injury Risk Increased  Goal: Skin Health and Integrity  Outcome: Ongoing, Progressing  Intervention: Optimize Skin Protection  Recent Flowsheet Documentation  Taken 10/7/2021 1422 by Giuseppe Beasley RN  Pressure Reduction Techniques:   sit time limited to 2 hours   frequent weight shift encouraged   pressure points protected   heels elevated off bed  Head of Bed (HOB): HOB elevated  Pressure Reduction Devices:   alternating pressure pump (ADD)   positioning supports utilized  Skin Protection:   adhesive use limited   transparent dressing maintained   tubing/devices free from skin contact  Taken 10/7/2021 1218 by Giuseppe Beasley RN  Head of  Bed (HOB): HOB elevated  Taken 10/7/2021 1004 by Giuseppe Beasley, RN  Head of Bed (HOB): HOB elevated  Taken 10/7/2021 0840 by Giuseppe Beasley, RN  Pressure Reduction Techniques:   frequent weight shift encouraged   sit time limited to 2 hours   pressure points protected   positioned off wounds   heels elevated off bed   rest period provided between sit times  Head of Bed (HOB): HOB elevated  Pressure Reduction Devices: (waffle cushion on )   alternating pressure pump (ADD)   positioning supports utilized  Skin Protection:   adhesive use limited   transparent dressing maintained   tubing/devices free from skin contact           Progress: no change  Outcome Summary: Pt's O2 remains 60/70% opti. No c/o pain, no signs of distress. A&Ox4. Tolerating all meds as given. Bladder scanned. BMx1. Monitoring am labs, respiratory status and titrating O2 as tolerated.

## 2021-10-07 NOTE — PLAN OF CARE
Problem: Adult Inpatient Plan of Care  Goal: Patient-Specific Goal (Individualized)  Flowsheets (Taken 10/7/2021 0453)  Patient-Specific Goals (Include Timeframe): No acute changes. VSS. AOx4. 6L O2 via HFNC. SpO2 drops to the low to mid 80's w/ activity. Patient encouraged to turn. Turns well. Bed alarm set. Waffle mattress in use. No c/o pain or discomfort. Bed locked and in lowest position. Side rails up x2. Call light within reach. Will continue to assess.  Goal: Absence of Hospital-Acquired Illness or Injury  Intervention: Identify and Manage Fall Risk  Flowsheets  Taken 10/7/2021 0422  Safety Promotion/Fall Prevention:   activity supervised   assistive device/personal items within reach   clutter free environment maintained   fall prevention program maintained   lighting adjusted   nonskid shoes/slippers when out of bed   room organization consistent   safety round/check completed  Taken 10/7/2021 0252  Safety Promotion/Fall Prevention:   activity supervised   assistive device/personal items within reach   clutter free environment maintained   fall prevention program maintained   lighting adjusted   nonskid shoes/slippers when out of bed   room organization consistent   safety round/check completed  Taken 10/7/2021 0036  Safety Promotion/Fall Prevention:   activity supervised   assistive device/personal items within reach   clutter free environment maintained   fall prevention program maintained   lighting adjusted   nonskid shoes/slippers when out of bed   room organization consistent   safety round/check completed  Taken 10/6/2021 2238  Safety Promotion/Fall Prevention:   activity supervised   assistive device/personal items within reach   clutter free environment maintained   fall prevention program maintained   lighting adjusted   nonskid shoes/slippers when out of bed   safety round/check completed   room organization consistent  Taken 10/6/2021 2042  Safety Promotion/Fall Prevention:   activity  supervised   assistive device/personal items within reach   clutter free environment maintained   fall prevention program maintained   lighting adjusted   nonskid shoes/slippers when out of bed   room organization consistent   safety round/check completed  Intervention: Prevent Skin Injury  Flowsheets  Taken 10/7/2021 0422  Body Position: position changed independently  Taken 10/7/2021 0252  Body Position: position changed independently  Taken 10/7/2021 0036  Body Position: position changed independently  Skin Protection:   adhesive use limited   tubing/devices free from skin contact   incontinence pads utilized  Taken 10/6/2021 2238  Body Position: position changed independently  Taken 10/6/2021 2042  Body Position: position changed independently  Skin Protection:   adhesive use limited   incontinence pads utilized   tubing/devices free from skin contact  Intervention: Prevent and Manage VTE (venous thromboembolism) Risk  Flowsheets  Taken 10/7/2021 0036  VTE Prevention/Management: (lovenox)   bilateral   dorsiflexion/plantar flexion performed   other (see comments)  Taken 10/6/2021 2042  VTE Prevention/Management:   bilateral   dorsiflexion/plantar flexion performed  Intervention: Prevent Infection  Flowsheets  Taken 10/7/2021 0422  Infection Prevention:   environmental surveillance performed   equipment surfaces disinfected   hand hygiene promoted   personal protective equipment utilized   rest/sleep promoted   single patient room provided   visitors restricted/screened  Taken 10/7/2021 0252  Infection Prevention:   environmental surveillance performed   equipment surfaces disinfected   hand hygiene promoted   personal protective equipment utilized   rest/sleep promoted   single patient room provided   visitors restricted/screened  Taken 10/7/2021 0036  Infection Prevention:   environmental surveillance performed   equipment surfaces disinfected   hand hygiene promoted   personal protective equipment utilized    rest/sleep promoted   single patient room provided   visitors restricted/screened  Taken 10/6/2021 2238  Infection Prevention:   environmental surveillance performed   hand hygiene promoted   equipment surfaces disinfected   personal protective equipment utilized   rest/sleep promoted   single patient room provided   visitors restricted/screened  Taken 10/6/2021 2042  Infection Prevention:   environmental surveillance performed   equipment surfaces disinfected   hand hygiene promoted   personal protective equipment utilized   rest/sleep promoted   single patient room provided   visitors restricted/screened  Goal: Optimal Comfort and Wellbeing  Intervention: Provide Person-Centered Care  Flowsheets  Taken 10/7/2021 0036  Trust Relationship/Rapport:   care explained   choices provided   emotional support provided   empathic listening provided   questions answered   questions encouraged   reassurance provided   thoughts/feelings acknowledged  Taken 10/6/2021 2042  Trust Relationship/Rapport:   care explained   choices provided   emotional support provided   empathic listening provided   questions answered   questions encouraged   reassurance provided   thoughts/feelings acknowledged  Goal: Readiness for Transition of Care  Intervention: Mutually Develop Transition Plan  Flowsheets (Taken 9/7/2021 1356 by Nahomy Pace CSW)  Discharge Facility/Level of Care Needs:   nursing facility, skilled   acute rehab   home with home health  Equipment Currently Used at Home: none  Anticipated Changes Related to Illness: none  Transportation Anticipated: family or friend will provide  Current Discharge Risk: physical impairment  Concerns to be Addressed: discharge planning  Patient/Family Anticipated Services at Transition:   home health care   skilled nursing  Patient/Family Anticipates Transition to: home with family     Problem: Infection  Goal: Infection Symptom Resolution  Intervention: Prevent or Manage  Infection  Flowsheets  Taken 10/7/2021 0422  Isolation Precautions:   contact precautions maintained   droplet precautions maintained  Taken 10/7/2021 0252  Isolation Precautions:   contact precautions maintained   droplet precautions maintained  Taken 10/7/2021 0036  Infection Management: aseptic technique maintained  Isolation Precautions:   contact precautions maintained   droplet precautions maintained  Taken 10/6/2021 2238  Isolation Precautions:   contact precautions maintained   droplet precautions maintained  Taken 10/6/2021 2042  Infection Management: aseptic technique maintained  Isolation Precautions:   contact precautions maintained   droplet precautions maintained     Problem: Fall Injury Risk  Goal: Absence of Fall and Fall-Related Injury  Intervention: Identify and Manage Contributors to Fall Injury Risk  Flowsheets  Taken 10/7/2021 0036  Self-Care Promotion:   independence encouraged   BADL personal objects within reach   BADL personal routines maintained  Taken 10/6/2021 2042  Medication Review/Management: medications reviewed  Intervention: Promote Injury-Free Environment  Flowsheets  Taken 10/7/2021 0422  Safety Promotion/Fall Prevention:   activity supervised   assistive device/personal items within reach   clutter free environment maintained   fall prevention program maintained   lighting adjusted   nonskid shoes/slippers when out of bed   room organization consistent   safety round/check completed  Taken 10/7/2021 0252  Safety Promotion/Fall Prevention:   activity supervised   assistive device/personal items within reach   clutter free environment maintained   fall prevention program maintained   lighting adjusted   nonskid shoes/slippers when out of bed   room organization consistent   safety round/check completed  Taken 10/7/2021 0036  Safety Promotion/Fall Prevention:   activity supervised   assistive device/personal items within reach   clutter free environment maintained   fall prevention  program maintained   lighting adjusted   nonskid shoes/slippers when out of bed   room organization consistent   safety round/check completed  Taken 10/6/2021 2238  Safety Promotion/Fall Prevention:   activity supervised   assistive device/personal items within reach   clutter free environment maintained   fall prevention program maintained   lighting adjusted   nonskid shoes/slippers when out of bed   safety round/check completed   room organization consistent  Taken 10/6/2021 2042  Safety Promotion/Fall Prevention:   activity supervised   assistive device/personal items within reach   clutter free environment maintained   fall prevention program maintained   lighting adjusted   nonskid shoes/slippers when out of bed   room organization consistent   safety round/check completed     Problem: Skin Injury Risk Increased  Goal: Skin Health and Integrity  Intervention: Optimize Skin Protection  Flowsheets  Taken 10/7/2021 0422  Head of Bed (HOB): HOB elevated  Taken 10/7/2021 0252  Head of Bed (HOB): HOB elevated  Taken 10/7/2021 0036  Pressure Reduction Techniques:   frequent weight shift encouraged   weight shift assistance provided   heels elevated off bed   pressure points protected  Head of Bed (HOB): HOB elevated  Pressure Reduction Devices: (waffle cushion)   alternating pressure pump (ADD)   pressure-redistributing mattress utilized   other (see comments)  Skin Protection:   adhesive use limited   tubing/devices free from skin contact   incontinence pads utilized  Taken 10/6/2021 2238  Head of Bed (HOB): HOB elevated  Taken 10/6/2021 2042  Pressure Reduction Techniques:   frequent weight shift encouraged   weight shift assistance provided   pressure points protected  Head of Bed (HOB): HOB elevated  Pressure Reduction Devices:   alternating pressure pump (ADD)   pressure-redistributing mattress utilized  Skin Protection:   adhesive use limited   incontinence pads utilized   tubing/devices free from skin  contact  Intervention: Promote and Optimize Oral Intake  Flowsheets  Taken 10/7/2021 0036  Oral Nutrition Promotion: rest periods promoted  Taken 10/6/2021 2042  Oral Nutrition Promotion: rest periods promoted   Goal Outcome Evaluation:    No acute changes. VSS. AOx4. 6L O2 via HFNC. SpO2 drops to the low to mid 80's w/ activity. Patient encouraged to turn. Turns well. Bed alarm set. Waffle mattress in use. No c/o pain or discomfort. Bed locked and in lowest position. Side rails up x2. Call light within reach. Will continue to assess.

## 2021-10-07 NOTE — PLAN OF CARE
Goal Outcome Evaluation:  Plan of Care Reviewed With: patient        Progress: improving  Outcome Summary: pt worked w OT on strength/endurance, pt tolerated 10x2 UE ex w rest breaks in between. Pt did well w his O2 level maintaining in the low to mid 90s, went down once to 89 then right back up. Pt refused to get to EOB or chair stating he will later, doesn't want to yet. cont OT to incr ADL, strength, balance, and tsf.  OT wore all PPE for COVID room, pt wore mask, OT washed hands before/after

## 2021-10-07 NOTE — PROGRESS NOTES
[facility]  [phone]  Cheo Kraft MD    Subjective:  LOS: 32    Chief Complaint: Acute respiratory failure    On 15L HF cannula. About the same as yesterday through remains hopeful and denies cough or phlegm.    Objective   Vital Signs past 24hrs    Temp range: Temp (24hrs), Av.6 °F (36.4 °C), Min:97 °F (36.1 °C), Max:98.1 °F (36.7 °C)    BP range: BP: (119-139)/(60-81) 139/81  Pulse range: Heart Rate:  [56-82] 66  Resp rate range: Resp:  [18] 18    Device (Oxygen Therapy): humidified;high-flow nasal cannulaFlow (L/min):  [4-10] 7  Oxygen range:SpO2:  [82 %-97 %] 95 %      63.8 kg (140 lb 11.2 oz); Body mass index is 22.03 kg/m².    Intake/Output Summary (Last 24 hours) at 10/7/2021 1338  Last data filed at 10/7/2021 0844  Gross per 24 hour   Intake --   Output 1360 ml   Net -1360 ml       Physical Exam  Eyes:      Pupils: Pupils are equal, round, and reactive to light.   Cardiovascular:      Rate and Rhythm: Normal rate and regular rhythm.      Heart sounds: No murmur heard.     Pulmonary:      Effort: Pulmonary effort is normal.      Breath sounds: Normal breath sounds.      Comments: No adventitious sounds  Abdominal:      General: Bowel sounds are normal.      Palpations: Abdomen is soft. There is no mass.      Tenderness: There is no abdominal tenderness.   Musculoskeletal:         General: No swelling.   Neurological:      Mental Status: He is alert.       Results Review:    I have reviewed the laboratory and imaging data since the last note by [facility] physician.  My annotations are noted in assessment and plan.    Results from last 7 days   Lab Units 10/07/21  0730 10/05/21  0524 10/04/21  0700   PROCALCITONIN ng/mL 0.05  --   --    CRP mg/dL  --  <0.30 <0.30       Medication Review:  I have reviewed the current MAR.  My annotations are noted in assessment and plan.       Plan   PCCM Problems  Acute hypoxic respiratory failure  SARS-CoV-2 infection  Bilateral pulmonary  infiltrates  Secondary bacterial infection  Pneumomediastinum  CLL  History NHL  CKD 3  Paroxysmal A. fib  Low platelets        Plan of Treatment    Remains on 15L HF with sats 91-95%. Monitor. Same.    Supportive treatment for SARS-CoV-2 infection. Previously on 10 mg twice daily.   Currently on dexamethasone 6 mg twice daily.  Lower dose as long as CRP remains within normal limits. Changed to 6 mg daily on 10/7/21    Concern for secondary bacterial infection.  Patient initially treated with antibiotics.      CT 10/2/21 noted. CxR noted.    Pneumomediastinum not reported on CT but seen on CxR 10/5/21.  No significant pneumothorax at this time.    Immunocompromised host due to CLL and history NHL.    CKD 3 further complicates care. Creatinine up today.    Remains on full anticoagulation for A. Fib.    Prognosis remains guarded.    Cheo Kraft MD  10/07/21  13:38 EDT    While in the room and during my examination of the patient I wore gloves, gown, mask, eye protection and followed enhanced droplet/contact isolation protocol and precautions.  I washed my hands before and after this patient encounter.    Part of this note may be an electronic transcription/translation of spoken language to printed text using the Dragon Dictation System.

## 2021-10-07 NOTE — CASE MANAGEMENT/SOCIAL WORK
Continued Stay Note  [facility]     Patient Name: Angel Cisneros  MRN: [id number]  Today's Date: 10/7/2021    Admit Date: 9/5/2021    Discharge Plan     Row Name 10/07/21 1004       Plan    Plan Comments  Peer to Peer info sent to [facility] by Hortencia. All information provided and medical director will call Dr. Zurita within 24 hours to complete the peer to peer. Priyanka Neville RN [facility]    Row Name 10/07/21 0739       Plan    Plan Comments  Inbound VM from Christiano/Keila. She said pre-cert was denied bc the insurance claimed the patient is not medically stable but did offer a peer to peer. Update Dr. Zurita and she is willing to go the peer to peer. Will update Christiano and liaison to get the peer to peer set up. priyanka Neville RN CCP        Discharge Codes    No documentation.       Expected Discharge Date and Time     Expected Discharge Date Expected Discharge Time    Oct 7, 2021             Priyanka Neville RN

## 2021-10-08 ENCOUNTER — APPOINTMENT (OUTPATIENT)
Dept: GENERAL RADIOLOGY | Facility: HOSPITAL | Age: 71
End: 2021-10-08

## 2021-10-08 PROBLEM — K59.00 ACUTE CONSTIPATION: Status: RESOLVED | Noted: 2021-10-07 | Resolved: 2021-10-08

## 2021-10-08 PROCEDURE — 63710000001 DEXAMETHASONE PER 0.25 MG: Performed by: INTERNAL MEDICINE

## 2021-10-08 PROCEDURE — 25010000002 ENOXAPARIN PER 10 MG: Performed by: INTERNAL MEDICINE

## 2021-10-08 PROCEDURE — 94799 UNLISTED PULMONARY SVC/PX: CPT

## 2021-10-08 PROCEDURE — 97110 THERAPEUTIC EXERCISES: CPT

## 2021-10-08 PROCEDURE — 71045 X-RAY EXAM CHEST 1 VIEW: CPT

## 2021-10-08 RX ADMIN — Medication 250 MG: at 08:36

## 2021-10-08 RX ADMIN — PREDNISOLONE ACETATE 1 DROP: 10 SUSPENSION/ DROPS OPHTHALMIC at 08:36

## 2021-10-08 RX ADMIN — Medication 3 MG: at 22:24

## 2021-10-08 RX ADMIN — ENOXAPARIN SODIUM 60 MG: 60 INJECTION, SOLUTION INTRAVENOUS; SUBCUTANEOUS at 08:36

## 2021-10-08 RX ADMIN — Medication 250 MG: at 20:11

## 2021-10-08 RX ADMIN — DEXAMETHASONE 6 MG: 4 TABLET ORAL at 08:36

## 2021-10-08 RX ADMIN — SODIUM CHLORIDE 1 DROP: 50 SOLUTION OPHTHALMIC at 20:11

## 2021-10-08 RX ADMIN — SODIUM CHLORIDE 1 DROP: 50 SOLUTION OPHTHALMIC at 17:10

## 2021-10-08 RX ADMIN — SODIUM CHLORIDE 1 DROP: 50 SOLUTION OPHTHALMIC at 08:36

## 2021-10-08 RX ADMIN — ENOXAPARIN SODIUM 60 MG: 60 INJECTION, SOLUTION INTRAVENOUS; SUBCUTANEOUS at 20:11

## 2021-10-08 RX ADMIN — METOPROLOL TARTRATE 12.5 MG: 25 TABLET ORAL at 20:11

## 2021-10-08 RX ADMIN — AMLODIPINE BESYLATE 10 MG: 10 TABLET ORAL at 08:36

## 2021-10-08 RX ADMIN — FAMOTIDINE 20 MG: 20 TABLET, FILM COATED ORAL at 08:36

## 2021-10-08 RX ADMIN — SODIUM CHLORIDE, PRESERVATIVE FREE 10 ML: 5 INJECTION INTRAVENOUS at 20:11

## 2021-10-08 RX ADMIN — DEXTROMETHORPHAN POLISTIREX 60 MG: 30 SUSPENSION, EXTENDED RELEASE ORAL at 08:36

## 2021-10-08 RX ADMIN — METOPROLOL TARTRATE 12.5 MG: 25 TABLET ORAL at 08:36

## 2021-10-08 RX ADMIN — MIRTAZAPINE 7.5 MG: 15 TABLET, FILM COATED ORAL at 20:11

## 2021-10-08 RX ADMIN — FINASTERIDE 5 MG: 5 TABLET, FILM COATED ORAL at 08:36

## 2021-10-08 RX ADMIN — DEXTROMETHORPHAN POLISTIREX 60 MG: 30 SUSPENSION, EXTENDED RELEASE ORAL at 20:11

## 2021-10-08 RX ADMIN — DOCUSATE SODIUM 50MG AND SENNOSIDES 8.6MG 2 TABLET: 8.6; 5 TABLET, FILM COATED ORAL at 08:36

## 2021-10-08 NOTE — CASE MANAGEMENT/SOCIAL WORK
Continued Stay Note  [facility]     Patient Name: Angel Cisneros  MRN: [id number]  Today's Date: 10/8/2021    Admit Date: 9/5/2021    Discharge Plan     Row Name 10/08/21 1535       Plan    Plan Comments  Peer to Peer upheld denial stating patient not stable for DC. [facility] aware. Outbound call to patient's cell phone. Unable to leave  as his mailbox is full. CCP will continue to follow. Suzi Neville RN CCP        Discharge Codes    No documentation.       Expected Discharge Date and Time     Expected Discharge Date Expected Discharge Time    Oct 12, 2021             Suzi Neville RN

## 2021-10-08 NOTE — THERAPY TREATMENT NOTE
Patient Name: Angel Cisneros  : 1950    MRN: [id number]                              Today's Date: 10/8/2021       Admit Date: 2021    Visit Dx:     ICD-10-CM ICD-9-CM   1. COVID-19 virus infection  U07.1 079.89   2. Acute hypoxemic respiratory failure (CMS/HCC)  J96.01 518.81   3. Acute renal failure superimposed on chronic kidney disease, unspecified CKD stage, unspecified acute renal failure type (CMS/HCC)  N17.9 584.9    N18.9 585.9     Patient Active Problem List   Diagnosis   • Lymphoma (HCC)   • Leukemia (HCC)   • Chest pain   • GERD (gastroesophageal reflux disease)   • HTN (hypertension)   • Chronic kidney disease, stage 3b (HCC)   • Generalized abdominal pain   • CLL (chronic lymphocytic leukemia) (HCC)   • Gastroesophageal reflux disease   • Hodgkin's disease of lymph nodes of head, face, and neck (HCC)   • Oral thrush   • Medicare annual wellness visit, subsequent   • Primary insomnia   • Adjustment disorder with depressed mood   • Dysuria   • Gross hematuria   • Acute non-recurrent maxillary sinusitis   • Cough   • BPH (benign prostatic hyperplasia)   • Elevated PSA   • Clinical diagnosis of COVID-19   • Pneumonia due to COVID-19 virus   • Paroxysmal atrial fibrillation (HCC)   • Acute respiratory failure with hypoxia (HCC)     Past Medical History:   Diagnosis Date   • Arthritis     both knees   • Cancer (HCC)     dx with lymphoma / radiation   • Cataracts, bilateral    • Hx of cornea transplant     both eyes   • Leukemia (HCC)    • Lymphoma (HCC)     dx in . treated with radiation.   • Lymphoma (HCC)     treated with radiation. dx in      Past Surgical History:   Procedure Laterality Date   • BREAST LUMPECTOMY Right    • COLONOSCOPY N/A 3/8/2016    Procedure: COLONOSCOPY with cold polypectomy X 3;  Surgeon: Chris Harden MD;  Location: [facility] ENDOSCOPY;  Service:    • ENDOSCOPY N/A 10/23/2018    Procedure: ESOPHAGOGASTRODUODENOSCOPY WITH BIOPSY;  Surgeon: Fina  Chris GRAYSON MD;  Location: [facility] ENDOSCOPY;  Service: Gastroenterology   • EYE SURGERY      partial cornea transplant 4-5 years ago   • KNEE SURGERY Left    • TOE SURGERY Bilateral    • TONSILLECTOMY       General Information     Row Name 10/08/21 1459          OT Time and Intention    Document Type  therapy note (daily note)  -     Mode of Treatment  individual therapy;occupational therapy  -     Row Name 10/08/21 1459          General Information    Patient Profile Reviewed  yes  -MW     Existing Precautions/Restrictions  fall;oxygen therapy device and L/min  -     Row Name 10/08/21 1459          Cognition    Orientation Status (Cognition)  oriented x 3  -MW     Row Name 10/08/21 1459          Safety Issues, Functional Mobility    Impairments Affecting Function (Mobility)  shortness of breath;endurance/activity tolerance;balance  -       User Key  (r) = Recorded By, (t) = Taken By, (c) = Cosigned By    Initials Name Provider Type    Kary Martino OT Occupational Therapist          Mobility/ADL's     Row Name 10/08/21 1459          Transfers    Comment (Transfers)  Pt up in chair upon arrival  -     Bed-Chair [place] (Transfers)  unable to assess  -     Sit-Stand [place] (Transfers)  unable to assess  -     Row Name 10/08/21 1459          Functional Mobility    Functional Mobility- Comment  Declines upright mobility on this date  -     Row Name 10/08/21 1459          Activities of Daily Living    BADL Assessment/Intervention  toileting  -     Row Name 10/08/21 1459          Toileting Assessment/Training    Comment (Toileting)  Patient just finishing utilizing urinal at bedside upon arrival; destats to 82-83% with activity requiring increased time to recover  -       User Key  (r) = Recorded By, (t) = Taken By, (c) = Cosigned By    Initials Name Provider Type    Kary Martino OT Occupational Therapist        Obj/Interventions     Row Name 10/08/21 1501          Shoulder  (Therapeutic Exercise)    Shoulder (Therapeutic Exercise)  AROM (active range of motion)  -     Shoulder AROM (Therapeutic Exercise)  bilateral;flexion;extension;aBduction;aDduction;2 sets;10 repetitions  -MW     Row Name 10/08/21 1501          Elbow/Forearm (Therapeutic Exercise)    Elbow/Forearm AROM (Therapeutic Exercise)  bilateral;extension;flexion;10 repetitions;2 sets  -MW     Row Name 10/08/21 1501          Motor Skills    Functional Endurance  poor  -     Row Name 10/08/21 1501          Balance    Balance Assessment  sitting static balance;sitting dynamic balance  -MW     Static Sitting Balance  WFL  -MW     Dynamic Sitting Balance  WFL  -MW     Row Name 10/08/21 1501          Therapeutic Exercise    Therapeutic Exercise  elbow/forearm;shoulder  -MW       User Key  (r) = Recorded By, (t) = Taken By, (c) = Cosigned By    Initials Name Provider Type    Kary Martino OT Occupational Therapist        Goals/Plan    No documentation.       Clinical Impression     Row Name 10/08/21 1501          Pain Assessment    Additional Documentation  Pain Scale: Numbers Pre/Post-Treatment (Group)  -[facility] Name 10/08/21 1501          Pain Scale: Numbers Pre/Post-Treatment    Pretreatment Pain Rating  0/10 - no pain  -MW     Posttreatment Pain Rating  0/10 - no pain  -MW     Row Name 10/08/21 1501          Plan of Care Review    Plan of Care Reviewed With  patient  -MW     Progress  improving  -MW     Outcome Summary  Patient agreeable to OT session on this date. Patient up in chair upon arrival in AM, stating he had been up in chair since before breakfast. Patient with increased anxiety upon entry due to SOA from utilizing urinal just prior to entry. Rest break required before completion of BUE ther ex 2 set x 20 reps with increased time between sets due to destats to lower 80s, requiring ~1 min to recover above 90. Patient declined upright mobility, stating he will return to bed later in afternoon. Continue  OT to increased activity tolerance, strength, ADLs, and transfers.  -MW     Row Name 10/08/21 1501          Vital Signs    Pre SpO2 (%)  92  -MW     O2 Delivery Pre Treatment  hi-flow  -MW     Intra SpO2 (%)  82  -MW     O2 Delivery Intra Treatment  hi-flow  -MW     Post SpO2 (%)  91  -MW     O2 Delivery Post Treatment  hi-flow  -MW     Pre Patient Position  Sitting  -MW     Intra Patient Position  Sitting  -MW     Post Patient Position  Sitting  -MW     Recovery Time  ~1-1:30 mins  -MW     Row Name 10/08/21 1501          Positioning and Restraints    Pre-Treatment Position  sitting in chair/recliner  -MW     Post Treatment Position  chair  -MW     In Chair  notified nsg;reclined;call light within reach;encouraged to call for assist;exit alarm on  -MW       User Key  (r) = Recorded By, (t) = Taken By, (c) = Cosigned By    Initials Name Provider Type    Kary Martino OT Occupational Therapist        Outcome Measures     Row Name 10/08/21 1504          How much help from another is currently needed...    Putting on and taking off regular lower body clothing?  3  -MW     Bathing (including washing, rinsing, and drying)  3  -MW     Toileting (which includes using toilet bed pan or urinal)  3  -MW     Putting on and taking off regular upper body clothing  3  -MW     Taking care of personal grooming (such as brushing teeth)  3  -MW     Eating meals  4  -MW     AM-PAC 6 Clicks Score (OT)  19  -MW     Row Name 10/08/21 1504          Functional Assessment    Outcome Measure Options  AM-PAC 6 Clicks Daily Activity (OT)  -MW       User Key  (r) = Recorded By, (t) = Taken By, (c) = Cosigned By    Initials Name Provider Type    Kary Martino OT Occupational Therapist          Occupational Therapy Education                 Title: PT OT SLP Therapies (Done)     Topic: Occupational Therapy (Done)     Point: ADL training (Done)     Description:   Instruct learner(s) on proper safety adaptation and remediation  techniques during self care or transfers.   Instruct in proper use of assistive devices.              Learning Progress Summary           Patient Acceptance, E,TB, VU by CB at 10/8/2021 0352    Acceptance, E, VU by AG at 10/7/2021 1530    Acceptance, E,TB, VU by CB at 10/7/2021 0452    Acceptance, E, VU by BT at 10/5/2021 1111    Comment: ot re eval    Acceptance, E, VU by AG at 9/30/2021 1550    Acceptance, E, VU by AG at 9/29/2021 1534    Acceptance, E, VU by AG at 9/26/2021 1614    Acceptance, E, VU by AG at 9/25/2021 1546    Acceptance, E, VU by AG at 9/24/2021 1542    Acceptance, E, VU by AG at 9/22/2021 1531    Acceptance, E, VU by JW at 9/22/2021 1316    Comment: role of OT, plan of care, safety, PLB                   Point: Home exercise program (Done)     Description:   Instruct learner(s) on appropriate technique for monitoring, assisting and/or progressing therapeutic exercises/activities.              Learning Progress Summary           Patient Acceptance, E,TB, VU by CB at 10/8/2021 0352    Acceptance, E, VU by AG at 10/7/2021 1530    Acceptance, E,TB, VU by CB at 10/7/2021 0452    Acceptance, E, VU by BT at 10/5/2021 1111    Comment: ot re eval    Acceptance, E, VU by AG at 9/30/2021 1550    Acceptance, E, VU by AG at 9/29/2021 1534    Acceptance, E, VU by AG at 9/26/2021 1614    Acceptance, E, VU by AG at 9/25/2021 1546    Acceptance, E, VU by AG at 9/24/2021 1542    Acceptance, E, VU by AG at 9/22/2021 1531    Acceptance, E, VU by JW at 9/22/2021 1316    Comment: role of OT, plan of care, safety, PLB                   Point: Precautions (Done)     Description:   Instruct learner(s) on prescribed precautions during self-care and functional transfers.              Learning Progress Summary           Patient Acceptance, E,TB, VU by CB at 10/8/2021 0352    Acceptance, E, VU by AG at 10/7/2021 1530    Acceptance, E,TB, VU by CB at 10/7/2021 0452    Acceptance, E, VU by BT at 10/5/2021 1111    Comment: ot  re eval    Acceptance, E, VU by AG at 9/30/2021 1550    Acceptance, E, VU by AG at 9/29/2021 1534    Acceptance, E, VU by AG at 9/26/2021 1614    Acceptance, E, VU by AG at 9/25/2021 1546    Acceptance, E, VU by AG at 9/24/2021 1542    Acceptance, E, VU by AG at 9/22/2021 1531    Acceptance, E, VU by JW at 9/22/2021 1316    Comment: role of OT, plan of care, safety, PLB                   Point: Body mechanics (Done)     Description:   Instruct learner(s) on proper positioning and spine alignment during self-care, functional mobility activities and/or exercises.              Learning Progress Summary           Patient Acceptance, E,TB, VU by CB at 10/8/2021 0352    Acceptance, E, VU by AG at 10/7/2021 1530    Acceptance, E,TB, VU by CB at 10/7/2021 0452    Acceptance, E, VU by BT at 10/5/2021 1111    Comment: ot re eval    Acceptance, E, VU by AG at 9/30/2021 1550    Acceptance, E, VU by AG at 9/29/2021 1534    Acceptance, E, VU by AG at 9/26/2021 1614    Acceptance, E, VU by AG at 9/25/2021 1546    Acceptance, E, VU by AG at 9/24/2021 1542    Acceptance, E, VU by AG at 9/22/2021 1531                               User Key     Initials Effective Dates Name Provider Type Discipline     06/16/21 -  Giuseppe Beasley, RN Registered Nurse Nurse    CB 07/28/21 -  Joey Yates, RN Registered Nurse Nurse    BT 11/16/20 -  Deena Saunders OT Occupational Therapist OT    JW 06/10/21 -  Roshni Nelson OT Occupational Therapist OT              OT Recommendation and Plan     Plan of Care Review  Plan of Care Reviewed With: patient  Progress: improving  Outcome Summary: Patient agreeable to OT session on this date. Patient up in chair upon arrival in AM, stating he had been up in chair since before breakfast. Patient with increased anxiety upon entry due to SOA from utilizing urinal just prior to entry. Rest break required before completion of BUE ther ex 2 set x 20 reps with increased time between sets due to destats to lower  80s, requiring ~1 min to recover above 90. Patient declined upright mobility, stating he will return to bed later in afternoon. Continue OT to increased activity tolerance, strength, ADLs, and transfers.     Time Calculation:   Time Calculation- OT     Row Name 10/08/21 1505             Time Calculation- OT    OT Start Time  0955  -MW      OT Stop Time  1021  -MW      OT Time Calculation (min)  26 min  -MW      Total Timed Code Minutes- OT  26 minute(s)  -MW      OT Received On  10/08/21  -MW      OT - Next Appointment  10/11/21  -         Timed Charges    44439 - OT Therapeutic Exercise Minutes  26  -MW         Total Minutes    Timed Charges Total Minutes  26  -MW       Total Minutes  26  -MW        User Key  (r) = Recorded By, (t) = Taken By, (c) = Cosigned By    Initials Name Provider Type    Kary Martino OT Occupational Therapist        Therapy Charges for Today     Code Description Service Date Service Provider Modifiers Qty    90399252932 HC OT THER PROC EA 15 MIN 10/8/2021 Kary Williamson OT GO 2               Kary Williamson OT  10/8/2021   [facility]

## 2021-10-08 NOTE — PROGRESS NOTES
"Adult Nutrition  Assessment/PES    Patient Name:  Angel Cisneros  YOB: 1950  MRN: [id number]  Admit Date:  9/5/2021    Assessment Date:  10/8/2021    Comments:  Nutrition follow up.  Dexamethasone decreased to daily.  O2 requirements fluctuating.  Eating well, % at meals.    RD to continue to follow.    Reason for Assessment     Row Name 10/08/21 1157          Reason for Assessment    Reason For Assessment  follow-up protocol         Nutrition/Diet History     Row Name 10/08/21 1158          Nutrition/Diet History    Typical Food/Fluid Intake  % at meals         Anthropometrics     Row Name 10/08/21 1158 10/08/21 1142       Anthropometrics    Height  170.2 cm (67.01\")  --    Weight  -- 128# 10/8  58.2 kg (128 lb 5 oz)       Ideal Body Weight (IBW)    Ideal Body Weight (IBW) (kg)  68.12  --       Body Mass Index (BMI)    BMI Assessment  BMI 18.5-24.9: normal 20.05  --    Row Name 10/08/21 0500          Anthropometrics    Weight  66.2 kg (146 lb)         Labs/Tests/Procedures/Meds     Row Name 10/08/21 1159          Labs/Procedures/Meds    Lab Results Reviewed  reviewed, pertinent     Lab Results Comments  Creat, BUN, Ca, GFR        Diagnostic Tests/Procedures    Diagnostic Test/Procedure Reviewed  reviewed, pertinent        Medications    Pertinent Medications Reviewed  reviewed, pertinent     Pertinent Medications Comments  tums, dexamethasone, lovenox, pepcid, remeron, florastor, pericolace         Physical Findings     Row Name 10/08/21 1201          Physical Findings    Overall Physical Appearance  on oxygen therapy     Skin  edema;other (see comments) B=19, bruised; trace/mild edema         Estimated/Assessed Needs     Row Name 10/08/21 1158          Calculation Measurements    Height  170.2 cm (67.01\")         Nutrition Prescription Ordered     Row Name 10/08/21 1202          Nutrition Prescription PO    Current PO Diet  Regular     Common Modifiers  Low Potassium     "     Evaluation of Received Nutrient/Fluid Intake     Row Name 10/08/21 1202          PO Evaluation    Number of Meals  3     % PO Intake                 Problem/Interventions:          Intervention Goal     Row Name 10/08/21 1202          Intervention Goal    General  Maintain nutrition;Disease management/therapy;Meet nutritional needs for age/condition     PO  Maintain intake     Weight  Maintain weight         Nutrition Intervention     Row Name 10/08/21 1202          Nutrition Intervention    RD/Tech Action  Follow Tx progress;Care plan reviewd           Education/Evaluation     Row Name 10/08/21 1202          Education    Education  Will Instruct as appropriate        Monitor/Evaluation    Monitor  Per protocol;PO intake;Supplement intake;Pertinent labs;Weight;Symptoms           Electronically signed by:  Kay Baez RD  10/08/21 12:02 EDT

## 2021-10-08 NOTE — THERAPY TREATMENT NOTE
Patient Name: Angel Cisneros  : 1950    MRN: [id number]                              Today's Date: 10/8/2021       Admit Date: 2021    Visit Dx:     ICD-10-CM ICD-9-CM   1. COVID-19 virus infection  U07.1 079.89   2. Acute hypoxemic respiratory failure (CMS/HCC)  J96.01 518.81   3. Acute renal failure superimposed on chronic kidney disease, unspecified CKD stage, unspecified acute renal failure type (CMS/HCC)  N17.9 584.9    N18.9 585.9     Patient Active Problem List   Diagnosis   • Lymphoma (HCC)   • Leukemia (HCC)   • Chest pain   • GERD (gastroesophageal reflux disease)   • HTN (hypertension)   • Chronic kidney disease, stage 3b (HCC)   • Generalized abdominal pain   • CLL (chronic lymphocytic leukemia) (HCC)   • Gastroesophageal reflux disease   • Hodgkin's disease of lymph nodes of head, face, and neck (HCC)   • Oral thrush   • Medicare annual wellness visit, subsequent   • Primary insomnia   • Adjustment disorder with depressed mood   • Dysuria   • Gross hematuria   • Acute non-recurrent maxillary sinusitis   • Cough   • BPH (benign prostatic hyperplasia)   • Elevated PSA   • Clinical diagnosis of COVID-19   • Pneumonia due to COVID-19 virus   • Paroxysmal atrial fibrillation (HCC)   • Acute respiratory failure with hypoxia (HCC)     Past Medical History:   Diagnosis Date   • Arthritis     both knees   • Cancer (HCC)     dx with lymphoma / radiation   • Cataracts, bilateral    • Hx of cornea transplant     both eyes   • Leukemia (HCC)    • Lymphoma (HCC)     dx in . treated with radiation.   • Lymphoma (HCC)     treated with radiation. dx in      Past Surgical History:   Procedure Laterality Date   • BREAST LUMPECTOMY Right    • COLONOSCOPY N/A 3/8/2016    Procedure: COLONOSCOPY with cold polypectomy X 3;  Surgeon: Chris Harden MD;  Location: [facility] ENDOSCOPY;  Service:    • ENDOSCOPY N/A 10/23/2018    Procedure: ESOPHAGOGASTRODUODENOSCOPY WITH BIOPSY;  Surgeon: Fina  Chris GRAYSON MD;  Location: [facility] ENDOSCOPY;  Service: Gastroenterology   • EYE SURGERY      partial cornea transplant 4-5 years ago   • KNEE SURGERY Left    • TOE SURGERY Bilateral    • TONSILLECTOMY       General Information     Row Name 10/08/21 1719          Physical Therapy Time and Intention    Document Type  therapy note (daily note)  -     Mode of Treatment  individual therapy;physical therapy  -     Row Name 10/08/21 1719          General Information    Patient Profile Reviewed  yes  -     Existing Precautions/Restrictions  fall;oxygen therapy device and L/min uses non-rebreather PRN during session in addition to HF  -       User Key  (r) = Recorded By, (t) = Taken By, (c) = Cosigned By    Initials Name Provider Type    Breana Jones PTA Physical Therapy Assistant        Mobility     Row Name 10/08/21 1720          Bed Mobility    Comment (Bed Mobility)  in chair  -     Row Name 10/08/21 1720          Sit-Stand Transfer    Sit-Stand [place] (Transfers)  contact guard;verbal cues  -     Assistive Device (Sit-Stand Transfers)  -- HHA  -     Row Name 10/08/21 1720          Gait/Stairs (Locomotion)    Distance in Feet (Gait)  unable , SATS decr w/STS, but improving, less recovery time  -       User Key  (r) = Recorded By, (t) = Taken By, (c) = Cosigned By    Initials Name Provider Type    Breana Jones PTA Physical Therapy Assistant        Obj/Interventions     Row Name 10/08/21 1721          Motor Skills    Therapeutic Exercise  -- APs, QS, hip abd/add; LAQS, seated MIP x10  -       User Key  (r) = Recorded By, (t) = Taken By, (c) = Cosigned By    Initials Name Provider Type    Breana Jones PTA Physical Therapy Assistant        Goals/Plan    No documentation.       Clinical Impression     Row Name 10/08/21 1722          Pain Scale: Numbers Pre/Post-Treatment    Pretreatment Pain Rating  0/10 - no pain  -     Posttreatment Pain Rating  0/10 - no pain  -[facility]  Name 10/08/21 1722          Plan of Care Review    Plan of Care Reviewed With  patient  -JM     Progress  improving  -JM     Outcome Summary  Pt agrred to PT session; pt apprehensive about standing but allowed non-rebreather prn so suggested he use and he then agreed to stand; pt also perf LE seated exer x10 reps; SATS decr from 94% to 85% just w/standing, but w/non-rebreather quickly up to 91%; pt plans SNU at [place] once SATS stable enough  -     Row Name 10/08/21 1722          Therapy Assessment/Plan (PT)    Rehab Potential (PT)  fair, will monitor progress closely  -     Criteria for Skilled Interventions Met (PT)  yes  -     Row Name 10/08/21 1722          Vital Signs    Pre SpO2 (%)  94  -JM     O2 Delivery Pre Treatment  hi-flow  -JM     Intra SpO2 (%)  85  -JM     O2 Delivery Intra Treatment  hi-flow  -JM     Post SpO2 (%)  97  -JM     O2 Delivery Post Treatment  hi-flow  -JM     Row Name 10/08/21 1722          Positioning and Restraints    Pre-Treatment Position  sitting in chair/recliner  -JM     Post Treatment Position  chair  -JM     In Chair  reclined;call light within reach;encouraged to call for assist;notified nsg no alarm upon entry, pt has call light  -       User Key  (r) = Recorded By, (t) = Taken By, (c) = Cosigned By    Initials Name Provider Type    Breana Jones PTA Physical Therapy Assistant        Outcome Measures     Row Name 10/08/21 1726          How much help from another person do you currently need...    Turning from your back to your side while in flat bed without using bedrails?  4  -JM     Moving from lying on back to sitting on the side of a flat bed without bedrails?  3  -JM     Moving to and from a bed to a chair (including a wheelchair)?  3  -JM     Standing up from a chair using your arms (e.g., wheelchair, bedside chair)?  3  -JM     Climbing 3-5 steps with a railing?  1  -JM     To walk in hospital room?  1  -JM     AM-PAC 6 Clicks Score (PT)  15  -JM     Row Name  10/08/21 1504          Functional Assessment    Outcome Measure Options  AM-PAC 6 Clicks Daily Activity (OT)  -       User Key  (r) = Recorded By, (t) = Taken By, (c) = Cosigned By    Initials Name Provider Type    Breana Jones, PTA Physical Therapy Assistant    Kary Martino, UJNI Occupational Therapist                       Physical Therapy Education                 Title: PT OT SLP Therapies (Done)     Topic: Physical Therapy (Done)     Point: Mobility training (Done)     Learning Progress Summary           Patient Acceptance, E,TB,D, VU,NR by JEROME at 10/8/2021 1728    Acceptance, E, VU by AG at 10/8/2021 1541    Acceptance, E,TB, VU by CB at 10/8/2021 0352    Acceptance, E, VU by AG at 10/7/2021 1530    Acceptance, E,TB, VU by CB at 10/7/2021 0452    Acceptance, E,D, VU,NR by EB at 10/6/2021 1214    Acceptance, E,TB,D, VU,NR by JEROME at 10/4/2021 1728    Acceptance, E, VU by RS at 10/2/2021 1626    Acceptance, E,TB,D, VU,NR by  at 9/30/2021 1638    Acceptance, E, VU by AG at 9/30/2021 1550    Acceptance, E, VU by AG at 9/29/2021 1534    Acceptance, E,D, VU by MS at 9/28/2021 1128    Acceptance, E, VU by AG at 9/26/2021 1614    Acceptance, E, VU by AG at 9/25/2021 1546    Acceptance, E,D, VU,NR by MS at 9/25/2021 1152    Acceptance, E, VU by AG at 9/24/2021 1542    Acceptance, E,TB,D, VU,NR,DU by CB1 at 9/23/2021 1329    Acceptance, E, VU by AG at 9/22/2021 1531    Acceptance, E,D, VU,NR by MS at 9/21/2021 1533    Acceptance, E,TB,D, VU,NR by JM at 9/17/2021 1836    Acceptance, E, NR by  at 9/15/2021 1551    Acceptance, E, VU by SR at 9/13/2021 1246    Acceptance, E,D, NR by JEROME at 9/10/2021 1933    Acceptance, E,TB,D, VU by JEROME at 9/8/2021 1725    Acceptance, E,D, VU,DU by SAIGE at 9/6/2021 0926                   Point: Home exercise program (Done)     Learning Progress Summary           Patient Acceptance, E,TB,D, VU,NR by JEROME at 10/8/2021 1728    Acceptance, E, VU by ULISES at 10/8/2021 1541    Acceptance,  E,TB, VU by CB at 10/8/2021 0352    Acceptance, E, VU by AG at 10/7/2021 1530    Acceptance, E,TB, VU by CB at 10/7/2021 0452    Acceptance, E,D, VU,NR by EB at 10/6/2021 1214    Acceptance, E,TB,D, VU,NR by JM at 10/4/2021 1728    Acceptance, E, VU by RS at 10/2/2021 1626    Acceptance, E,TB,D, VU,NR by CH at 9/30/2021 1638    Acceptance, E, VU by AG at 9/30/2021 1550    Acceptance, E, VU by AG at 9/29/2021 1534    Acceptance, E,D, VU by MS at 9/28/2021 1128    Acceptance, E, VU by AG at 9/26/2021 1614    Acceptance, E, VU by AG at 9/25/2021 1546    Acceptance, E,D, VU,NR by MS at 9/25/2021 1152    Acceptance, E, VU by AG at 9/24/2021 1542    Acceptance, E,TB,D, VU,NR,DU by CB1 at 9/23/2021 1329    Acceptance, E, VU by AG at 9/22/2021 1531    Acceptance, E,D, VU,NR by MS at 9/21/2021 1533    Acceptance, E,TB,D, VU,NR by JM at 9/17/2021 1836    Acceptance, E, NR by  at 9/15/2021 1551    Acceptance, E, VU by SR at 9/13/2021 1246    Acceptance, E,D, NR by JM at 9/10/2021 1933    Acceptance, E,TB,D, VU by JM at 9/8/2021 1725    Acceptance, E,D, VU,DU by  at 9/6/2021 0926                   Point: Body mechanics (Done)     Learning Progress Summary           Patient Acceptance, E,TB,D, VU,NR by JM at 10/8/2021 1728    Acceptance, E, VU by AG at 10/8/2021 1541    Acceptance, E,TB, VU by CB at 10/8/2021 0352    Acceptance, E, VU by AG at 10/7/2021 1530    Acceptance, E,TB, VU by CB at 10/7/2021 0452    Acceptance, E,D, VU,NR by EB at 10/6/2021 1214    Acceptance, E,TB,D, VU,NR by JM at 10/4/2021 1728    Acceptance, E, VU by RS at 10/2/2021 1626    Acceptance, E,TB,D, VU,NR by CH at 9/30/2021 1638    Acceptance, E, VU by AG at 9/30/2021 1550    Acceptance, E, VU by AG at 9/29/2021 1534    Acceptance, E,D, VU by MS at 9/28/2021 1128    Acceptance, E, VU by AG at 9/26/2021 1614    Acceptance, E, VU by AG at 9/25/2021 1546    Acceptance, E,D, VU,NR by MS at 9/25/2021 1152    Acceptance, E, VU by AG at 9/24/2021 1542     Acceptance, E,TB,D, VU,NR,DU by CB1 at 9/23/2021 1329    Acceptance, E, VU by AG at 9/22/2021 1531    Acceptance, E,D, VU,NR by MS at 9/21/2021 1533    Acceptance, E,TB,D, VU,NR by JM at 9/17/2021 1836    Acceptance, E, NR by LH at 9/15/2021 1551    Acceptance, E, VU by SR at 9/13/2021 1246    Acceptance, E,D, NR by JM at 9/10/2021 1933    Acceptance, E,TB,D, VU by JM at 9/8/2021 1725    Acceptance, E,D, VU,DU by LC at 9/6/2021 0926                   Point: Precautions (Done)     Learning Progress Summary           Patient Acceptance, E,TB,D, VU,NR by JM at 10/8/2021 1728    Acceptance, E, VU by AG at 10/8/2021 1541    Acceptance, E,TB, VU by CB at 10/8/2021 0352    Acceptance, E, VU by AG at 10/7/2021 1530    Acceptance, E,TB, VU by CB at 10/7/2021 0452    Acceptance, E,D, VU,NR by EB at 10/6/2021 1214    Acceptance, E,TB,D, VU,NR by JM at 10/4/2021 1728    Acceptance, E, VU by RS at 10/2/2021 1626    Acceptance, E,TB,D, VU,NR by  at 9/30/2021 1638    Acceptance, E, VU by AG at 9/30/2021 1550    Acceptance, E, VU by AG at 9/29/2021 1534    Acceptance, E,D, VU by MS at 9/28/2021 1128    Acceptance, E, VU by AG at 9/26/2021 1614    Acceptance, E, VU by AG at 9/25/2021 1546    Acceptance, E,D, VU,NR by MS at 9/25/2021 1152    Acceptance, E, VU by AG at 9/24/2021 1542    Acceptance, E,TB,D, VU,NR,DU by CB1 at 9/23/2021 1329    Acceptance, E, VU by AG at 9/22/2021 1531    Acceptance, E,D, VU,NR by MS at 9/21/2021 1533    Acceptance, E,TB,D, VU,NR by JM at 9/17/2021 1836    Acceptance, E, NR by  at 9/15/2021 1551    Acceptance, E, VU by SR at 9/13/2021 1246    Acceptance, E,D, NR by JM at 9/10/2021 1933    Acceptance, E,TB,D, VU by JEROME at 9/8/2021 1725    Acceptance, E,D, VU,DU by  at 9/6/2021 0926                               User Key     Initials Effective Dates Name Provider Type Discipline     06/16/21 -  Neida Abbott, PT Physical Therapist PT     06/16/21 -  Iman Archer, PT Physical Therapist PT     JEROME 03/07/18 -  Breana Almonte PTA Physical Therapy Assistant PT    MS 06/16/21 -  Angel Vergara, PT Physical Therapist PT    LC 07/02/20 -  Gino Bolden, PT DPT Physical Therapist PT    EB 06/16/21 -  Sabrina Flores PTA Physical Therapy Assistant PT    RS 06/16/21 -  Coni Hamilton, PT Physical Therapist PT    AG 06/16/21 -  Giuseppe Beasley, RN Registered Nurse Nurse    CB 07/28/21 -  Joey Yates, RN Registered Nurse Nurse    CB1 12/30/20 -  Brigid Marinelli Physical Therapist PT    SR 02/08/21 -  Lucretia Weiss Physical Therapist PT              PT Recommendation and Plan     Plan of Care Reviewed With: patient  Progress: improving  Outcome Summary: Pt agrred to PT session; pt apprehensive about standing but allowed non-rebreather prn so suggested he use and he then agreed to stand; pt also perf LE seated exer x10 reps; SATS decr from 94% to 85% just w/standing, but w/non-rebreather quickly up to 91%; [place] SN[place] once SATS stable enough     Time Calculation:   PT Charges     Row Name 10/08/21 1729             Time Calculation    Start Time  1425  -      Stop Time  1448  -      Time Calculation (min)  23 min  -      PT Received On  10/08/21  -JEROME      PT - Next Appointment  10/11/21  -JEROME        User Key  (r) = Recorded By, (t) = Taken By, (c) = Cosigned By    Initials Name Provider Type     Breana Almonte PTA Physical Therapy Assistant        Therapy Charges for Today     Code Description Service Date Service Provider Modifiers Qty    44980196232 HC PT THER PROC EA 15 MIN 10/8/2021 Breana Almonte PTA GP 2          PT G-Codes  Outcome Measure Options: AM-PAC 6 Clicks Daily Activity (OT)  AM-PAC 6 Clicks Score (PT): 15  AM-PAC 6 Clicks Score (OT): 19    Breana Almonte PTA  10/8/2021

## 2021-10-08 NOTE — PROGRESS NOTES
Name: Angel Cisneros ADMIT: 2021   : 1950  PCP: Ayaan Amin MD    MRN: [id number] LOS: 33 days   AGE/SEX: 71 y.o. male  ROOM: [facility]     Subjective   Subjective   Sitting up in chair, appears very comfortable.  Oxygen requirements at 7 L today.  He is eating well, had a bowel movement.  Steroids have been decreased to daily which he is happy about.  Denies chest pain, fevers or chills, abdominal pain.  Still short of breath with minimal exertion.  I personally viewed his chest x-ray which shows patchy consolidation in the bilateral lung bases.    Review of Systems  As above     Objective   Objective   Vital Signs  Temp:  [96.5 °F (35.8 °C)-97.9 °F (36.6 °C)] 97.9 °F (36.6 °C)  Heart Rate:  [56-79] 57  Resp:  [18-20] 20  BP: (104-148)/(74-91) 123/74  SpO2:  [92 %-97 %] 92 %  on  Flow (L/min):  [7-15] 7;   Device (Oxygen Therapy): humidified;high-flow nasal cannula  Body mass index is 20.09 kg/m².  Physical Exam  Constitutional:       General: He is not in acute distress.     Appearance: He is not diaphoretic.   HENT:      Mouth/Throat:      Mouth: Mucous membranes are moist.   Eyes:      General: No scleral icterus.  Cardiovascular:      Rate and Rhythm: Normal rate and regular rhythm.      Heart sounds: No murmur heard.   No gallop.    Pulmonary:      Effort: Pulmonary effort is normal. No respiratory distress.      Breath sounds: No wheezing or rhonchi.   Abdominal:      General: There is no distension.      Palpations: Abdomen is soft. There is no mass.      Tenderness: There is no abdominal tenderness. There is no guarding.   Skin:     General: Skin is warm and dry.   Neurological:      Mental Status: He is alert.         Results Review     I reviewed the patient's new clinical results.  Results from last 7 days   Lab Units 10/06/21  0634 10/05/21  0524 10/04/21  0700 10/03/21  0545   WBC 10*3/mm3 5.69 5.37 5.55 5.79   HEMOGLOBIN g/dL 13.2 12.9* 12.5* 12.4*   PLATELETS 10*3/mm3 143  124* 126* 107*     Results from last 7 days   Lab Units 10/06/21  0634 10/05/21  0524 10/04/21  0700 10/03/21  0545   SODIUM mmol/L 141 142 138 141   POTASSIUM mmol/L 4.9 4.6 4.7 4.9   CHLORIDE mmol/L 109* 109* 108* 109*   CO2 mmol/L 23.8 23.1 20.5* 21.9*   BUN mg/dL 40* 42* 44* 45*   CREATININE mg/dL 1.33* 1.19 1.31* 1.36*   GLUCOSE mg/dL 95 92 99 101*   Estimated Creatinine Clearance: 42.5 mL/min (A) (by C-G formula based on SCr of 1.33 mg/dL (H)).    Results from last 7 days   Lab Units 10/06/21  0634 10/05/21  0524 10/04/21  0700 10/03/21  0545   CALCIUM mg/dL 8.5* 8.4* 8.3* 8.5*     Results from last 7 days   Lab Units 10/07/21  0730   PROCALCITONIN ng/mL 0.05     COVID19   Date Value Ref Range Status   09/22/2021 Detected (C) Not Detected - Ref. Range Final   09/05/2021 Detected (C) Not Detected - Ref. Range Final     No results found for: HGBA1C, POCGLU    XR Chest 1 View  Narrative: XR CHEST 1 VW-  10/08/2021     HISTORY: Covid. Follow-up pneumomediastinum.     Heart size is mildly enlarged. There are moderate patchy infiltrates in  the bilateral lungs mostly in the mid to lower lungs and findings are  consistent with Covid 19 pneumonia. These findings appear similar to  yesterday's study.     No definite pneumothorax is seen.     On yesterday's study there was suggestion of a small amount of  pneumomediastinum. This appears improved from yesterday though there  still may be tiny amount of mediastinal air.     Impression: Lateral pulmonary infiltrates are consistent with Covid 19  pneumonia and appears similar to yesterday's study.  2. No evidence of pneumothorax.  3. The pneumomediastinum appears to be further improved. There may be a  trace amount of mediastinal air on the current study.     This report was finalized on 10/8/2021 8:38 AM by Dr. Norman Montalvo M.D.       I reviewed the patient's daily medications.  Scheduled Medications  amLODIPine, 10 mg, Oral, Q24H  calcium carbonate, 1 tablet, Oral,  Daily  dexamethasone, 6 mg, Oral, Daily With Breakfast  dextromethorphan polistirex ER, 60 mg, Oral, Q12H  enoxaparin, 1 mg/kg, Subcutaneous, Q12H  famotidine, 20 mg, Oral, Daily  finasteride, 5 mg, Oral, Daily  metoprolol tartrate, 12.5 mg, Oral, Q12H  mirtazapine, 7.5 mg, Oral, Nightly  prednisoLONE acetate, 1 drop, Both Eyes, Daily  saccharomyces boulardii, 250 mg, Oral, BID  senna-docusate sodium, 2 tablet, Oral, Daily  sodium chloride, 1 drop, Left Eye, TID    Infusions   Diet  Diet Regular; Low Potassium; 2 gm (50 mEq)       Assessment/Plan     Active Hospital Problems    Diagnosis  POA   • **Pneumonia due to COVID-19 virus [U07.1, J12.82]  Yes   • Paroxysmal atrial fibrillation (HCC) [I48.0]  No   • Acute respiratory failure with hypoxia (HCC) [J96.01]  Yes   • BPH (benign prostatic hyperplasia) [N40.0]  Yes   • CLL (chronic lymphocytic leukemia) (HCC) [C91.10]  Yes   • Chronic kidney disease, stage 3b (HCC) [N18.32]  Yes   • HTN (hypertension) [I10]  Yes   • GERD (gastroesophageal reflux disease) [K21.9]  Yes   • Hodgkin's disease of lymph nodes of head, face, and neck (HCC) [C81.91]  Yes      Resolved Hospital Problems    Diagnosis Date Resolved POA   • Acute constipation [K59.00] 10/08/2021 No       71 y.o. male with history of CLL admitted with acute hypoxic respiratory failure due to COVID-19 pneumonia.  He was fully vaccinated.    COVID-19 pneumonia with acute hypoxic respiratory failure:   - He completed 7-day course of Rocephin due to elevated procalcitonin/pneumomediastinum previously this admission and infectious disease evaluated.  Completed remdesivir course.  Has been on high-dose dexamethasone for some time.    Slowly weaning down.  Down to daily dexamethasone today. Pulmonology following.  -Oxygen requirements fluctuating.  He is currently comfortable on 7 L.    Constipation  -Resolved.  Continue bowel regimen    Paroxysmal A. fib  -Sinus rhythm.  Continue metoprolol.    On therapeutic Lovenox  for anticoagulation.  Cardiology evaluated and are anticipating about additional 1 month of anticoagulation at discharge.  Transition to Eliquis/Xarelto at that time.    Fibrosis noted on CT. outpatient follow-up    Hypertension: Acceptable acutely.  Continue current regimen    CKD3: Stable at baseline.  Changing labs to every 3 days since he has been here for so long.    CLL: Follows with [place] oncology    GERD: PPI    Lovenox 60 twice daily   Full code.  Discussed with patient and nursing staff.  Anticipate discharge to SNU facility.  I expect him to continue to improve but may need 2-3 more days before we get him down to an acceptable oxygen level for discharge      Sandra Zurita DO  [facility]  10/08/21  12:23 EDT    Patient was wearing facemask when I entered the room and throughout our encounter.  I wore protective equipment throughout this patient encounter including a face mask, gloves and protective eyewear.  Hand hygiene was performed before donning protective equipment and after removal when leaving the room.

## 2021-10-08 NOTE — PLAN OF CARE
Goal Outcome Evaluation:  Plan of Care Reviewed With: patient  Progress: improving  Outcome Summary: Patient agreeable to OT session on this date. Patient up in chair upon arrival in AM, stating he had been up in chair since before breakfast. Patient with increased anxiety upon entry due to SOA from utilizing urinal just prior to entry. Rest break required before completion of BUE ther ex 2 set x 20 reps with increased time between sets due to destats to lower 80s, requiring ~1 min to recover above 90. Patient declined upright mobility, stating he will return to bed later in afternoon. Continue OT to increased activity tolerance, strength, ADLs, and transfers.    Patient not wearing mask. Therapist donned all approp PPE for COVID isolation. Thorough hand hygiene completed before and after tx session.

## 2021-10-08 NOTE — PLAN OF CARE
Goal Outcome Evaluation:  Plan of Care Reviewed With: patient        Progress: improving  Outcome Summary: Pt agrred to PT session; pt apprehensive about standing but allowed non-rebreather prn so suggested he use and he then agreed to stand; pt also perf LE seated exer x10 reps; SATS decr from 94% to 85% just w/standing, but w/non-rebreather quickly up to 91%; pt plans SNU at [place] once SATS stable enough    Patient was not wearing a face mask during this therapy encounter. Therapist used appropriate personal protective equipment including eye protection, mask, and gloves.  Mask used was N95/duckbill. Appropriate PPE was worn during the entire therapy session. Hand hygiene was completed before and after therapy session. Patient is in enhanced droplet precautions.

## 2021-10-08 NOTE — PLAN OF CARE
Problem: Adult Inpatient Plan of Care  Goal: Patient-Specific Goal (Individualized)  Flowsheets (Taken 10/8/2021 0353)  Patient-Specific Goals (Include Timeframe): No acute changes. VSS. AOx4. 5L O2 via HFNC. 15L NRB nancy if needed. SpO2 drops to the low to mid 80's w/ activity. Sats in low to mid 90's w/ rest. NSR on tele. Patient encouraged to turn. Turns well. Waffle mattress in use. No c/o pain or discomfort. Bed locked and in lowest position. Side rails up x2. Call light within reach. Will continue to assess.  Goal: Absence of Hospital-Acquired Illness or Injury  Intervention: Identify and Manage Fall Risk  Flowsheets  Taken 10/8/2021 0300 by Memo Austin  Safety Promotion/Fall Prevention:   safety round/check completed   room organization consistent   nonskid shoes/slippers when out of bed   lighting adjusted   fall prevention program maintained   clutter free environment maintained   assistive device/personal items within reach   activity supervised  Taken 10/8/2021 0227 by Joey Yates, RN  Safety Promotion/Fall Prevention:   activity supervised   assistive device/personal items within reach   clutter free environment maintained   fall prevention program maintained   lighting adjusted   nonskid shoes/slippers when out of bed   room organization consistent   safety round/check completed  Taken 10/8/2021 0026 by Joey Yates, RN  Safety Promotion/Fall Prevention:   assistive device/personal items within reach   clutter free environment maintained   activity supervised   fall prevention program maintained   lighting adjusted   nonskid shoes/slippers when out of bed   room organization consistent   safety round/check completed  Taken 10/7/2021 2234 by Joey Yates, RN  Safety Promotion/Fall Prevention:   activity supervised   assistive device/personal items within reach   clutter free environment maintained   fall prevention program maintained   lighting adjusted   nonskid shoes/slippers when out of bed   room  organization consistent   safety round/check completed  Taken 10/7/2021 2045 by Joey Yates RN  Safety Promotion/Fall Prevention:   activity supervised   assistive device/personal items within reach   clutter free environment maintained   fall prevention program maintained   lighting adjusted   nonskid shoes/slippers when out of bed   room organization consistent   safety round/check completed  Intervention: Prevent Skin Injury  Flowsheets  Taken 10/8/2021 0227  Body Position: position changed independently  Taken 10/8/2021 0026  Body Position: position changed independently  Skin Protection:   adhesive use limited   incontinence pads utilized   tubing/devices free from skin contact  Taken 10/7/2021 2234  Body Position: position changed independently  Taken 10/7/2021 2045  Body Position: position changed independently  Skin Protection:   adhesive use limited   incontinence pads utilized   tubing/devices free from skin contact  Intervention: Prevent and Manage VTE (venous thromboembolism) Risk  Flowsheets  Taken 10/8/2021 0026  VTE Prevention/Management: (lovenox injections)   bilateral   dorsiflexion/plantar flexion performed   other (see comments)  Taken 10/7/2021 2045  VTE Prevention/Management: (lovenox)   bilateral   dorsiflexion/plantar flexion performed   other (see comments)  Intervention: Prevent Infection  Flowsheets  Taken 10/8/2021 0300 by Memo Austin  Infection Prevention:   rest/sleep promoted   single patient room provided  Taken 10/8/2021 0227 by Joey Yates RN  Infection Prevention:   environmental surveillance performed   equipment surfaces disinfected   hand hygiene promoted   personal protective equipment utilized   rest/sleep promoted   single patient room provided   visitors restricted/screened  Taken 10/8/2021 0026 by Joey Yates RN  Infection Prevention:   environmental surveillance performed   equipment surfaces disinfected   hand hygiene promoted   personal protective equipment  utilized   rest/sleep promoted   single patient room provided   visitors restricted/screened  Taken 10/7/2021 2234 by Joey Yates, RN  Infection Prevention:   environmental surveillance performed   equipment surfaces disinfected   hand hygiene promoted   personal protective equipment utilized   rest/sleep promoted   single patient room provided   visitors restricted/screened  Taken 10/7/2021 2045 by Jeoy Yates, RN  Infection Prevention:   environmental surveillance performed   equipment surfaces disinfected   hand hygiene promoted   personal protective equipment utilized   rest/sleep promoted   single patient room provided   visitors restricted/screened  Goal: Optimal Comfort and Wellbeing  Intervention: Provide Person-Centered Care  Flowsheets  Taken 10/8/2021 0026  Trust Relationship/Rapport:   care explained   choices provided   emotional support provided   empathic listening provided   questions answered   questions encouraged   reassurance provided   thoughts/feelings acknowledged  Taken 10/7/2021 2045  Trust Relationship/Rapport:   care explained   choices provided   emotional support provided   empathic listening provided   questions answered   questions encouraged   reassurance provided   thoughts/feelings acknowledged  Goal: Readiness for Transition of Care  Intervention: Mutually Develop Transition Plan  Flowsheets (Taken 9/7/2021 1356 by Nahomy Pace CSW)  Discharge Facility/Level of Care Needs:   nursing facility, skilled   acute rehab   home with home health  Equipment Currently Used at Home: none  Anticipated Changes Related to Illness: none  Transportation Anticipated: family or friend will provide  Current Discharge Risk: physical impairment  Concerns to be Addressed: discharge planning  Patient/Family Anticipated Services at Transition:   home health care   skilled nursing  Patient/Family Anticipates Transition to: home with family     Problem: Infection  Goal: Infection Symptom  Resolution  Intervention: Prevent or Manage Infection  Flowsheets  Taken 10/8/2021 0300 by Memo Austin  Isolation Precautions: contact precautions maintained  Taken 10/8/2021 0227 by Joey Yates RN  Isolation Precautions:   contact precautions maintained   droplet precautions maintained  Taken 10/8/2021 0026 by Joey Yates RN  Isolation Precautions:   contact precautions maintained   droplet precautions maintained  Taken 10/7/2021 2234 by Joey Yates RN  Isolation Precautions:   contact precautions maintained   droplet precautions maintained  Taken 10/7/2021 2045 by Joey Yates RN  Infection Management: aseptic technique maintained  Isolation Precautions:   contact precautions maintained   droplet precautions maintained     Problem: Fall Injury Risk  Goal: Absence of Fall and Fall-Related Injury  Intervention: Identify and Manage Contributors to Fall Injury Risk  Flowsheets  Taken 10/7/2021 2234  Medication Review/Management: medications reviewed  Taken 10/7/2021 2045  Medication Review/Management: medications reviewed  Taken 10/7/2021 0036  Self-Care Promotion:   independence encouraged   BADL personal objects within reach   BADL personal routines maintained  Intervention: Promote Injury-Free Environment  Flowsheets  Taken 10/8/2021 0300 by Memo Austin  Safety Promotion/Fall Prevention:   safety round/check completed   room organization consistent   nonskid shoes/slippers when out of bed   lighting adjusted   fall prevention program maintained   clutter free environment maintained   assistive device/personal items within reach   activity supervised  Taken 10/8/2021 0227 by Joey Yates RN  Safety Promotion/Fall Prevention:   activity supervised   assistive device/personal items within reach   clutter free environment maintained   fall prevention program maintained   lighting adjusted   nonskid shoes/slippers when out of bed   room organization consistent   safety round/check completed  Taken 10/8/2021  0026 by Joey Yates, RN  Safety Promotion/Fall Prevention:   assistive device/personal items within reach   clutter free environment maintained   activity supervised   fall prevention program maintained   lighting adjusted   nonskid shoes/slippers when out of bed   room organization consistent   safety round/check completed  Taken 10/7/2021 2234 by Joey Yates, RN  Safety Promotion/Fall Prevention:   activity supervised   assistive device/personal items within reach   clutter free environment maintained   fall prevention program maintained   lighting adjusted   nonskid shoes/slippers when out of bed   room organization consistent   safety round/check completed  Taken 10/7/2021 2045 by Joey Yates, RN  Safety Promotion/Fall Prevention:   activity supervised   assistive device/personal items within reach   clutter free environment maintained   fall prevention program maintained   lighting adjusted   nonskid shoes/slippers when out of bed   room organization consistent   safety round/check completed     Problem: Skin Injury Risk Increased  Goal: Skin Health and Integrity  Intervention: Optimize Skin Protection  Flowsheets  Taken 10/8/2021 0227  Head of Bed (HOB): HOB elevated  Taken 10/8/2021 0026  Pressure Reduction Techniques:   frequent weight shift encouraged   heels elevated off bed   weight shift assistance provided  Head of Bed (HOB): HOB elevated  Pressure Reduction Devices: (waffle cushion)   alternating pressure pump (ADD)   heel offloading device utilized   positioning supports utilized   pressure-redistributing mattress utilized   other (see comments)  Skin Protection:   adhesive use limited   incontinence pads utilized   tubing/devices free from skin contact  Taken 10/7/2021 2234  Head of Bed (HOB): HOB elevated  Taken 10/7/2021 2045  Pressure Reduction Techniques:   frequent weight shift encouraged   heels elevated off bed   weight shift assistance provided  Head of Bed (HOB): HOB elevated  Pressure  Reduction Devices: (waffle cushion)   alternating pressure pump (ADD)   other (see comments)   pressure-redistributing mattress utilized   positioning supports utilized  Skin Protection:   adhesive use limited   incontinence pads utilized   tubing/devices free from skin contact  Intervention: Promote and Optimize Oral Intake  Flowsheets  Taken 10/8/2021 0026  Oral Nutrition Promotion:   medicated   rest periods promoted  Taken 10/7/2021 2045  Oral Nutrition Promotion: rest periods promoted   Goal Outcome Evaluation:    No acute changes. VSS. AOx4. 5L O2 via HFNC. 15L NRB nancy if needed. SpO2 drops to the low to mid 80's w/ activity. Sats in low to mid 90's w/ rest. NSR on tele. Patient encouraged to turn. Turns well. Waffle mattress in use. No c/o pain or discomfort. Bed locked and in lowest position. Side rails up x2. Call light within reach. Will continue to assess.

## 2021-10-08 NOTE — PROGRESS NOTES
[facility]  [phone]  Cheo Kraft MD    Subjective:  LOS: 33    Chief Complaint: Acute respiratory failure    Patient remains on 15 L high flow cannula.  He is sometimes able to reduce to 8 to 10 L high flow but with any exertion his oxygen requirement goes up significantly for a significant.  Of time thereafter.  He denies new cough or phlegm.  He is very concerned about his weight loss.    Objective   Vital Signs past 24hrs    Temp range: Temp (24hrs), Av.4 °F (36.3 °C), Min:96.5 °F (35.8 °C), Max:97.9 °F (36.6 °C)    BP range: BP: (104-124)/(69-91) 116/69  Pulse range: Heart Rate:  [56-79] 64  Resp rate range: Resp:  [18-20] 20    Device (Oxygen Therapy): humidified;high-flow nasal cannulaFlow (L/min):  [7-15] 7  Oxygen range:SpO2:  [90 %-97 %] 94 %      58.2 kg (128 lb 5 oz); Body mass index is 20.09 kg/m².    Intake/Output Summary (Last 24 hours) at 10/8/2021 1536  Last data filed at 10/8/2021 1400  Gross per 24 hour   Intake 838 ml   Output 1340 ml   Net -502 ml       Physical Exam  Eyes:      Pupils: Pupils are equal, round, and reactive to light.   Cardiovascular:      Rate and Rhythm: Normal rate and regular rhythm.      Heart sounds: No murmur heard.     Pulmonary:      Effort: Pulmonary effort is normal.      Breath sounds: Normal breath sounds.      Comments: No adventitious sounds  Abdominal:      General: Bowel sounds are normal.      Palpations: Abdomen is soft. There is no mass.      Tenderness: There is no abdominal tenderness.   Musculoskeletal:         General: No swelling.   Neurological:      Mental Status: He is alert.       Results Review:    I have reviewed the laboratory and imaging data since the last note by [facility] physician.  My annotations are noted in assessment and plan.    Results from last 7 days   Lab Units 10/07/21  0730 10/05/21  0524 10/04/21  0700   PROCALCITONIN ng/mL 0.05  --   --    CRP mg/dL  --  <0.30 <0.30       Medication Review:  I have reviewed the  current MAR.  My annotations are noted in assessment and plan.       Plan   PCCM Problems  Acute hypoxic respiratory failure  SARS-CoV-2 infection  Bilateral pulmonary infiltrates  Secondary bacterial infection  Pneumomediastinum  CLL  History NHL  CKD 3  Paroxysmal A. fib  Low platelets        Plan of Treatment    Remains on 15L HF with sats 91-95%. Monitor. Same.  Intermittently able to reduce flows to 8 to 10 L reportedly according to the chart.    Supportive treatment for SARS-CoV-2 infection. Previously on 10 mg twice daily.   Currently on dexamethasone 6 mg twice daily.  Lower dose as long as CRP remains within normal limits. Changed to 6 mg daily on 10/7/21    Concern for secondary bacterial infection.  Patient initially treated with antibiotics.      CT 10/2/21 noted. CxR noted.    Pneumomediastinum not reported on CT but seen on CxR and now seems better.  No significant pneumothorax at this time.    Immunocompromised host due to CLL and history NHL.    CKD 3 but no signs of fluid overload at this time    Remains on full anticoagulation for A. Fib.    He is concerned about weight loss.  Seen by nutritionist today.  Supplement intake recommended by them.    Prognosis remains guarded.    Cheo Kraft MD  10/08/21  15:36 EDT    While in the room and during my examination of the patient I wore gloves, gown, mask, eye protection and followed enhanced droplet/contact isolation protocol and precautions.  I washed my hands before and after this patient encounter.    Part of this note may be an electronic transcription/translation of spoken language to printed text using the Dragon Dictation System.

## 2021-10-08 NOTE — PLAN OF CARE
Problem: Adult Inpatient Plan of Care  Goal: Patient-Specific Goal (Individualized)  Outcome: Ongoing, Progressing  Pt's O2 at 7L. No c/o pain, no signs of distress. Up in chair. Worked with PT. Dressing changed. BM x1. Tolerating all meds. SR on the monitor. Monitoring respiratory status and titrating O2 as tolerated.   Goal: Absence of Hospital-Acquired Illness or Injury  Outcome: Ongoing, Progressing  Intervention: Identify and Manage Fall Risk  Recent Flowsheet Documentation  Taken 10/8/2021 1443 by Giuseppe Beasley, RN  Safety Promotion/Fall Prevention:   activity supervised   assistive device/personal items within reach   clutter free environment maintained   fall prevention program maintained   nonskid shoes/slippers when out of bed   room organization consistent   safety round/check completed   lighting adjusted  Taken 10/8/2021 1226 by Giuseppe Beasley, RN  Safety Promotion/Fall Prevention:   activity supervised   assistive device/personal items within reach   clutter free environment maintained   fall prevention program maintained   nonskid shoes/slippers when out of bed   room organization consistent   safety round/check completed   muscle strengthening facilitated   lighting adjusted  Taken 10/8/2021 1008 by Giuseppe Beasley, RN  Safety Promotion/Fall Prevention:   activity supervised   assistive device/personal items within reach   clutter free environment maintained   fall prevention program maintained   lighting adjusted   nonskid shoes/slippers when out of bed   room organization consistent   safety round/check completed  Taken 10/8/2021 0832 by Giuseppe Beasley, RN  Safety Promotion/Fall Prevention:   activity supervised   assistive device/personal items within reach   clutter free environment maintained   fall prevention program maintained   lighting adjusted   nonskid shoes/slippers when out of bed   room organization consistent   safety round/check completed   muscle  strengthening facilitated  Intervention: Prevent Skin Injury  Recent Flowsheet Documentation  Taken 10/8/2021 1443 by Giuseppe Beasley RN  Body Position: position changed independently  Skin Protection:   adhesive use limited   tubing/devices free from skin contact   transparent dressing maintained  Taken 10/8/2021 1226 by Giuseppe Beasley RN  Body Position: (up in chair, cushion in place) position changed independently  Taken 10/8/2021 1008 by Giuseppe Beasley RN  Body Position: position changed independently  Taken 10/8/2021 0832 by Giuseppe Beasley RN  Body Position: position changed independently  Skin Protection:   adhesive use limited   transparent dressing maintained   tubing/devices free from skin contact   pulse oximeter probe site changed   electrode sites changed  Intervention: Prevent and Manage VTE (venous thromboembolism) Risk  Recent Flowsheet Documentation  Taken 10/8/2021 1443 by Giuseppe Beasley RN  VTE Prevention/Management: (pt on lovenox)   bilateral   dorsiflexion/plantar flexion performed  Taken 10/8/2021 0832 by Giuseppe Beasley RN  VTE Prevention/Management: (pt on lovenox )   bilateral   dorsiflexion/plantar flexion performed  Intervention: Prevent Infection  Recent Flowsheet Documentation  Taken 10/8/2021 1443 by Giuseppe Beasley RN  Infection Prevention:   visitors restricted/screened   single patient room provided   rest/sleep promoted   personal protective equipment utilized  Taken 10/8/2021 1226 by Giuseppe Beasley RN  Infection Prevention:   rest/sleep promoted   personal protective equipment utilized   single patient room provided   visitors restricted/screened  Taken 10/8/2021 1008 by Giuseppe Beasley RN  Infection Prevention:   visitors restricted/screened   single patient room provided   rest/sleep promoted   personal protective equipment utilized  Taken 10/8/2021 0832 by Giuseppe Beasley RN  Infection Prevention:    visitors restricted/screened   single patient room provided   rest/sleep promoted   personal protective equipment utilized  Goal: Optimal Comfort and Wellbeing  Outcome: Ongoing, Progressing  Intervention: Provide Person-Centered Care  Recent Flowsheet Documentation  Taken 10/8/2021 1443 by Giuseppe Beasley RN  Trust Relationship/Rapport: care explained  Taken 10/8/2021 0832 by Giuseppe Beasley RN  Trust Relationship/Rapport: care explained  Goal: Readiness for Transition of Care  Outcome: Ongoing, Progressing     Problem: Infection  Goal: Infection Symptom Resolution  Outcome: Ongoing, Progressing  Intervention: Prevent or Manage Infection  Recent Flowsheet Documentation  Taken 10/8/2021 1443 by Giuseppe Beasley RN  Isolation Precautions:   contact precautions maintained   droplet precautions maintained  Taken 10/8/2021 1226 by Giuseppe Beasley RN  Isolation Precautions:   contact precautions maintained   droplet precautions maintained  Taken 10/8/2021 1008 by Giuseppe Beasley RN  Isolation Precautions:   contact precautions maintained   droplet precautions maintained  Taken 10/8/2021 0832 by Giuseppe Beasley RN  Isolation Precautions:   contact precautions maintained   droplet precautions maintained     Problem: Fall Injury Risk  Goal: Absence of Fall and Fall-Related Injury  Outcome: Ongoing, Progressing  Intervention: Identify and Manage Contributors to Fall Injury Risk  Recent Flowsheet Documentation  Taken 10/8/2021 1443 by Giuseppe Beasley RN  Medication Review/Management: medications reviewed  Taken 10/8/2021 1226 by Giuseppe Beasley RN  Medication Review/Management: medications reviewed  Taken 10/8/2021 1008 by Giuseppe Beasley RN  Medication Review/Management: medications reviewed  Taken 10/8/2021 0832 by Giuseppe Beasley RN  Medication Review/Management: medications reviewed  Intervention: Promote Injury-Free Environment  Recent Flowsheet  Documentation  Taken 10/8/2021 1443 by Giuseppe Beasley RN  Safety Promotion/Fall Prevention:   activity supervised   assistive device/personal items within reach   clutter free environment maintained   fall prevention program maintained   nonskid shoes/slippers when out of bed   room organization consistent   safety round/check completed   lighting adjusted  Taken 10/8/2021 1226 by Giuseppe Beasley RN  Safety Promotion/Fall Prevention:   activity supervised   assistive device/personal items within reach   clutter free environment maintained   fall prevention program maintained   nonskid shoes/slippers when out of bed   room organization consistent   safety round/check completed   muscle strengthening facilitated   lighting adjusted  Taken 10/8/2021 1008 by Giuseppe Beasley RN  Safety Promotion/Fall Prevention:   activity supervised   assistive device/personal items within reach   clutter free environment maintained   fall prevention program maintained   lighting adjusted   nonskid shoes/slippers when out of bed   room organization consistent   safety round/check completed  Taken 10/8/2021 0832 by Giuseppe Beasley RN  Safety Promotion/Fall Prevention:   activity supervised   assistive device/personal items within reach   clutter free environment maintained   fall prevention program maintained   lighting adjusted   nonskid shoes/slippers when out of bed   room organization consistent   safety round/check completed   muscle strengthening facilitated     Problem: Skin Injury Risk Increased  Goal: Skin Health and Integrity  Outcome: Ongoing, Progressing  Intervention: Optimize Skin Protection  Recent Flowsheet Documentation  Taken 10/8/2021 1443 by Giuseppe Beasley RN  Pressure Reduction Techniques:   pressure points protected   heels elevated off bed   sit time limited to 2 hours   frequent weight shift encouraged  Head of Bed (HOB): HOB elevated  Pressure Reduction Devices: ( waffle  mattress, waffle cushion )   alternating pressure pump (ADD)   positioning supports utilized  Skin Protection:   adhesive use limited   tubing/devices free from skin contact   transparent dressing maintained  Taken 10/8/2021 1226 by Giuseppe Beasley RN  Head of Bed (HOB): HOB elevated  Taken 10/8/2021 1008 by Giuseppe Beasley RN  Head of Bed (HOB): HOB elevated  Taken 10/8/2021 0832 by Giuseppe Beasley RN  Pressure Reduction Techniques:   heels elevated off bed   pressure points protected   frequent weight shift encouraged   sit time limited to 2 hours   rest period provided between sit times  Head of Bed (HOB): HOB elevated  Pressure Reduction Devices: (waffle mattress, waffle cushion )   alternating pressure pump (ADD)   positioning supports utilized  Skin Protection:   adhesive use limited   transparent dressing maintained   tubing/devices free from skin contact   pulse oximeter probe site changed   electrode sites changed   Goal Outcome Evaluation:  Plan of Care Reviewed With: patient        Progress: no change  Outcome Summary: Pt's O2 remains 60/70% opti. No c/o pain, no signs of distress. A&Ox4. Tolerating all meds as given. Bladder scanned. BMx1. Monitoring am labs, respiratory status and titrating O2 as tolerated.

## 2021-10-09 ENCOUNTER — APPOINTMENT (OUTPATIENT)
Dept: GENERAL RADIOLOGY | Facility: HOSPITAL | Age: 71
End: 2021-10-09

## 2021-10-09 PROBLEM — G72.81 CRITICAL ILLNESS MYOPATHY: Status: ACTIVE | Noted: 2021-10-09

## 2021-10-09 PROBLEM — R53.1 WEAKNESS: Status: ACTIVE | Noted: 2021-10-09

## 2021-10-09 LAB
ANION GAP SERPL CALCULATED.3IONS-SCNC: 10.6 MMOL/L (ref 5–15)
BASOPHILS # BLD AUTO: 0.01 10*3/MM3 (ref 0–0.2)
BASOPHILS NFR BLD AUTO: 0.2 % (ref 0–1.5)
BUN SERPL-MCNC: 36 MG/DL (ref 8–23)
BUN/CREAT SERPL: 29.5 (ref 7–25)
CALCIUM SPEC-SCNC: 8.2 MG/DL (ref 8.6–10.5)
CHLORIDE SERPL-SCNC: 106 MMOL/L (ref 98–107)
CO2 SERPL-SCNC: 21.4 MMOL/L (ref 22–29)
CREAT SERPL-MCNC: 1.22 MG/DL (ref 0.76–1.27)
CRP SERPL-MCNC: 1.17 MG/DL (ref 0–0.5)
DEPRECATED RDW RBC AUTO: 52.6 FL (ref 37–54)
EOSINOPHIL # BLD AUTO: 0.03 10*3/MM3 (ref 0–0.4)
EOSINOPHIL NFR BLD AUTO: 0.7 % (ref 0.3–6.2)
ERYTHROCYTE [DISTWIDTH] IN BLOOD BY AUTOMATED COUNT: 15.3 % (ref 12.3–15.4)
GFR SERPL CREATININE-BSD FRML MDRD: 59 ML/MIN/1.73
GLUCOSE SERPL-MCNC: 184 MG/DL (ref 65–99)
HCT VFR BLD AUTO: 41.7 % (ref 37.5–51)
HGB BLD-MCNC: 13.1 G/DL (ref 13–17.7)
LYMPHOCYTES # BLD AUTO: 0.57 10*3/MM3 (ref 0.7–3.1)
LYMPHOCYTES NFR BLD AUTO: 13.1 % (ref 19.6–45.3)
MCH RBC QN AUTO: 29.5 PG (ref 26.6–33)
MCHC RBC AUTO-ENTMCNC: 31.4 G/DL (ref 31.5–35.7)
MCV RBC AUTO: 93.9 FL (ref 79–97)
MONOCYTES # BLD AUTO: 0.21 10*3/MM3 (ref 0.1–0.9)
MONOCYTES NFR BLD AUTO: 4.8 % (ref 5–12)
NEUTROPHILS NFR BLD AUTO: 3.44 10*3/MM3 (ref 1.7–7)
NEUTROPHILS NFR BLD AUTO: 79.4 % (ref 42.7–76)
PLAT MORPH BLD: NORMAL
PLATELET # BLD AUTO: 127 10*3/MM3 (ref 140–450)
PMV BLD AUTO: 12.2 FL (ref 6–12)
POTASSIUM SERPL-SCNC: 4.1 MMOL/L (ref 3.5–5.2)
RBC # BLD AUTO: 4.44 10*6/MM3 (ref 4.14–5.8)
RBC MORPH BLD: NORMAL
SODIUM SERPL-SCNC: 138 MMOL/L (ref 136–145)
WBC # BLD AUTO: 4.34 10*3/MM3 (ref 3.4–10.8)
WBC MORPH BLD: NORMAL

## 2021-10-09 PROCEDURE — 63710000001 DEXAMETHASONE PER 0.25 MG: Performed by: INTERNAL MEDICINE

## 2021-10-09 PROCEDURE — 86140 C-REACTIVE PROTEIN: CPT | Performed by: STUDENT IN AN ORGANIZED HEALTH CARE EDUCATION/TRAINING PROGRAM

## 2021-10-09 PROCEDURE — 71045 X-RAY EXAM CHEST 1 VIEW: CPT

## 2021-10-09 PROCEDURE — 80048 BASIC METABOLIC PNL TOTAL CA: CPT | Performed by: STUDENT IN AN ORGANIZED HEALTH CARE EDUCATION/TRAINING PROGRAM

## 2021-10-09 PROCEDURE — 85007 BL SMEAR W/DIFF WBC COUNT: CPT | Performed by: STUDENT IN AN ORGANIZED HEALTH CARE EDUCATION/TRAINING PROGRAM

## 2021-10-09 PROCEDURE — 85025 COMPLETE CBC W/AUTO DIFF WBC: CPT | Performed by: STUDENT IN AN ORGANIZED HEALTH CARE EDUCATION/TRAINING PROGRAM

## 2021-10-09 PROCEDURE — 25010000002 ENOXAPARIN PER 10 MG: Performed by: INTERNAL MEDICINE

## 2021-10-09 RX ADMIN — SODIUM CHLORIDE 1 DROP: 50 SOLUTION OPHTHALMIC at 18:19

## 2021-10-09 RX ADMIN — DOCUSATE SODIUM 50MG AND SENNOSIDES 8.6MG 2 TABLET: 8.6; 5 TABLET, FILM COATED ORAL at 09:49

## 2021-10-09 RX ADMIN — METOPROLOL TARTRATE 12.5 MG: 25 TABLET ORAL at 09:49

## 2021-10-09 RX ADMIN — SODIUM CHLORIDE 1 DROP: 50 SOLUTION OPHTHALMIC at 09:49

## 2021-10-09 RX ADMIN — FINASTERIDE 5 MG: 5 TABLET, FILM COATED ORAL at 09:48

## 2021-10-09 RX ADMIN — ANTACID TABLETS 1 TABLET: 500 TABLET, CHEWABLE ORAL at 09:49

## 2021-10-09 RX ADMIN — DEXTROMETHORPHAN POLISTIREX 60 MG: 30 SUSPENSION, EXTENDED RELEASE ORAL at 09:48

## 2021-10-09 RX ADMIN — Medication 3 MG: at 22:41

## 2021-10-09 RX ADMIN — ENOXAPARIN SODIUM 60 MG: 60 INJECTION, SOLUTION INTRAVENOUS; SUBCUTANEOUS at 09:48

## 2021-10-09 RX ADMIN — AMLODIPINE BESYLATE 10 MG: 10 TABLET ORAL at 10:12

## 2021-10-09 RX ADMIN — PREDNISOLONE ACETATE 1 DROP: 10 SUSPENSION/ DROPS OPHTHALMIC at 09:50

## 2021-10-09 RX ADMIN — Medication 250 MG: at 09:49

## 2021-10-09 RX ADMIN — Medication 250 MG: at 21:24

## 2021-10-09 RX ADMIN — DEXTROMETHORPHAN POLISTIREX 60 MG: 30 SUSPENSION, EXTENDED RELEASE ORAL at 21:24

## 2021-10-09 RX ADMIN — FAMOTIDINE 20 MG: 20 TABLET, FILM COATED ORAL at 09:49

## 2021-10-09 RX ADMIN — DEXAMETHASONE 6 MG: 4 TABLET ORAL at 09:49

## 2021-10-09 RX ADMIN — MIRTAZAPINE 7.5 MG: 15 TABLET, FILM COATED ORAL at 21:24

## 2021-10-09 RX ADMIN — ENOXAPARIN SODIUM 60 MG: 60 INJECTION, SOLUTION INTRAVENOUS; SUBCUTANEOUS at 21:24

## 2021-10-09 RX ADMIN — METOPROLOL TARTRATE 12.5 MG: 25 TABLET ORAL at 21:25

## 2021-10-09 RX ADMIN — SODIUM CHLORIDE 1 DROP: 50 SOLUTION OPHTHALMIC at 21:24

## 2021-10-09 NOTE — PROGRESS NOTES
Name: Angel Cisneros ADMIT: 2021   : 1950  PCP: Ayaan Amin MD    MRN: [id number] LOS: 34 days   AGE/SEX: 71 y.o. male  ROOM: [facility]     Subjective   Subjective   Doing much the same today.  Pleasant as always.  Oxygen requirements continue to decrease, this is encouraging.  He feels well.  Hopeful that we can get him to a rehab facility soon.  Denies chest pain, cough, fevers.  He is concerned about how much weight he has lost.  Unfortunately this is just due to being in the hospital for so long.  I will order some protein supplements, he prefers Ensure vanilla or strawberry    Review of Systems  As above     Objective   Objective   Vital Signs  Temp:  [96.1 °F (35.6 °C)-97.9 °F (36.6 °C)] 96.1 °F (35.6 °C)  Heart Rate:  [59-70] 70  Resp:  [20] 20  BP: (116-143)/(69-84) 143/84  SpO2:  [90 %-94 %] 94 %  on  Flow (L/min):  [5-7] 5;   Device (Oxygen Therapy): humidified; high-flow nasal cannula  Body mass index is 20.94 kg/m².  Physical Exam  Constitutional:       General: He is not in acute distress.     Appearance: He is not diaphoretic.   HENT:      Mouth/Throat:      Mouth: Mucous membranes are moist.   Eyes:      General: No scleral icterus.  Cardiovascular:      Rate and Rhythm: Normal rate and regular rhythm.      Heart sounds: No murmur heard.  No gallop.    Pulmonary:      Effort: Pulmonary effort is normal. No respiratory distress.      Breath sounds: No wheezing or rhonchi.   Abdominal:      General: There is no distension.      Palpations: Abdomen is soft. There is no mass.      Tenderness: There is no abdominal tenderness. There is no guarding.   Skin:     General: Skin is warm and dry.   Neurological:      Mental Status: He is alert.         Results Review     I reviewed the patient's new clinical results.  Results from last 7 days   Lab Units 10/09/21  0533 10/06/21  0634 10/05/21  0524 10/04/21  0700   WBC 10*3/mm3 4.34 5.69 5.37 5.55   HEMOGLOBIN g/dL 13.1 13.2 12.9*  12.5*   PLATELETS 10*3/mm3 127* 143 124* 126*     Results from last 7 days   Lab Units 10/06/21  0634 10/05/21  0524 10/04/21  0700 10/03/21  0545   SODIUM mmol/L 141 142 138 141   POTASSIUM mmol/L 4.9 4.6 4.7 4.9   CHLORIDE mmol/L 109* 109* 108* 109*   CO2 mmol/L 23.8 23.1 20.5* 21.9*   BUN mg/dL 40* 42* 44* 45*   CREATININE mg/dL 1.33* 1.19 1.31* 1.36*   GLUCOSE mg/dL 95 92 99 101*   Estimated Creatinine Clearance: 44.3 mL/min (A) (by C-G formula based on SCr of 1.33 mg/dL (H)).    Results from last 7 days   Lab Units 10/06/21  0634 10/05/21  0524 10/04/21  0700 10/03/21  0545   CALCIUM mg/dL 8.5* 8.4* 8.3* 8.5*     Results from last 7 days   Lab Units 10/07/21  0730   PROCALCITONIN ng/mL 0.05     COVID19   Date Value Ref Range Status   09/22/2021 Detected (C) Not Detected - Ref. Range Final   09/05/2021 Detected (C) Not Detected - Ref. Range Final     No results found for: HGBA1C, POCGLU    XR Chest 1 View  Narrative: XR CHEST 1 VW-     HISTORY: Male who is 71 years-old,  pneumomediastinum     TECHNIQUE: Frontal view of the chest     COMPARISON: 10/08/2021     FINDINGS: Heart size is borderline. Persistent pneumomediastinum is  apparent. Bilateral pulmonary opacities appear similar to the prior  exam. No pleural effusion or pneumothorax. No acute osseous process.     Impression: No significant change.     This report was finalized on 10/9/2021 8:02 AM by Dr. Parminder Bradshaw M.D.       I reviewed the patient's daily medications.  Scheduled Medications  amLODIPine, 10 mg, Oral, Q24H  calcium carbonate, 1 tablet, Oral, Daily  dexamethasone, 6 mg, Oral, Daily With Breakfast  dextromethorphan polistirex ER, 60 mg, Oral, Q12H  enoxaparin, 1 mg/kg, Subcutaneous, Q12H  famotidine, 20 mg, Oral, Daily  finasteride, 5 mg, Oral, Daily  metoprolol tartrate, 12.5 mg, Oral, Q12H  mirtazapine, 7.5 mg, Oral, Nightly  prednisoLONE acetate, 1 drop, Both Eyes, Daily  saccharomyces boulardii, 250 mg, Oral, BID  senna-docusate  sodium, 2 tablet, Oral, Daily  sodium chloride, 1 drop, Left Eye, TID    Infusions   Diet  Diet Regular; Low Potassium; 2 gm (50 mEq)       Assessment/Plan     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]  No   • Paroxysmal atrial fibrillation (HCC) [I48.0]  No   • Acute respiratory failure with hypoxia (HCC) [J96.01]  Yes   • BPH (benign prostatic hyperplasia) [N40.0]  Yes   • CLL (chronic lymphocytic leukemia) (HCC) [C91.10]  Yes   • Chronic kidney disease, stage 3b (HCC) [N18.32]  Yes   • HTN (hypertension) [I10]  Yes   • GERD (gastroesophageal reflux disease) [K21.9]  Yes   • Hodgkin's disease of lymph nodes of head, face, and neck (HCC) [C81.91]  Yes      Resolved Hospital Problems    Diagnosis Date Resolved POA   • Acute constipation [K59.00] 10/08/2021 No       71 y.o. male with history of CLL admitted with acute hypoxic respiratory failure due to COVID-19 pneumonia.  He was fully vaccinated.    COVID-19 pneumonia with acute hypoxic respiratory failure:   - He completed 7-day course of Rocephin due to elevated procalcitonin/pneumomediastinum previously this admission and infectious disease evaluated.  Completed remdesivir course.  Has been on high-dose dexamethasone for some time.    Slowly weaning down.  Down to daily dexamethasone today. Pulmonology following.  -Oxygen requirements slowly improving.  On 5 L.  Hopeful that he will continue to improve and we can send him to rehab in the next 1 to 2 days    Critical illness myopathy  -Encourage physical therapy and out of bed as much as possible.  Will start protein supplements, he prefers Ensure vanilla or strawberry    Constipation  -Resolved.  Continue bowel regimen    Paroxysmal A. fib  -Sinus rhythm.  Continue metoprolol.    On therapeutic Lovenox for anticoagulation.  Cardiology evaluated and are anticipating about additional 1 month of anticoagulation at  discharge.  Transition to Eliquis/Xarelto at that time.    Fibrosis noted on CT. outpatient follow-up    Hypertension: Acceptable acutely.  Continue current regimen    CKD3: Stable at baseline.  Changing labs to every 3 days since he has been here for so long.  Awaiting BMP from today    CLL: Follows with [facility]    GERD: PPI    Lovenox 60 twice daily   Full code.  Discussed with patient and nursing staff.  Anticipate discharge to SNU facility.  Requirements continue to decrease.  Hopeful for DC in the next 2 days      Sandra Zurita DO  [facility]  10/09/21  12:51 EDT    Patient was wearing facemask when I entered the room and throughout our encounter.  I wore protective equipment throughout this patient encounter including a face mask, gloves and protective eyewear.  Hand hygiene was performed before donning protective equipment and after removal when leaving the room.

## 2021-10-09 NOTE — PROGRESS NOTES
[facility]  [phone]  Cheo Kraft MD    Subjective:  LOS: 34    Chief Complaint: Acute respiratory failure    As I walked in he is on 5 L high flow.  However as I sat the patient up to examine his saturation drops quickly to low 80s.  He is very intolerant of any exertion or exercise.  At rest and in bed he tolerates 5 to 6 L high flow.  He continues to feel better.  Denies cough of phlegm.  He remains concerned about having lost so much weight.    Objective   Vital Signs past 24hrs    Temp range: Temp (24hrs), Av.2 °F (36.2 °C), Min:96.1 °F (35.6 °C), Max:97.9 °F (36.6 °C)    BP range: BP: (121-143)/(67-84) 121/67  Pulse range: Heart Rate:  [59-70] 61  Resp rate range: Resp:  [20] 20    Device (Oxygen Therapy): humidified; high-flow nasal cannulaFlow (L/min):  [5] 5  Oxygen range:SpO2:  [92 %-94 %] 92 %      60.6 kg (133 lb 11.2 oz); Body mass index is 20.94 kg/m².    Intake/Output Summary (Last 24 hours) at 10/9/2021 1452  Last data filed at 10/9/2021 1300  Gross per 24 hour   Intake 960 ml   Output 1425 ml   Net -465 ml       Physical Exam  Eyes:      Pupils: Pupils are equal, round, and reactive to light.   Cardiovascular:      Rate and Rhythm: Normal rate and regular rhythm.      Heart sounds: No murmur heard.      Pulmonary:      Effort: Pulmonary effort is normal.      Breath sounds: Rales (minimal in bases only) present.      Comments: No adventitious sounds  Abdominal:      General: Bowel sounds are normal.      Palpations: Abdomen is soft. There is no mass.      Tenderness: There is no abdominal tenderness.   Musculoskeletal:         General: No swelling.   Neurological:      Mental Status: He is alert.       Results Review:    I have reviewed the laboratory and imaging data since the last note by [facility]PC physician.  My annotations are noted in assessment and plan.    Results from last 7 days   Lab Units 10/09/21  1238 10/07/21  0730 10/05/21  0524 10/04/21  0700   PROCALCITONIN ng/mL   --  0.05  --   --    CRP mg/dL 1.17*  --  <0.30 <0.30       Medication Review:  I have reviewed the current MAR.  My annotations are noted in assessment and plan.       Plan   PCCM Problems  Acute hypoxic respiratory failure  SARS-CoV-2 infection  Bilateral pulmonary infiltrates  Secondary bacterial infection  Pneumomediastinum  CLL  History NHL  CKD 3  Paroxysmal A. fib  Low platelets        Plan of Treatment    On 5 L high flow but requires increased oxygen flows with any exertion including sitting up in bed.  Hopefully this improves as his oxygenation continues to improve.    Supportive treatment for SARS-CoV-2 infection. Previously on 10 mg twice daily.   Currently on dexamethasone 6 mg twice daily.  Lower dose as long as CRP remains within normal limits. Changed to 6 mg daily on 10/7/21    Concern for secondary bacterial infection.  Patient initially treated with antibiotics.      CT 10/2/21 noted. CxR noted.    Pneumomediastinum not reported on CT but seen on CxR and now seems better.  No significant pneumothorax at this time.    Immunocompromised host due to CLL and history NHL.    CKD 3 but no signs of fluid overload at this time    Remains on full anticoagulation for A. Fib.    He is concerned about weight loss.  Seen by nutritionist and supplement intake recommended by them.      Cheo Kraft MD  10/09/21  14:52 EDT    While in the room and during my examination of the patient I wore gloves, gown, mask, eye protection and followed enhanced droplet/contact isolation protocol and precautions.  I washed my hands before and after this patient encounter.    Part of this note may be an electronic transcription/translation of spoken language to printed text using the Dragon Dictation System.

## 2021-10-10 ENCOUNTER — APPOINTMENT (OUTPATIENT)
Dept: GENERAL RADIOLOGY | Facility: HOSPITAL | Age: 71
End: 2021-10-10

## 2021-10-10 PROCEDURE — 71045 X-RAY EXAM CHEST 1 VIEW: CPT

## 2021-10-10 PROCEDURE — 63710000001 DEXAMETHASONE PER 0.25 MG: Performed by: INTERNAL MEDICINE

## 2021-10-10 PROCEDURE — 94799 UNLISTED PULMONARY SVC/PX: CPT

## 2021-10-10 PROCEDURE — 25010000002 ENOXAPARIN PER 10 MG: Performed by: INTERNAL MEDICINE

## 2021-10-10 RX ADMIN — MIRTAZAPINE 7.5 MG: 15 TABLET, FILM COATED ORAL at 21:23

## 2021-10-10 RX ADMIN — DEXAMETHASONE 6 MG: 4 TABLET ORAL at 09:19

## 2021-10-10 RX ADMIN — Medication 250 MG: at 09:04

## 2021-10-10 RX ADMIN — Medication 250 MG: at 21:23

## 2021-10-10 RX ADMIN — SODIUM CHLORIDE 1 DROP: 50 SOLUTION OPHTHALMIC at 09:12

## 2021-10-10 RX ADMIN — SODIUM CHLORIDE, PRESERVATIVE FREE 10 ML: 5 INJECTION INTRAVENOUS at 21:29

## 2021-10-10 RX ADMIN — PREDNISOLONE ACETATE 1 DROP: 10 SUSPENSION/ DROPS OPHTHALMIC at 09:12

## 2021-10-10 RX ADMIN — SODIUM CHLORIDE 1 DROP: 50 SOLUTION OPHTHALMIC at 17:50

## 2021-10-10 RX ADMIN — ENOXAPARIN SODIUM 60 MG: 60 INJECTION, SOLUTION INTRAVENOUS; SUBCUTANEOUS at 09:04

## 2021-10-10 RX ADMIN — ENOXAPARIN SODIUM 60 MG: 60 INJECTION, SOLUTION INTRAVENOUS; SUBCUTANEOUS at 21:22

## 2021-10-10 RX ADMIN — METOPROLOL TARTRATE 12.5 MG: 25 TABLET ORAL at 09:04

## 2021-10-10 RX ADMIN — FAMOTIDINE 20 MG: 20 TABLET, FILM COATED ORAL at 09:04

## 2021-10-10 RX ADMIN — SODIUM CHLORIDE 1 DROP: 50 SOLUTION OPHTHALMIC at 21:23

## 2021-10-10 RX ADMIN — DOCUSATE SODIUM 50MG AND SENNOSIDES 8.6MG 2 TABLET: 8.6; 5 TABLET, FILM COATED ORAL at 09:04

## 2021-10-10 RX ADMIN — DEXTROMETHORPHAN POLISTIREX 60 MG: 30 SUSPENSION, EXTENDED RELEASE ORAL at 09:04

## 2021-10-10 RX ADMIN — FINASTERIDE 5 MG: 5 TABLET, FILM COATED ORAL at 09:04

## 2021-10-10 RX ADMIN — METOPROLOL TARTRATE 12.5 MG: 25 TABLET ORAL at 21:23

## 2021-10-10 RX ADMIN — DEXTROMETHORPHAN POLISTIREX 60 MG: 30 SUSPENSION, EXTENDED RELEASE ORAL at 21:23

## 2021-10-10 RX ADMIN — Medication 3 MG: at 22:15

## 2021-10-10 RX ADMIN — AMLODIPINE BESYLATE 10 MG: 10 TABLET ORAL at 09:04

## 2021-10-10 NOTE — PROGRESS NOTES
Name: Angel Cisneros ADMIT: 2021   : 1950  PCP: Ayaan Amin MD    MRN: [id number] LOS: 35 days   AGE/SEX: 71 y.o. male  ROOM: [facility]     Subjective   Subjective   Doing well today.  No acute events overnight and no new complaints today.  Denies chest pain, cough, fevers.      Review of Systems  As above     Objective   Objective   Vital Signs  Temp:  [97.1 °F (36.2 °C)-97.7 °F (36.5 °C)] 97.1 °F (36.2 °C)  Heart Rate:  [65-81] 70  Resp:  [20] 20  BP: (120-128)/(70-81) 128/81  SpO2:  [92 %-96 %] 94 %  on  Flow (L/min):  [5] 5;   Device (Oxygen Therapy): humidified; high-flow nasal cannula  Body mass index is 20.94 kg/m².  Physical Exam  Constitutional:       General: He is not in acute distress.     Appearance: He is not diaphoretic.   HENT:      Mouth/Throat:      Mouth: Mucous membranes are moist.   Eyes:      General: No scleral icterus.  Cardiovascular:      Rate and Rhythm: Normal rate and regular rhythm.      Heart sounds: No murmur heard.  No gallop.    Pulmonary:      Effort: Pulmonary effort is normal. No respiratory distress.      Breath sounds: No wheezing or rhonchi.   Abdominal:      General: There is no distension.      Palpations: Abdomen is soft. There is no mass.      Tenderness: There is no abdominal tenderness. There is no guarding.   Skin:     General: Skin is warm and dry.   Neurological:      Mental Status: He is alert.         Results Review     I reviewed the patient's new clinical results.  Results from last 7 days   Lab Units 10/09/21  0533 10/06/21  0634 10/05/21  0524 10/04/21  0700   WBC 10*3/mm3 4.34 5.69 5.37 5.55   HEMOGLOBIN g/dL 13.1 13.2 12.9* 12.5*   PLATELETS 10*3/mm3 127* 143 124* 126*     Results from last 7 days   Lab Units 10/09/21  1238 10/06/21  0634 10/05/21  0524 10/04/21  0700   SODIUM mmol/L 138 141 142 138   POTASSIUM mmol/L 4.1 4.9 4.6 4.7   CHLORIDE mmol/L 106 109* 109* 108*   CO2 mmol/L 21.4* 23.8 23.1 20.5*   BUN mg/dL 36* 40* 42* 44*    CREATININE mg/dL 1.22 1.33* 1.19 1.31*   GLUCOSE mg/dL 184* 95 92 99   Estimated Creatinine Clearance: 48.3 mL/min (by C-G formula based on SCr of 1.22 mg/dL).    Results from last 7 days   Lab Units 10/09/21  1238 10/06/21  0634 10/05/21  0524 10/04/21  0700   CALCIUM mg/dL 8.2* 8.5* 8.4* 8.3*     Results from last 7 days   Lab Units 10/07/21  0730   PROCALCITONIN ng/mL 0.05     COVID19   Date Value Ref Range Status   09/22/2021 Detected (C) Not Detected - Ref. Range Final   09/05/2021 Detected (C) Not Detected - Ref. Range Final     No results found for: HGBA1C, POCGLU    XR Chest 1 View  Narrative: XR CHEST 1 VW-     HISTORY: Male who is 71 years-old,  pneumomediastinum, follow-up     TECHNIQUE: Frontal view of the chest     COMPARISON: 10/09/2021     FINDINGS: Heart size is borderline. No conspicuous change and previous  pneumomediastinum. Bilateral pulmonary opacities appear similar to the  prior exam. No pleural effusion or pneumothorax. Right hemidiaphragm is  elevated. No acute osseous process.        Impression: No significant change.        This report was finalized on 10/10/2021 8:26 AM by Dr. Parminder Bradshaw M.D.       I reviewed the patient's daily medications.  Scheduled Medications  amLODIPine, 10 mg, Oral, Q24H  calcium carbonate, 1 tablet, Oral, Daily  dexamethasone, 6 mg, Oral, Daily With Breakfast  dextromethorphan polistirex ER, 60 mg, Oral, Q12H  enoxaparin, 1 mg/kg, Subcutaneous, Q12H  famotidine, 20 mg, Oral, Daily  finasteride, 5 mg, Oral, Daily  metoprolol tartrate, 12.5 mg, Oral, Q12H  mirtazapine, 7.5 mg, Oral, Nightly  prednisoLONE acetate, 1 drop, Both Eyes, Daily  saccharomyces boulardii, 250 mg, Oral, BID  senna-docusate sodium, 2 tablet, Oral, Daily  sodium chloride, 1 drop, Left Eye, TID    Infusions   Diet  Diet Regular; Low Potassium; 2 gm (50 mEq)       Assessment/Plan     Active Hospital Problems    Diagnosis  POA   • **Pneumonia due to COVID-19 virus [U07.1, J12.82]  Yes    • Critical illness myopathy [G72.81]  No   • Paroxysmal atrial fibrillation (HCC) [I48.0]  No   • Acute respiratory failure with hypoxia (HCC) [J96.01]  Yes   • BPH (benign prostatic hyperplasia) [N40.0]  Yes   • CLL (chronic lymphocytic leukemia) (HCC) [C91.10]  Yes   • Chronic kidney disease, stage 3b (HCC) [N18.32]  Yes   • HTN (hypertension) [I10]  Yes   • GERD (gastroesophageal reflux disease) [K21.9]  Yes   • Hodgkin's disease of lymph nodes of head, face, and neck (HCC) [C81.91]  Yes      Resolved Hospital Problems    Diagnosis Date Resolved POA   • Acute constipation [K59.00] 10/08/2021 No       71 y.o. male with history of CLL admitted with acute hypoxic respiratory failure due to COVID-19 pneumonia.  He was fully vaccinated.    COVID-19 pneumonia with acute hypoxic respiratory failure:   - He completed 7-day course of Rocephin due to elevated procalcitonin/pneumomediastinum previously this admission and infectious disease evaluated.  Completed remdesivir course.  Has been on high-dose dexamethasone for some time.    Slowly weaning down.  Down to daily dexamethasone. Pulmonology following.  -Oxygen requirements slowly improving.  Still on 5 L.  Hopeful that he will continue to improve and we can send him to rehab in the next 1 to 2 days    Critical illness myopathy  -Encourage physical therapy and out of bed as much as possible.  Will start protein supplements, he prefers Ensure vanilla or strawberry    Paroxysmal A. fib  -Sinus rhythm.  Continue metoprolol.    On therapeutic Lovenox for anticoagulation.  Cardiology evaluated and are anticipating about additional 1 month of anticoagulation at discharge.  Transition to Eliquis/Xarelto at that time.    Fibrosis noted on CT. outpatient follow-up    Hypertension: Acceptable acutely.  Continue current regimen    CKD3: Stable at baseline.  Changing labs to every 3 days since he has been here for so long.      CLL: Follows with [place] [facility]    GERD:  PPI    Lovenox 60 twice daily   Full code.  Discussed with patient and nursing staff.  Anticipate discharge to SNU facility.  Requirements continue to decrease.  Hopeful for DC in the next 1-2 days      Sandra Zurita DO  [facility]  10/10/21  14:05 EDT    Patient was wearing facemask when I entered the room and throughout our encounter.  I wore protective equipment throughout this patient encounter including a face mask, gloves and protective eyewear.  Hand hygiene was performed before donning protective equipment and after removal when leaving the room.

## 2021-10-10 NOTE — PLAN OF CARE
Goal Outcome Evaluation:  Plan of Care Reviewed With: patient            VSS, patient continues on 5L O2,  up to recliner today.  Refused bath offered.  Area to buttocks cleansed and dressing changed.

## 2021-10-10 NOTE — NURSING NOTE
Patient stated that his number one goal tonight was to get at least 4 straight hours of sleep without being interrupted. He refused midnight vitals and midnight assessment. With there being no medications due, A&Ox4, progressing, MT watching heart rate/rhythm and oxygen sats, I granted him his request.

## 2021-10-10 NOTE — PLAN OF CARE
Problem: Adult Inpatient Plan of Care  Goal: Patient-Specific Goal (Individualized)  Outcome: Ongoing, Progressing  Flowsheets (Taken 10/10/2021 5280)  Patient-Specific Goals (Include Timeframe):   VSS   pt slept well throughout the night   SR on monitor   5L NC   lovenox given   meds w/applesauce   will cont to monitor   Goal Outcome Evaluation:

## 2021-10-10 NOTE — PROGRESS NOTES
LOS: 35 days   Patient Care Team:  Ayaan Amin MD as PCP - General (Family Medicine)  Following for Covid pneumonia and acute respiratory failure  Subjective     Patient says he feels about the same he feels pretty good he thinks his oxygenation is improving.  Minimal to no cough no chest pain no nausea vomiting diarrhea no lower extremity pain or swelling.  This patient does not change his story.  Review of Systems:          Objective     Vital Signs  Vital Sign Min/Max for last 24 hours  Temp  Min: 97.1 °F (36.2 °C)  Max: 97.7 °F (36.5 °C)   BP  Min: 120/70  Max: 128/81   Pulse  Min: 65  Max: 81   Resp  Min: 20  Max: 20   SpO2  Min: 92 %  Max: 96 %   Flow (L/min)  Min: 5  Max: 5   No data recorded        Ventilator/Non-Invasive Ventilation Settings (From admission, onward)    None                       Body mass index is 20.94 kg/m².  I/O last 3 completed shifts:  In: 1320 [P.O.:1320]  Out: 2075 [Urine:2075]  I/O this shift:  In: 840 [P.O.:840]  Out: -         Physical Exam:  General Appearance: Well-developed white male he is resting in bed 5 L nasal cannula O2 with oxygen saturation of 99% at rest and even when he was talking sitting up and moving around the lowest he got was 95%.  Eyes: Conjunctiva are clear and anicteric  ENT: Mucous membranes are moist no erythema no exudate  Neck: No jugular venous distension trachea midline no crepitance  Lungs: Clear nonlabored symmetric expansion no wheezes no rales no rhonchi  Cardiac: Regular rate rhythm no murmur no gallop  Abdomen: Soft no palpable hepatosplenomegaly or masses  : Not examined  Musculoskeletal: Grossly normal there is no calf tenderness or palpable cords  Skin: No jaundice no petechiae skin is warm and dry  Neuro: He is alert oriented cooperative following commands moving all extremities grossly intact  Extremities/P Vascular: No clubbing no cyanosis no edema palpable radial dorsalis pedis pulses bilaterally  MSE: He seems to be in  good spirits today       Labs:  Results from last 7 days   Lab Units 10/09/21  1238 10/06/21  0634 10/05/21  0524 10/04/21  0700   GLUCOSE mg/dL 184* 95 92 99   SODIUM mmol/L 138 141 142 138   POTASSIUM mmol/L 4.1 4.9 4.6 4.7   CO2 mmol/L 21.4* 23.8 23.1 20.5*   CHLORIDE mmol/L 106 109* 109* 108*   ANION GAP mmol/L 10.6 8.2 9.9 9.5   CREATININE mg/dL 1.22 1.33* 1.19 1.31*   BUN mg/dL 36* 40* 42* 44*   BUN / CREAT RATIO  29.5* 30.1* 35.3* 33.6*   CALCIUM mg/dL 8.2* 8.5* 8.4* 8.3*   EGFR IF NONAFRICN AM mL/min/1.73 59* 53* 60* 54*     Estimated Creatinine Clearance: 48.3 mL/min (by C-G formula based on SCr of 1.22 mg/dL).      Results from last 7 days   Lab Units 10/09/21  0533 10/06/21  0634 10/05/21  0524 10/04/21  0700   WBC 10*3/mm3 4.34 5.69 5.37 5.55   RBC 10*6/mm3 4.44 4.44 4.33 4.22   HEMOGLOBIN g/dL 13.1 13.2 12.9* 12.5*   HEMATOCRIT % 41.7 39.1 38.4 38.1   MCV fL 93.9 88.1 88.7 90.3   MCH pg 29.5 29.7 29.8 29.6   MCHC g/dL 31.4* 33.8 33.6 32.8   RDW % 15.3 14.6 14.5 14.5   RDW-SD fl 52.6 46.8 46.1 47.8   MPV fL 12.2* 10.8 11.3 11.0   PLATELETS 10*3/mm3 127* 143 124* 126*   NEUTROPHIL % % 79.4*  --  90.3* 86.5*   LYMPHOCYTE % % 13.1*  --  5.0* 7.9*   MONOCYTES % % 4.8*  --  3.4* 3.6*   EOSINOPHIL % % 0.7  --  0.0* 0.0*   BASOPHIL % % 0.2  --  0.0 0.2   NEUTROS ABS 10*3/mm3 3.44 5.29 4.85 4.80   LYMPHS ABS 10*3/mm3 0.57*  --  0.27* 0.44*   MONOS ABS 10*3/mm3 0.21  --  0.18 0.20   EOS ABS 10*3/mm3 0.03  --  0.00 0.00   BASOS ABS 10*3/mm3 0.01  --  0.00 0.01             Results from last 7 days   Lab Units 10/07/21  0730   PROBNP pg/mL 288.0         Results from last 7 days   Lab Units 10/07/21  0730   PROCALCITONIN ng/mL 0.05         Microbiology Results (last 10 days)     ** No results found for the last 240 hours. **              amLODIPine, 10 mg, Oral, Q24H  calcium carbonate, 1 tablet, Oral, Daily  dexamethasone, 6 mg, Oral, Daily With Breakfast  dextromethorphan polistirex ER, 60 mg, Oral,  Q12H  enoxaparin, 1 mg/kg, Subcutaneous, Q12H  famotidine, 20 mg, Oral, Daily  finasteride, 5 mg, Oral, Daily  metoprolol tartrate, 12.5 mg, Oral, Q12H  mirtazapine, 7.5 mg, Oral, Nightly  prednisoLONE acetate, 1 drop, Both Eyes, Daily  saccharomyces boulardii, 250 mg, Oral, BID  senna-docusate sodium, 2 tablet, Oral, Daily  sodium chloride, 1 drop, Left Eye, TID           Diagnostics:  CT Head Without Contrast    Result Date: 9/8/2021  CT HEAD WITHOUT CONTRAST  HISTORY: Increasing confusion  COMPARISON: None available.  TECHNIQUE: Axial CT imaging was obtained through the brain. No IV contrast was administered.  FINDINGS: No acute intracranial hemorrhage is seen. Ventricles are normal in size. There is no midline shift or mass effect. Mild mucosal thickening is seen within the left maxillary sinus. There is some minimal opacification of the right mastoid air cells.      No acute intracranial findings.  Radiation dose reduction techniques were utilized, including automated exposure control and exposure modulation based on body size.  This report was finalized on 9/8/2021 4:38 AM by Dr. Sobeida Jones M.D.      XR Chest 1 View    Result Date: 9/25/2021  XR CHEST 1 VW-  HISTORY: Male who is 71 years-old,  pneumomediastinum follow-up  TECHNIQUE: Frontal view of the chest  COMPARISON: 09/24/2021  FINDINGS: The heart is enlarged. Pneumomediastinum is again apparent, with soft tissue gas again extending to the right neck base. Bilateral infiltrates appear similar to prior exam. No pleural effusion, or pneumothorax. No acute osseous process.      No significant change.  This report was finalized on 9/25/2021 7:08 AM by Dr. Parminder Bradshaw M.D.      XR Chest 1 View    Result Date: 9/24/2021  ONE VIEW PORTABLE CHEST AT 5:50 AM  HISTORY: Follow-up of pneumomediastinum. Covid 19 pneumonia.  FINDINGS: The lungs are well-expanded with extensive pneumonia in both lungs, particularly in the lower lobes and showing no  significant change from 2 days ago. There has been nearly complete resolution of some pneumomediastinum and there remains a tiny amount of subcutaneous emphysema at the base of the right neck. There is no evidence of pneumothorax. The heart remains mildly enlarged.  This report was finalized on 9/24/2021 7:13 AM by Dr. Fidel Madison M.D.      XR Chest 1 View    Result Date: 9/22/2021  XR CHEST 1 VW-  HISTORY: Male who is 71 years-old,  Covid pneumonia  TECHNIQUE: Frontal view of the chest  COMPARISON: 09/20/2021  FINDINGS: Heart size is normal. Pneumomediastinum is noted, extending to the right neck base. Pulmonary vasculature is unremarkable. Bilateral infiltrates do not appear significantly changed. No pleural effusion, or pneumothorax. Right hemidiaphragm remains elevated. No acute osseous process.      Persistent bilateral infiltrates. Pneumomediastinum extends into the right neck base.  This report was finalized on 9/22/2021 1:00 PM by Dr. Parminder Bradshaw M.D.      XR Chest 1 View    Result Date: 9/20/2021  XR CHEST 1 VW-  09/20/2021  HISTORY: Follow-up pneumonia.  Heart size is mildly enlarged. There are moderately severe patchy infiltrates in the bilateral mid to lower lungs right worse than left. FINDINGS are consistent with Covid 19 and appears similar to yesterday's study.  No pneumothorax is seen. Bony structures appear unremarkable.      Mild cardiomegaly. 2. Moderately severe bilateral pulmonary infiltrates right worse than left. FINDINGS are consistent with Covid 19. 3. Chest appears similar to yesterday's study.  This report was finalized on 9/20/2021 7:37 AM by Dr. Norman Montalvo M.D.      XR Chest 1 View    Result Date: 9/19/2021  ONE VIEW PORTABLE CHEST AT 5:10 AM  HISTORY: Covid 19 pneumonia. Shortness of breath.  FINDINGS: There is extensive pneumonia predominantly in both lower lobes and consistent with Covid 19 pneumonia. This shows no significant change from 3 days ago. However, there  is now some mediastinal air present that extends into the base of the right neck. No definite pneumothorax is seen. The heart remains slightly enlarged.  This report was finalized on 9/19/2021 7:07 AM by Dr. Fidel Madison M.D.      XR Chest 1 View    Result Date: 9/16/2021  EXAMINATION: SINGLE VIEW CHEST RADIOGRAPH  HISTORY: 71-year-old male with history of worsening hypoxia.  FINDINGS: An upright AP portable chest radiograph was obtained. Comparison is made to chest radiograph dated 09/07/2021. There has been interval progression of opacification in the bilateral mid and lower lung fields since the prior examination. The cardiac silhouette is obscured to a greater degree than on the prior examination. The visualized osseous structures appear normal.      There is evidence of interval progression of bilateral pulmonary infiltrates.  This report was finalized on 9/16/2021 11:08 AM by Dr. Matthew Bradley M.D.      XR Chest 1 View    Result Date: 9/7/2021  SINGLE VIEW OF THE CHEST  HISTORY: Worsening respiratory failure  COMPARISON: 09/05/2021  FINDINGS: Cardiomegaly is present. There is no vascular congestion. Bilateral pulmonary infiltrates are seen. Appearance is in keeping with COVID. They have worsened when compared to prior exam. No pneumothorax or pleural effusion is seen.      Worsening bilateral pulmonary infiltrates.  This report was finalized on 9/7/2021 10:44 PM by Dr. Sobeida Jones M.D.      XR Chest AP    Result Date: 9/5/2021  PORTABLE CHEST 09/05/2021 AT 4:25 PM  CLINICAL HISTORY: Cough. Fever. COVID 19 evaluation.  Compared to the previous chest dated 09/07/2018.  There is patchy ill-defined increased density in the inferior half of the right lung and to lesser extent in the inferior aspect of the left lung consistent with pneumonia due to COVID 19 infection. There are no pleural effusions. The heart is borderline enlarged.  This report was finalized on 9/5/2021 5:01 PM by Dr. Blu Mendez M.D.       Results for orders placed during the hospital encounter of 09/07/18    Adult Transthoracic Echo Complete W/ Cont if Necessary Per Protocol    Interpretation Summary  · EF = 65%.  · Left ventricular systolic function is normal.  · Mild tricuspid valve regurgitation is present.  · Calculated right ventricular systolic pressure from tricuspid regurgitation is 37 mmHg.  · Estimated right ventricular systolic pressure from tricuspid regurgitation is mildly elevated (35-45 mmHg).          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]  No   • Paroxysmal atrial fibrillation (HCC) [I48.0]  No   • Acute respiratory failure with hypoxia (HCC) [J96.01]  Yes   • BPH (benign prostatic hyperplasia) [N40.0]  Yes   • CLL (chronic lymphocytic leukemia) (HCC) [C91.10]  Yes   • Chronic kidney disease, stage 3b (HCC) [N18.32]  Yes   • HTN (hypertension) [I10]  Yes   • GERD (gastroesophageal reflux disease) [K21.9]  Yes   • Hodgkin's disease of lymph nodes of head, face, and neck (HCC) [C81.91]  Yes      Resolved Hospital Problems    Diagnosis Date Resolved POA   • Acute constipation [K59.00] 10/08/2021 No     Chest x-ray with chronic bibasilar infiltrates that really have not changed much over the last couple of weeks the pneumomediastinum has resolved    Assessment/Plan     1. COVID-19 infection and pneumonia he is on Decadron 6 milligrams  daily and has completed remdesivir.  Will probably 1 wean his dexamethasone very slowly given the duration he has been on in his problems.  2. Acute hypoxic respiratory failure secondary #1 wean O2 as tolerated making progress from when I last saw him he is down to 5 L looks like at least at rest he may be able to wean a little lower.  3. Elevated procalcitonin completed antibiotics for possible secondary bacterial pneumonia. And repeat procalcitonin was normal.  4. Pneumomediastinum  resolved  5. CLL  6. History of non-Hodgkin's lymphoma  7. Chronic kidney disease stage IIIb  8. Paroxysmal atrial fibrillation  9. Thrombocytopenia mild not sure etiology, D-dimer normal unlikely clot consuming platelets or DIC questions is related to his CLL, at least they have been stable.          Plan for disposition: Patient is making excellent progress now hopefully we can get his O2 a little bit lower then he will be able to go home.    Clay Truong MD  10/10/21  18:24 EDT    Time:

## 2021-10-11 ENCOUNTER — APPOINTMENT (OUTPATIENT)
Dept: GENERAL RADIOLOGY | Facility: HOSPITAL | Age: 71
End: 2021-10-11

## 2021-10-11 PROCEDURE — 25010000002 ENOXAPARIN PER 10 MG: Performed by: INTERNAL MEDICINE

## 2021-10-11 PROCEDURE — 97110 THERAPEUTIC EXERCISES: CPT

## 2021-10-11 PROCEDURE — 97530 THERAPEUTIC ACTIVITIES: CPT

## 2021-10-11 PROCEDURE — 63710000001 DEXAMETHASONE PER 0.25 MG: Performed by: INTERNAL MEDICINE

## 2021-10-11 PROCEDURE — 71045 X-RAY EXAM CHEST 1 VIEW: CPT

## 2021-10-11 RX ORDER — AMOXICILLIN 250 MG
2 CAPSULE ORAL 2 TIMES DAILY
Status: DISCONTINUED | OUTPATIENT
Start: 2021-10-11 | End: 2021-10-16 | Stop reason: HOSPADM

## 2021-10-11 RX ORDER — DEXAMETHASONE 4 MG/1
4 TABLET ORAL
Status: DISCONTINUED | OUTPATIENT
Start: 2021-10-12 | End: 2021-10-15

## 2021-10-11 RX ADMIN — DEXTROMETHORPHAN POLISTIREX 60 MG: 30 SUSPENSION, EXTENDED RELEASE ORAL at 19:53

## 2021-10-11 RX ADMIN — SODIUM CHLORIDE 1 DROP: 50 SOLUTION OPHTHALMIC at 16:25

## 2021-10-11 RX ADMIN — AMLODIPINE BESYLATE 10 MG: 10 TABLET ORAL at 09:29

## 2021-10-11 RX ADMIN — DEXTROMETHORPHAN POLISTIREX 60 MG: 30 SUSPENSION, EXTENDED RELEASE ORAL at 09:29

## 2021-10-11 RX ADMIN — ENOXAPARIN SODIUM 60 MG: 60 INJECTION, SOLUTION INTRAVENOUS; SUBCUTANEOUS at 09:30

## 2021-10-11 RX ADMIN — METOPROLOL TARTRATE 12.5 MG: 25 TABLET ORAL at 19:53

## 2021-10-11 RX ADMIN — METOPROLOL TARTRATE 12.5 MG: 25 TABLET ORAL at 09:29

## 2021-10-11 RX ADMIN — DEXAMETHASONE 6 MG: 4 TABLET ORAL at 09:29

## 2021-10-11 RX ADMIN — MIRTAZAPINE 7.5 MG: 15 TABLET, FILM COATED ORAL at 19:53

## 2021-10-11 RX ADMIN — Medication 250 MG: at 09:29

## 2021-10-11 RX ADMIN — FINASTERIDE 5 MG: 5 TABLET, FILM COATED ORAL at 09:29

## 2021-10-11 RX ADMIN — PREDNISOLONE ACETATE 1 DROP: 10 SUSPENSION/ DROPS OPHTHALMIC at 09:28

## 2021-10-11 RX ADMIN — ANTACID TABLETS 1 TABLET: 500 TABLET, CHEWABLE ORAL at 09:29

## 2021-10-11 RX ADMIN — FAMOTIDINE 20 MG: 20 TABLET, FILM COATED ORAL at 09:29

## 2021-10-11 RX ADMIN — Medication 3 MG: at 22:35

## 2021-10-11 RX ADMIN — Medication 250 MG: at 19:52

## 2021-10-11 RX ADMIN — ENOXAPARIN SODIUM 60 MG: 60 INJECTION, SOLUTION INTRAVENOUS; SUBCUTANEOUS at 19:53

## 2021-10-11 RX ADMIN — SODIUM CHLORIDE 1 DROP: 50 SOLUTION OPHTHALMIC at 19:53

## 2021-10-11 RX ADMIN — BISACODYL 10 MG: 10 SUPPOSITORY RECTAL at 09:56

## 2021-10-11 RX ADMIN — SODIUM CHLORIDE 1 DROP: 50 SOLUTION OPHTHALMIC at 09:27

## 2021-10-11 NOTE — PROGRESS NOTES
Name: Angel Cisneros ADMIT: 2021   : 1950  PCP: Ayaan Amin MD    MRN: [id number] LOS: 36 days   AGE/SEX: 71 y.o. male  ROOM: [facility]     Subjective   Subjective   Resting comfortably in bed.  Pleasant as always.  Oxygen requirements slowly improving.  Denies chest pain, cough, fevers.      Review of Systems  As above     Objective   Objective   Vital Signs  Temp:  [96.8 °F (36 °C)-97.7 °F (36.5 °C)] 97 °F (36.1 °C)  Heart Rate:  [56-81] 60  Resp:  [18-20] 18  BP: (114-137)/(78-83) 137/83  SpO2:  [93 %-99 %] 96 %  on  Flow (L/min):  [5] 5;   Device (Oxygen Therapy): nasal cannula; humidified  Body mass index is 20.97 kg/m².  Physical Exam  Constitutional:       General: He is not in acute distress.     Appearance: He is not diaphoretic.   HENT:      Mouth/Throat:      Mouth: Mucous membranes are moist.   Eyes:      General: No scleral icterus.  Cardiovascular:      Rate and Rhythm: Normal rate and regular rhythm.      Heart sounds: No murmur heard.  No gallop.    Pulmonary:      Effort: Pulmonary effort is normal. No respiratory distress.      Breath sounds: No wheezing or rhonchi.   Abdominal:      General: There is no distension.      Palpations: Abdomen is soft. There is no mass.      Tenderness: There is no abdominal tenderness. There is no guarding.   Skin:     General: Skin is warm and dry.   Neurological:      Mental Status: He is alert.         Results Review     I reviewed the patient's new clinical results.  Results from last 7 days   Lab Units 10/09/21  0533 10/06/21  0634 10/05/21  0524   WBC 10*3/mm3 4.34 5.69 5.37   HEMOGLOBIN g/dL 13.1 13.2 12.9*   PLATELETS 10*3/mm3 127* 143 124*     Results from last 7 days   Lab Units 10/09/21  1238 10/06/21  0634 10/05/21  0524   SODIUM mmol/L 138 141 142   POTASSIUM mmol/L 4.1 4.9 4.6   CHLORIDE mmol/L 106 109* 109*   CO2 mmol/L 21.4* 23.8 23.1   BUN mg/dL 36* 40* 42*   CREATININE mg/dL 1.22 1.33* 1.19   GLUCOSE mg/dL 184* 95 92    Estimated Creatinine Clearance: 48.4 mL/min (by C-G formula based on SCr of 1.22 mg/dL).    Results from last 7 days   Lab Units 10/09/21  1238 10/06/21  0634 10/05/21  0524   CALCIUM mg/dL 8.2* 8.5* 8.4*     Results from last 7 days   Lab Units 10/07/21  0730   PROCALCITONIN ng/mL 0.05     COVID19   Date Value Ref Range Status   09/22/2021 Detected (C) Not Detected - Ref. Range Final   09/05/2021 Detected (C) Not Detected - Ref. Range Final     No results found for: HGBA1C, POCGLU    XR Chest 1 View  Narrative: CHEST SINGLE VIEW     HISTORY: Covid-19 positive. Pneumomediastinum. Follow-up exam.     COMPARISON: AP chest 10/10/2021, 10/09/2021, 10/08/2021, CT chest  10/03/2021.     FINDINGS: Heart size is enlarged. There are abnormal interstitial and  airspace opacities within the mid lungs and both lung bases consistent  with infiltrates and exhibiting no change. The lung apexes are  comparatively clear and there is no pneumothorax. The pneumomediastinum  demonstrated on previous CT is not evident radiographically. Cardiac  monitoring leads are noted. There is chronic elevation of the right  hemidiaphragm.     Impression: No interval change.     This report was finalized on 10/11/2021 9:05 AM by Dr. Thanh Singh M.D.       I reviewed the patient's daily medications.  Scheduled Medications  amLODIPine, 10 mg, Oral, Q24H  calcium carbonate, 1 tablet, Oral, Daily  dexamethasone, 6 mg, Oral, Daily With Breakfast  dextromethorphan polistirex ER, 60 mg, Oral, Q12H  enoxaparin, 1 mg/kg, Subcutaneous, Q12H  famotidine, 20 mg, Oral, Daily  finasteride, 5 mg, Oral, Daily  metoprolol tartrate, 12.5 mg, Oral, Q12H  mirtazapine, 7.5 mg, Oral, Nightly  prednisoLONE acetate, 1 drop, Both Eyes, Daily  saccharomyces boulardii, 250 mg, Oral, BID  senna-docusate sodium, 2 tablet, Oral, BID  sodium chloride, 1 drop, Left Eye, TID    Infusions   Diet  Diet Regular; Low Potassium; 2 gm (50 mEq)       Assessment/Plan     Active  Hospital Problems    Diagnosis  POA   • **Pneumonia due to COVID-19 virus [U07.1, J12.82]  Yes   • Critical illness myopathy [G72.81]  No   • Paroxysmal atrial fibrillation (HCC) [I48.0]  No   • Acute respiratory failure with hypoxia (HCC) [J96.01]  Yes   • BPH (benign prostatic hyperplasia) [N40.0]  Yes   • CLL (chronic lymphocytic leukemia) (HCC) [C91.10]  Yes   • Chronic kidney disease, stage 3b (HCC) [N18.32]  Yes   • HTN (hypertension) [I10]  Yes   • GERD (gastroesophageal reflux disease) [K21.9]  Yes   • Hodgkin's disease of lymph nodes of head, face, and neck (HCC) [C81.91]  Yes      Resolved Hospital Problems    Diagnosis Date Resolved POA   • Acute constipation [K59.00] 10/08/2021 No       71 y.o. male with history of CLL admitted with acute hypoxic respiratory failure due to COVID-19 pneumonia.  He was fully vaccinated.    COVID-19 pneumonia with acute hypoxic respiratory failure:   - He completed 7-day course of Rocephin due to elevated procalcitonin/pneumomediastinum previously this admission and infectious disease evaluated.  Completed remdesivir course.  Has been on high-dose dexamethasone for some time.  Weaned down to daily dexamethasone. Pulmonology following.  -Oxygen requirements slowly improving.  Still on 5 L.  Hopeful that he will continue to improve and we can send him to rehab in the next 1 to 2 days    Paroxysmal A. fib  -Sinus rhythm.  Continue metoprolol.    On therapeutic Lovenox for anticoagulation.  Cardiology evaluated and are anticipating about additional 1 month of anticoagulation at discharge.  Transition to Eliquis/Xarelto at that time.    Critical illness myopathy  -Encourage physical therapy and out of bed as much as possible.  Encourage protein supplements    Fibrosis noted on CT. outpatient follow-up    Hypertension: Acceptable acutely.  Continue current regimen    CKD3: Stable at baseline.  labs every 3 days     CLL: Follows with [facility]    GERD: PPI    Lovenox 60  twice daily   Full code.  Discussed with patient and nursing staff.  Anticipate discharge to SNU facility.  Hopeful for DC in the next 1-2 days.  If rehab would accept him on 5 L, I would say he is stable to go.   working on pre-CERT      Sandra Zurita [facility]  10/11/21  11:43 EDT    Patient was wearing facemask when I entered the room and throughout our encounter.  I wore protective equipment throughout this patient encounter including a face mask, gloves and protective eyewear.  Hand hygiene was performed before donning protective equipment and after removal when leaving the room.

## 2021-10-11 NOTE — PLAN OF CARE
Goal Outcome Evaluation:         Pt. Remains A&Ox4 with VSS on 5L NC, but still uses NRB mask PRN. Pt. Is very anxious about his oxygen levels, but to be expected given the length of his stay so far. Pt. Did have BM today after PRN suppository was given. Pt. Does not c/o pain at this time. Potential to D/C tomorrow.  Will continue to monitor.   Problem: Adult Inpatient Plan of Care  Goal: Patient-Specific Goal (Individualized)  Outcome: Ongoing, Progressing  Goal: Absence of Hospital-Acquired Illness or Injury  Outcome: Ongoing, Progressing  Intervention: Identify and Manage Fall Risk  Recent Flowsheet Documentation  Taken 10/11/2021 1400 by Adeel Laguerre, RN  Safety Promotion/Fall Prevention:   assistive device/personal items within reach   clutter free environment maintained   fall prevention program maintained   lighting adjusted   mobility aid in reach   nonskid shoes/slippers when out of bed   room organization consistent   safety round/check completed  Taken 10/11/2021 1200 by Adeel Laguerre, ARYA  Safety Promotion/Fall Prevention:   assistive device/personal items within reach   clutter free environment maintained   fall prevention program maintained   lighting adjusted   mobility aid in reach   nonskid shoes/slippers when out of bed   room organization consistent   safety round/check completed  Taken 10/11/2021 1000 by Adeel Laguerre, RN  Safety Promotion/Fall Prevention:   assistive device/personal items within reach   clutter free environment maintained   fall prevention program maintained   lighting adjusted   mobility aid in reach   nonskid shoes/slippers when out of bed   room organization consistent   safety round/check completed  Taken 10/11/2021 0800 by Adeel Laguerre, RN  Safety Promotion/Fall Prevention:   assistive device/personal items within reach   clutter free environment maintained   fall prevention program maintained   lighting adjusted   mobility aid in reach   nonskid shoes/slippers when out of  bed   room organization consistent   safety round/check completed  Intervention: Prevent Infection  Recent Flowsheet Documentation  Taken 10/11/2021 1400 by Adeel Laguerre RN  Infection Prevention:   environmental surveillance performed   hand hygiene promoted   personal protective equipment utilized   rest/sleep promoted   single patient room provided   visitors restricted/screened  Taken 10/11/2021 1200 by Adeel Laguerre RN  Infection Prevention:   environmental surveillance performed   hand hygiene promoted   personal protective equipment utilized   rest/sleep promoted   single patient room provided   visitors restricted/screened  Taken 10/11/2021 1000 by Adeel Laguerre RN  Infection Prevention:   environmental surveillance performed   hand hygiene promoted   personal protective equipment utilized   rest/sleep promoted   single patient room provided   visitors restricted/screened  Taken 10/11/2021 0800 by Adeel Laguerre RN  Infection Prevention:   environmental surveillance performed   hand hygiene promoted   personal protective equipment utilized   rest/sleep promoted   single patient room provided   visitors restricted/screened  Goal: Optimal Comfort and Wellbeing  Outcome: Ongoing, Progressing  Goal: Readiness for Transition of Care  Outcome: Ongoing, Progressing     Problem: Infection  Goal: Infection Symptom Resolution  Outcome: Ongoing, Progressing  Intervention: Prevent or Manage Infection  Recent Flowsheet Documentation  Taken 10/11/2021 1400 by Adeel Laguerre RN  Isolation Precautions:   contact precautions maintained   droplet precautions maintained  Taken 10/11/2021 1200 by Adeel Laguerre RN  Isolation Precautions:   contact precautions maintained   droplet precautions maintained  Taken 10/11/2021 1000 by Adeel Laguerre RN  Isolation Precautions:   contact precautions maintained   droplet precautions maintained  Taken 10/11/2021 0800 by Adeel Laguerre RN  Isolation Precautions:   contact precautions  maintained   droplet precautions maintained     Problem: Fall Injury Risk  Goal: Absence of Fall and Fall-Related Injury  Outcome: Ongoing, Progressing  Intervention: Identify and Manage Contributors to Fall Injury Risk  Recent Flowsheet Documentation  Taken 10/11/2021 1400 by Adeel Laguerre RN  Medication Review/Management: medications reviewed  Taken 10/11/2021 1200 by Adeel Laguerre RN  Medication Review/Management: medications reviewed  Taken 10/11/2021 1000 by Adeel Laguerre RN  Medication Review/Management: medications reviewed  Taken 10/11/2021 0800 by Adeel Laguerre RN  Medication Review/Management: medications reviewed  Intervention: Promote Injury-Free Environment  Recent Flowsheet Documentation  Taken 10/11/2021 1400 by Adeel Laguerre RN  Safety Promotion/Fall Prevention:   assistive device/personal items within reach   clutter free environment maintained   fall prevention program maintained   lighting adjusted   mobility aid in reach   nonskid shoes/slippers when out of bed   room organization consistent   safety round/check completed  Taken 10/11/2021 1200 by Adeel Laguerre RN  Safety Promotion/Fall Prevention:   assistive device/personal items within reach   clutter free environment maintained   fall prevention program maintained   lighting adjusted   mobility aid in reach   nonskid shoes/slippers when out of bed   room organization consistent   safety round/check completed  Taken 10/11/2021 1000 by Adeel Laguerre RN  Safety Promotion/Fall Prevention:   assistive device/personal items within reach   clutter free environment maintained   fall prevention program maintained   lighting adjusted   mobility aid in reach   nonskid shoes/slippers when out of bed   room organization consistent   safety round/check completed  Taken 10/11/2021 0800 by Adeel Laguerre RN  Safety Promotion/Fall Prevention:   assistive device/personal items within reach   clutter free environment maintained   fall prevention program  maintained   lighting adjusted   mobility aid in reach   nonskid shoes/slippers when out of bed   room organization consistent   safety round/check completed     Problem: Skin Injury Risk Increased  Goal: Skin Health and Integrity  Outcome: Ongoing, Progressing

## 2021-10-11 NOTE — PLAN OF CARE
Goal Outcome Evaluation:  Plan of Care Reviewed With: patient        Progress: improving  Outcome Summary: Pt agreed to PT/OT session; pt slowly improving w/activity, and on 5L today; did need to use non-rebreather w/2nd attempt STS; pt having some anxiety but improving; pt plans SNU [place]    Patient was intermittently wearing a face mask during this therapy encounter. Therapist used appropriate personal protective equipment including eye protection, mask, and gloves.  Mask used  was N95  .Appropriate PPE was worn during the entire therapy session. Hand hygiene was completed before and after therapy session. Patient is in enhanced droplet precautions.

## 2021-10-11 NOTE — THERAPY TREATMENT NOTE
Patient Name: Angel Cisneros  : 1950    MRN: [id number]                              Today's Date: 10/11/2021       Admit Date: 2021    Visit Dx:     ICD-10-CM ICD-9-CM   1. COVID-19 virus infection  U07.1 079.89   2. Acute hypoxemic respiratory failure (CMS/[facility])  J96.01 518.81   3. Acute renal failure superimposed on chronic kidney disease, unspecified CKD stage, unspecified acute renal failure type (CMS/HCC)  N17.9 584.9    N18.9 585.9     Patient Active Problem List   Diagnosis   • Lymphoma (HCC)   • Leukemia (HCC)   • Chest pain   • GERD (gastroesophageal reflux disease)   • HTN (hypertension)   • Chronic kidney disease, stage 3b (HCC)   • Generalized abdominal pain   • CLL (chronic lymphocytic leukemia) (HCC)   • Gastroesophageal reflux disease   • Hodgkin's disease of lymph nodes of head, face, and neck (HCC)   • Oral thrush   • Medicare annual wellness visit, subsequent   • Primary insomnia   • Adjustment disorder with depressed mood   • Dysuria   • Gross hematuria   • Acute non-recurrent maxillary sinusitis   • Cough   • BPH (benign prostatic hyperplasia)   • Elevated PSA   • Clinical diagnosis of COVID-19   • Pneumonia due to COVID-19 virus   • Paroxysmal atrial fibrillation (HCC)   • Acute respiratory failure with hypoxia (HCC)   • Critical illness myopathy     Past Medical History:   Diagnosis Date   • Arthritis     both knees   • Cancer (HCC)     dx with lymphoma / radiation   • Cataracts, bilateral    • Hx of cornea transplant     both eyes   • Leukemia (HCC)    • Lymphoma (HCC)     dx in . treated with radiation.   • Lymphoma (HCC)     treated with radiation. dx in      Past Surgical History:   Procedure Laterality Date   • BREAST LUMPECTOMY Right    • COLONOSCOPY N/A 3/8/2016    Procedure: COLONOSCOPY with cold polypectomy X 3;  Surgeon: Chris Harden MD;  Location: [facility] ENDOSCOPY;  Service:    • ENDOSCOPY N/A 10/23/2018    Procedure: ESOPHAGOGASTRODUODENOSCOPY WITH  BIOPSY;  Surgeon: Chris Harden MD;  Location: [facility] ENDOSCOPY;  Service: Gastroenterology   • EYE SURGERY      partial cornea transplant 4-5 years ago   • KNEE SURGERY Left    • TOE SURGERY Bilateral    • TONSILLECTOMY        General Information     Row Name 10/11/21 1438          Physical Therapy Time and Intention    Document Type therapy note (daily note)  -     Mode of Treatment co-treatment; physical therapy; occupational therapy  -     Row Name 10/11/21 1438          General Information    Patient Profile Reviewed yes  -     Existing Precautions/Restrictions fall; oxygen therapy device and L/min   super anxious at times, but improving  -     Row Name 10/11/21 1438          Cognition    Orientation Status (Cognition) oriented x 3  -     Row Name 10/11/21 1438          Safety Issues, Functional Mobility    Impairments Affecting Function (Mobility) endurance/activity tolerance; shortness of breath; strength  -           User Key  (r) = Recorded By, (t) = Taken By, (c) = Cosigned By    Initials Name Provider Type    Breana Jones PTA Physical Therapy Assistant               Mobility     Row Name 10/11/21 1447          Bed Mobility    Supine-Sit [place] (Bed Mobility) contact guard; standby assist; verbal cues; nonverbal cues (demo/gesture)  but pt used rail to complete  -     Sit-Supine [place] (Bed Mobility) not tested  -     Assistive Device (Bed Mobility) bed rails  -     Row Name 10/11/21 1445          Bed-Chair Transfer    Bed-Chair [place] (Transfers) 2 person assist; contact guard; verbal cues; nonverbal cues (demo/gesture)  pt became anxious during tfer , had to re-direct to chair, as we were trying to attempt a few steps forw, but pt does not do well w/change  -           User Key  (r) = Recorded By, (t) = Taken By, (c) = Cosigned By    Initials Name Provider Type    Breana Jones PTA Physical Therapy Assistant               Obj/Interventions     Row  Name 10/11/21 1451          Motor Skills    Therapeutic Exercise --  LAQs, seated MIP x5 each-fatigued after up to chair and OT exer  -           User Key  (r) = Recorded By, (t) = Taken By, (c) = Cosigned By    Initials Name Provider Type    Brenaa Jones PTA Physical Therapy Assistant               Goals/Plan    No documentation.                Clinical Impression     Row Name 10/11/21 1452          Pain Scale: Numbers Pre/Post-Treatment    Pretreatment Pain Rating 0/10 - no pain  -     Posttreatment Pain Rating 0/10 - no pain  -     Row Name 10/11/21 1452          Plan of Care Review    Plan of Care Reviewed With patient  -     Outcome Summary Pt agreed to PT/OT session; pt slowly improving w/activity, and on 5L today; did need to use non-rebreather w/2nd attempt STS; pt having some anxiety but improving; pt plans SNU at [place]  -     Row Name 10/11/21 1452          Therapy Assessment/Plan (PT)    Rehab Potential (PT) good, to achieve stated therapy goals  -     Criteria for Skilled Interventions Met (PT) yes  -JM     Row Name 10/11/21 1452          Vital Signs    Pre SpO2 (%) 95  -JM     O2 Delivery Pre Treatment hi-flow  -JM     Intra SpO2 (%) 86  -JM     O2 Delivery Intra Treatment hi-flow  -JM     Post SpO2 (%) 90  -JM     O2 Delivery Post Treatment hi-flow  -JM     Row Name 10/11/21 1452          Positioning and Restraints    Pre-Treatment Position in bed  -JM     Post Treatment Position chair  -JM     In Chair reclined; call light within reach; encouraged to call for assist; exit alarm on; notified nsg  -           User Key  (r) = Recorded By, (t) = Taken By, (c) = Cosigned By    Initials Name Provider Type    Breana Jones PTA Physical Therapy Assistant               Outcome Measures     Row Name 10/11/21 1501          How much help from another person do you currently need...    Turning from your back to your side while in flat bed without using bedrails? 4  -JM     Moving from  lying on back to sitting on the side of a flat bed without bedrails? 3  -JM     Moving to and from a bed to a chair (including a wheelchair)? 3  -JM     Standing up from a chair using your arms (e.g., wheelchair, bedside chair)? 3  -JM     Climbing 3-5 steps with a railing? 1  -JM     To walk in hospital room? 2  -JM     AM-PAC 6 Clicks Score (PT) 16  -JM           User Key  (r) = Recorded By, (t) = Taken By, (c) = Cosigned By    Initials Name Provider Type    Breana Jones PTA Physical Therapy Assistant                             Physical Therapy Education                 Title: PT OT SLP Therapies (Done)     Topic: Physical Therapy (Done)     Point: Mobility training (Done)     Learning Progress Summary           Patient Acceptance, E,TB,D, VU,NR by JEROME at 10/11/2021 1501    Comment: anxiety limits   Family Acceptance, E,TB, VU by LETICIA at 10/9/2021 1735      Show all documentation for this point (27)                 Point: Home exercise program (Done)     Learning Progress Summary           Patient Acceptance, E,TB,D, VU,NR by JEROME at 10/11/2021 1501    Comment: anxiety limits   Family Acceptance, E,TB, VU by LETICIA at 10/9/2021 1735      Show all documentation for this point (27)                 Point: Body mechanics (Done)     Learning Progress Summary           Patient Acceptance, E,TB,D, VU,NR by JEROME at 10/11/2021 1501    Comment: anxiety limits   Family Acceptance, E,TB, VU by LETICIA at 10/9/2021 1735      Show all documentation for this point (27)                 Point: Precautions (Done)     Learning Progress Summary           Patient Acceptance, E,TB,D, VU,NR by JEROME at 10/11/2021 1501    Comment: anxiety limits   Family Acceptance, E,TB, VU by LETICIA at 10/9/2021 1735      Show all documentation for this point (27)                             User Key     Initials Effective Dates Name Provider Type Discipline     03/07/18 -  Breana Almonte PTA Physical Therapy Assistant PT    LETICIA 09/24/19 -  Dolores Read RN  Registered Nurse Nurse              PT Recommendation and Plan     Plan of Care Reviewed With: patient  Progress: improving  Outcome Summary: Pt agreed to PT/OT session; pt slowly improving w/activity, and on 5L today; did need to use non-rebreather w/2nd attempt STS; pt having some anxiety but improving; pt plans SNU at [place]     Time Calculation:    PT Charges     Row Name 10/11/21 1437             Time Calculation    Start Time 1344  -      Stop Time 1415  -      Time Calculation (min) 31 min  -      PT Received On 10/11/21  -JEROME      PT - Next Appointment 10/12/21  -JEROME            User Key  (r) = Recorded By, (t) = Taken By, (c) = Cosigned By    Initials Name Provider Type    Breana Jones PTA Physical Therapy Assistant              Therapy Charges for Today     Code Description Service Date Service Provider Modifiers Qty    15565959108 HC PT THER PROC EA 15 MIN 10/11/2021 Breana Almonte PTA GP 2    10441695594 HC PT THER SUPP EA 15 MIN 10/11/2021 Breana Almonte PTA GP 1          PT G-Codes  Outcome Measure Options: AM-PAC 6 Clicks Daily Activity (OT)  AM-PAC 6 Clicks Score (PT): 16  AM-PAC 6 Clicks Score (OT): 19    Breana Almonte PTA  10/11/2021

## 2021-10-11 NOTE — PLAN OF CARE
Goal Outcome Evaluation:  Plan of Care Reviewed With: patient     Outcome Summary: Pt was found supine in bed on 5L O2, intermittent use of non rebreather. Agreeable to OT/PT co-treatment. He sits EOB c SBA, rest break provided. STS/~5 steps to chair c CGA, demo's anxious behavior when standing and req's rest break. SpO2 remains in high 80s-low 90s t/o session. He partipates in light UB AROM to promote strength and activity tolerance needed for ADLs. Pt left [facility] c PT present.    Patient was wearing a face mask during this therapy encounter. Therapist used appropriate personal protective equipment including mask, gloves, eye protection, face shield, and gown.  Mask used was N95/duckbill. Appropriate PPE was worn during the entire therapy session. Hand hygiene was completed before and after therapy session. Patient is in enhanced droplet precautions.

## 2021-10-11 NOTE — PLAN OF CARE
Goal Outcome Evaluation:  Plan of Care Reviewed With: patient           Outcome Summary: No S/SX of distress, Patient on IV steroids,  O2 titrated to 60L 67% patient on NRB . Will continue to monitor.

## 2021-10-11 NOTE — THERAPY TREATMENT NOTE
Patient Name: Angel Cisneros  : 1950    MRN: [id number]                              Today's Date: 10/11/2021       Admit Date: 2021    Visit Dx:     ICD-10-CM ICD-9-CM   1. COVID-19 virus infection  U07.1 079.89   2. Acute hypoxemic respiratory failure (CMS/[facility])  J96.01 518.81   3. Acute renal failure superimposed on chronic kidney disease, unspecified CKD stage, unspecified acute renal failure type (CMS/HCC)  N17.9 584.9    N18.9 585.9     Patient Active Problem List   Diagnosis   • Lymphoma (HCC)   • Leukemia (HCC)   • Chest pain   • GERD (gastroesophageal reflux disease)   • HTN (hypertension)   • Chronic kidney disease, stage 3b (HCC)   • Generalized abdominal pain   • CLL (chronic lymphocytic leukemia) (HCC)   • Gastroesophageal reflux disease   • Hodgkin's disease of lymph nodes of head, face, and neck (HCC)   • Oral thrush   • Medicare annual wellness visit, subsequent   • Primary insomnia   • Adjustment disorder with depressed mood   • Dysuria   • Gross hematuria   • Acute non-recurrent maxillary sinusitis   • Cough   • BPH (benign prostatic hyperplasia)   • Elevated PSA   • Clinical diagnosis of COVID-19   • Pneumonia due to COVID-19 virus   • Paroxysmal atrial fibrillation (HCC)   • Acute respiratory failure with hypoxia (HCC)   • Critical illness myopathy     Past Medical History:   Diagnosis Date   • Arthritis     both knees   • Cancer (HCC)     dx with lymphoma / radiation   • Cataracts, bilateral    • Hx of cornea transplant     both eyes   • Leukemia (HCC)    • Lymphoma (HCC)     dx in . treated with radiation.   • Lymphoma (HCC)     treated with radiation. dx in      Past Surgical History:   Procedure Laterality Date   • BREAST LUMPECTOMY Right    • COLONOSCOPY N/A 3/8/2016    Procedure: COLONOSCOPY with cold polypectomy X 3;  Surgeon: Chris Harden MD;  Location: [facility] ENDOSCOPY;  Service:    • ENDOSCOPY N/A 10/23/2018    Procedure: ESOPHAGOGASTRODUODENOSCOPY WITH  BIOPSY;  Surgeon: Chris Harden MD;  Location: [facility] ENDOSCOPY;  Service: Gastroenterology   • EYE SURGERY      partial cornea transplant 4-5 years ago   • KNEE SURGERY Left    • TOE SURGERY Bilateral    • TONSILLECTOMY        General Information     [facility] Name 10/11/21 1607          OT Time and Intention    Document Type therapy note (daily note)  -     Mode of Treatment co-treatment; physical therapy; occupational therapy  -[facility] Name 10/11/21 1607          General Information    Patient Profile Reviewed yes  -     Existing Precautions/Restrictions fall; oxygen therapy device and L/min  -[facility] Name 10/11/21 1607          Cognition    Orientation Status (Cognition) oriented x 3  -[facility] Name 10/11/21 1607          Safety Issues, Functional Mobility    Impairments Affecting Function (Mobility) endurance/activity tolerance; shortness of breath; strength  -           User Key  (r) = Recorded By, (t) = Taken By, (c) = Cosigned By    Initials Name Provider Type     Roshni Nelson OT Occupational Therapist                 Mobility/ADL's     Row Name 10/11/21 1607          Bed Mobility    Bed Mobility supine-sit  -     Supine-Sit [place] (Bed Mobility) contact guard; standby assist; verbal cues  -     Assistive Device (Bed Mobility) bed rails  -[facility] Name 10/11/21 1607          Transfers    Transfers bed-chair transfer; sit-stand transfer  -     Bed-Chair [place] (Transfers) contact guard; verbal cues; nonverbal cues (demo/gesture)  -     Sit-Stand [place] (Transfers) contact guard; verbal cues  -[facility] Name 10/11/21 1607          Functional Mobility    Functional Mobility- Comment ~5 steps to chair c CGA, RODRIGUEZ noted  -[facility] Name 10/11/21 1607          Lower Body Dressing Assessment/Training    [place] Level (Lower Body Dressing) lower body dressing skills; doff; don; set up; socks  -     Position (Lower Body Dressing) supine  -           User Key  (r) =  Recorded By, (t) = Taken By, (c) = Cosigned By    Initials Name Provider Type     Roshni Nelson OT Occupational Therapist               Obj/Interventions     [facility] Name 10/11/21 1608          Shoulder (Therapeutic Exercise)    Shoulder (Therapeutic Exercise) AROM (active range of motion)  -     Shoulder AROM (Therapeutic Exercise) bilateral; flexion; extension; 5 repetitions; 3 sets  -     Row Name 10/11/21 1608          Motor Skills    Motor Skills functional endurance  -     Functional Endurance limited  -JW     Row Name 10/11/21 1608          Balance    Static Sitting Balance WNL  -     Dynamic Sitting Balance WNL  -     Static Standing Balance mild impairment  -     Dynamic Standing Balance mild impairment  -     Balance Interventions sitting; standing; sit to stand; supported; dynamic reaching  -     Comment, Balance dyn reaching c BUEs sitting in chair  -           User Key  (r) = Recorded By, (t) = Taken By, (c) = Cosigned By    Initials Name Provider Type     Roshni Nelson OT Occupational Therapist               Goals/Plan    No documentation.                Clinical Impression     Row Name 10/11/21 1609          Pain Assessment    Additional Documentation Pain Scale: Numbers Pre/Post-Treatment (Group)  -JW     Row Name 10/11/21 1609          Pain Scale: Numbers Pre/Post-Treatment    Pretreatment Pain Rating 0/10 - no pain  -     Posttreatment Pain Rating 0/10 - no pain  -JW     Row Name 10/11/21 1609          Plan of Care Review    Plan of Care Reviewed With patient  -     Outcome Summary Pt was found supine in bed on 5L O2, intermittent use of non rebreather. Agreeable to OT/PT co-treatment. He sits EOB c SBA, rest break provided. STS/~5 steps to chair c CGA, demo's anxious behavior when standing and req's rest break. SpO2 remains in high 80s-low 90s t/o session. He partipates in light UB AROM to promote strength and activity tolerance needed for ADLs. Pt left [facility] c PT present.  -      Row Name 10/11/21 1609          Positioning and Restraints    Pre-Treatment Position in bed  -JW     Post Treatment Position chair  -JW     In Chair with PT; sitting; exit alarm on  -JW           User Key  (r) = Recorded By, (t) = Taken By, (c) = Cosigned By    Initials Name Provider Type    Roshni Kang OT Occupational Therapist               Outcome Measures     Row Name 10/11/21 1612          How much help from another is currently needed...    Putting on and taking off regular lower body clothing? 3  -JW     Bathing (including washing, rinsing, and drying) 3  -JW     Toileting (which includes using toilet bed pan or urinal) 3  -JW     Putting on and taking off regular upper body clothing 3  -JW     Taking care of personal grooming (such as brushing teeth) 3  -JW     Eating meals 4  -JW     AM-PAC 6 Clicks Score (OT) 19  -     Row Name 10/11/21 1501          How much help from another person do you currently need...    Turning from your back to your side while in flat bed without using bedrails? 4  -JM     Moving from lying on back to sitting on the side of a flat bed without bedrails? 3  -JM     Moving to and from a bed to a chair (including a wheelchair)? 3  -JM     Standing up from a chair using your arms (e.g., wheelchair, bedside chair)? 3  -JM     Climbing 3-5 steps with a railing? 1  -JM     To walk in hospital room? 2  -JM     AM-PAC 6 Clicks Score (PT) 16  -     Row Name 10/11/21 1612          Functional Assessment    Outcome Measure Options AM-PAC 6 Clicks Daily Activity (OT)  -           User Key  (r) = Recorded By, (t) = Taken By, (c) = Cosigned By    Initials Name Provider Type    Breana Jones PTA Physical Therapy Assistant    Roshni Kang OT Occupational Therapist                Occupational Therapy Education                 Title: PT OT SLP Therapies (Done)     Topic: Occupational Therapy (Done)     Point: ADL training (Done)     Description:   Instruct learner(s) on proper  safety adaptation and remediation techniques during self care or transfers.   Instruct in proper use of assistive devices.              Learning Progress Summary           Patient Acceptance, E,TB, VU by LETICIA at 10/9/2021 1735   Family Acceptance, E,TB, VU by DK at 10/9/2021 1735      Show all documentation for this point (13)                 Point: Home exercise program (Done)     Description:   Instruct learner(s) on appropriate technique for monitoring, assisting and/or progressing therapeutic exercises/activities.              Learning Progress Summary           Patient Acceptance, E,TB, VU by LETICIA at 10/9/2021 1735   Family Acceptance, E,TB, VU by DK at 10/9/2021 1735      Show all documentation for this point (13)                 Point: Precautions (Done)     Description:   Instruct learner(s) on prescribed precautions during self-care and functional transfers.              Learning Progress Summary           Patient Acceptance, E,TB, VU by LETICIA at 10/9/2021 1735   Family Acceptance, E,TB, VU by LETICIA at 10/9/2021 1735      Show all documentation for this point (13)                 Point: Body mechanics (Done)     Description:   Instruct learner(s) on proper positioning and spine alignment during self-care, functional mobility activities and/or exercises.              Learning Progress Summary           Patient Acceptance, E,TB, VU by LETICIA at 10/9/2021 1735   Family Acceptance, E,TB, VU by LETICIA at 10/9/2021 1735      Show all documentation for this point (12)                             User Key     Initials Effective Dates Name Provider Type Discipline    LETICIA 09/24/19 -  Dolores Read RN Registered Nurse Nurse              OT Recommendation and Plan  Planned Therapy Interventions (OT): activity tolerance training, BADL retraining, occupation/activity based interventions, patient/caregiver education/training, strengthening exercise  Therapy Frequency (OT): 5 times/wk  Plan of Care Review  Plan of Care Reviewed With:  patient  Outcome Summary: Pt was found supine in bed on 5L O2, intermittent use of non rebreather. Agreeable to OT/PT co-treatment. He sits EOB c SBA, rest break provided. STS/~5 steps to chair c CGA, demo's anxious behavior when standing and req's rest break. SpO2 remains in high 80s-low 90s t/o session. He partipates in light UB AROM to promote strength and activity tolerance needed for ADLs. Pt left [facility] c PT present.     Time Calculation:    Time Calculation- OT     Row Name 10/11/21 1612             Time Calculation- OT    OT Start Time 1340  -JW      OT Stop Time 1408  -JW      OT Time Calculation (min) 28 min  -      Total Timed Code Minutes- OT 28 minute(s)  -JW      OT Received On 10/11/21  -      OT - Next Appointment 10/12/21  -              Timed Charges    01924 - OT Therapeutic Activity Minutes 28  -JW              Total Minutes    Timed Charges Total Minutes 28  -       Total Minutes 28  -            User Key  (r) = Recorded By, (t) = Taken By, (c) = Cosigned By    Initials Name Provider Type    Roshni Kang OT Occupational Therapist              Therapy Charges for Today     Code Description Service Date Service Provider Modifiers Qty    [id number]  OT THERAPEUTIC ACT EA 15 MIN 10/11/2021 Roshni Nelson OT GO 2               Roshni Nelson OT  10/11/2021

## 2021-10-11 NOTE — CASE MANAGEMENT/SOCIAL WORK
Continued Stay Note  [facility]     Patient Name: Angel Cisneros  MRN: [id number]  Today's Date: 10/11/2021    Admit Date: 9/5/2021     Discharge Plan     Row Name 10/11/21 1242       Plan    Plan Comments Message to Christiano/Keila to re-start pre-cert. Patient needs to be seen by PT first. Message to PT and patient will be seen after lunch. Suzi Neville RN CCP               Discharge Codes    No documentation.               Expected Discharge Date and Time     Expected Discharge Date Expected Discharge Time    Oct 12, 2021             Suzi Neville RN

## 2021-10-12 ENCOUNTER — APPOINTMENT (OUTPATIENT)
Dept: GENERAL RADIOLOGY | Facility: HOSPITAL | Age: 71
End: 2021-10-12

## 2021-10-12 LAB
ANION GAP SERPL CALCULATED.3IONS-SCNC: 10.4 MMOL/L (ref 5–15)
BASOPHILS # BLD AUTO: 0.02 10*3/MM3 (ref 0–0.2)
BASOPHILS NFR BLD AUTO: 0.4 % (ref 0–1.5)
BUN SERPL-MCNC: 35 MG/DL (ref 8–23)
BUN/CREAT SERPL: 24 (ref 7–25)
CALCIUM SPEC-SCNC: 8.9 MG/DL (ref 8.6–10.5)
CHLORIDE SERPL-SCNC: 105 MMOL/L (ref 98–107)
CO2 SERPL-SCNC: 26.6 MMOL/L (ref 22–29)
CREAT SERPL-MCNC: 1.46 MG/DL (ref 0.76–1.27)
CRP SERPL-MCNC: 0.61 MG/DL (ref 0–0.5)
DEPRECATED RDW RBC AUTO: 50.4 FL (ref 37–54)
EOSINOPHIL # BLD AUTO: 0.05 10*3/MM3 (ref 0–0.4)
EOSINOPHIL NFR BLD AUTO: 1.1 % (ref 0.3–6.2)
ERYTHROCYTE [DISTWIDTH] IN BLOOD BY AUTOMATED COUNT: 15.3 % (ref 12.3–15.4)
GFR SERPL CREATININE-BSD FRML MDRD: 48 ML/MIN/1.73
GLUCOSE SERPL-MCNC: 100 MG/DL (ref 65–99)
HCT VFR BLD AUTO: 38.6 % (ref 37.5–51)
HGB BLD-MCNC: 13 G/DL (ref 13–17.7)
IMM GRANULOCYTES # BLD AUTO: 0.09 10*3/MM3 (ref 0–0.05)
IMM GRANULOCYTES NFR BLD AUTO: 1.9 % (ref 0–0.5)
LYMPHOCYTES # BLD AUTO: 0.59 10*3/MM3 (ref 0.7–3.1)
LYMPHOCYTES NFR BLD AUTO: 12.6 % (ref 19.6–45.3)
MCH RBC QN AUTO: 30.3 PG (ref 26.6–33)
MCHC RBC AUTO-ENTMCNC: 33.7 G/DL (ref 31.5–35.7)
MCV RBC AUTO: 90 FL (ref 79–97)
MONOCYTES # BLD AUTO: 0.3 10*3/MM3 (ref 0.1–0.9)
MONOCYTES NFR BLD AUTO: 6.4 % (ref 5–12)
NEUTROPHILS NFR BLD AUTO: 3.64 10*3/MM3 (ref 1.7–7)
NEUTROPHILS NFR BLD AUTO: 77.6 % (ref 42.7–76)
NRBC BLD AUTO-RTO: 0.2 /100 WBC (ref 0–0.2)
PLATELET # BLD AUTO: 166 10*3/MM3 (ref 140–450)
PMV BLD AUTO: 10.6 FL (ref 6–12)
POTASSIUM SERPL-SCNC: 3.8 MMOL/L (ref 3.5–5.2)
RBC # BLD AUTO: 4.29 10*6/MM3 (ref 4.14–5.8)
SODIUM SERPL-SCNC: 142 MMOL/L (ref 136–145)
WBC # BLD AUTO: 4.69 10*3/MM3 (ref 3.4–10.8)

## 2021-10-12 PROCEDURE — 80048 BASIC METABOLIC PNL TOTAL CA: CPT | Performed by: STUDENT IN AN ORGANIZED HEALTH CARE EDUCATION/TRAINING PROGRAM

## 2021-10-12 PROCEDURE — 86140 C-REACTIVE PROTEIN: CPT | Performed by: STUDENT IN AN ORGANIZED HEALTH CARE EDUCATION/TRAINING PROGRAM

## 2021-10-12 PROCEDURE — 71045 X-RAY EXAM CHEST 1 VIEW: CPT

## 2021-10-12 PROCEDURE — 25010000002 ENOXAPARIN PER 10 MG: Performed by: INTERNAL MEDICINE

## 2021-10-12 PROCEDURE — 85025 COMPLETE CBC W/AUTO DIFF WBC: CPT | Performed by: STUDENT IN AN ORGANIZED HEALTH CARE EDUCATION/TRAINING PROGRAM

## 2021-10-12 PROCEDURE — 63710000001 DEXAMETHASONE PER 0.25 MG: Performed by: INTERNAL MEDICINE

## 2021-10-12 RX ADMIN — Medication 3 MG: at 22:30

## 2021-10-12 RX ADMIN — AMLODIPINE BESYLATE 10 MG: 10 TABLET ORAL at 09:36

## 2021-10-12 RX ADMIN — Medication 250 MG: at 20:59

## 2021-10-12 RX ADMIN — Medication 250 MG: at 09:35

## 2021-10-12 RX ADMIN — ENOXAPARIN SODIUM 60 MG: 60 INJECTION, SOLUTION INTRAVENOUS; SUBCUTANEOUS at 09:35

## 2021-10-12 RX ADMIN — METOPROLOL TARTRATE 12.5 MG: 25 TABLET ORAL at 20:59

## 2021-10-12 RX ADMIN — METOPROLOL TARTRATE 12.5 MG: 25 TABLET ORAL at 09:36

## 2021-10-12 RX ADMIN — DEXAMETHASONE 4 MG: 4 TABLET ORAL at 09:36

## 2021-10-12 RX ADMIN — MIRTAZAPINE 7.5 MG: 15 TABLET, FILM COATED ORAL at 20:59

## 2021-10-12 RX ADMIN — DEXTROMETHORPHAN POLISTIREX 60 MG: 30 SUSPENSION, EXTENDED RELEASE ORAL at 20:59

## 2021-10-12 RX ADMIN — FAMOTIDINE 20 MG: 20 TABLET, FILM COATED ORAL at 09:36

## 2021-10-12 RX ADMIN — DEXTROMETHORPHAN POLISTIREX 60 MG: 30 SUSPENSION, EXTENDED RELEASE ORAL at 09:36

## 2021-10-12 RX ADMIN — SODIUM CHLORIDE 1 DROP: 50 SOLUTION OPHTHALMIC at 20:59

## 2021-10-12 RX ADMIN — SODIUM CHLORIDE 1 DROP: 50 SOLUTION OPHTHALMIC at 09:37

## 2021-10-12 RX ADMIN — FINASTERIDE 5 MG: 5 TABLET, FILM COATED ORAL at 09:36

## 2021-10-12 RX ADMIN — ENOXAPARIN SODIUM 60 MG: 60 INJECTION, SOLUTION INTRAVENOUS; SUBCUTANEOUS at 20:59

## 2021-10-12 RX ADMIN — PREDNISOLONE ACETATE 1 DROP: 10 SUSPENSION/ DROPS OPHTHALMIC at 09:37

## 2021-10-12 RX ADMIN — SODIUM CHLORIDE 1 DROP: 50 SOLUTION OPHTHALMIC at 16:32

## 2021-10-12 NOTE — PROGRESS NOTES
Name: Angel Cisneros ADMIT: 2021   : 1950  PCP: Ayaan Amin MD    MRN: [id number] LOS: 37 days   AGE/SEX: 71 y.o. male  ROOM: [facility]   Subjective   Chief Complaint   Patient presents with   • positive covid   • Headache   • Abdominal Pain   • Shortness of Breath     Dyspnea fair  On steroids  On 5L oxygen    ROS  No f/c  No n/v  No cp/palp  Mild soa/cough    Objective   Vital Signs  Temp:  [97.8 °F (36.6 °C)-99.3 °F (37.4 °C)] 99.3 °F (37.4 °C)  Heart Rate:  [68-78] 71  Resp:  [20-24] 20  BP: (109-137)/(67-85) 109/67  SpO2:  [92 %-98 %] 92 %  on  Flow (L/min):  [5] 5;   Device (Oxygen Therapy): humidified; high-flow nasal cannula  Body mass index is 20.84 kg/m².    Physical Exam  Constitutional:       Appearance: He is well-developed.   HENT:      Head: Normocephalic and atraumatic.   Eyes:      General: No scleral icterus.  Cardiovascular:      Rate and Rhythm: Normal rate and regular rhythm.      Heart sounds: Normal heart sounds.   Pulmonary:      Effort: Pulmonary effort is normal. No respiratory distress (decreased bs at bases).      Breath sounds: Normal breath sounds.   Abdominal:      General: There is no distension.      Palpations: Abdomen is soft.      Tenderness: There is no abdominal tenderness.   Musculoskeletal:      Cervical back: Neck supple.   Neurological:      Mental Status: He is alert.   Psychiatric:         Behavior: Behavior normal.         Results Review:       I reviewed the patient's new clinical results.  Results from last 7 days   Lab Units 10/12/21  1056 10/09/21  0533 10/06/21  0634   WBC 10*3/mm3 4.69 4.34 5.69   HEMOGLOBIN g/dL 13.0 13.1 13.2   PLATELETS 10*3/mm3 166 127* 143     Results from last 7 days   Lab Units 10/12/21  1056 10/09/21  1238 10/06/21  0634   SODIUM mmol/L 142 138 141   POTASSIUM mmol/L 3.8 4.1 4.9   CHLORIDE mmol/L 105 106 109*   CO2 mmol/L 26.6 21.4* 23.8   BUN mg/dL 35* 36* 40*   CREATININE mg/dL 1.46* 1.22 1.33*   GLUCOSE mg/dL  100* 184* 95   Estimated Creatinine Clearance: 40.2 mL/min (A) (by C-G formula based on SCr of 1.46 mg/dL (H)).    Results from last 7 days   Lab Units 10/12/21  1056 10/09/21  1238 10/06/21  0634   CALCIUM mg/dL 8.9 8.2* 8.5*     Results from last 7 days   Lab Units 10/07/21  0730   PROCALCITONIN ng/mL 0.05       Coag     HbA1C   Lab Results   Component Value Date    HGBA1C 5.39 09/06/2021     Infection   Results from last 7 days   Lab Units 10/07/21  0730   PROCALCITONIN ng/mL 0.05     Radiology(recent) XR Chest 1 View    Result Date: 10/12/2021  No interval change. No evidence of pneumothorax or pneumomediastinum.  This report was finalized on 10/12/2021 7:10 AM by Dr. Rich Joseph M.D.      XR Chest 1 View    Result Date: 10/11/2021  No interval change.  This report was finalized on 10/11/2021 9:05 AM by Dr. Thanh Singh M.D.      No results found for: TROPONINT, TROPONINI, BNP  No components found for: TSH;2    amLODIPine, 10 mg, Oral, Q24H  calcium carbonate, 1 tablet, Oral, Daily  dexamethasone, 4 mg, Oral, Daily With Breakfast  dextromethorphan polistirex ER, 60 mg, Oral, Q12H  enoxaparin, 1 mg/kg, Subcutaneous, Q12H  famotidine, 20 mg, Oral, Daily  finasteride, 5 mg, Oral, Daily  metoprolol tartrate, 12.5 mg, Oral, Q12H  mirtazapine, 7.5 mg, Oral, Nightly  prednisoLONE acetate, 1 drop, Both Eyes, Daily  saccharomyces boulardii, 250 mg, Oral, BID  senna-docusate sodium, 2 tablet, Oral, BID  sodium chloride, 1 drop, Left Eye, TID       Diet Regular; Low Potassium; 2 gm (50 mEq)      Assessment/Plan      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]  No   • Paroxysmal atrial fibrillation (HCC) [I48.0]  No   • Acute respiratory failure with hypoxia (HCC) [J96.01]  Yes   • BPH (benign prostatic hyperplasia) [N40.0]  Yes   • CLL (chronic lymphocytic leukemia) (HCC) [C91.10]  Yes   • Chronic kidney disease, stage  3b (HCC) [N18.32]  Yes   • HTN (hypertension) [I10]  Yes   • GERD (gastroesophageal reflux disease) [K21.9]  Yes   • Hodgkin's disease of lymph nodes of head, face, and neck (HCC) [C81.91]  Yes      Resolved Hospital Problems    Diagnosis Date Resolved POA   • Acute constipation [K59.00] 10/08/2021 No         71 y.o. male with history of CLL admitted with acute hypoxic respiratory failure due to COVID-19 pneumonia.  He was fully vaccinated.     COVID-19 pneumonia with acute hypoxic respiratory failure:   - He completed 7-day course of Rocephin due to elevated procalcitonin/pneumomediastinum previously this admission and infectious disease evaluated.  Completed remdesivir course.  Has been on high-dose dexamethasone for some time.  Weaning down  -Oxygen requirements slowly improving.  Still on 5 L.      Paroxysmal A. fib  -Sinus rhythm.  Continue metoprolol.    On therapeutic Lovenox for anticoagulation.  Cardiology evaluated and are anticipating about additional 1 month of anticoagulation at discharge.  Transition to Eliquis/Xarelto at that time.     Critical illness myopathy  -Encourage physical therapy and out of bed as much as possible.  Encourage protein supplements     Fibrosis noted on CT. outpatient follow-up     Hypertension: Acceptable acutely.  Continue current regimen     CKD3: Stable at baseline.  labs every 3 days      CLL: Follows with [facility]     GERD: PPI     · Lovenox 60 twice daily   · Full code.  · Discussed with patient and nursing staff.  ·   · Anticipate discharge to SNU facility.  Hopeful for DC tomorrow.   working on pre-CERT        Jake Gomez MD  [facility]  10/12/21  15:17 EDT

## 2021-10-12 NOTE — PLAN OF CARE
Goal Outcome Evaluation:         Pt. VSS on 5L NC. Pt. Denied stool softener this morning but is now requesting PRN suppository. Pt. Education performed. Pt. Is hoping to d/c to [facility] in the next few days. Will continue to monitor.   Problem: Adult Inpatient Plan of Care  Goal: Patient-Specific Goal (Individualized)  Outcome: Ongoing, Progressing  Goal: Absence of Hospital-Acquired Illness or Injury  Outcome: Ongoing, Progressing  Intervention: Identify and Manage Fall Risk  Recent Flowsheet Documentation  Taken 10/12/2021 1600 by Adeel Laguerre, ARYA  Safety Promotion/Fall Prevention:   assistive device/personal items within reach   clutter free environment maintained   fall prevention program maintained   lighting adjusted   mobility aid in reach   nonskid shoes/slippers when out of bed   room organization consistent   safety round/check completed  Taken 10/12/2021 1400 by Adeel Laguerre, ARYA  Safety Promotion/Fall Prevention:   assistive device/personal items within reach   clutter free environment maintained   fall prevention program maintained   lighting adjusted   mobility aid in reach   nonskid shoes/slippers when out of bed   safety round/check completed   room organization consistent  Taken 10/12/2021 1200 by Adeel Laguerre, ARYA  Safety Promotion/Fall Prevention:   assistive device/personal items within reach   clutter free environment maintained   fall prevention program maintained   lighting adjusted   mobility aid in reach   nonskid shoes/slippers when out of bed   room organization consistent   safety round/check completed  Taken 10/12/2021 1000 by Adeel Laguerre, ARYA  Safety Promotion/Fall Prevention:   assistive device/personal items within reach   clutter free environment maintained   fall prevention program maintained   lighting adjusted   mobility aid in reach   nonskid shoes/slippers when out of bed   room organization consistent   safety round/check completed  Taken 10/12/2021 0800 by Shade  Adeel, ARYA  Safety Promotion/Fall Prevention:   assistive device/personal items within reach   clutter free environment maintained   fall prevention program maintained   lighting adjusted   mobility aid in reach   nonskid shoes/slippers when out of bed   room organization consistent   safety round/check completed  Intervention: Prevent Infection  Recent Flowsheet Documentation  Taken 10/12/2021 1600 by Adeel Laguerre RN  Infection Prevention:   environmental surveillance performed   hand hygiene promoted   personal protective equipment utilized   rest/sleep promoted   single patient room provided   visitors restricted/screened  Taken 10/12/2021 1400 by Adeel Laguerre RN  Infection Prevention:   environmental surveillance performed   hand hygiene promoted   personal protective equipment utilized   rest/sleep promoted   single patient room provided   visitors restricted/screened  Taken 10/12/2021 1200 by Adeel Laguerre RN  Infection Prevention:   environmental surveillance performed   hand hygiene promoted   personal protective equipment utilized   rest/sleep promoted   single patient room provided   visitors restricted/screened  Taken 10/12/2021 1000 by Adeel Laguerre RN  Infection Prevention:   environmental surveillance performed   hand hygiene promoted   personal protective equipment utilized   rest/sleep promoted   single patient room provided   visitors restricted/screened  Taken 10/12/2021 0800 by Adeel Laguerre, ARYA  Infection Prevention:   environmental surveillance performed   hand hygiene promoted   personal protective equipment utilized   rest/sleep promoted   single patient room provided   visitors restricted/screened  Goal: Optimal Comfort and Wellbeing  Outcome: Ongoing, Progressing  Goal: Readiness for Transition of Care  Outcome: Ongoing, Progressing     Problem: Infection  Goal: Infection Symptom Resolution  Outcome: Ongoing, Progressing  Intervention: Prevent or Manage Infection  Recent Flowsheet  Documentation  Taken 10/12/2021 1600 by Adeel Laguerre RN  Isolation Precautions:   contact precautions maintained   droplet precautions maintained  Taken 10/12/2021 1400 by Adeel Laguerre RN  Isolation Precautions:   contact precautions maintained   droplet precautions maintained  Taken 10/12/2021 1200 by Adeel Laguerre RN  Isolation Precautions:   contact precautions maintained   droplet precautions maintained  Taken 10/12/2021 1000 by Adeel Laguerre RN  Isolation Precautions:   contact precautions maintained   droplet precautions maintained  Taken 10/12/2021 0800 by Adeel Laguerre RN  Isolation Precautions:   contact precautions maintained   droplet precautions maintained     Problem: Fall Injury Risk  Goal: Absence of Fall and Fall-Related Injury  Outcome: Ongoing, Progressing  Intervention: Identify and Manage Contributors to Fall Injury Risk  Recent Flowsheet Documentation  Taken 10/12/2021 1600 by Adeel Laguerre RN  Medication Review/Management: medications reviewed  Taken 10/12/2021 1400 by Adeel Laguerre RN  Medication Review/Management: medications reviewed  Taken 10/12/2021 1200 by Adeel Laguerre RN  Medication Review/Management: medications reviewed  Taken 10/12/2021 1000 by Adeel Laguerre RN  Medication Review/Management: medications reviewed  Taken 10/12/2021 0800 by Adeel Laguerre RN  Medication Review/Management: medications reviewed  Intervention: Promote Injury-Free Environment  Recent Flowsheet Documentation  Taken 10/12/2021 1600 by Adeel Laguerre RN  Safety Promotion/Fall Prevention:   assistive device/personal items within reach   clutter free environment maintained   fall prevention program maintained   lighting adjusted   mobility aid in reach   nonskid shoes/slippers when out of bed   room organization consistent   safety round/check completed  Taken 10/12/2021 1400 by Adeel Laguerre RN  Safety Promotion/Fall Prevention:   assistive device/personal items within reach   clutter free  environment maintained   fall prevention program maintained   lighting adjusted   mobility aid in reach   nonskid shoes/slippers when out of bed   safety round/check completed   room organization consistent  Taken 10/12/2021 1200 by Adeel Laguerre, ARYA  Safety Promotion/Fall Prevention:   assistive device/personal items within reach   clutter free environment maintained   fall prevention program maintained   lighting adjusted   mobility aid in reach   nonskid shoes/slippers when out of bed   room organization consistent   safety round/check completed  Taken 10/12/2021 1000 by Adeel Laguerre, ARYA  Safety Promotion/Fall Prevention:   assistive device/personal items within reach   clutter free environment maintained   fall prevention program maintained   lighting adjusted   mobility aid in reach   nonskid shoes/slippers when out of bed   room organization consistent   safety round/check completed  Taken 10/12/2021 0800 by Adeel Laguerre, RN  Safety Promotion/Fall Prevention:   assistive device/personal items within reach   clutter free environment maintained   fall prevention program maintained   lighting adjusted   mobility aid in reach   nonskid shoes/slippers when out of bed   room organization consistent   safety round/check completed     Problem: Skin Injury Risk Increased  Goal: Skin Health and Integrity  Outcome: Ongoing, Progressing

## 2021-10-12 NOTE — PLAN OF CARE
Problem: Adult Inpatient Plan of Care  Goal: Patient-Specific Goal (Individualized)  Flowsheets (Taken 10/12/2021 0554)  Patient-Specific Goals (Include Timeframe): No acute changes. VSS. AOx4. No c/o pain or discomfort. Bed locked and in lowest position. Side rails up x3 per pt request. Call light within reach. Will continue to asses.  Goal: Absence of Hospital-Acquired Illness or Injury  Intervention: Identify and Manage Fall Risk  Flowsheets  Taken 10/12/2021 0500 by Monica Wills  Safety Promotion/Fall Prevention:   activity supervised   fall prevention program maintained   nonskid shoes/slippers when out of bed   safety round/check completed  Taken 10/12/2021 0424 by Joey Yates, RN  Safety Promotion/Fall Prevention:   activity supervised   assistive device/personal items within reach   clutter free environment maintained   fall prevention program maintained   lighting adjusted   nonskid shoes/slippers when out of bed   room organization consistent   safety round/check completed  Taken 10/12/2021 0245 by Joey Yates, ARYA  Safety Promotion/Fall Prevention:   activity supervised   assistive device/personal items within reach   clutter free environment maintained   fall prevention program maintained   lighting adjusted   nonskid shoes/slippers when out of bed   room organization consistent   safety round/check completed  Taken 10/12/2021 0048 by Joey Yates, RN  Safety Promotion/Fall Prevention:   activity supervised   assistive device/personal items within reach   clutter free environment maintained   fall prevention program maintained   lighting adjusted   nonskid shoes/slippers when out of bed   room organization consistent   safety round/check completed  Taken 10/11/2021 2232 by Joey Yates, RN  Safety Promotion/Fall Prevention:   activity supervised   assistive device/personal items within reach   clutter free environment maintained   fall prevention program maintained   lighting adjusted   nonskid  shoes/slippers when out of bed   safety round/check completed   room organization consistent  Taken 10/11/2021 2025 by Joey Yates RN  Safety Promotion/Fall Prevention:   activity supervised   assistive device/personal items within reach   clutter free environment maintained   fall prevention program maintained   lighting adjusted   nonskid shoes/slippers when out of bed   room organization consistent   safety round/check completed  Intervention: Prevent Skin Injury  Flowsheets  Taken 10/12/2021 0500 by Monica Wills  Body Position:   position changed independently   tilted, left  Taken 10/12/2021 0424 by Joey Yates RN  Body Position: position changed independently  Taken 10/12/2021 0245 by Joey Yates RN  Body Position: position changed independently  Taken 10/12/2021 0048 by Joey Yates RN  Body Position: position changed independently  Skin Protection:   adhesive use limited   tubing/devices free from skin contact  Taken 10/11/2021 2232 by Joey Yates RN  Body Position: position changed independently  Taken 10/11/2021 2025 by Joey Yates RN  Body Position: position changed independently  Skin Protection:   adhesive use limited   incontinence pads utilized   tubing/devices free from skin contact  Intervention: Prevent and Manage VTE (venous thromboembolism) Risk  Flowsheets  Taken 10/12/2021 0048  VTE Prevention/Management: (lovenox)   bilateral   dorsiflexion/plantar flexion performed   other (see comments)  Taken 10/11/2021 2025  VTE Prevention/Management: (lovenox)   bilateral   dorsiflexion/plantar flexion performed   other (see comments)  Intervention: Prevent Infection  Flowsheets  Taken 10/12/2021 0500 by Monica Wills  Infection Prevention:   environmental surveillance performed   personal protective equipment utilized   rest/sleep promoted  Taken 10/12/2021 0424 by Joey Yates RN  Infection Prevention:   environmental surveillance performed   equipment surfaces disinfected   hand hygiene promoted    personal protective equipment utilized   rest/sleep promoted   single patient room provided   visitors restricted/screened  Taken 10/12/2021 0245 by Joey Yates RN  Infection Prevention:   environmental surveillance performed   equipment surfaces disinfected   hand hygiene promoted   personal protective equipment utilized   rest/sleep promoted   single patient room provided   visitors restricted/screened  Taken 10/12/2021 0048 by Joey Yates RN  Infection Prevention:   equipment surfaces disinfected   environmental surveillance performed   hand hygiene promoted   personal protective equipment utilized   rest/sleep promoted   single patient room provided   visitors restricted/screened  Taken 10/11/2021 2232 by Joey Yates RN  Infection Prevention:   environmental surveillance performed   equipment surfaces disinfected   hand hygiene promoted   personal protective equipment utilized   rest/sleep promoted   single patient room provided   visitors restricted/screened  Taken 10/11/2021 2025 by Joey Yates RN  Infection Prevention:   environmental surveillance performed   equipment surfaces disinfected   hand hygiene promoted   personal protective equipment utilized   rest/sleep promoted   single patient room provided   visitors restricted/screened  Goal: Optimal Comfort and Wellbeing  Intervention: Provide Person-Centered Care  Flowsheets  Taken 10/12/2021 0048  Trust Relationship/Rapport:   care explained   choices provided   emotional support provided   empathic listening provided   questions answered   questions encouraged   reassurance provided   thoughts/feelings acknowledged  Taken 10/11/2021 2025  Trust Relationship/Rapport:   care explained   choices provided   empathic listening provided   emotional support provided   questions answered   reassurance provided   questions encouraged   thoughts/feelings acknowledged  Goal: Readiness for Transition of Care  Intervention: Mutually Develop Transition Plan  Flowsheets  (Taken 9/7/2021 1356 by Nahomy Pace CSW)  Discharge Facility/Level of Care Needs:   nursing facility, skilled   acute rehab   home with home health  Equipment Currently Used at Home: none  Anticipated Changes Related to Illness: none  Transportation Anticipated: family or friend will provide  Current Discharge Risk: physical impairment  Concerns to be Addressed: discharge planning  Patient/Family Anticipated Services at Transition:   home health care   skilled nursing  Patient/Family Anticipates Transition to: home with family     Problem: Infection  Goal: Infection Symptom Resolution  Intervention: Prevent or Manage Infection  Flowsheets  Taken 10/12/2021 0500 by Monica Wills  Isolation Precautions: contact precautions maintained  Taken 10/12/2021 0424 by Joey Yates RN  Isolation Precautions:   contact precautions maintained   droplet precautions maintained  Taken 10/12/2021 0245 by Joey Yates RN  Isolation Precautions:   contact precautions maintained   droplet precautions maintained  Taken 10/12/2021 0048 by Joey Yates RN  Isolation Precautions:   contact precautions maintained   droplet precautions maintained  Taken 10/11/2021 2232 by Joey Yates RN  Infection Management: aseptic technique maintained  Isolation Precautions:   contact precautions maintained   droplet precautions maintained  Taken 10/11/2021 2025 by Joey Yates RN  Infection Management: aseptic technique maintained  Isolation Precautions:   contact precautions maintained   droplet precautions maintained  Taken 10/9/2021 0822 by Dolores Read RN  Fever Reduction/Comfort Measures: lightweight clothing     Problem: Fall Injury Risk  Goal: Absence of Fall and Fall-Related Injury  Intervention: Identify and Manage Contributors to Fall Injury Risk  Flowsheets  Taken 10/11/2021 2232 by Joey Yates RN  Medication Review/Management: medications reviewed  Taken 10/11/2021 2025 by Joey Yates RN  Medication Review/Management: medications  reviewed  Taken 10/9/2021 1649 by Dolores Read RN  Self-Care Promotion: independence encouraged  Intervention: Promote Injury-Free Environment  Flowsheets  Taken 10/12/2021 0500 by Monica Wills  Safety Promotion/Fall Prevention:   activity supervised   fall prevention program maintained   nonskid shoes/slippers when out of bed   safety round/check completed  Taken 10/12/2021 0424 by Joey Yates RN  Safety Promotion/Fall Prevention:   activity supervised   assistive device/personal items within reach   clutter free environment maintained   fall prevention program maintained   lighting adjusted   nonskid shoes/slippers when out of bed   room organization consistent   safety round/check completed  Taken 10/12/2021 0245 by Joey Yates RN  Safety Promotion/Fall Prevention:   activity supervised   assistive device/personal items within reach   clutter free environment maintained   fall prevention program maintained   lighting adjusted   nonskid shoes/slippers when out of bed   room organization consistent   safety round/check completed  Taken 10/12/2021 0048 by Joey Yates, ARYA  Safety Promotion/Fall Prevention:   activity supervised   assistive device/personal items within reach   clutter free environment maintained   fall prevention program maintained   lighting adjusted   nonskid shoes/slippers when out of bed   room organization consistent   safety round/check completed  Taken 10/11/2021 2232 by Joey Yates RN  Safety Promotion/Fall Prevention:   activity supervised   assistive device/personal items within reach   clutter free environment maintained   fall prevention program maintained   lighting adjusted   nonskid shoes/slippers when out of bed   safety round/check completed   room organization consistent  Taken 10/11/2021 2025 by Joey Yates, RN  Safety Promotion/Fall Prevention:   activity supervised   assistive device/personal items within reach   clutter free environment maintained   fall prevention program  maintained   lighting adjusted   nonskid shoes/slippers when out of bed   room organization consistent   safety round/check completed     Problem: Skin Injury Risk Increased  Goal: Skin Health and Integrity  Intervention: Optimize Skin Protection  Flowsheets  Taken 10/12/2021 0500 by Monica Wills  Head of Bed ([facility]): HOB elevated  Taken 10/12/2021 0424 by Joey Yates RN  Head of Bed (HOB): HOB elevated  Taken 10/12/2021 0245 by Joey Yates RN  Head of Bed (HOB): HOB elevated  Taken 10/12/2021 0048 by Joey Yates RN  Pressure Reduction Techniques:   frequent weight shift encouraged   weight shift assistance provided  Head of Bed (HOB): [facility] elevated  Pressure Reduction Devices:   alternating pressure pump (ADD)   pressure-redistributing mattress utilized  Skin Protection:   adhesive use limited   tubing/devices free from skin contact  Taken 10/11/2021 2232 by Joey Yates RN  Head of Bed (HOB): HOB elevated  Taken 10/11/2021 2025 by Joey Yates RN  Pressure Reduction Techniques:   frequent weight shift encouraged   weight shift assistance provided  Head of Bed (HOB): [facility] elevated  Pressure Reduction Devices: (waffle cushion)   alternating pressure pump (ADD)   pressure-redistributing mattress utilized   other (see comments)  Skin Protection:   adhesive use limited   incontinence pads utilized   tubing/devices free from skin contact  Intervention: Promote and Optimize Oral Intake  Flowsheets  Taken 10/12/2021 0048  Oral Nutrition Promotion:   medicated   rest periods promoted  Taken 10/11/2021 2025  Oral Nutrition Promotion:   medicated   rest periods promoted   Goal Outcome Evaluation:

## 2021-10-12 NOTE — CASE MANAGEMENT/SOCIAL WORK
Continued Stay Note  [facility]     Patient Name: Angel Cisneros  MRN: [id number]  Today's Date: 10/12/2021    Admit Date: 9/5/2021     Discharge Plan     Row Name 10/12/21 1107       Plan    Plan Comments Spoke with patient's son Alex and updated him that pre-cert is pending and once approval is obtained patient will DC to [facility]. he verbalized understanding and is planning to come visit once he gets moved. CCP will follow. Suzi Neville RN CCP               Discharge Codes    No documentation.               Expected Discharge Date and Time     Expected Discharge Date Expected Discharge Time    Oct 12, 2021             Suzi Neville RN

## 2021-10-12 NOTE — PROGRESS NOTES
LOS: 36 days   Patient Care Team:  Ayaan Amin MD as PCP - General (Family Medicine)  Following for Covid pneumonia and acute respiratory failure  Subjective     Patient says he is feeling pretty good he did move around a little bit his oxygen came off and got real short of breath but he is recovered minimal cough no sputum no chest pain no lower extremity pain or swelling  Review of Systems:          Objective     Vital Signs  Vital Sign Min/Max for last 24 hours  Temp  Min: 96.8 °F (36 °C)  Max: 97.9 °F (36.6 °C)   BP  Min: 114/66  Max: 137/80   Pulse  Min: 56  Max: 81   Resp  Min: 18  Max: 24   SpO2  Min: 94 %  Max: 99 %   Flow (L/min)  Min: 5  Max: 5   Weight  Min: 60.7 kg (133 lb 14.4 oz)  Max: 60.7 kg (133 lb 14.4 oz)        Ventilator/Non-Invasive Ventilation Settings (From admission, onward)    None                       Body mass index is 20.97 kg/m².  I/O last 3 completed shifts:  In: 1680 [P.O.:1680]  Out: 1875 [Urine:1875]  No intake/output data recorded.        Physical Exam:  General Appearance: Well-developed white male he is resting in bed 5 L nasal cannula O2 with oxygen saturation of 96%  Eyes: Conjunctiva are clear and anicteric  ENT: Mucous membranes are moist no erythema no exudate  Neck: No jugular venous distension trachea midline no crepitance  Lungs: Clear nonlabored symmetric expansion no wheezes no rales no rhonchi  Cardiac: Regular rate rhythm no murmur no gallop  Abdomen: Soft no palpable hepatosplenomegaly or masses  : Not examined  Musculoskeletal: Grossly normal there is no calf tenderness or palpable cords  Skin: No jaundice no petechiae skin is warm and dry  Neuro: He is alert oriented cooperative following commands moving all extremities grossly intact  Extremities/P Vascular: No clubbing no cyanosis no edema palpable radial dorsalis pedis pulses bilaterally  MSE: He seems to be in good spirits today       Labs:  Results from last 7 days   Lab Units  10/09/21  1238 10/06/21  0634 10/05/21  0524   GLUCOSE mg/dL 184* 95 92   SODIUM mmol/L 138 141 142   POTASSIUM mmol/L 4.1 4.9 4.6   CO2 mmol/L 21.4* 23.8 23.1   CHLORIDE mmol/L 106 109* 109*   ANION GAP mmol/L 10.6 8.2 9.9   CREATININE mg/dL 1.22 1.33* 1.19   BUN mg/dL 36* 40* 42*   BUN / CREAT RATIO  29.5* 30.1* 35.3*   CALCIUM mg/dL 8.2* 8.5* 8.4*   EGFR IF NONAFRICN AM mL/min/1.73 59* 53* 60*     Estimated Creatinine Clearance: 48.4 mL/min (by C-G formula based on SCr of 1.22 mg/dL).      Results from last 7 days   Lab Units 10/09/21  0533 10/06/21  0634 10/05/21  0524   WBC 10*3/mm3 4.34 5.69 5.37   RBC 10*6/mm3 4.44 4.44 4.33   HEMOGLOBIN g/dL 13.1 13.2 12.9*   HEMATOCRIT % 41.7 39.1 38.4   MCV fL 93.9 88.1 88.7   MCH pg 29.5 29.7 29.8   MCHC g/dL 31.4* 33.8 33.6   RDW % 15.3 14.6 14.5   RDW-SD fl 52.6 46.8 46.1   MPV fL 12.2* 10.8 11.3   PLATELETS 10*3/mm3 127* 143 124*   NEUTROPHIL % % 79.4*  --  90.3*   LYMPHOCYTE % % 13.1*  --  5.0*   MONOCYTES % % 4.8*  --  3.4*   EOSINOPHIL % % 0.7  --  0.0*   BASOPHIL % % 0.2  --  0.0   NEUTROS ABS 10*3/mm3 3.44 5.29 4.85   LYMPHS ABS 10*3/mm3 0.57*  --  0.27*   MONOS ABS 10*3/mm3 0.21  --  0.18   EOS ABS 10*3/mm3 0.03  --  0.00   BASOS ABS 10*3/mm3 0.01  --  0.00             Results from last 7 days   Lab Units 10/07/21  0730   PROBNP pg/mL 288.0         Results from last 7 days   Lab Units 10/07/21  0730   PROCALCITONIN ng/mL 0.05         Microbiology Results (last 10 days)     ** No results found for the last 240 hours. **              amLODIPine, 10 mg, Oral, Q24H  calcium carbonate, 1 tablet, Oral, Daily  dexamethasone, 6 mg, Oral, Daily With Breakfast  dextromethorphan polistirex ER, 60 mg, Oral, Q12H  enoxaparin, 1 mg/kg, Subcutaneous, Q12H  famotidine, 20 mg, Oral, Daily  finasteride, 5 mg, Oral, Daily  metoprolol tartrate, 12.5 mg, Oral, Q12H  mirtazapine, 7.5 mg, Oral, Nightly  prednisoLONE acetate, 1 drop, Both Eyes, Daily  saccharomyces boulardii, 250 mg,  Oral, BID  senna-docusate sodium, 2 tablet, Oral, BID  sodium chloride, 1 drop, Left Eye, TID           Diagnostics:  CT Head Without Contrast    Result Date: 9/8/2021  CT HEAD WITHOUT CONTRAST  HISTORY: Increasing confusion  COMPARISON: None available.  TECHNIQUE: Axial CT imaging was obtained through the brain. No IV contrast was administered.  FINDINGS: No acute intracranial hemorrhage is seen. Ventricles are normal in size. There is no midline shift or mass effect. Mild mucosal thickening is seen within the left maxillary sinus. There is some minimal opacification of the right mastoid air cells.      No acute intracranial findings.  Radiation dose reduction techniques were utilized, including automated exposure control and exposure modulation based on body size.  This report was finalized on 9/8/2021 4:38 AM by Dr. Sobeida Jones M.D.      XR Chest 1 View    Result Date: 9/25/2021  XR CHEST 1 VW-  HISTORY: Male who is 71 years-old,  pneumomediastinum follow-up  TECHNIQUE: Frontal view of the chest  COMPARISON: 09/24/2021  FINDINGS: The heart is enlarged. Pneumomediastinum is again apparent, with soft tissue gas again extending to the right neck base. Bilateral infiltrates appear similar to prior exam. No pleural effusion, or pneumothorax. No acute osseous process.      No significant change.  This report was finalized on 9/25/2021 7:08 AM by Dr. Parminder Bradshaw M.D.      XR Chest 1 View    Result Date: 9/24/2021  ONE VIEW PORTABLE CHEST AT 5:50 AM  HISTORY: Follow-up of pneumomediastinum. Covid 19 pneumonia.  FINDINGS: The lungs are well-expanded with extensive pneumonia in both lungs, particularly in the lower lobes and showing no significant change from 2 days ago. There has been nearly complete resolution of some pneumomediastinum and there remains a tiny amount of subcutaneous emphysema at the base of the right neck. There is no evidence of pneumothorax. The heart remains mildly enlarged.  This report  was finalized on 9/24/2021 7:13 AM by Dr. Fidel Madison M.D.      XR Chest 1 View    Result Date: 9/22/2021  XR CHEST 1 VW-  HISTORY: Male who is 71 years-old,  Covid pneumonia  TECHNIQUE: Frontal view of the chest  COMPARISON: 09/20/2021  FINDINGS: Heart size is normal. Pneumomediastinum is noted, extending to the right neck base. Pulmonary vasculature is unremarkable. Bilateral infiltrates do not appear significantly changed. No pleural effusion, or pneumothorax. Right hemidiaphragm remains elevated. No acute osseous process.      Persistent bilateral infiltrates. Pneumomediastinum extends into the right neck base.  This report was finalized on 9/22/2021 1:00 PM by Dr. Parminder Bradshaw M.D.      XR Chest 1 View    Result Date: 9/20/2021  XR CHEST 1 VW-  09/20/2021  HISTORY: Follow-up pneumonia.  Heart size is mildly enlarged. There are moderately severe patchy infiltrates in the bilateral mid to lower lungs right worse than left. FINDINGS are consistent with Covid 19 and appears similar to yesterday's study.  No pneumothorax is seen. Bony structures appear unremarkable.      Mild cardiomegaly. 2. Moderately severe bilateral pulmonary infiltrates right worse than left. FINDINGS are consistent with Covid 19. 3. Chest appears similar to yesterday's study.  This report was finalized on 9/20/2021 7:37 AM by Dr. Norman Montalvo M.D.      XR Chest 1 View    Result Date: 9/19/2021  ONE VIEW PORTABLE CHEST AT 5:10 AM  HISTORY: Covid 19 pneumonia. Shortness of breath.  FINDINGS: There is extensive pneumonia predominantly in both lower lobes and consistent with Covid 19 pneumonia. This shows no significant change from 3 days ago. However, there is now some mediastinal air present that extends into the base of the right neck. No definite pneumothorax is seen. The heart remains slightly enlarged.  This report was finalized on 9/19/2021 7:07 AM by Dr. Fidel Madison M.D.      XR Chest 1 View    Result Date:  9/16/2021  EXAMINATION: SINGLE VIEW CHEST RADIOGRAPH  HISTORY: 71-year-old male with history of worsening hypoxia.  FINDINGS: An upright AP portable chest radiograph was obtained. Comparison is made to chest radiograph dated 09/07/2021. There has been interval progression of opacification in the bilateral mid and lower lung fields since the prior examination. The cardiac silhouette is obscured to a greater degree than on the prior examination. The visualized osseous structures appear normal.      There is evidence of interval progression of bilateral pulmonary infiltrates.  This report was finalized on 9/16/2021 11:08 AM by Dr. Matthew Bradley M.D.      XR Chest 1 View    Result Date: 9/7/2021  SINGLE VIEW OF THE CHEST  HISTORY: Worsening respiratory failure  COMPARISON: 09/05/2021  FINDINGS: Cardiomegaly is present. There is no vascular congestion. Bilateral pulmonary infiltrates are seen. Appearance is in keeping with COVID. They have worsened when compared to prior exam. No pneumothorax or pleural effusion is seen.      Worsening bilateral pulmonary infiltrates.  This report was finalized on 9/7/2021 10:44 PM by Dr. Sobeida Jones M.D.      XR Chest AP    Result Date: 9/5/2021  PORTABLE CHEST 09/05/2021 AT 4:25 PM  CLINICAL HISTORY: Cough. Fever. COVID 19 evaluation.  Compared to the previous chest dated 09/07/2018.  There is patchy ill-defined increased density in the inferior half of the right lung and to lesser extent in the inferior aspect of the left lung consistent with pneumonia due to COVID 19 infection. There are no pleural effusions. The heart is borderline enlarged.  This report was finalized on 9/5/2021 5:01 PM by Dr. Blu Mendez M.D.      Results for orders placed during the hospital encounter of 09/07/18    Adult Transthoracic Echo Complete W/ Cont if Necessary Per Protocol    Interpretation Summary  · EF = 65%.  · Left ventricular systolic function is normal.  · Mild tricuspid valve  regurgitation is present.  · Calculated right ventricular systolic pressure from tricuspid regurgitation is 37 mmHg.  · Estimated right ventricular systolic pressure from tricuspid regurgitation is mildly elevated (35-45 mmHg).          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]  No   • Paroxysmal atrial fibrillation (HCC) [I48.0]  No   • Acute respiratory failure with hypoxia (HCC) [J96.01]  Yes   • BPH (benign prostatic hyperplasia) [N40.0]  Yes   • CLL (chronic lymphocytic leukemia) (HCC) [C91.10]  Yes   • Chronic kidney disease, stage 3b (HCC) [N18.32]  Yes   • HTN (hypertension) [I10]  Yes   • GERD (gastroesophageal reflux disease) [K21.9]  Yes   • Hodgkin's disease of lymph nodes of head, face, and neck (HCC) [C81.91]  Yes      Resolved Hospital Problems    Diagnosis Date Resolved POA   • Acute constipation [K59.00] 10/08/2021 No     Chest x-ray with chronic bibasilar infiltrates that really have not changed much over the last couple of weeks the pneumomediastinum has resolved    Assessment/Plan     1. COVID-19 infection and pneumonia he is on Decadron 6 milligrams  daily and has completed remdesivir.  Will probably  wean his dexamethasone very slowly given the duration he has been on in his problems I will drop to 4 mg a day.  2. Acute hypoxic respiratory failure secondary #1 wean O2 as tolerated making progress from when I last saw him he is down to 5 L looks like at least at rest he may be able to wean a little lower.  3. Elevated procalcitonin completed antibiotics for possible secondary bacterial pneumonia. And repeat procalcitonin was normal.  4. Pneumomediastinum resolved  5. CLL  6. History of non-Hodgkin's lymphoma  7. Chronic kidney disease stage IIIb  8. Paroxysmal atrial fibrillation  9. Thrombocytopenia mild not sure etiology, D-dimer normal unlikely clot consuming platelets or DIC questions is  related to his CLL, at least they have been stable.          Plan for disposition: Patient is making excellent progress I agree if he is going to subacute rehab if they will take him on 5 L he probably could go anytime    Clay Truong MD  10/11/21  20:45 EDT    Time:

## 2021-10-13 ENCOUNTER — APPOINTMENT (OUTPATIENT)
Dept: GENERAL RADIOLOGY | Facility: HOSPITAL | Age: 71
End: 2021-10-13

## 2021-10-13 PROCEDURE — 97530 THERAPEUTIC ACTIVITIES: CPT

## 2021-10-13 PROCEDURE — 63710000001 DEXAMETHASONE PER 0.25 MG: Performed by: INTERNAL MEDICINE

## 2021-10-13 PROCEDURE — 71045 X-RAY EXAM CHEST 1 VIEW: CPT

## 2021-10-13 PROCEDURE — 97110 THERAPEUTIC EXERCISES: CPT

## 2021-10-13 PROCEDURE — 94799 UNLISTED PULMONARY SVC/PX: CPT

## 2021-10-13 PROCEDURE — 25010000002 ENOXAPARIN PER 10 MG: Performed by: INTERNAL MEDICINE

## 2021-10-13 RX ADMIN — FINASTERIDE 5 MG: 5 TABLET, FILM COATED ORAL at 08:32

## 2021-10-13 RX ADMIN — SODIUM CHLORIDE 1 DROP: 50 SOLUTION OPHTHALMIC at 08:32

## 2021-10-13 RX ADMIN — MIRTAZAPINE 7.5 MG: 15 TABLET, FILM COATED ORAL at 20:12

## 2021-10-13 RX ADMIN — PREDNISOLONE ACETATE 1 DROP: 10 SUSPENSION/ DROPS OPHTHALMIC at 08:32

## 2021-10-13 RX ADMIN — AMLODIPINE BESYLATE 10 MG: 10 TABLET ORAL at 08:32

## 2021-10-13 RX ADMIN — Medication 250 MG: at 20:11

## 2021-10-13 RX ADMIN — FAMOTIDINE 20 MG: 20 TABLET, FILM COATED ORAL at 08:33

## 2021-10-13 RX ADMIN — ENOXAPARIN SODIUM 60 MG: 60 INJECTION, SOLUTION INTRAVENOUS; SUBCUTANEOUS at 20:31

## 2021-10-13 RX ADMIN — DEXTROMETHORPHAN POLISTIREX 60 MG: 30 SUSPENSION, EXTENDED RELEASE ORAL at 20:11

## 2021-10-13 RX ADMIN — DEXTROMETHORPHAN POLISTIREX 60 MG: 30 SUSPENSION, EXTENDED RELEASE ORAL at 08:32

## 2021-10-13 RX ADMIN — SODIUM CHLORIDE 1 DROP: 50 SOLUTION OPHTHALMIC at 17:20

## 2021-10-13 RX ADMIN — METOPROLOL TARTRATE 12.5 MG: 25 TABLET ORAL at 08:33

## 2021-10-13 RX ADMIN — Medication 3 MG: at 23:11

## 2021-10-13 RX ADMIN — ANTACID TABLETS 1 TABLET: 500 TABLET, CHEWABLE ORAL at 08:32

## 2021-10-13 RX ADMIN — SODIUM CHLORIDE 1 DROP: 50 SOLUTION OPHTHALMIC at 20:16

## 2021-10-13 RX ADMIN — DOCUSATE SODIUM 50MG AND SENNOSIDES 8.6MG 2 TABLET: 8.6; 5 TABLET, FILM COATED ORAL at 08:33

## 2021-10-13 RX ADMIN — ENOXAPARIN SODIUM 60 MG: 60 INJECTION, SOLUTION INTRAVENOUS; SUBCUTANEOUS at 08:33

## 2021-10-13 RX ADMIN — METOPROLOL TARTRATE 12.5 MG: 25 TABLET ORAL at 20:13

## 2021-10-13 RX ADMIN — DEXAMETHASONE 4 MG: 4 TABLET ORAL at 08:33

## 2021-10-13 RX ADMIN — Medication 250 MG: at 08:32

## 2021-10-13 NOTE — PROGRESS NOTES
LOS: 38 days   Patient Care Team:  Ayaan Amin MD as PCP - General (Family Medicine)  Following for Covid pneumonia and acute respiratory failure  Subjective     Patient says he was up in a chair today and up a couple times to the bathroom not really anymore.  He does get pretty short of breath when he exerts the computer says he is on 6 L but he is actually on 4-1/2 to 5 L currently his oxygen saturations are about 96-97% at rest but just talking or exerting he did a little bit in bed he drops down to about 91%.  I asked him patient was doing today and he started crying he is getting really depressed to being in the hospital he is not seeing anybody and he has been here for 6 weeks and has not had close contact with any family  Review of Systems:          Objective     Vital Signs  Vital Sign Min/Max for last 24 hours  Temp  Min: 97 °F (36.1 °C)  Max: 98 °F (36.7 °C)   BP  Min: 113/75  Max: 127/78   Pulse  Min: 65  Max: 70   Resp  Min: 18  Max: 20   SpO2  Min: 91 %  Max: 98 %   Flow (L/min)  Min: 5  Max: 6   Weight  Min: 60.5 kg (133 lb 6.4 oz)  Max: 60.5 kg (133 lb 6.4 oz)        Ventilator/Non-Invasive Ventilation Settings (From admission, onward)    None                       Body mass index is 20.89 kg/m².  I/O last 3 completed shifts:  In: 720 [P.O.:720]  Out: 2350 [Urine:2350]  No intake/output data recorded.        Physical Exam:  General Appearance: Well-developed white male he is resting in bed 5 L nasal cannula O2 with oxygen saturation of 96%  Eyes: Conjunctiva are clear and anicteric  ENT: Mucous membranes are moist no erythema no exudate  Neck: No jugular venous distension trachea midline no crepitance  Lungs: Clear nonlabored symmetric expansion no wheezes no rales no rhonchi  Cardiac: Regular rate rhythm no murmur no gallop  Abdomen: Soft no palpable hepatosplenomegaly or masses  : Not examined  Musculoskeletal: Grossly normal there is no calf tenderness or palpable cords  Skin: No  jaundice no petechiae skin is warm and dry does have a little abrasions in his presacrococcygeal area  Neuro: He is alert oriented cooperative following commands moving all extremities grossly intact  Extremities/P Vascular: No clubbing no cyanosis no edema palpable radial dorsalis pedis pulses bilaterally  MSE: He seems adepressed today       Labs:  Results from last 7 days   Lab Units 10/12/21  1056 10/09/21  1238   GLUCOSE mg/dL 100* 184*   SODIUM mmol/L 142 138   POTASSIUM mmol/L 3.8 4.1   CO2 mmol/L 26.6 21.4*   CHLORIDE mmol/L 105 106   ANION GAP mmol/L 10.4 10.6   CREATININE mg/dL 1.46* 1.22   BUN mg/dL 35* 36*   BUN / CREAT RATIO  24.0 29.5*   CALCIUM mg/dL 8.9 8.2*   EGFR IF NONAFRICN AM mL/min/1.73 48* 59*     Estimated Creatinine Clearance: 40.3 mL/min (A) (by C-G formula based on SCr of 1.46 mg/dL (H)).      Results from last 7 days   Lab Units 10/12/21  1056 10/09/21  0533   WBC 10*3/mm3 4.69 4.34   RBC 10*6/mm3 4.29 4.44   HEMOGLOBIN g/dL 13.0 13.1   HEMATOCRIT % 38.6 41.7   MCV fL 90.0 93.9   MCH pg 30.3 29.5   MCHC g/dL 33.7 31.4*   RDW % 15.3 15.3   RDW-SD fl 50.4 52.6   MPV fL 10.6 12.2*   PLATELETS 10*3/mm3 166 127*   NEUTROPHIL % % 77.6* 79.4*   LYMPHOCYTE % % 12.6* 13.1*   MONOCYTES % % 6.4 4.8*   EOSINOPHIL % % 1.1 0.7   BASOPHIL % % 0.4 0.2   IMM GRAN % % 1.9*  --    NEUTROS ABS 10*3/mm3 3.64 3.44   LYMPHS ABS 10*3/mm3 0.59* 0.57*   MONOS ABS 10*3/mm3 0.30 0.21   EOS ABS 10*3/mm3 0.05 0.03   BASOS ABS 10*3/mm3 0.02 0.01   IMMATURE GRANS (ABS) 10*3/mm3 0.09*  --    NRBC /100 WBC 0.2  --              Results from last 7 days   Lab Units 10/07/21  0730   PROBNP pg/mL 288.0         Results from last 7 days   Lab Units 10/07/21  0730   PROCALCITONIN ng/mL 0.05         Microbiology Results (last 10 days)     ** No results found for the last 240 hours. **              amLODIPine, 10 mg, Oral, Q24H  calcium carbonate, 1 tablet, Oral, Daily  dexamethasone, 4 mg, Oral, Daily With  Breakfast  dextromethorphan polistirex ER, 60 mg, Oral, Q12H  enoxaparin, 1 mg/kg, Subcutaneous, Q12H  famotidine, 20 mg, Oral, Daily  finasteride, 5 mg, Oral, Daily  metoprolol tartrate, 12.5 mg, Oral, Q12H  mirtazapine, 7.5 mg, Oral, Nightly  prednisoLONE acetate, 1 drop, Both Eyes, Daily  saccharomyces boulardii, 250 mg, Oral, BID  senna-docusate sodium, 2 tablet, Oral, BID  sodium chloride, 1 drop, Left Eye, TID           Diagnostics:  CT Head Without Contrast    Result Date: 9/8/2021  CT HEAD WITHOUT CONTRAST  HISTORY: Increasing confusion  COMPARISON: None available.  TECHNIQUE: Axial CT imaging was obtained through the brain. No IV contrast was administered.  FINDINGS: No acute intracranial hemorrhage is seen. Ventricles are normal in size. There is no midline shift or mass effect. Mild mucosal thickening is seen within the left maxillary sinus. There is some minimal opacification of the right mastoid air cells.      No acute intracranial findings.  Radiation dose reduction techniques were utilized, including automated exposure control and exposure modulation based on body size.  This report was finalized on 9/8/2021 4:38 AM by Dr. Sobeida Jones M.D.      XR Chest 1 View    Result Date: 9/25/2021  XR CHEST 1 VW-  HISTORY: Male who is 71 years-old,  pneumomediastinum follow-up  TECHNIQUE: Frontal view of the chest  COMPARISON: 09/24/2021  FINDINGS: The heart is enlarged. Pneumomediastinum is again apparent, with soft tissue gas again extending to the right neck base. Bilateral infiltrates appear similar to prior exam. No pleural effusion, or pneumothorax. No acute osseous process.      No significant change.  This report was finalized on 9/25/2021 7:08 AM by Dr. Parminder Bradshaw M.D.      XR Chest 1 View    Result Date: 9/24/2021  ONE VIEW PORTABLE CHEST AT 5:50 AM  HISTORY: Follow-up of pneumomediastinum. Covid 19 pneumonia.  FINDINGS: The lungs are well-expanded with extensive pneumonia in both lungs,  particularly in the lower lobes and showing no significant change from 2 days ago. There has been nearly complete resolution of some pneumomediastinum and there remains a tiny amount of subcutaneous emphysema at the base of the right neck. There is no evidence of pneumothorax. The heart remains mildly enlarged.  This report was finalized on 9/24/2021 7:13 AM by Dr. Fidel Madison M.D.      XR Chest 1 View    Result Date: 9/22/2021  XR CHEST 1 VW-  HISTORY: Male who is 71 years-old,  Covid pneumonia  TECHNIQUE: Frontal view of the chest  COMPARISON: 09/20/2021  FINDINGS: Heart size is normal. Pneumomediastinum is noted, extending to the right neck base. Pulmonary vasculature is unremarkable. Bilateral infiltrates do not appear significantly changed. No pleural effusion, or pneumothorax. Right hemidiaphragm remains elevated. No acute osseous process.      Persistent bilateral infiltrates. Pneumomediastinum extends into the right neck base.  This report was finalized on 9/22/2021 1:00 PM by Dr. Parminder Bradshaw M.D.      XR Chest 1 View    Result Date: 9/20/2021  XR CHEST 1 VW-  09/20/2021  HISTORY: Follow-up pneumonia.  Heart size is mildly enlarged. There are moderately severe patchy infiltrates in the bilateral mid to lower lungs right worse than left. FINDINGS are consistent with Covid 19 and appears similar to yesterday's study.  No pneumothorax is seen. Bony structures appear unremarkable.      Mild cardiomegaly. 2. Moderately severe bilateral pulmonary infiltrates right worse than left. FINDINGS are consistent with Covid 19. 3. Chest appears similar to yesterday's study.  This report was finalized on 9/20/2021 7:37 AM by Dr. Norman Montlavo M.D.      XR Chest 1 View    Result Date: 9/19/2021  ONE VIEW PORTABLE CHEST AT 5:10 AM  HISTORY: Covid 19 pneumonia. Shortness of breath.  FINDINGS: There is extensive pneumonia predominantly in both lower lobes and consistent with Covid 19 pneumonia. This shows no  significant change from 3 days ago. However, there is now some mediastinal air present that extends into the base of the right neck. No definite pneumothorax is seen. The heart remains slightly enlarged.  This report was finalized on 9/19/2021 7:07 AM by Dr. Fidel Madison M.D.      XR Chest 1 View    Result Date: 9/16/2021  EXAMINATION: SINGLE VIEW CHEST RADIOGRAPH  HISTORY: 71-year-old male with history of worsening hypoxia.  FINDINGS: An upright AP portable chest radiograph was obtained. Comparison is made to chest radiograph dated 09/07/2021. There has been interval progression of opacification in the bilateral mid and lower lung fields since the prior examination. The cardiac silhouette is obscured to a greater degree than on the prior examination. The visualized osseous structures appear normal.      There is evidence of interval progression of bilateral pulmonary infiltrates.  This report was finalized on 9/16/2021 11:08 AM by Dr. Matthew Bradley M.D.      XR Chest 1 View    Result Date: 9/7/2021  SINGLE VIEW OF THE CHEST  HISTORY: Worsening respiratory failure  COMPARISON: 09/05/2021  FINDINGS: Cardiomegaly is present. There is no vascular congestion. Bilateral pulmonary infiltrates are seen. Appearance is in keeping with COVID. They have worsened when compared to prior exam. No pneumothorax or pleural effusion is seen.      Worsening bilateral pulmonary infiltrates.  This report was finalized on 9/7/2021 10:44 PM by Dr. Sobeida Jones M.D.      XR Chest AP    Result Date: 9/5/2021  PORTABLE CHEST 09/05/2021 AT 4:25 PM  CLINICAL HISTORY: Cough. Fever. COVID 19 evaluation.  Compared to the previous chest dated 09/07/2018.  There is patchy ill-defined increased density in the inferior half of the right lung and to lesser extent in the inferior aspect of the left lung consistent with pneumonia due to COVID 19 infection. There are no pleural effusions. The heart is borderline enlarged.  This report was  finalized on 9/5/2021 5:01 PM by Dr. Blu Mendez M.D.      Results for orders placed during the hospital encounter of 09/07/18    Adult Transthoracic Echo Complete W/ Cont if Necessary Per Protocol    Interpretation Summary  · EF = 65%.  · Left ventricular systolic function is normal.  · Mild tricuspid valve regurgitation is present.  · Calculated right ventricular systolic pressure from tricuspid regurgitation is 37 mmHg.  · Estimated right ventricular systolic pressure from tricuspid regurgitation is mildly elevated (35-45 mmHg).          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]  No   • Paroxysmal atrial fibrillation (HCC) [I48.0]  No   • Acute respiratory failure with hypoxia (HCC) [J96.01]  Yes   • BPH (benign prostatic hyperplasia) [N40.0]  Yes   • CLL (chronic lymphocytic leukemia) (HCC) [C91.10]  Yes   • Chronic kidney disease, stage 3b (HCC) [N18.32]  Yes   • HTN (hypertension) [I10]  Yes   • GERD (gastroesophageal reflux disease) [K21.9]  Yes   • Hodgkin's disease of lymph nodes of head, face, and neck (HCC) [C81.91]  Yes      Resolved Hospital Problems    Diagnosis Date Resolved POA   • Acute constipation [K59.00] 10/08/2021 No     Chest x-ray with chronic bibasilar infiltrates that really have not changed much over the last couple of weeks the pneumomediastinum has resolved    Assessment/Plan     1. COVID-19 infection and pneumonia he is on Decadron 6 milligrams  daily and has completed remdesivir.  Will probably  wean his dexamethasone very slowly given the duration he has been on in his problems I will drop to 4 mg a day.  2. Acute hypoxic respiratory failure secondary #1 wean O2 as tolerated making progress been very slow.  He does have some fibrotic changes on his lung bases on the CT scan from the third how much further he improves I do not know there was still a lot of inflammation on that  scan.  3. Elevated procalcitonin completed antibiotics for possible secondary bacterial pneumonia. And repeat procalcitonin was normal.  4. Pneumomediastinum resolved  5. CLL  6. History of non-Hodgkin's lymphoma  7. Chronic kidney disease stage IIIb  8. Paroxysmal atrial fibrillation  9. Thrombocytopenia mild not sure etiology, D-dimer normal unlikely clot consuming platelets or DIC questions is related to his CLL, at least they have been stable.  10. Presacral precoccygeal pressure sore  11. Depression patient seems quite depressed really said in the last couple of days he has been here so long he should be able to come out of isolation and have visitors I have asked nursing to check with infection control tomorrow about this          Plan for disposition: Patient is making excellent progress I agree if he is going to subacute rehab if they will take him on 5 L he probably could go anytime.  Patient really needs to go somewhere that has the ability to give him at least 5 L O2 at rest and he needs more oxygen for exertion as he desaturates with minimal exertion but I think if he had more oxygen he could do a lot more therapy.    Clay Truong MD  10/13/21  19:54 EDT    Time:

## 2021-10-13 NOTE — PROGRESS NOTES
Name: Angel Cisneros ADMIT: 2021   : 1950  PCP: Ayaan Amin MD    MRN: [id number] LOS: 38 days   AGE/SEX: 71 y.o. male  ROOM: [facility]   Subjective   Chief Complaint   Patient presents with   • positive covid   • Headache   • Abdominal Pain   • Shortness of Breath     Dyspnea fair  On steroids  On 6L oxygen  Working with therapists- desats some with therapy    ROS  No f/c  No n/v  No cp/palp  Mild soa/cough    Objective   Vital Signs  Temp:  [97 °F (36.1 °C)-97.7 °F (36.5 °C)] 97 °F (36.1 °C)  Heart Rate:  [65-66] 66  Resp:  [20] 20  BP: (118-127)/(69-78) 124/69  SpO2:  [91 %-98 %] 94 %  on  Flow (L/min):  [5-6] 6;   Device (Oxygen Therapy): humidified; high-flow nasal cannula  Body mass index is 20.89 kg/m².    Physical Exam  Constitutional:       Appearance: He is well-developed.   HENT:      Head: Normocephalic and atraumatic.   Eyes:      General: No scleral icterus.  Cardiovascular:      Rate and Rhythm: Normal rate and regular rhythm.      Heart sounds: Normal heart sounds.   Pulmonary:      Effort: Respiratory distress (decreased bs at basesslight) present.      Breath sounds: Normal breath sounds.   Abdominal:      General: There is no distension.      Palpations: Abdomen is soft.      Tenderness: There is no abdominal tenderness.   Musculoskeletal:      Cervical back: Neck supple.   Neurological:      Mental Status: He is alert.   Psychiatric:         Behavior: Behavior normal.         Results Review:       I reviewed the patient's new clinical results.  Results from last 7 days   Lab Units 10/12/21  1056 10/09/21  0533   WBC 10*3/mm3 4.69 4.34   HEMOGLOBIN g/dL 13.0 13.1   PLATELETS 10*3/mm3 166 127*     Results from last 7 days   Lab Units 10/12/21  1056 10/09/21  1238   SODIUM mmol/L 142 138   POTASSIUM mmol/L 3.8 4.1   CHLORIDE mmol/L 105 106   CO2 mmol/L 26.6 21.4*   BUN mg/dL 35* 36*   CREATININE mg/dL 1.46* 1.22   GLUCOSE mg/dL 100* 184*   Estimated Creatinine Clearance:  40.3 mL/min (A) (by C-G formula based on SCr of 1.46 mg/dL (H)).    Results from last 7 days   Lab Units 10/12/21  1056 10/09/21  1238   CALCIUM mg/dL 8.9 8.2*     Results from last 7 days   Lab Units 10/07/21  0730   PROCALCITONIN ng/mL 0.05       Coag     HbA1C   Lab Results   Component Value Date    HGBA1C 5.39 09/06/2021     Infection   Results from last 7 days   Lab Units 10/07/21  0730   PROCALCITONIN ng/mL 0.05     Radiology(recent) XR Chest 1 View    Result Date: 10/12/2021  No interval change. No evidence of pneumothorax or pneumomediastinum.  This report was finalized on 10/12/2021 7:10 AM by Dr. Rich Joseph M.D.      No results found for: TROPONINT, TROPONINI, BNP  No components found for: TSH;2    amLODIPine, 10 mg, Oral, Q24H  calcium carbonate, 1 tablet, Oral, Daily  dexamethasone, 4 mg, Oral, Daily With Breakfast  dextromethorphan polistirex ER, 60 mg, Oral, Q12H  enoxaparin, 1 mg/kg, Subcutaneous, Q12H  famotidine, 20 mg, Oral, Daily  finasteride, 5 mg, Oral, Daily  metoprolol tartrate, 12.5 mg, Oral, Q12H  mirtazapine, 7.5 mg, Oral, Nightly  prednisoLONE acetate, 1 drop, Both Eyes, Daily  saccharomyces boulardii, 250 mg, Oral, BID  senna-docusate sodium, 2 tablet, Oral, BID  sodium chloride, 1 drop, Left Eye, TID       Diet Regular; Low Potassium; 2 gm (50 mEq)      Assessment/Plan      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]  No   • Paroxysmal atrial fibrillation (HCC) [I48.0]  No   • Acute respiratory failure with hypoxia (HCC) [J96.01]  Yes   • BPH (benign prostatic hyperplasia) [N40.0]  Yes   • CLL (chronic lymphocytic leukemia) (HCC) [C91.10]  Yes   • Chronic kidney disease, stage 3b (HCC) [N18.32]  Yes   • HTN (hypertension) [I10]  Yes   • GERD (gastroesophageal reflux disease) [K21.9]  Yes   • Hodgkin's disease of lymph nodes of head, face, and neck (HCC) [C81.91]  Yes      Resolved  Hospital Problems    Diagnosis Date Resolved POA   • Acute constipation [K59.00] 10/08/2021 No         71 y.o. male with history of CLL admitted with acute hypoxic respiratory failure due to COVID-19 pneumonia.  He was fully vaccinated.     COVID-19 pneumonia with acute hypoxic respiratory failure:   - He completed 7-day course of Rocephin due to elevated procalcitonin/pneumomediastinum previously this admission and infectious disease evaluated.  Completed remdesivir course.  Has been on high-dose dexamethasone for some time.  Weaning down  -Oxygen requirements slowly improving.  Still on 5-6 L.      Paroxysmal A. fib  -Sinus rhythm.  Continue metoprolol.    On therapeutic Lovenox for anticoagulation.  Cardiology evaluated and are anticipating about additional 1 month of anticoagulation at discharge.  Transition to Eliquis/Xarelto at that time.     Critical illness myopathy  -Encourage physical therapy and out of bed as much as possible.  Encourage protein supplements     Fibrosis noted on CT. outpatient follow-up     Hypertension: Acceptable acutely.  Continue current regimen     CKD3: Stable at baseline.  labs every 3 days      CLL: Follows with [facility]     GERD: PPI     · Lovenox 60 twice daily   · Full code.  · Discussed with patient and nursing staff  ·   · Anticipate discharge to facility.  Hopeful for DC soon.   working on pre-CERT. His pre-cert was denied by Dr. Lauryn Jensen working for his insurance company. This facility is appopriate for the patient both to continue to manage his Pulmonary issues as well as increase rehab. If the patient has worse outcome than desired this may be due to his insurance company and Dr. Jensen' denial.     Greater than 36 minutes spent with greater than 50% counseling and coordinating care      Jake Gomez MD  [facility]  10/13/21  15:17 EDT

## 2021-10-13 NOTE — PLAN OF CARE
Goal Outcome Evaluation:  Plan of Care Reviewed With: patient           Outcome Summary: Pt agreeable to skilled PT, requesting to get to BSC. Assisted pt CGA bed <->BSC , pt on high flow and NRB, pt very anxious w mobilization, VCs for PLB/relaxation strategies. Approx 3 min recovery time 80-88% sa02,. will cont to progress activity as gabriel.    .Patient was not wearing a face mask during this therapy encounter. Therapist used appropriate personal protective equipment including gown, eye protection, mask and gloves.  Mask used was [id number]/[id number]. Appropriate PPE was worn during the entire therapy session. Hand hygiene was completed before and after therapy session. Patient is in enhanced droplet precautions.

## 2021-10-13 NOTE — CASE MANAGEMENT/SOCIAL WORK
Continued Stay Note  [facility]     Patient Name: Angel Cisneros  MRN: [id number]  Today's Date: 10/13/2021    Admit Date: 9/5/2021     Discharge Plan     Row Name 10/13/21 1327       Plan    Plan Comments P2P done this morning and was denied again. Spoke with patient and updated him. He wishes to file an appeal with his insurance company. Spoke with Christiano/Keila and updated her the P2P was denied again and that patient wants to file an appeal. They can file that appeal for him. Christiano faxed document to [facility] that patient will need to sign giving [employer] permission to file an appeal with his insurance. Form signed and faxed back to Christiano. Updated son Alex who is on his way here to visit the patient. Explained as of right now patient still can not have visitors at the hospital. Alex verbalized understanding. He is driving in from [place]. Spoke with Paul/Itz Gilbert to see if they could manage his oxygen needs and she is aware the patient is appealing denial for [employer]. She will review clinical information. Awaiting determination from the appeal and from Paul on if they can manage his O2 demands. Suzi Neville RN CCP               Discharge Codes    No documentation.               Expected Discharge Date and Time     Expected Discharge Date Expected Discharge Time    Oct 12, 2021             Suzi Neville RN

## 2021-10-13 NOTE — PROGRESS NOTES
LOS: 37 days   Patient Care Team:  Ayaan Amin MD as PCP - General (Family Medicine)  Following for Covid pneumonia and acute respiratory failure  Subjective     Patient did not get up today says his bottom is really getting sore breathing is about the same.  Review of Systems:          Objective     Vital Signs  Vital Sign Min/Max for last 24 hours  Temp  Min: 97.7 °F (36.5 °C)  Max: 99.3 °F (37.4 °C)   BP  Min: 109/67  Max: 122/85   Pulse  Min: 68  Max: 78   Resp  Min: 20  Max: 20   SpO2  Min: 91 %  Max: 98 %   Flow (L/min)  Min: 5  Max: 5   Weight  Min: 60.4 kg (133 lb 1.6 oz)  Max: 60.4 kg (133 lb 1.6 oz)        Ventilator/Non-Invasive Ventilation Settings (From admission, onward)    None                       Body mass index is 20.84 kg/m².  I/O last 3 completed shifts:  In: 1680 [P.O.:1680]  Out: 2450 [Urine:2450]  I/O this shift:  In: -   Out: 275 [Urine:275]        Physical Exam:  General Appearance: Well-developed white male he is resting in bed 5 L nasal cannula O2 with oxygen saturation of 96%  Eyes: Conjunctiva are clear and anicteric  ENT: Mucous membranes are moist no erythema no exudate  Neck: No jugular venous distension trachea midline no crepitance  Lungs: Clear nonlabored symmetric expansion no wheezes no rales no rhonchi  Cardiac: Regular rate rhythm no murmur no gallop  Abdomen: Soft no palpable hepatosplenomegaly or masses  : Not examined  Musculoskeletal: Grossly normal there is no calf tenderness or palpable cords  Skin: No jaundice no petechiae skin is warm and dry does have a little abrasions in his presacrococcygeal area  Neuro: He is alert oriented cooperative following commands moving all extremities grossly intact  Extremities/P Vascular: No clubbing no cyanosis no edema palpable radial dorsalis pedis pulses bilaterally  MSE: He seems a little depressed today       Labs:  Results from last 7 days   Lab Units 10/12/21  1056 10/09/21  1238 10/06/21  0634   GLUCOSE mg/dL  100* 184* 95   SODIUM mmol/L 142 138 141   POTASSIUM mmol/L 3.8 4.1 4.9   CO2 mmol/L 26.6 21.4* 23.8   CHLORIDE mmol/L 105 106 109*   ANION GAP mmol/L 10.4 10.6 8.2   CREATININE mg/dL 1.46* 1.22 1.33*   BUN mg/dL 35* 36* 40*   BUN / CREAT RATIO  24.0 29.5* 30.1*   CALCIUM mg/dL 8.9 8.2* 8.5*   EGFR IF NONAFRICN AM mL/min/1.73 48* 59* 53*     Estimated Creatinine Clearance: 40.2 mL/min (A) (by C-G formula based on SCr of 1.46 mg/dL (H)).      Results from last 7 days   Lab Units 10/12/21  1056 10/09/21  0533 10/06/21  0634   WBC 10*3/mm3 4.69 4.34 5.69   RBC 10*6/mm3 4.29 4.44 4.44   HEMOGLOBIN g/dL 13.0 13.1 13.2   HEMATOCRIT % 38.6 41.7 39.1   MCV fL 90.0 93.9 88.1   MCH pg 30.3 29.5 29.7   MCHC g/dL 33.7 31.4* 33.8   RDW % 15.3 15.3 14.6   RDW-SD fl 50.4 52.6 46.8   MPV fL 10.6 12.2* 10.8   PLATELETS 10*3/mm3 166 127* 143   NEUTROPHIL % % 77.6* 79.4*  --    LYMPHOCYTE % % 12.6* 13.1*  --    MONOCYTES % % 6.4 4.8*  --    EOSINOPHIL % % 1.1 0.7  --    BASOPHIL % % 0.4 0.2  --    IMM GRAN % % 1.9*  --   --    NEUTROS ABS 10*3/mm3 3.64 3.44 5.29   LYMPHS ABS 10*3/mm3 0.59* 0.57*  --    MONOS ABS 10*3/mm3 0.30 0.21  --    EOS ABS 10*3/mm3 0.05 0.03  --    BASOS ABS 10*3/mm3 0.02 0.01  --    IMMATURE GRANS (ABS) 10*3/mm3 0.09*  --   --    NRBC /100 WBC 0.2  --   --              Results from last 7 days   Lab Units 10/07/21  0730   PROBNP pg/mL 288.0         Results from last 7 days   Lab Units 10/07/21  0730   PROCALCITONIN ng/mL 0.05         Microbiology Results (last 10 days)     ** No results found for the last 240 hours. **              amLODIPine, 10 mg, Oral, Q24H  calcium carbonate, 1 tablet, Oral, Daily  dexamethasone, 4 mg, Oral, Daily With Breakfast  dextromethorphan polistirex ER, 60 mg, Oral, Q12H  enoxaparin, 1 mg/kg, Subcutaneous, Q12H  famotidine, 20 mg, Oral, Daily  finasteride, 5 mg, Oral, Daily  metoprolol tartrate, 12.5 mg, Oral, Q12H  mirtazapine, 7.5 mg, Oral, Nightly  prednisoLONE acetate, 1 drop,  Both Eyes, Daily  saccharomyces boulardii, 250 mg, Oral, BID  senna-docusate sodium, 2 tablet, Oral, BID  sodium chloride, 1 drop, Left Eye, TID           Diagnostics:  CT Head Without Contrast    Result Date: 9/8/2021  CT HEAD WITHOUT CONTRAST  HISTORY: Increasing confusion  COMPARISON: None available.  TECHNIQUE: Axial CT imaging was obtained through the brain. No IV contrast was administered.  FINDINGS: No acute intracranial hemorrhage is seen. Ventricles are normal in size. There is no midline shift or mass effect. Mild mucosal thickening is seen within the left maxillary sinus. There is some minimal opacification of the right mastoid air cells.      No acute intracranial findings.  Radiation dose reduction techniques were utilized, including automated exposure control and exposure modulation based on body size.  This report was finalized on 9/8/2021 4:38 AM by Dr. Sobeida Jones M.D.      XR Chest 1 View    Result Date: 9/25/2021  XR CHEST 1 VW-  HISTORY: Male who is 71 years-old,  pneumomediastinum follow-up  TECHNIQUE: Frontal view of the chest  COMPARISON: 09/24/2021  FINDINGS: The heart is enlarged. Pneumomediastinum is again apparent, with soft tissue gas again extending to the right neck base. Bilateral infiltrates appear similar to prior exam. No pleural effusion, or pneumothorax. No acute osseous process.      No significant change.  This report was finalized on 9/25/2021 7:08 AM by Dr. Parminder Bradshaw M.D.      XR Chest 1 View    Result Date: 9/24/2021  ONE VIEW PORTABLE CHEST AT 5:50 AM  HISTORY: Follow-up of pneumomediastinum. Covid 19 pneumonia.  FINDINGS: The lungs are well-expanded with extensive pneumonia in both lungs, particularly in the lower lobes and showing no significant change from 2 days ago. There has been nearly complete resolution of some pneumomediastinum and there remains a tiny amount of subcutaneous emphysema at the base of the right neck. There is no evidence of  pneumothorax. The heart remains mildly enlarged.  This report was finalized on 9/24/2021 7:13 AM by Dr. Fidel Madison M.D.      XR Chest 1 View    Result Date: 9/22/2021  XR CHEST 1 VW-  HISTORY: Male who is 71 years-old,  Covid pneumonia  TECHNIQUE: Frontal view of the chest  COMPARISON: 09/20/2021  FINDINGS: Heart size is normal. Pneumomediastinum is noted, extending to the right neck base. Pulmonary vasculature is unremarkable. Bilateral infiltrates do not appear significantly changed. No pleural effusion, or pneumothorax. Right hemidiaphragm remains elevated. No acute osseous process.      Persistent bilateral infiltrates. Pneumomediastinum extends into the right neck base.  This report was finalized on 9/22/2021 1:00 PM by Dr. Parminder Bradshaw M.D.      XR Chest 1 View    Result Date: 9/20/2021  XR CHEST 1 VW-  09/20/2021  HISTORY: Follow-up pneumonia.  Heart size is mildly enlarged. There are moderately severe patchy infiltrates in the bilateral mid to lower lungs right worse than left. FINDINGS are consistent with Covid 19 and appears similar to yesterday's study.  No pneumothorax is seen. Bony structures appear unremarkable.      Mild cardiomegaly. 2. Moderately severe bilateral pulmonary infiltrates right worse than left. FINDINGS are consistent with Covid 19. 3. Chest appears similar to yesterday's study.  This report was finalized on 9/20/2021 7:37 AM by Dr. Norman Montalvo M.D.      XR Chest 1 View    Result Date: 9/19/2021  ONE VIEW PORTABLE CHEST AT 5:10 AM  HISTORY: Covid 19 pneumonia. Shortness of breath.  FINDINGS: There is extensive pneumonia predominantly in both lower lobes and consistent with Covid 19 pneumonia. This shows no significant change from 3 days ago. However, there is now some mediastinal air present that extends into the base of the right neck. No definite pneumothorax is seen. The heart remains slightly enlarged.  This report was finalized on 9/19/2021 7:07 AM by Dr. Parra  KVEIN Madison      XR Chest 1 View    Result Date: 9/16/2021  EXAMINATION: SINGLE VIEW CHEST RADIOGRAPH  HISTORY: 71-year-old male with history of worsening hypoxia.  FINDINGS: An upright AP portable chest radiograph was obtained. Comparison is made to chest radiograph dated 09/07/2021. There has been interval progression of opacification in the bilateral mid and lower lung fields since the prior examination. The cardiac silhouette is obscured to a greater degree than on the prior examination. The visualized osseous structures appear normal.      There is evidence of interval progression of bilateral pulmonary infiltrates.  This report was finalized on 9/16/2021 11:08 AM by Dr. Matthew Bradley M.D.      XR Chest 1 View    Result Date: 9/7/2021  SINGLE VIEW OF THE CHEST  HISTORY: Worsening respiratory failure  COMPARISON: 09/05/2021  FINDINGS: Cardiomegaly is present. There is no vascular congestion. Bilateral pulmonary infiltrates are seen. Appearance is in keeping with COVID. They have worsened when compared to prior exam. No pneumothorax or pleural effusion is seen.      Worsening bilateral pulmonary infiltrates.  This report was finalized on 9/7/2021 10:44 PM by Dr. Sobeida Jones M.D.      XR Chest AP    Result Date: 9/5/2021  PORTABLE CHEST 09/05/2021 AT 4:25 PM  CLINICAL HISTORY: Cough. Fever. COVID 19 evaluation.  Compared to the previous chest dated 09/07/2018.  There is patchy ill-defined increased density in the inferior half of the right lung and to lesser extent in the inferior aspect of the left lung consistent with pneumonia due to COVID 19 infection. There are no pleural effusions. The heart is borderline enlarged.  This report was finalized on 9/5/2021 5:01 PM by Dr. Blu Mendez M.D.      Results for orders placed during the hospital encounter of 09/07/18    Adult Transthoracic Echo Complete W/ Cont if Necessary Per Protocol    Interpretation Summary  · EF = 65%.  · Left ventricular systolic  function is normal.  · Mild tricuspid valve regurgitation is present.  · Calculated right ventricular systolic pressure from tricuspid regurgitation is 37 mmHg.  · Estimated right ventricular systolic pressure from tricuspid regurgitation is mildly elevated (35-45 mmHg).          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]  No   • Paroxysmal atrial fibrillation (HCC) [I48.0]  No   • Acute respiratory failure with hypoxia (HCC) [J96.01]  Yes   • BPH (benign prostatic hyperplasia) [N40.0]  Yes   • CLL (chronic lymphocytic leukemia) (HCC) [C91.10]  Yes   • Chronic kidney disease, stage 3b (HCC) [N18.32]  Yes   • HTN (hypertension) [I10]  Yes   • GERD (gastroesophageal reflux disease) [K21.9]  Yes   • Hodgkin's disease of lymph nodes of head, face, and neck (HCC) [C81.91]  Yes      Resolved Hospital Problems    Diagnosis Date Resolved POA   • Acute constipation [K59.00] 10/08/2021 No     Chest x-ray with chronic bibasilar infiltrates that really have not changed much over the last couple of weeks the pneumomediastinum has resolved    Assessment/Plan     1. COVID-19 infection and pneumonia he is on Decadron 6 milligrams  daily and has completed remdesivir.  Will probably  wean his dexamethasone very slowly given the duration he has been on in his problems I will drop to 4 mg a day.  2. Acute hypoxic respiratory failure secondary #1 wean O2 as tolerated making progress been very slow.  He does have some fibrotic changes on his lung bases on the CT scan from the third how much further he improves I do not know there was still a lot of inflammation on that scan.  3. Elevated procalcitonin completed antibiotics for possible secondary bacterial pneumonia. And repeat procalcitonin was normal.  4. Pneumomediastinum resolved  5. CLL  6. History of non-Hodgkin's lymphoma  7. Chronic kidney disease stage IIIb  8. Paroxysmal atrial  fibrillation  9. Thrombocytopenia mild not sure etiology, D-dimer normal unlikely clot consuming platelets or DIC questions is related to his CLL, at least they have been stable.  10. Presacral precoccygeal pressure sore          Plan for disposition: Patient is making excellent progress I agree if he is going to subacute rehab if they will take him on 5 L he probably could go anytime    Clay Truong MD  10/12/21  21:10 EDT    Time:

## 2021-10-13 NOTE — PLAN OF CARE
Goal Outcome Evaluation:  Plan of Care Reviewed With: patient     Progress: improving  Outcome Summary: Pt was found supine in bed, SpO2=91% at rest. EOB c SBA then req's rest break and use of non rebreather briefly to return to 90%. STS/~3 steps to chair c SBA and req's use of NRB again as O2 drops to 85%. Pt participates in light UB therex ~1lb 5x2 sets c rest breaks provided to prmote strength, endurance and act tolerance. Pt left c RN present, SpO2 stabilized    Patient was was not wearing a face mask during this therapy encounter. Therapist used appropriate personal protective equipment including mask, gloves, eye protection, face shield, and gown.  Mask used was N95/duckbill. Appropriate PPE was worn during the entire therapy session. Hand hygiene was completed before and after therapy session. Patient is in enhanced droplet precautions.

## 2021-10-13 NOTE — PLAN OF CARE
Problem: Adult Inpatient Plan of Care  Goal: Patient-Specific Goal (Individualized)  Outcome: Ongoing, Progressing  VSS, O2 at 6L HFNC. BM x1. Dressing changed. Tolerating all meds as given. No new updates.  Goal: Absence of Hospital-Acquired Illness or Injury  Outcome: Ongoing, Progressing  Intervention: Identify and Manage Fall Risk  Recent Flowsheet Documentation  Taken 10/13/2021 1433 by Giuseppe Beasley, RN  Safety Promotion/Fall Prevention:   activity supervised   assistive device/personal items within reach   clutter free environment maintained   fall prevention program maintained   nonskid shoes/slippers when out of bed   room organization consistent   safety round/check completed   lighting adjusted   muscle strengthening facilitated  Taken 10/13/2021 1206 by Giuseppe Beasley, RN  Safety Promotion/Fall Prevention:   activity supervised   assistive device/personal items within reach   clutter free environment maintained   fall prevention program maintained   nonskid shoes/slippers when out of bed   room organization consistent   safety round/check completed   muscle strengthening facilitated  Taken 10/13/2021 1003 by Giuseppe Beasley, RN  Safety Promotion/Fall Prevention:   activity supervised   assistive device/personal items within reach   clutter free environment maintained   fall prevention program maintained   muscle strengthening facilitated   nonskid shoes/slippers when out of bed   room organization consistent   safety round/check completed   lighting adjusted  Taken 10/13/2021 0829 by Giuseppe Beasley, RN  Safety Promotion/Fall Prevention:   activity supervised   assistive device/personal items within reach   clutter free environment maintained   fall prevention program maintained   safety round/check completed   room organization consistent   nonskid shoes/slippers when out of bed   muscle strengthening facilitated  Intervention: Prevent Skin Injury  Recent Flowsheet  Documentation  Taken 10/13/2021 1433 by Giuseppe Beasley RN  Body Position: position changed independently  Skin Protection:   adhesive use limited   transparent dressing maintained   tubing/devices free from skin contact  Taken 10/13/2021 1206 by Giuseppe Beasley RN  Body Position: position changed independently  Taken 10/13/2021 1003 by Giuseppe Beasley RN  Body Position: position changed independently  Taken 10/13/2021 0829 by Giuseppe Beasley RN  Body Position: position changed independently  Skin Protection:   adhesive use limited   tubing/devices free from skin contact   transparent dressing maintained  Intervention: Prevent and Manage VTE (venous thromboembolism) Risk  Recent Flowsheet Documentation  Taken 10/13/2021 1433 by Giuseppe Beasley RN  VTE Prevention/Management: (pt on lovenox)   bilateral   dorsiflexion/plantar flexion performed  Taken 10/13/2021 0829 by Giuseppe Beasley RN  VTE Prevention/Management: (pt on lovenox)   bilateral   dorsiflexion/plantar flexion performed  Intervention: Prevent Infection  Recent Flowsheet Documentation  Taken 10/13/2021 1433 by Giuseppe Beasley RN  Infection Prevention:   visitors restricted/screened   rest/sleep promoted   personal protective equipment utilized   single patient room provided  Taken 10/13/2021 1206 by Giuseppe Beasley RN  Infection Prevention:   visitors restricted/screened   single patient room provided   rest/sleep promoted   personal protective equipment utilized  Taken 10/13/2021 1003 by Giuseppe Beasley RN  Infection Prevention:   visitors restricted/screened   single patient room provided   rest/sleep promoted   personal protective equipment utilized  Taken 10/13/2021 0829 by Giuseppe Beasley RN  Infection Prevention:   visitors restricted/screened   single patient room provided   personal protective equipment utilized   rest/sleep promoted  Goal: Optimal Comfort and Wellbeing  Outcome:  Ongoing, Progressing  Intervention: Provide Person-Centered Care  Recent Flowsheet Documentation  Taken 10/13/2021 1433 by Giuseppe Beasley RN  Trust Relationship/Rapport: care explained  Taken 10/13/2021 0829 by Giuseppe Beasley RN  Trust Relationship/Rapport: care explained  Goal: Readiness for Transition of Care  Outcome: Ongoing, Progressing     Problem: Infection  Goal: Infection Symptom Resolution  Outcome: Ongoing, Progressing  Intervention: Prevent or Manage Infection  Recent Flowsheet Documentation  Taken 10/13/2021 1433 by Giuseppe Beasley RN  Isolation Precautions:   contact precautions maintained   droplet precautions maintained  Taken 10/13/2021 1206 by Giuseppe Beasley RN  Isolation Precautions:   contact precautions maintained   droplet precautions maintained  Taken 10/13/2021 1003 by Giuseppe Beasley RN  Isolation Precautions:   contact precautions maintained   droplet precautions maintained  Taken 10/13/2021 0829 by Giuseppe Beasley RN  Isolation Precautions:   contact precautions maintained   droplet precautions maintained     Problem: Fall Injury Risk  Goal: Absence of Fall and Fall-Related Injury  Outcome: Ongoing, Progressing  Intervention: Identify and Manage Contributors to Fall Injury Risk  Recent Flowsheet Documentation  Taken 10/13/2021 1433 by Giuseppe Beasley RN  Medication Review/Management: medications reviewed  Taken 10/13/2021 1206 by Giuseppe Beasley RN  Medication Review/Management: medications reviewed  Taken 10/13/2021 1003 by Giuseppe Beasley RN  Medication Review/Management: medications reviewed  Taken 10/13/2021 0829 by Giuseppe Beasley RN  Medication Review/Management: medications reviewed  Intervention: Promote Injury-Free Environment  Recent Flowsheet Documentation  Taken 10/13/2021 1433 by Giuseppe Beasley RN  Safety Promotion/Fall Prevention:   activity supervised   assistive device/personal items within  reach   clutter free environment maintained   fall prevention program maintained   nonskid shoes/slippers when out of bed   room organization consistent   safety round/check completed   lighting adjusted   muscle strengthening facilitated  Taken 10/13/2021 1206 by Giuseppe Beasley, RN  Safety Promotion/Fall Prevention:   activity supervised   assistive device/personal items within reach   clutter free environment maintained   fall prevention program maintained   nonskid shoes/slippers when out of bed   room organization consistent   safety round/check completed   muscle strengthening facilitated  Taken 10/13/2021 1003 by Giuseppe Beasley RN  Safety Promotion/Fall Prevention:   activity supervised   assistive device/personal items within reach   clutter free environment maintained   fall prevention program maintained   muscle strengthening facilitated   nonskid shoes/slippers when out of bed   room organization consistent   safety round/check completed   lighting adjusted  Taken 10/13/2021 0829 by Giuseppe Beasley, RN  Safety Promotion/Fall Prevention:   activity supervised   assistive device/personal items within reach   clutter free environment maintained   fall prevention program maintained   safety round/check completed   room organization consistent   nonskid shoes/slippers when out of bed   muscle strengthening facilitated     Problem: Skin Injury Risk Increased  Goal: Skin Health and Integrity  Outcome: Ongoing, Progressing  Intervention: Optimize Skin Protection  Recent Flowsheet Documentation  Taken 10/13/2021 1433 by Giuseppe Beasley, RN  Pressure Reduction Techniques:   sit time limited to 2 hours   frequent weight shift encouraged   pressure points protected   positioned off wounds  Head of Bed (HOB): HOB elevated  Pressure Reduction Devices:   alternating pressure pump (ADD)   positioning supports utilized  Skin Protection:   adhesive use limited   transparent dressing maintained    tubing/devices free from skin contact  Taken 10/13/2021 1206 by Giuseppe Beasley RN  Head of Bed ([facility]): HOB elevated  Taken 10/13/2021 1003 by Giuseppe Beasley RN  Head of Bed ([facility]): HOB elevated  Taken 10/13/2021 0829 by Giuseppe Beasley RN  Pressure Reduction Techniques:   sit time limited to 2 hours   weight shift assistance provided   positioned off wounds   pressure points protected   heels elevated off bed  Head of Bed ([facility]): [facility] elevated  Pressure Reduction Devices:   alternating pressure pump (ADD)   positioning supports utilized  Skin Protection:   adhesive use limited   tubing/devices free from skin contact   transparent dressing maintained  Intervention: Promote and Optimize Oral Intake  Recent Flowsheet Documentation  Taken 10/13/2021 0829 by Giuseppe Beasley RN  Oral Nutrition Promotion:   medicated   rest periods promoted   Goal Outcome Evaluation:  Plan of Care Reviewed With: patient        Progress: no change  Outcome Summary: Pt's O2 remains 60/70% opti. No c/o pain, no signs of distress. A&Ox4. Tolerating all meds as given. Bladder scanned. BMx1. Monitoring am labs, respiratory status and titrating O2 as tolerated.

## 2021-10-13 NOTE — PLAN OF CARE
Problem: Adult Inpatient Plan of Care  Goal: Patient-Specific Goal (Individualized)  Flowsheets (Taken 10/13/2021 0508)  Patient-Specific Goals (Include Timeframe): No acute changes. VSS. AOx4. No c/o pain or discomfort. Bed locked and in lowest position. Possible d/c today. Call light within reach. Will continue to asses.  Goal: Absence of Hospital-Acquired Illness or Injury  Intervention: Identify and Manage Fall Risk  Flowsheets  Taken 10/13/2021 0500 by Monica Wills  Safety Promotion/Fall Prevention:   activity supervised   fall prevention program maintained   nonskid shoes/slippers when out of bed   safety round/check completed  Taken 10/13/2021 0247 by Joey Yates, ARYA  Safety Promotion/Fall Prevention:   activity supervised   assistive device/personal items within reach   clutter free environment maintained   fall prevention program maintained   lighting adjusted   nonskid shoes/slippers when out of bed   room organization consistent   safety round/check completed  Taken 10/13/2021 0045 by Joey Yates, ARYA  Safety Promotion/Fall Prevention:   activity supervised   assistive device/personal items within reach   clutter free environment maintained   fall prevention program maintained   lighting adjusted   nonskid shoes/slippers when out of bed   room organization consistent   safety round/check completed  Taken 10/12/2021 2250 by Joey Yates, RN  Safety Promotion/Fall Prevention:   activity supervised   assistive device/personal items within reach   clutter free environment maintained   fall prevention program maintained   lighting adjusted   nonskid shoes/slippers when out of bed   room organization consistent   safety round/check completed  Taken 10/12/2021 2050 by Joey Yates, RN  Safety Promotion/Fall Prevention:   assistive device/personal items within reach   activity supervised   clutter free environment maintained   fall prevention program maintained   lighting adjusted   nonskid shoes/slippers when out  of bed   room organization consistent   safety round/check completed  Intervention: Prevent Skin Injury  Flowsheets  Taken 10/13/2021 0500 by Monica Wills  Body Position:   position changed independently   supine  Taken 10/13/2021 0247 by Joey Yates RN  Body Position: position changed independently  Taken 10/13/2021 0045 by Joey Yates RN  Body Position: position changed independently  Skin Protection:   adhesive use limited   incontinence pads utilized   tubing/devices free from skin contact  Taken 10/12/2021 2250 by Joey Yates RN  Body Position: position changed independently  Taken 10/12/2021 2050 by Joey Yates RN  Body Position: position changed independently  Intervention: Prevent and Manage VTE (venous thromboembolism) Risk  Flowsheets (Taken 10/13/2021 0045)  VTE Prevention/Management:   bilateral   dorsiflexion/plantar flexion performed  Intervention: Prevent Infection  Flowsheets  Taken 10/13/2021 0500 by Monica Wills  Infection Prevention:   environmental surveillance performed   personal protective equipment utilized   rest/sleep promoted  Taken 10/13/2021 0247 by Joey Yates RN  Infection Prevention:   environmental surveillance performed   equipment surfaces disinfected   hand hygiene promoted   rest/sleep promoted   personal protective equipment utilized   single patient room provided   visitors restricted/screened  Taken 10/13/2021 0045 by Joey Yates RN  Infection Prevention:   environmental surveillance performed   equipment surfaces disinfected   hand hygiene promoted   personal protective equipment utilized   rest/sleep promoted   single patient room provided   visitors restricted/screened  Taken 10/12/2021 2250 by Joey Yates RN  Infection Prevention:   environmental surveillance performed   equipment surfaces disinfected   hand hygiene promoted   personal protective equipment utilized   single patient room provided   rest/sleep promoted   visitors restricted/screened  Taken 10/12/2021  2050 by Joey Yates, RN  Infection Prevention:   environmental surveillance performed   equipment surfaces disinfected   hand hygiene promoted   personal protective equipment utilized   rest/sleep promoted   single patient room provided   visitors restricted/screened  Goal: Optimal Comfort and Wellbeing  Intervention: Provide Person-Centered Care  Flowsheets  Taken 10/13/2021 0045  Trust Relationship/Rapport:   care explained   choices provided   emotional support provided   empathic listening provided   questions answered   questions encouraged   reassurance provided   thoughts/feelings acknowledged  Taken 10/12/2021 2050  Trust Relationship/Rapport:   care explained   choices provided   emotional support provided   empathic listening provided   questions answered   questions encouraged   reassurance provided   thoughts/feelings acknowledged  Goal: Readiness for Transition of Care  Intervention: Mutually Develop Transition Plan  Flowsheets (Taken 9/7/2021 1356 by Nahomy Pace CSW)  Discharge Facility/Level of Care Needs:   nursing facility, skilled   acute rehab   home with home health  Equipment Currently Used at Home: none  Anticipated Changes Related to Illness: none  Transportation Anticipated: family or friend will provide  Current Discharge Risk: physical impairment  Concerns to be Addressed: discharge planning  Patient/Family Anticipated Services at Transition:   home health care   skilled nursing  Patient/Family Anticipates Transition to: home with family     Problem: Infection  Goal: Infection Symptom Resolution  Intervention: Prevent or Manage Infection  Flowsheets  Taken 10/13/2021 0500 by Monica Wills  Isolation Precautions: contact precautions maintained  Taken 10/13/2021 0247 by Joey Yates, RN  Isolation Precautions:   contact precautions maintained   droplet precautions maintained  Taken 10/13/2021 0045 by Joey Yates, RN  Isolation Precautions:   contact precautions maintained   droplet  precautions maintained  Taken 10/12/2021 2250 by Joey Yates RN  Isolation Precautions:   contact precautions maintained   droplet precautions maintained  Taken 10/12/2021 2050 by Joey Yates RN  Infection Management: aseptic technique maintained  Isolation Precautions:   contact precautions maintained   droplet precautions maintained  Taken 10/9/2021 0822 by Dolores Read RN  Fever Reduction/Comfort Measures: lightweight clothing     Problem: Fall Injury Risk  Goal: Absence of Fall and Fall-Related Injury  Intervention: Identify and Manage Contributors to Fall Injury Risk  Flowsheets  Taken 10/12/2021 2050 by Joey Yates RN  Medication Review/Management: medications reviewed  Taken 10/9/2021 1649 by Dolores Read RN  Self-Care Promotion: independence encouraged  Intervention: Promote Injury-Free Environment  Flowsheets  Taken 10/13/2021 0500 by Monica Wills  Safety Promotion/Fall Prevention:   activity supervised   fall prevention program maintained   nonskid shoes/slippers when out of bed   safety round/check completed  Taken 10/13/2021 0247 by Joey Yates RN  Safety Promotion/Fall Prevention:   activity supervised   assistive device/personal items within reach   clutter free environment maintained   fall prevention program maintained   lighting adjusted   nonskid shoes/slippers when out of bed   room organization consistent   safety round/check completed  Taken 10/13/2021 0045 by Joey Yates RN  Safety Promotion/Fall Prevention:   activity supervised   assistive device/personal items within reach   clutter free environment maintained   fall prevention program maintained   lighting adjusted   nonskid shoes/slippers when out of bed   room organization consistent   safety round/check completed  Taken 10/12/2021 2250 by Joey Yates RN  Safety Promotion/Fall Prevention:   activity supervised   assistive device/personal items within reach   clutter free environment maintained   fall prevention program  maintained   lighting adjusted   nonskid shoes/slippers when out of bed   room organization consistent   safety round/check completed  Taken 10/12/2021 2050 by Joey Yates RN  Safety Promotion/Fall Prevention:   assistive device/personal items within reach   activity supervised   clutter free environment maintained   fall prevention program maintained   lighting adjusted   nonskid shoes/slippers when out of bed   room organization consistent   safety round/check completed     Problem: Skin Injury Risk Increased  Goal: Skin Health and Integrity  Intervention: Optimize Skin Protection  Flowsheets  Taken 10/13/2021 0500 by Monica Wills  Head of Bed ([facility]): HOB elevated  Taken 10/13/2021 0247 by Joey Yates RN  Head of Bed ([facility]): HOB elevated  Taken 10/13/2021 0045 by Joey Yates RN  Pressure Reduction Techniques:   frequent weight shift encouraged   weight shift assistance provided  Head of Bed (HOB): HOB elevated  Pressure Reduction Devices:   alternating pressure pump (ADD)   pressure-redistributing mattress utilized  Skin Protection:   adhesive use limited   incontinence pads utilized   tubing/devices free from skin contact  Taken 10/12/2021 2250 by Joey Yates RN  Head of Bed (HOB): HOB elevated  Taken 10/12/2021 2050 by Joey Yates RN  Head of Bed (HOB): HOB elevated  Intervention: Promote and Optimize Oral Intake  Flowsheets  Taken 10/13/2021 0045  Oral Nutrition Promotion:   medicated   rest periods promoted  Taken 10/12/2021 2050  Oral Nutrition Promotion:   medicated   rest periods promoted   Goal Outcome Evaluation:    No acute changes. VSS. AOx4. No c/o pain or discomfort. Bed locked and in lowest position. Possible d/c today. Call light within reach. Will continue to asses.

## 2021-10-13 NOTE — THERAPY TREATMENT NOTE
Patient Name: Angel Cisneros  : 1950    MRN: [id number]                              Today's Date: 10/13/2021       Admit Date: 2021    Visit Dx:     ICD-10-CM ICD-9-CM   1. COVID-19 virus infection  U07.1 079.89   2. Acute hypoxemic respiratory failure (CMS/[facility])  J96.01 518.81   3. Acute renal failure superimposed on chronic kidney disease, unspecified CKD stage, unspecified acute renal failure type (CMS/HCC)  N17.9 584.9    N18.9 585.9     Patient Active Problem List   Diagnosis   • Lymphoma (HCC)   • Leukemia (HCC)   • Chest pain   • GERD (gastroesophageal reflux disease)   • HTN (hypertension)   • Chronic kidney disease, stage 3b (HCC)   • Generalized abdominal pain   • CLL (chronic lymphocytic leukemia) (HCC)   • Gastroesophageal reflux disease   • Hodgkin's disease of lymph nodes of head, face, and neck (HCC)   • Oral thrush   • Medicare annual wellness visit, subsequent   • Primary insomnia   • Adjustment disorder with depressed mood   • Dysuria   • Gross hematuria   • Acute non-recurrent maxillary sinusitis   • Cough   • BPH (benign prostatic hyperplasia)   • Elevated PSA   • Clinical diagnosis of COVID-19   • Pneumonia due to COVID-19 virus   • Paroxysmal atrial fibrillation (HCC)   • Acute respiratory failure with hypoxia (HCC)   • Critical illness myopathy     Past Medical History:   Diagnosis Date   • Arthritis     both knees   • Cancer (HCC)     dx with lymphoma / radiation   • Cataracts, bilateral    • Hx of cornea transplant     both eyes   • Leukemia (HCC)    • Lymphoma (HCC)     dx in . treated with radiation.   • Lymphoma (HCC)     treated with radiation. dx in      Past Surgical History:   Procedure Laterality Date   • BREAST LUMPECTOMY Right    • COLONOSCOPY N/A 3/8/2016    Procedure: COLONOSCOPY with cold polypectomy X 3;  Surgeon: Chris Harden MD;  Location: [facility] ENDOSCOPY;  Service:    • ENDOSCOPY N/A 10/23/2018    Procedure: ESOPHAGOGASTRODUODENOSCOPY WITH  BIOPSY;  Surgeon: Chris Harden MD;  Location: [facility] ENDOSCOPY;  Service: Gastroenterology   • EYE SURGERY      partial cornea transplant 4-5 years ago   • KNEE SURGERY Left    • TOE SURGERY Bilateral    • TONSILLECTOMY        General Information     Row Name 10/13/21 1441          OT Time and Intention    Document Type therapy note (daily note)  -     Mode of Treatment individual therapy; occupational therapy  -     Row Name 10/13/21 1441          General Information    Patient Profile Reviewed yes  -     Existing Precautions/Restrictions fall; oxygen therapy device and L/min  -     Row Name 10/13/21 1441          Cognition    Orientation Status (Cognition) oriented x 3  -     Row Name 10/13/21 1441          Safety Issues, Functional Mobility    Impairments Affecting Function (Mobility) endurance/activity tolerance; shortness of breath; strength  -           User Key  (r) = Recorded By, (t) = Taken By, (c) = Cosigned By    Initials Name Provider Type     Roshni Nelson OT Occupational Therapist                 Mobility/ADL's     Row Name 10/13/21 1441          Bed Mobility    Bed Mobility supine-sit  -     All Activities, Dale (Bed Mobility) supervision  -     Assistive Device (Bed Mobility) bed rails  -     Row Name 10/13/21 1441          Transfers    Transfers bed-chair transfer; sit-stand transfer  -     Bed-Chair Dale (Transfers) contact guard; standby assist; verbal cues  -     Sit-Stand Dale (Transfers) standby assist; contact guard; verbal cues  -     Row Name 10/13/21 1441          Functional Mobility    Functional Mobility- Safety Issues supplemental O2  -     Functional Mobility- Comment ~3 steps to chair c SBA-CGA, SpO2 drops to ~87% c standing  -           User Key  (r) = Recorded By, (t) = Taken By, (c) = Cosigned By    Initials Name Provider Type     Roshni Nelson OT Occupational Therapist               Obj/Interventions     Row Name 10/13/21  1442          Shoulder (Therapeutic Exercise)    Shoulder Strengthening (Therapeutic Exercise) bilateral; flexion; extension; sitting; 1 lb free weight; 5 repetitions; 2 sets  -     Row Name 10/13/21 1442          Elbow/Forearm (Therapeutic Exercise)    Elbow/Forearm (Therapeutic Exercise) strengthening exercise  -     Elbow/Forearm Strengthening (Therapeutic Exercise) bilateral; flexion; extension; 1 lb free weight; 5 repetitions; 2 sets  -           User Key  (r) = Recorded By, (t) = Taken By, (c) = Cosigned By    Initials Name Provider Type    Roshni Kang OT Occupational Therapist               Goals/Plan    No documentation.                Clinical Impression     Row Name 10/13/21 1444          Pain Assessment    Additional Documentation Pain Scale: Numbers Pre/Post-Treatment (Group)  -     Row Name 10/13/21 1444          Pain Scale: Numbers Pre/Post-Treatment    Pretreatment Pain Rating 0/10 - no pain  -     Posttreatment Pain Rating 0/10 - no pain  -     Pre/Posttreatment Pain Comment reports feeling pretty good today  -     Row Name 10/13/21 1444          Plan of Care Review    Plan of Care Reviewed With patient  -     Progress improving  -     Outcome Summary Pt was found supine in bed, SpO2=91% at rest. EOB c SBA then req's rest break and use of non rebreather briefly to return to 90%. STS/~3 steps to chair c SBA and req's use of NRB again as O2 drops to 85%. Pt participates in light UB therex ~1lb 5x2 sets c rest breaks provided to prmote strength, endurance and act tolerance. Pt left c RN present, SpO2 stabilized  -     Row Name 10/13/21 1444          Vital Signs    Pre SpO2 (%) 90  -     O2 Delivery Pre Treatment hi-flow  -     Intra SpO2 (%) 85  -JW     O2 Delivery Intra Treatment non-rebreather  -     Post SpO2 (%) 90  -     O2 Delivery Post Treatment hi-flow  -     Pre Patient Position Supine  -     Intra Patient Position Standing  -     Post Patient Position  Sitting  -     Recovery Time 3  -     Row Name 10/13/21 1444          Positioning and Restraints    In Chair sitting; call light within reach; encouraged to call for assist; exit alarm on; with nsg  -           User Key  (r) = Recorded By, (t) = Taken By, (c) = Cosigned By    Initials Name Provider Type    Roshni Kang OT Occupational Therapist               Outcome Measures     Row Name 10/13/21 1447          How much help from another is currently needed...    Putting on and taking off regular lower body clothing? 3  -JW     Bathing (including washing, rinsing, and drying) 3  -JW     Toileting (which includes using toilet bed pan or urinal) 3  -JW     Putting on and taking off regular upper body clothing 3  -JW     Taking care of personal grooming (such as brushing teeth) 3  -JW     Eating meals 4  -     AM-PAC 6 Clicks Score (OT) 19  -     Row Name 10/13/21 1100          How much help from another person do you currently need...    Turning from your back to your side while in flat bed without using bedrails? 4  -LH     Moving from lying on back to sitting on the side of a flat bed without bedrails? 4  -LH     Moving to and from a bed to a chair (including a wheelchair)? 4  -LH     Standing up from a chair using your arms (e.g., wheelchair, bedside chair)? 3  -LH     Climbing 3-5 steps with a railing? 3  -LH     To walk in hospital room? 3  -     AM-PAC 6 Clicks Score (PT) 21  -     Row Name 10/13/21 1447 10/13/21 1100       Functional Assessment    Outcome Measure Options AM-PAC 6 Clicks Daily Activity (OT)  - AM-PAC 6 Clicks Basic Mobility (PT)  -          User Key  (r) = Recorded By, (t) = Taken By, (c) = Cosigned By    Initials Name Provider Type     Iman Archer, PT Physical Therapist    Roshni Kang OT Occupational Therapist                Occupational Therapy Education                 Title: PT OT SLP Therapies (In Progress)     Topic: Occupational Therapy (Done)     Point: ADL  training (Done)     Description:   Instruct learner(s) on proper safety adaptation and remediation techniques during self care or transfers.   Instruct in proper use of assistive devices.              Learning Progress Summary           Patient Acceptance, E,TB, VU by JC at 10/13/2021 0508   Family Acceptance, E,TB, VU by LETICIA at 10/9/2021 1735      Show all documentation for this point (14)                 Point: Home exercise program (Done)     Description:   Instruct learner(s) on appropriate technique for monitoring, assisting and/or progressing therapeutic exercises/activities.              Learning Progress Summary           Patient Acceptance, E,TB, VU by CB at 10/13/2021 0508   Family Acceptance, E,TB, VU by LETICIA at 10/9/2021 1735      Show all documentation for this point (14)                 Point: Precautions (Done)     Description:   Instruct learner(s) on prescribed precautions during self-care and functional transfers.              Learning Progress Summary           Patient Acceptance, E,TB, VU by JC at 10/13/2021 0508   Family Acceptance, E,TB, VU by LETICIA at 10/9/2021 1735      Show all documentation for this point (14)                 Point: Body mechanics (Done)     Description:   Instruct learner(s) on proper positioning and spine alignment during self-care, functional mobility activities and/or exercises.              Learning Progress Summary           Patient Acceptance, E,TB, VU by JC at 10/13/2021 0508   Family Acceptance, E,TB, VU by LETICIA at 10/9/2021 1735      Show all documentation for this point (13)                             User Key     Initials Effective Dates Name Provider Type Discipline     09/24/19 -  Doolres Read, RN Registered Nurse Nurse     07/28/21 -  Joey Yates, RN Registered Nurse Nurse              OT Recommendation and Plan  Planned Therapy Interventions (OT): activity tolerance training, BADL retraining, occupation/activity based interventions, patient/caregiver  education/training, strengthening exercise  Therapy Frequency (OT): 5 times/wk  Plan of Care Review  Plan of Care Reviewed With: patient  Progress: improving  Outcome Summary: Pt was found supine in bed, SpO2=91% at rest. EOB c SBA then req's rest break and use of non rebreather briefly to return to 90%. STS/~3 steps to chair c SBA and req's use of NRB again as O2 drops to 85%. Pt participates in light UB therex ~1lb 5x2 sets c rest breaks provided to prmote strength, endurance and act tolerance. Pt left c RN present, SpO2 stabilized     Time Calculation:    Time Calculation- OT     Row Name 10/13/21 1448             Time Calculation- OT    OT Start Time 1353  -JW      OT Stop Time 1411  -      OT Time Calculation (min) 18 min  -JW      Total Timed Code Minutes- OT 18 minute(s)  -JW      OT Received On 10/13/21  -      OT - Next Appointment 10/14/21  -              Timed Charges    05380 - OT Therapeutic Exercise Minutes 15  -JW              Total Minutes    Timed Charges Total Minutes 15  -JW       Total Minutes 15  -JW            User Key  (r) = Recorded By, (t) = Taken By, (c) = Cosigned By    Initials Name Provider Type     Roshni Nelson OT Occupational Therapist              Therapy Charges for Today     Code Description Service Date Service Provider Modifiers Qty    [id number]  OT THER PROC EA 15 MIN 10/13/2021 Roshni Nelson OT GO 1               Roshni Nelson OT  10/13/2021

## 2021-10-13 NOTE — THERAPY TREATMENT NOTE
Acute Care - Physical Therapy Treatment Note  [facility]     Patient Name: Angel Cisneros  : 1950  MRN: [id number]  Today's Date: 10/13/2021      Visit Dx:     ICD-10-CM ICD-9-CM   1. COVID-19 virus infection  U07.1 079.89   2. Acute hypoxemic respiratory failure (CMS/[facility])  J96.01 518.81   3. Acute renal failure superimposed on chronic kidney disease, unspecified CKD stage, unspecified acute renal failure type (CMS/HCC)  N17.9 584.9    N18.9 585.9     Patient Active Problem List   Diagnosis   • Lymphoma (HCC)   • Leukemia (HCC)   • Chest pain   • GERD (gastroesophageal reflux disease)   • HTN (hypertension)   • Chronic kidney disease, stage 3b (HCC)   • Generalized abdominal pain   • CLL (chronic lymphocytic leukemia) (HCC)   • Gastroesophageal reflux disease   • Hodgkin's disease of lymph nodes of head, face, and neck (HCC)   • Oral thrush   • Medicare annual wellness visit, subsequent   • Primary insomnia   • Adjustment disorder with depressed mood   • Dysuria   • Gross hematuria   • Acute non-recurrent maxillary sinusitis   • Cough   • BPH (benign prostatic hyperplasia)   • Elevated PSA   • Clinical diagnosis of COVID-19   • Pneumonia due to COVID-19 virus   • Paroxysmal atrial fibrillation (HCC)   • Acute respiratory failure with hypoxia (HCC)   • Critical illness myopathy     Past Medical History:   Diagnosis Date   • Arthritis     both knees   • Cancer (HCC)     dx with lymphoma / radiation   • Cataracts, bilateral    • Hx of cornea transplant     both eyes   • Leukemia (HCC)    • Lymphoma (HCC)     dx in . treated with radiation.   • Lymphoma (HCC)     treated with radiation. dx in      Past Surgical History:   Procedure Laterality Date   • BREAST LUMPECTOMY Right    • COLONOSCOPY N/A 3/8/2016    Procedure: COLONOSCOPY with cold polypectomy X 3;  Surgeon: Chris Harden MD;  Location: [facility] ENDOSCOPY;  Service:    • ENDOSCOPY N/A 10/23/2018    Procedure:  ESOPHAGOGASTRODUODENOSCOPY WITH BIOPSY;  Surgeon: Chris Harden MD;  Location: [facility] ENDOSCOPY;  Service: Gastroenterology   • EYE SURGERY      partial cornea transplant 4-5 years ago   • KNEE SURGERY Left    • TOE SURGERY Bilateral    • TONSILLECTOMY       PT Assessment (last 12 hours)     PT Evaluation and Treatment     Row Name 10/13/21 1109          Physical Therapy Time and Intention    Subjective Information no complaints  -     Document Type therapy note (daily note)  -     Mode of Treatment individual therapy; physical therapy  -     Patient Effort adequate  -     Row Name 10/13/21 1109          General Information    Patient Profile Reviewed yes  -     Existing Precautions/Restrictions fall; oxygen therapy device and L/min  -[facility] Name 10/13/21 1109          Cognition    Orientation Status (Cognition) oriented x 3  -[facility] Name 10/13/21 1109          Pain Scale: Numbers Pre/Post-Treatment    Pretreatment Pain Rating 0/10 - no pain  -     Posttreatment Pain Rating 0/10 - no pain  -[facility] Name 10/13/21 1109          Bed Mobility    Supine-Sit [place] (Bed Mobility) standby assist; verbal cues  -     Sit-Supine [place] (Bed Mobility) standby assist; verbal cues  -     Assistive Device (Bed Mobility) bed rails  -     Row Name 10/13/21 1109          Transfers    Transfers sit-stand transfer; stand-sit transfer; toilet transfer  -     Sit-Stand [place] (Transfers) contact guard; verbal cues; nonverbal cues (demo/gesture)  -     Stand-Sit [place] (Transfers) contact guard; verbal cues; nonverbal cues (demo/gesture)  -     [place] Level (Toilet Transfer) contact guard; verbal cues; nonverbal cues (demo/gesture)  -     Assistive Device (Toilet Transfer) commode, bedside without drop arms  -     Row Name 10/13/21 1109          Toilet Transfer    Type (Toilet Transfer) sit-stand; stand-sit; stand pivot/stand step  -     Row Name 10/13/21 1109           Gait/Stairs (Locomotion)    Comment (Gait/Stairs) pivot steps bed<->[facility]  -     Row Name 10/13/21 1109          Balance    Static Sitting Balance WNL  -     Dynamic Sitting Balance WNL  -     Static Standing Balance mild impairment  assisted w ADLs/hygiene after BM ~30 sec  -     Row Name             Wound 10/08/21 0937 Bilateral gluteal Abrasion    Wound - Properties Group Placement Date: 10/08/21  -AG Placement Time: 0937  -AG Present on Hospital Admission: N  -AG, seen by WOC on 10/06/2021  Side: Bilateral  -AG Location: gluteal  -AG Primary Wound Type: Abrasion  -AG     Retired Wound - Properties Group Date first assessed: 10/08/21  -AG Time first assessed: 0937  -AG Present on Hospital Admission: N  -AG, seen by WOC on 10/06/2021  Side: Bilateral  -AG Location: gluteal  -AG Primary Wound Type: Abrasion  -AG     Row Name 10/13/21 1109          Plan of Care Review    Plan of Care Reviewed With patient  -     Outcome Summary Pt agreeable to skilled PT, requesting to get to [facility]. Assisted pt [facility] bed <->C , pt on high flow and NRB, pt very anxious w mobilization, VCs for PLB/relaxation strategies. Approx 3 min recovery time 80-88% sa02,. will cont to progress activity as gabriel.  -     Row Name 10/13/21 1109          Vital Signs    Intratreatment Heart Rate (beats/min) 130  -     Pre SpO2 (%) 90  -     O2 Delivery Pre Treatment hi-flow  -     Intra SpO2 (%) 80  -     O2 Delivery Intra Treatment hi-flow  -     Post SpO2 (%) 88  -     O2 Delivery Post Treatment hi-flow  -     Recovery Time 3 min  -     Row Name 10/13/21 1109          Positioning and Restraints    Pre-Treatment Position in bed  -     Post Treatment Position bed  -     In Bed fowlers; notified nsg; call light within reach; encouraged to call for assist; exit alarm on  [facility]           User Key  (r) = Recorded By, (t) = Taken By, (c) = Cosigned By    Initials Name Provider Type     Iman Archer, PT Physical Therapist     Giuseppe Kunz, RN Registered Nurse              Physical Therapy Education                 Title: PT OT SLP Therapies (In Progress)     Topic: Physical Therapy (In Progress)     Point: Mobility training (In Progress)     Learning Progress Summary           Patient Acceptance, E, NR by  at 10/13/2021 1116   Family Acceptance, E,TB, VU by DK at 10/9/2021 1735      Show all documentation for this point (29)                 Point: Home exercise program (In Progress)     Learning Progress Summary           Patient Acceptance, E, NR by  at 10/13/2021 1116   Family Acceptance, E,TB, VU by DK at 10/9/2021 1735      Show all documentation for this point (29)                 Point: Body mechanics (In Progress)     Learning Progress Summary           Patient Acceptance, E, NR by  at 10/13/2021 1116   Family Acceptance, E,TB, VU by DK at 10/9/2021 1735      Show all documentation for this point (29)                 Point: Precautions (In Progress)     Learning Progress Summary           Patient Acceptance, E, NR by  at 10/13/2021 1116   Family Acceptance, E,TB, VU by DK at 10/9/2021 1735      Show all documentation for this point (29)                             User Key     Initials Effective Dates Name Provider Type Discipline     06/16/21 -  Iman Archer, PT Physical Therapist PT    DK 09/24/19 -  Dolores Read, RN Registered Nurse Nurse              PT Recommendation and Plan     Plan of Care Reviewed With: patient  Outcome Summary: Pt agreeable to skilled PT, requesting to get to [facility]. Assisted pt [facility] bed <->[facility] , pt on high flow and NRB, pt very anxious w mobilization, VCs for PLB/relaxation strategies. Approx 3 min recovery time 80-88% sa02,. will cont to progress activity as gabriel.   Outcome Measures     Row Name 10/13/21 1100             How much help from another person do you currently need...    Turning from your back to your side while in flat bed without using bedrails? 4  -      Moving  from lying on back to sitting on the side of a flat bed without bedrails? 4  -LH      Moving to and from a bed to a chair (including a wheelchair)? 4  -LH      Standing up from a chair using your arms (e.g., wheelchair, bedside chair)? 3  -LH      Climbing 3-5 steps with a railing? 3  -LH      To walk in hospital room? 3  -      AM-PAC 6 Clicks Score (PT) 21  -              Functional Assessment    Outcome Measure Options AM-PAC 6 Clicks Basic Mobility (PT)  -            User Key  (r) = Recorded By, (t) = Taken By, (c) = Cosigned By    Initials Name Provider Type     Iman Archer, PT Physical Therapist                 Time Calculation:    PT Charges     Row Name 10/13/21 1117             Time Calculation    Start Time 0902  -      Stop Time 0920  -      Time Calculation (min) 18 min  -      PT Received On 10/13/21  -      PT - Next Appointment 10/15/21  -            User Key  (r) = Recorded By, (t) = Taken By, (c) = Cosigned By    Initials Name Provider Type     Iman Archer, PT Physical Therapist              Therapy Charges for Today     Code Description Service Date Service Provider Modifiers Qty    97011220879  PT THERAPEUTIC ACT EA 15 MIN 10/13/2021 Iman Archer, PT GP 1          PT G-Codes  Outcome Measure Options: AM-PAC 6 Clicks Basic Mobility (PT)  AM-PAC 6 Clicks Score (PT): 21  AM-PAC 6 Clicks Score (OT): 19    Iman Archer PT  10/13/2021

## 2021-10-14 ENCOUNTER — APPOINTMENT (OUTPATIENT)
Dept: GENERAL RADIOLOGY | Facility: HOSPITAL | Age: 71
End: 2021-10-14

## 2021-10-14 LAB — SARS-COV-2 ORF1AB RESP QL NAA+PROBE: NOT DETECTED

## 2021-10-14 PROCEDURE — 63710000001 DEXAMETHASONE PER 0.25 MG: Performed by: INTERNAL MEDICINE

## 2021-10-14 PROCEDURE — 94799 UNLISTED PULMONARY SVC/PX: CPT

## 2021-10-14 PROCEDURE — 25010000002 ENOXAPARIN PER 10 MG: Performed by: INTERNAL MEDICINE

## 2021-10-14 PROCEDURE — 97110 THERAPEUTIC EXERCISES: CPT

## 2021-10-14 PROCEDURE — U0004 COV-19 TEST NON-CDC HGH THRU: HCPCS | Performed by: INTERNAL MEDICINE

## 2021-10-14 PROCEDURE — 71045 X-RAY EXAM CHEST 1 VIEW: CPT

## 2021-10-14 RX ORDER — ECHINACEA PURPUREA EXTRACT 125 MG
1 TABLET ORAL AS NEEDED
Status: DISCONTINUED | OUTPATIENT
Start: 2021-10-14 | End: 2021-10-16 | Stop reason: HOSPADM

## 2021-10-14 RX ADMIN — DEXTROMETHORPHAN POLISTIREX 60 MG: 30 SUSPENSION, EXTENDED RELEASE ORAL at 08:24

## 2021-10-14 RX ADMIN — PREDNISOLONE ACETATE 1 DROP: 10 SUSPENSION/ DROPS OPHTHALMIC at 08:25

## 2021-10-14 RX ADMIN — FAMOTIDINE 20 MG: 20 TABLET, FILM COATED ORAL at 08:23

## 2021-10-14 RX ADMIN — Medication 250 MG: at 08:23

## 2021-10-14 RX ADMIN — SODIUM CHLORIDE 1 DROP: 50 SOLUTION OPHTHALMIC at 16:33

## 2021-10-14 RX ADMIN — SODIUM CHLORIDE 1 DROP: 50 SOLUTION OPHTHALMIC at 20:41

## 2021-10-14 RX ADMIN — MIRTAZAPINE 7.5 MG: 15 TABLET, FILM COATED ORAL at 20:31

## 2021-10-14 RX ADMIN — METOPROLOL TARTRATE 12.5 MG: 25 TABLET ORAL at 08:24

## 2021-10-14 RX ADMIN — DEXAMETHASONE 4 MG: 4 TABLET ORAL at 08:24

## 2021-10-14 RX ADMIN — ENOXAPARIN SODIUM 60 MG: 60 INJECTION, SOLUTION INTRAVENOUS; SUBCUTANEOUS at 08:25

## 2021-10-14 RX ADMIN — FINASTERIDE 5 MG: 5 TABLET, FILM COATED ORAL at 08:24

## 2021-10-14 RX ADMIN — AMLODIPINE BESYLATE 10 MG: 10 TABLET ORAL at 08:23

## 2021-10-14 RX ADMIN — Medication 250 MG: at 20:31

## 2021-10-14 RX ADMIN — DEXTROMETHORPHAN POLISTIREX 60 MG: 30 SUSPENSION, EXTENDED RELEASE ORAL at 20:41

## 2021-10-14 RX ADMIN — SODIUM CHLORIDE, PRESERVATIVE FREE 10 ML: 5 INJECTION INTRAVENOUS at 08:24

## 2021-10-14 RX ADMIN — ENOXAPARIN SODIUM 60 MG: 60 INJECTION, SOLUTION INTRAVENOUS; SUBCUTANEOUS at 20:41

## 2021-10-14 RX ADMIN — SALINE NASAL SPRAY 1 SPRAY: 1.5 SOLUTION NASAL at 20:41

## 2021-10-14 RX ADMIN — METOPROLOL TARTRATE 12.5 MG: 25 TABLET ORAL at 20:30

## 2021-10-14 RX ADMIN — SODIUM CHLORIDE 1 DROP: 50 SOLUTION OPHTHALMIC at 08:26

## 2021-10-14 NOTE — PROGRESS NOTES
Name: Angel Cisneros ADMIT: 2021   : 1950  PCP: Ayaan Amin MD    MRN: [id number] LOS: 39 days   AGE/SEX: 71 y.o. male  ROOM: [facility]   Subjective   Chief Complaint   Patient presents with   • positive covid   • Headache   • Abdominal Pain   • Shortness of Breath     Dyspnea fair  On steroids  On 6L oxygen  Working with therapists- desats some with therapy  Had been using NRB with exertion but going to stick to high flow today for better determination of how much he needs    ROS  No f/c  No n/v  No cp/palp  Mild soa/cough    Objective   Vital Signs  Temp:  [97.1 °F (36.2 °C)-97.8 °F (36.6 °C)] 97.8 °F (36.6 °C)  Heart Rate:  [60-88] 84  Resp:  [16-20] 16  BP: (110-148)/(57-94) 117/69  SpO2:  [92 %-98 %] 92 %  on  Flow (L/min):  [6] 6;   Device (Oxygen Therapy): humidified; high-flow nasal cannula  Body mass index is 20.98 kg/m².    Physical Exam  Constitutional:       Appearance: He is well-developed. He is ill-appearing (chronically).   HENT:      Head: Normocephalic and atraumatic.   Eyes:      General: No scleral icterus.  Cardiovascular:      Rate and Rhythm: Normal rate and regular rhythm.      Heart sounds: Normal heart sounds.   Pulmonary:      Effort: Respiratory distress (slight, decreased bs at bases) present.      Breath sounds: Normal breath sounds.   Abdominal:      General: There is no distension.      Palpations: Abdomen is soft.      Tenderness: There is no abdominal tenderness.   Musculoskeletal:      Cervical back: Neck supple.   Neurological:      Mental Status: He is alert.   Psychiatric:         Behavior: Behavior normal.         Results Review:       I reviewed the patient's new clinical results.  Results from last 7 days   Lab Units 10/12/21  1056 10/09/21  0533   WBC 10*3/mm3 4.69 4.34   HEMOGLOBIN g/dL 13.0 13.1   PLATELETS 10*3/mm3 166 127*     Results from last 7 days   Lab Units 10/12/21  1056 10/09/21  1238   SODIUM mmol/L 142 138   POTASSIUM mmol/L 3.8 4.1    CHLORIDE mmol/L 105 106   CO2 mmol/L 26.6 21.4*   BUN mg/dL 35* 36*   CREATININE mg/dL 1.46* 1.22   GLUCOSE mg/dL 100* 184*   Estimated Creatinine Clearance: 40.5 mL/min (A) (by C-G formula based on SCr of 1.46 mg/dL (H)).    Results from last 7 days   Lab Units 10/12/21  1056 10/09/21  1238   CALCIUM mg/dL 8.9 8.2*           Coag     HbA1C   Lab Results   Component Value Date    HGBA1C 5.39 09/06/2021     Infection       Radiology(recent) XR Chest 1 View    Result Date: 10/14/2021  Bilateral pulmonary opacities appear stable. No pneumothorax or pneumomediastinum is evident.  This report was finalized on 10/14/2021 7:31 AM by Dr. Rich Joseph M.D.      No results found for: TROPONINT, TROPONINI, BNP  No components found for: TSH;2    amLODIPine, 10 mg, Oral, Q24H  calcium carbonate, 1 tablet, Oral, Daily  dexamethasone, 4 mg, Oral, Daily With Breakfast  dextromethorphan polistirex ER, 60 mg, Oral, Q12H  enoxaparin, 1 mg/kg, Subcutaneous, Q12H  famotidine, 20 mg, Oral, Daily  finasteride, 5 mg, Oral, Daily  metoprolol tartrate, 12.5 mg, Oral, Q12H  mirtazapine, 7.5 mg, Oral, Nightly  prednisoLONE acetate, 1 drop, Both Eyes, Daily  saccharomyces boulardii, 250 mg, Oral, BID  senna-docusate sodium, 2 tablet, Oral, BID  sodium chloride, 1 drop, Left Eye, TID       Diet Regular; Low Potassium; 2 gm (50 mEq)      Assessment/Plan      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]  No   • Paroxysmal atrial fibrillation (HCC) [I48.0]  No   • Acute respiratory failure with hypoxia (HCC) [J96.01]  Yes   • BPH (benign prostatic hyperplasia) [N40.0]  Yes   • CLL (chronic lymphocytic leukemia) (HCC) [C91.10]  Yes   • Chronic kidney disease, stage 3b (HCC) [N18.32]  Yes   • HTN (hypertension) [I10]  Yes   • GERD (gastroesophageal reflux disease) [K21.9]  Yes   • Hodgkin's disease of lymph nodes of head, face, and neck (HCC) [C81.91]   Yes      Resolved Hospital Problems    Diagnosis Date Resolved POA   • Acute constipation [K59.00] 10/08/2021 No         71 y.o. male with history of CLL admitted with acute hypoxic respiratory failure due to COVID-19 pneumonia.  He was fully vaccinated.     COVID-19 pneumonia with acute hypoxic respiratory failure:   - He completed 7-day course of Rocephin due to elevated procalcitonin/pneumomediastinum previously this admission and infectious disease evaluated.  Completed remdesivir course.  Has been on high-dose dexamethasone for some time.  Weaning down  -Oxygen requirements slowly improving.  Still on 5-6 L with increased need with exertion     Paroxysmal A. fib  -Sinus rhythm.  Continue metoprolol.    On therapeutic Lovenox for anticoagulation.  Cardiology evaluated and are anticipating about additional 1 month of anticoagulation at discharge.  Transition to Eliquis/Xarelto at that time.     Critical illness myopathy  -Encourage physical therapy and out of bed as much as possible.  Encourage protein supplements     Fibrosis noted on CT. outpatient follow-up     Hypertension: Acceptable acutely.  Continue current regimen     CKD3: Stable at baseline.  check labs in AM     CLL: Follows with [place] [facility]     GERD: PPI     · Lovenox 60 twice daily   · Full code.  · Discussed with patient and nursing staff and his son on the phone  ·   · Anticipate discharge to facility- heard great news that this is approved. Awaiting bed availability sounds like will have bed on Saturday.     Greater than 36 minutes spent with greater than 50% counseling and coordinating care      Jake Gomez MD  [facility]  10/14/21  15:17 EDT

## 2021-10-14 NOTE — PROGRESS NOTES
LOS: 39 days   Patient Care Team:  Ayaan Amin MD as PCP - General (Family Medicine)  Following for Covid pneumonia and acute respiratory failure  Subjective     Patient is up in a chair has been up several times today he says he is on 5-1/2 L O2 and does not feel short of breath.  Review of Systems:          Objective     Vital Signs  Vital Sign Min/Max for last 24 hours  Temp  Min: 97.1 °F (36.2 °C)  Max: 97.8 °F (36.6 °C)   BP  Min: 110/62  Max: 148/94   Pulse  Min: 60  Max: 88   Resp  Min: 16  Max: 20   SpO2  Min: 92 %  Max: 98 %   Flow (L/min)  Min: 6  Max: 6   Weight  Min: 60.8 kg (134 lb)  Max: 60.8 kg (134 lb)        Ventilator/Non-Invasive Ventilation Settings (From admission, onward)    None                       Body mass index is 20.98 kg/m².  I/O last 3 completed shifts:  In: 360 [P.O.:360]  Out: 2525 [Urine:2525]  I/O this shift:  In: 960 [P.O.:960]  Out: -         Physical Exam:  General Appearance: Well-developed white male he is resting in bed 5.5 L nasal cannula O2 with oxygen saturation of 96%  Eyes: Conjunctiva are clear and anicteric  ENT: Mucous membranes are moist no erythema no exudate  Neck: No jugular venous distension trachea midline no crepitance  Lungs: Clear nonlabored symmetric expansion no wheezes no rales no rhonchi  Cardiac: Regular rate rhythm no murmur no gallop  Abdomen: Soft no palpable hepatosplenomegaly or masses  : Not examined  Musculoskeletal: Grossly normal there is no calf tenderness or palpable cords  Skin: No jaundice no petechiae skin is warm and dry does have a little abrasions in his presacrococcygeal area  Neuro: He is alert oriented cooperative following commands moving all extremities grossly intact  Extremities/P Vascular: No clubbing no cyanosis no edema palpable radial dorsalis pedis pulses bilaterally  MSE: He seems in a little bit better spirits today       Labs:  Results from last 7 days   Lab Units 10/12/21  1056 10/09/21  1238   GLUCOSE  mg/dL 100* 184*   SODIUM mmol/L 142 138   POTASSIUM mmol/L 3.8 4.1   CO2 mmol/L 26.6 21.4*   CHLORIDE mmol/L 105 106   ANION GAP mmol/L 10.4 10.6   CREATININE mg/dL 1.46* 1.22   BUN mg/dL 35* 36*   BUN / CREAT RATIO  24.0 29.5*   CALCIUM mg/dL 8.9 8.2*   EGFR IF NONAFRICN AM mL/min/1.73 48* 59*     Estimated Creatinine Clearance: 40.5 mL/min (A) (by C-G formula based on SCr of 1.46 mg/dL (H)).      Results from last 7 days   Lab Units 10/12/21  1056 10/09/21  0533   WBC 10*3/mm3 4.69 4.34   RBC 10*6/mm3 4.29 4.44   HEMOGLOBIN g/dL 13.0 13.1   HEMATOCRIT % 38.6 41.7   MCV fL 90.0 93.9   MCH pg 30.3 29.5   MCHC g/dL 33.7 31.4*   RDW % 15.3 15.3   RDW-SD fl 50.4 52.6   MPV fL 10.6 12.2*   PLATELETS 10*3/mm3 166 127*   NEUTROPHIL % % 77.6* 79.4*   LYMPHOCYTE % % 12.6* 13.1*   MONOCYTES % % 6.4 4.8*   EOSINOPHIL % % 1.1 0.7   BASOPHIL % % 0.4 0.2   IMM GRAN % % 1.9*  --    NEUTROS ABS 10*3/mm3 3.64 3.44   LYMPHS ABS 10*3/mm3 0.59* 0.57*   MONOS ABS 10*3/mm3 0.30 0.21   EOS ABS 10*3/mm3 0.05 0.03   BASOS ABS 10*3/mm3 0.02 0.01   IMMATURE GRANS (ABS) 10*3/mm3 0.09*  --    NRBC /100 WBC 0.2  --                              Microbiology Results (last 10 days)     Procedure Component Value - Date/Time    COVID PRE-OP / PRE-PROCEDURE SCREENING ORDER (NO ISOLATION) - Swab, Nasopharynx [[id number]]  (Normal) Collected: 10/14/21 1234    Lab Status: Final result Specimen: Swab from Nasopharynx Updated: 10/14/21 3946    Narrative:      The following orders were created for panel order COVID PRE-OP / PRE-PROCEDURE SCREENING ORDER (NO ISOLATION) - Swab, Nasopharynx.  Procedure                               Abnormality         Status                     ---------                               -----------         ------                     COVID-19,APTIMA PANTHER(...[[id number]]  Normal              Final result                 Please view results for these tests on the individual orders.    COVID-19,APTIMA PANTHER(CHUN),KRISTY SEXTON, NP/OP  SWAB IN UTM/VTM/[facility] TRANSPORT MEDIA,24 HR TAT - Swab, Nasopharynx [[id number]]  (Normal) Collected: 10/14/21 1234    Lab Status: Final result Specimen: Swab from Nasopharynx Updated: 10/14/21 1755     COVID19 Not Detected    Narrative:      Fact sheet for providers: https://www.fda.gov/media/415934/download     Fact sheet for patients: https://www.fda.gov/media/590074/download    Test performed by RT PCR.              amLODIPine, 10 mg, Oral, Q24H  calcium carbonate, 1 tablet, Oral, Daily  dexamethasone, 4 mg, Oral, Daily With Breakfast  dextromethorphan polistirex ER, 60 mg, Oral, Q12H  enoxaparin, 1 mg/kg, Subcutaneous, Q12H  famotidine, 20 mg, Oral, Daily  finasteride, 5 mg, Oral, Daily  metoprolol tartrate, 12.5 mg, Oral, Q12H  mirtazapine, 7.5 mg, Oral, Nightly  prednisoLONE acetate, 1 drop, Both Eyes, Daily  saccharomyces boulardii, 250 mg, Oral, BID  senna-docusate sodium, 2 tablet, Oral, BID  sodium chloride, 1 drop, Left Eye, TID           Diagnostics:  CT Head Without Contrast    Result Date: 9/8/2021  CT HEAD WITHOUT CONTRAST  HISTORY: Increasing confusion  COMPARISON: None available.  TECHNIQUE: Axial CT imaging was obtained through the brain. No IV contrast was administered.  FINDINGS: No acute intracranial hemorrhage is seen. Ventricles are normal in size. There is no midline shift or mass effect. Mild mucosal thickening is seen within the left maxillary sinus. There is some minimal opacification of the right mastoid air cells.      No acute intracranial findings.  Radiation dose reduction techniques were utilized, including automated exposure control and exposure modulation based on body size.  This report was finalized on 9/8/2021 4:38 AM by Dr. Sobeida Jones M.D.      XR Chest 1 View    Result Date: 9/25/2021  XR CHEST 1 VW-  HISTORY: Male who is 71 years-old,  pneumomediastinum follow-up  TECHNIQUE: Frontal view of the chest  COMPARISON: 09/24/2021  FINDINGS: The heart is enlarged.  Pneumomediastinum is again apparent, with soft tissue gas again extending to the right neck base. Bilateral infiltrates appear similar to prior exam. No pleural effusion, or pneumothorax. No acute osseous process.      No significant change.  This report was finalized on 9/25/2021 7:08 AM by Dr. Parminder Bradshaw M.D.      XR Chest 1 View    Result Date: 9/24/2021  ONE VIEW PORTABLE CHEST AT 5:50 AM  HISTORY: Follow-up of pneumomediastinum. Covid 19 pneumonia.  FINDINGS: The lungs are well-expanded with extensive pneumonia in both lungs, particularly in the lower lobes and showing no significant change from 2 days ago. There has been nearly complete resolution of some pneumomediastinum and there remains a tiny amount of subcutaneous emphysema at the base of the right neck. There is no evidence of pneumothorax. The heart remains mildly enlarged.  This report was finalized on 9/24/2021 7:13 AM by Dr. Fidel Madison M.D.      XR Chest 1 View    Result Date: 9/22/2021  XR CHEST 1 VW-  HISTORY: Male who is 71 years-old,  Covid pneumonia  TECHNIQUE: Frontal view of the chest  COMPARISON: 09/20/2021  FINDINGS: Heart size is normal. Pneumomediastinum is noted, extending to the right neck base. Pulmonary vasculature is unremarkable. Bilateral infiltrates do not appear significantly changed. No pleural effusion, or pneumothorax. Right hemidiaphragm remains elevated. No acute osseous process.      Persistent bilateral infiltrates. Pneumomediastinum extends into the right neck base.  This report was finalized on 9/22/2021 1:00 PM by Dr. Parminder Bradshaw M.D.      XR Chest 1 View    Result Date: 9/20/2021  XR CHEST 1 VW-  09/20/2021  HISTORY: Follow-up pneumonia.  Heart size is mildly enlarged. There are moderately severe patchy infiltrates in the bilateral mid to lower lungs right worse than left. FINDINGS are consistent with Covid 19 and appears similar to yesterday's study.  No pneumothorax is seen. Bony structures appear  unremarkable.      Mild cardiomegaly. 2. Moderately severe bilateral pulmonary infiltrates right worse than left. FINDINGS are consistent with Covid 19. 3. Chest appears similar to yesterday's study.  This report was finalized on 9/20/2021 7:37 AM by Dr. Norman Montalvo M.D.      XR Chest 1 View    Result Date: 9/19/2021  ONE VIEW PORTABLE CHEST AT 5:10 AM  HISTORY: Covid 19 pneumonia. Shortness of breath.  FINDINGS: There is extensive pneumonia predominantly in both lower lobes and consistent with Covid 19 pneumonia. This shows no significant change from 3 days ago. However, there is now some mediastinal air present that extends into the base of the right neck. No definite pneumothorax is seen. The heart remains slightly enlarged.  This report was finalized on 9/19/2021 7:07 AM by Dr. Fidel Madison M.D.      XR Chest 1 View    Result Date: 9/16/2021  EXAMINATION: SINGLE VIEW CHEST RADIOGRAPH  HISTORY: 71-year-old male with history of worsening hypoxia.  FINDINGS: An upright AP portable chest radiograph was obtained. Comparison is made to chest radiograph dated 09/07/2021. There has been interval progression of opacification in the bilateral mid and lower lung fields since the prior examination. The cardiac silhouette is obscured to a greater degree than on the prior examination. The visualized osseous structures appear normal.      There is evidence of interval progression of bilateral pulmonary infiltrates.  This report was finalized on 9/16/2021 11:08 AM by Dr. Matthew Bradley M.D.      XR Chest 1 View    Result Date: 9/7/2021  SINGLE VIEW OF THE CHEST  HISTORY: Worsening respiratory failure  COMPARISON: 09/05/2021  FINDINGS: Cardiomegaly is present. There is no vascular congestion. Bilateral pulmonary infiltrates are seen. Appearance is in keeping with COVID. They have worsened when compared to prior exam. No pneumothorax or pleural effusion is seen.      Worsening bilateral pulmonary infiltrates.  This  report was finalized on 9/7/2021 10:44 PM by Dr. Sobeida Jones M.D.      XR Chest AP    Result Date: 9/5/2021  PORTABLE CHEST 09/05/2021 AT 4:25 PM  CLINICAL HISTORY: Cough. Fever. COVID 19 evaluation.  Compared to the previous chest dated 09/07/2018.  There is patchy ill-defined increased density in the inferior half of the right lung and to lesser extent in the inferior aspect of the left lung consistent with pneumonia due to COVID 19 infection. There are no pleural effusions. The heart is borderline enlarged.  This report was finalized on 9/5/2021 5:01 PM by Dr. Blu Mendez M.D.      Results for orders placed during the hospital encounter of 09/07/18    Adult Transthoracic Echo Complete W/ Cont if Necessary Per Protocol    Interpretation Summary  · EF = 65%.  · Left ventricular systolic function is normal.  · Mild tricuspid valve regurgitation is present.  · Calculated right ventricular systolic pressure from tricuspid regurgitation is 37 mmHg.  · Estimated right ventricular systolic pressure from tricuspid regurgitation is mildly elevated (35-45 mmHg).          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]  No   • Paroxysmal atrial fibrillation (HCC) [I48.0]  No   • Acute respiratory failure with hypoxia (HCC) [J96.01]  Yes   • BPH (benign prostatic hyperplasia) [N40.0]  Yes   • CLL (chronic lymphocytic leukemia) (HCC) [C91.10]  Yes   • Chronic kidney disease, stage 3b (HCC) [N18.32]  Yes   • HTN (hypertension) [I10]  Yes   • GERD (gastroesophageal reflux disease) [K21.9]  Yes   • Hodgkin's disease of lymph nodes of head, face, and neck (HCC) [C81.91]  Yes      Resolved Hospital Problems    Diagnosis Date Resolved POA   • Acute constipation [K59.00] 10/08/2021 No     Chest x-ray with chronic bibasilar infiltrates that really have not changed minimally last couple of weeks the pneumomediastinum has  resolved    Assessment/Plan     1. COVID-19 infection and pneumonia he is on Decadron 6 milligrams  daily and has completed remdesivir.  Will probably  wean his dexamethasone very slowly given the duration he has been on, may be down to 2 mg in a couple of days and then that for a few days and off  2. Acute hypoxic respiratory failure secondary #1 wean O2 as tolerated making progress been very slow.  He does have some fibrotic changes on his lung bases on the CT scan from the third how much further he improves I do not know there was still a lot of inflammation on that scan.  3. Elevated procalcitonin completed antibiotics for possible secondary bacterial pneumonia. And repeat procalcitonin was normal.  4. Pneumomediastinum resolved  5. CLL  6. History of non-Hodgkin's lymphoma  7. Chronic kidney disease stage IIIb  8. Paroxysmal atrial fibrillation  9. Thrombocytopenia mild not sure etiology, D-dimer normal unlikely clot consuming platelets or DIC questions is related to his CLL, platelets up to normal today  10. Presacral precoccygeal pressure sore  11. Seems a little better spirits hearing that he possibly can get out of here in the next day or 2      I think is very important that when patient goes to rehab they know that they can turn his O2 up to what ever it takes to keep his sats up with exertion just turning back down at rest    Plan for disposition: Hopefully to rehab soon    Clay Truong MD  10/14/21  18:13 EDT    Time:

## 2021-10-14 NOTE — THERAPY TREATMENT NOTE
Patient Name: Angel Cisneros  : 1950    MRN: [id number]                              Today's Date: 10/14/2021       Admit Date: 2021    Visit Dx:     ICD-10-CM ICD-9-CM   1. COVID-19 virus infection  U07.1 079.89   2. Acute hypoxemic respiratory failure (CMS/[facility])  J96.01 518.81   3. Acute renal failure superimposed on chronic kidney disease, unspecified CKD stage, unspecified acute renal failure type (CMS/HCC)  N17.9 584.9    N18.9 585.9     Patient Active Problem List   Diagnosis   • Lymphoma (HCC)   • Leukemia (HCC)   • Chest pain   • GERD (gastroesophageal reflux disease)   • HTN (hypertension)   • Chronic kidney disease, stage 3b (HCC)   • Generalized abdominal pain   • CLL (chronic lymphocytic leukemia) (HCC)   • Gastroesophageal reflux disease   • Hodgkin's disease of lymph nodes of head, face, and neck (HCC)   • Oral thrush   • Medicare annual wellness visit, subsequent   • Primary insomnia   • Adjustment disorder with depressed mood   • Dysuria   • Gross hematuria   • Acute non-recurrent maxillary sinusitis   • Cough   • BPH (benign prostatic hyperplasia)   • Elevated PSA   • Clinical diagnosis of COVID-19   • Pneumonia due to COVID-19 virus   • Paroxysmal atrial fibrillation (HCC)   • Acute respiratory failure with hypoxia (HCC)   • Critical illness myopathy     Past Medical History:   Diagnosis Date   • Arthritis     both knees   • Cancer (HCC)     dx with lymphoma / radiation   • Cataracts, bilateral    • Hx of cornea transplant     both eyes   • Leukemia (HCC)    • Lymphoma (HCC)     dx in . treated with radiation.   • Lymphoma (HCC)     treated with radiation. dx in      Past Surgical History:   Procedure Laterality Date   • BREAST LUMPECTOMY Right    • COLONOSCOPY N/A 3/8/2016    Procedure: COLONOSCOPY with cold polypectomy X 3;  Surgeon: Chris Harden MD;  Location: [facility] ENDOSCOPY;  Service:    • ENDOSCOPY N/A 10/23/2018    Procedure: ESOPHAGOGASTRODUODENOSCOPY WITH  BIOPSY;  Surgeon: Chris Harden MD;  Location: [facility] ENDOSCOPY;  Service: Gastroenterology   • EYE SURGERY      partial cornea transplant 4-5 years ago   • KNEE SURGERY Left    • TOE SURGERY Bilateral    • TONSILLECTOMY        General Information     Row Name 10/14/21 1219          OT Time and Intention    Document Type therapy note (daily note)  -     Mode of Treatment occupational therapy; individual therapy  -     Row Name 10/14/21 1219          General Information    Patient Profile Reviewed yes  -     Existing Precautions/Restrictions fall; oxygen therapy device and L/min  -     Row Name 10/14/21 1219          Cognition    Orientation Status (Cognition) oriented x 3  -     Row Name 10/14/21 1219          Safety Issues, Functional Mobility    Safety Issues Affecting Function (Mobility) insight into deficits/self-awareness; awareness of need for assistance  -     Impairments Affecting Function (Mobility) endurance/activity tolerance; balance; shortness of breath; strength  -           User Key  (r) = Recorded By, (t) = Taken By, (c) = Cosigned By    Initials Name Provider Type     Cynthia Kearns OT Occupational Therapist                 Mobility/ADL's     Row Name 10/14/21 1221          Bed Mobility    Bed Mobility supine-sit; sit-supine  -BL     Supine-Sit Hydes (Bed Mobility) standby assist; verbal cues  -     Sit-Supine Hydes (Bed Mobility) standby assist; verbal cues  -     Assistive Device (Bed Mobility) bed rails; head of bed elevated  -     Row Name 10/14/21 1221          Transfers    Comment (Transfers) NT-pt sats dropping with bed mobility, inappropriate for transfers this date  -     Row Name 10/14/21 1221          Grooming Assessment/Training    Hydes Level (Grooming) wash face, hands; set up  -     Position (Grooming) long sitting  -           User Key  (r) = Recorded By, (t) = Taken By, (c) = Cosigned By    Initials Name Provider Type      Cynthia Kearns OT Occupational Therapist               Obj/Interventions     Row Name 10/14/21 1223          Shoulder (Therapeutic Exercise)    Shoulder AROM (Therapeutic Exercise) bilateral; flexion; extension; aBduction; aDduction; 10 repetitions; 2 sets; sitting  -BL     Row Name 10/14/21 [facility]          Elbow/Forearm (Therapeutic Exercise)    Elbow/Forearm AROM (Therapeutic Exercise) bilateral; flexion; extension; supination; pronation; 10 repetitions; 2 sets; sitting  -BL     Row Name 10/14/21 [facility]          Hand (Therapeutic Exercise)    Hand (Therapeutic Exercise) AROM (active range of motion)  -     Hand AROM/AAROM (Therapeutic Exercise) bilateral; AROM (active range of motion); finger flexion; finger extension; finger aBduction; finger aDduction; 10 repetitions; 2 sets  -     Row Name 10/14/21 [facility]          Therapeutic Exercise    Therapeutic Exercise shoulder; elbow/forearm; hand  -           User Key  (r) = Recorded By, (t) = Taken By, (c) = Cosigned By    Initials Name Provider Type     Cynthia Kearns OT Occupational Therapist               Goals/Plan    No documentation.                Clinical Impression     Row Name 10/14/21 [facility]          Pain Assessment    Additional Documentation Pain Scale: Numbers Pre/Post-Treatment (Group)  -     Row Name 10/14/21 [facility]          Pain Scale: Numbers Pre/Post-Treatment    Pretreatment Pain Rating 0/10 - no pain  -     Posttreatment Pain Rating 0/10 - no pain  -     Row Name 10/14/21 [facility]          Plan of Care Review    Plan of Care Reviewed With patient  -     Progress no change  -     Outcome Summary Pt seen by OT this date for treatment. Upon arrival pt lying supine in bed, c/o 6/10 pain in lower abdomen, A&O. Pt declined transfers this date due to pain in abdomen and pt not appropriate this date due to increase time for sats to recover. Pt agreeable to sitting EOB with SBA to complete BUE AROM 10x2 sets. Pt requiring increase resting break  with instruction for purse lip breathing. Pt requiring 1min 12 sec recovery as pt quickly desat to 82%. Pt requesting to return to supine due to pain in abdomen. Pt completed grooming task in supine with S/U. Pt left in supine, needs in reach, alarm on. Pt to continue with skilled OT services to address goals and deficits.  -BL     Row Name 10/14/21 1223          Vital Signs    Pre SpO2 (%) 88  -BL     O2 Delivery Pre Treatment supplemental O2  -BL     Intra SpO2 (%) 82  -BL     O2 Delivery Intra Treatment supplemental O2  -BL     Post SpO2 (%) 88  -BL     O2 Delivery Post Treatment supplemental O2  -BL     Pre Patient Position Supine  -BL     Intra Patient Position Sitting  -BL     Post Patient Position Supine  -BL     Row Name 10/14/21 1223          Positioning and Restraints    Pre-Treatment Position in bed  -BL     Post Treatment Position bed  -BL     In Bed supine; call light within reach; encouraged to call for assist; exit alarm on  -BL           User Key  (r) = Recorded By, (t) = Taken By, (c) = Cosigned By    Initials Name Provider Type    Cynthia Sparrow OT Occupational Therapist               Outcome Measures    No documentation.                 Occupational Therapy Education                 Title: PT OT SLP Therapies (Done)     Topic: Occupational Therapy (Done)     Point: ADL training (Done)     Description:   Instruct learner(s) on proper safety adaptation and remediation techniques during self care or transfers.   Instruct in proper use of assistive devices.              Learning Progress Summary           Patient Acceptance, E, VU by ULISES at 10/13/2021 1538   Family Acceptance, E,TB, VU by LETICIA at 10/9/2021 1357      Show all documentation for this point (15)                 Point: Home exercise program (Done)     Description:   Instruct learner(s) on appropriate technique for monitoring, assisting and/or progressing therapeutic exercises/activities.              Learning Progress Summary            Patient Acceptance, E, VU by AG at 10/13/2021 1538   Family Acceptance, E,TB, VU by  at 10/9/2021 1735      Show all documentation for this point (15)                 Point: Precautions (Done)     Description:   Instruct learner(s) on prescribed precautions during self-care and functional transfers.              Learning Progress Summary           Patient Acceptance, E, VU by AG at 10/13/2021 1538   Family Acceptance, E,TB, VU by  at 10/9/2021 1735      Show all documentation for this point (15)                 Point: Body mechanics (Done)     Description:   Instruct learner(s) on proper positioning and spine alignment during self-care, functional mobility activities and/or exercises.              Learning Progress Summary           Patient Acceptance, E, VU by  at 10/13/2021 1538   Family Acceptance, E,TB, VU by  at 10/9/2021 1735      Show all documentation for this point (14)                             User Key     Initials Effective Dates Name Provider Type Discipline     09/24/19 -  Dolores Read, RN Registered Nurse Nurse     06/16/21 -  Giuseppe Beasley, RN Registered Nurse Nurse              OT Recommendation and Plan     Plan of Care Review  Plan of Care Reviewed With: patient  Progress: no change  Outcome Summary: Pt seen by OT this date for treatment. Upon arrival pt lying supine in bed, c/o 6/10 pain in lower abdomen, A&O. Pt declined transfers this date due to pain in abdomen and pt not appropriate this date due to increase time for sats to recover. Pt agreeable to sitting EOB with SBA to complete BUE AROM 10x2 sets. Pt requiring increase resting break with instruction for purse lip breathing. Pt requiring 1min 12 sec recovery as pt quickly desat to 82%. Pt requesting to return to supine due to pain in abdomen. Pt completed grooming task in supine with S/U. Pt left in supine, needs in reach, alarm on. Pt to continue with skilled OT services to address goals and deficits.     Time  Calculation:    Time Calculation- OT     Row Name 10/14/21 1231             Time Calculation- OT    OT Start Time 1015  -BL      OT Stop Time 1033  -BL      OT Time Calculation (min) 18 min  -BL      Total Timed Code Minutes- OT 18 minute(s)  -BL      OT Received On 10/14/21  -BL      OT - Next Appointment 10/15/21  -BL              Timed Charges    15553 - OT Therapeutic Exercise Minutes 18  -BL              Total Minutes    Timed Charges Total Minutes 18  -BL       Total Minutes 18  -BL            User Key  (r) = Recorded By, (t) = Taken By, (c) = Cosigned By    Initials Name Provider Type    BL Cynthia Kearns OT Occupational Therapist              Therapy Charges for Today     Code Description Service Date Service Provider Modifiers Qty    [id number] HC OT THER PROC EA 15 MIN 10/14/2021 Cynthia Kearns OT GO 1               Cynthia Kearns OT  10/14/2021

## 2021-10-14 NOTE — CASE MANAGEMENT/SOCIAL WORK
Continued Stay Note  [facility]     Patient Name: Angel Cisneros  MRN: [id number]  Today's Date: 10/14/2021    Admit Date: 9/5/2021     Discharge Plan     Row Name 10/14/21 1516       Plan    Plan Comments Inbound call from [place]. Expedited appeal was approved. Patient will have a bed available Saturday and if one opens up tomorrow he can DC tomorrow. MD aware and patient aware. Suzi Peres RN CCP               Discharge Codes    No documentation.               Expected Discharge Date and Time     Expected Discharge Date Expected Discharge Time    Oct 16, 2021             Suzi Neville RN

## 2021-10-14 NOTE — CASE MANAGEMENT/SOCIAL WORK
Continued Stay Note  [facility]     Patient Name: Angel Cisneros  MRN: [id number]  Today's Date: 10/14/2021    Admit Date: 9/5/2021     Discharge Plan     Row Name 10/14/21 1040       Plan    Plan Comments Spoke with son Alex and updated him regarding the appeal. Suzi Neville RN CCP    Row Name 10/14/21 1023       Plan    Plan Comments Appeal started on 10/13/21 by [employer]. Per Christiano additional notes were sent this morning. Patient has not had an ICU stay since being admitted and would not be accepted at [facility]. CCP will continue to follow. Avoidable days entered on insurance.  Suzi Neville RN CCP               Discharge Codes    No documentation.               Expected Discharge Date and Time     Expected Discharge Date Expected Discharge Time    Oct 12, 2021             Suzi Neville RN

## 2021-10-14 NOTE — CONSULTS
Consult request received to review patient for isolation precaution removal. Patient reviewed by Dr. Lema and due to him immunosuppressed state he will need to have a negative Covid test before he can be cleared to come out of Isolation precautions. Infection Control RN discussed with primary RN today.

## 2021-10-14 NOTE — PLAN OF CARE
Problem: Adult Inpatient Plan of Care  Goal: Patient-Specific Goal (Individualized)  Outcome: Ongoing, Progressing  Flowsheets (Taken 10/14/2021 1820)  Patient-Specific Goals (Include Timeframe): vss, no c/o pain , tolerating po, up to chair, remains on 6Lhfnc possible dc tomorrow to rehab, will continue to monitor   Goal Outcome Evaluation:

## 2021-10-14 NOTE — CASE MANAGEMENT/SOCIAL WORK
Continued Stay Note  [facility]     Patient Name: Angel Cisneros  MRN: [id number]  Today's Date: 10/14/2021    Admit Date: 9/5/2021     Discharge Plan     Row Name 10/14/21 1023       Plan    Plan Comments Appeal started on 10/13/21 by [employer]. Reese Alvarado additional notes were sent this morning. Patient has not had an ICU stay since being admitted and would not be accepted at [facility]. CCP will continue to follow. Avoidable days entered on insurance.  Suzi Neville RN CCP               Discharge Codes    No documentation.               Expected Discharge Date and Time     Expected Discharge Date Expected Discharge Time    Oct 12, 2021             Suzi Neville RN

## 2021-10-14 NOTE — PLAN OF CARE
Goal Outcome Evaluation:  Plan of Care Reviewed With: patient        Progress: no change  Outcome Summary: Pt seen by OT this date for treatment. Upon arrival pt lying supine in bed, c/o 6/10 pain in lower abdomen, A&O. Pt declined transfers this date due to pain in abdomen and pt not appropriate this date due to increase time for sats to recover. Pt agreeable to sitting EOB with SBA to complete BUE AROM 10x2 sets. Pt requiring increase resting break with instruction for purse lip breathing. Pt requiring 1min 12 sec recovery as pt quickly desat to 82%. Pt requesting to return to supine due to pain in abdomen. Pt completed grooming task in supine with S/U. Pt left in supine, needs in reach, alarm on. Pt to continue with skilled OT services to address goals and deficits.

## 2021-10-15 ENCOUNTER — APPOINTMENT (OUTPATIENT)
Dept: GENERAL RADIOLOGY | Facility: HOSPITAL | Age: 71
End: 2021-10-15

## 2021-10-15 LAB
ANION GAP SERPL CALCULATED.3IONS-SCNC: 9.6 MMOL/L (ref 5–15)
BASOPHILS # BLD AUTO: 0.04 10*3/MM3 (ref 0–0.2)
BASOPHILS NFR BLD AUTO: 0.8 % (ref 0–1.5)
BUN SERPL-MCNC: 33 MG/DL (ref 8–23)
BUN/CREAT SERPL: 24.4 (ref 7–25)
CALCIUM SPEC-SCNC: 8.3 MG/DL (ref 8.6–10.5)
CHLORIDE SERPL-SCNC: 110 MMOL/L (ref 98–107)
CO2 SERPL-SCNC: 26.4 MMOL/L (ref 22–29)
CREAT SERPL-MCNC: 1.35 MG/DL (ref 0.76–1.27)
CRP SERPL-MCNC: 0.61 MG/DL (ref 0–0.5)
DEPRECATED RDW RBC AUTO: 51.2 FL (ref 37–54)
EOSINOPHIL # BLD AUTO: 0.01 10*3/MM3 (ref 0–0.4)
EOSINOPHIL NFR BLD AUTO: 0.2 % (ref 0.3–6.2)
ERYTHROCYTE [DISTWIDTH] IN BLOOD BY AUTOMATED COUNT: 15.3 % (ref 12.3–15.4)
GFR SERPL CREATININE-BSD FRML MDRD: 52 ML/MIN/1.73
GLUCOSE SERPL-MCNC: 74 MG/DL (ref 65–99)
HCT VFR BLD AUTO: 36 % (ref 37.5–51)
HGB BLD-MCNC: 11.8 G/DL (ref 13–17.7)
IMM GRANULOCYTES # BLD AUTO: 0.23 10*3/MM3 (ref 0–0.05)
IMM GRANULOCYTES NFR BLD AUTO: 4.7 % (ref 0–0.5)
LYMPHOCYTES # BLD AUTO: 0.8 10*3/MM3 (ref 0.7–3.1)
LYMPHOCYTES NFR BLD AUTO: 16.4 % (ref 19.6–45.3)
MCH RBC QN AUTO: 30.1 PG (ref 26.6–33)
MCHC RBC AUTO-ENTMCNC: 32.8 G/DL (ref 31.5–35.7)
MCV RBC AUTO: 91.8 FL (ref 79–97)
MONOCYTES # BLD AUTO: 0.39 10*3/MM3 (ref 0.1–0.9)
MONOCYTES NFR BLD AUTO: 8 % (ref 5–12)
NEUTROPHILS NFR BLD AUTO: 3.4 10*3/MM3 (ref 1.7–7)
NEUTROPHILS NFR BLD AUTO: 69.9 % (ref 42.7–76)
NRBC BLD AUTO-RTO: 0 /100 WBC (ref 0–0.2)
PLATELET # BLD AUTO: 149 10*3/MM3 (ref 140–450)
PMV BLD AUTO: 9.9 FL (ref 6–12)
POTASSIUM SERPL-SCNC: 4.4 MMOL/L (ref 3.5–5.2)
RBC # BLD AUTO: 3.92 10*6/MM3 (ref 4.14–5.8)
SODIUM SERPL-SCNC: 146 MMOL/L (ref 136–145)
WBC # BLD AUTO: 4.87 10*3/MM3 (ref 3.4–10.8)

## 2021-10-15 PROCEDURE — 86140 C-REACTIVE PROTEIN: CPT | Performed by: INTERNAL MEDICINE

## 2021-10-15 PROCEDURE — 85025 COMPLETE CBC W/AUTO DIFF WBC: CPT | Performed by: INTERNAL MEDICINE

## 2021-10-15 PROCEDURE — 80048 BASIC METABOLIC PNL TOTAL CA: CPT | Performed by: INTERNAL MEDICINE

## 2021-10-15 PROCEDURE — 71045 X-RAY EXAM CHEST 1 VIEW: CPT

## 2021-10-15 PROCEDURE — 63710000001 DEXAMETHASONE PER 0.25 MG: Performed by: INTERNAL MEDICINE

## 2021-10-15 PROCEDURE — 94799 UNLISTED PULMONARY SVC/PX: CPT

## 2021-10-15 PROCEDURE — 25010000002 ENOXAPARIN PER 10 MG: Performed by: INTERNAL MEDICINE

## 2021-10-15 RX ORDER — DEXAMETHASONE 2 MG/1
2 TABLET ORAL
Status: DISCONTINUED | OUTPATIENT
Start: 2021-10-16 | End: 2021-10-16 | Stop reason: HOSPADM

## 2021-10-15 RX ADMIN — FINASTERIDE 5 MG: 5 TABLET, FILM COATED ORAL at 08:46

## 2021-10-15 RX ADMIN — DEXTROMETHORPHAN POLISTIREX 60 MG: 30 SUSPENSION, EXTENDED RELEASE ORAL at 20:03

## 2021-10-15 RX ADMIN — Medication 250 MG: at 20:03

## 2021-10-15 RX ADMIN — DEXAMETHASONE 4 MG: 4 TABLET ORAL at 08:46

## 2021-10-15 RX ADMIN — ENOXAPARIN SODIUM 60 MG: 60 INJECTION, SOLUTION INTRAVENOUS; SUBCUTANEOUS at 08:46

## 2021-10-15 RX ADMIN — SODIUM CHLORIDE 1 DROP: 50 SOLUTION OPHTHALMIC at 08:47

## 2021-10-15 RX ADMIN — PREDNISOLONE ACETATE 1 DROP: 10 SUSPENSION/ DROPS OPHTHALMIC at 08:47

## 2021-10-15 RX ADMIN — Medication 3 MG: at 00:20

## 2021-10-15 RX ADMIN — SODIUM CHLORIDE 1 DROP: 50 SOLUTION OPHTHALMIC at 16:00

## 2021-10-15 RX ADMIN — Medication 250 MG: at 08:46

## 2021-10-15 RX ADMIN — AMLODIPINE BESYLATE 10 MG: 10 TABLET ORAL at 08:46

## 2021-10-15 RX ADMIN — METOPROLOL TARTRATE 12.5 MG: 25 TABLET ORAL at 20:03

## 2021-10-15 RX ADMIN — MIRTAZAPINE 7.5 MG: 15 TABLET, FILM COATED ORAL at 20:03

## 2021-10-15 RX ADMIN — DEXTROMETHORPHAN POLISTIREX 60 MG: 30 SUSPENSION, EXTENDED RELEASE ORAL at 08:46

## 2021-10-15 RX ADMIN — DOCUSATE SODIUM 50MG AND SENNOSIDES 8.6MG 2 TABLET: 8.6; 5 TABLET, FILM COATED ORAL at 20:03

## 2021-10-15 RX ADMIN — METOPROLOL TARTRATE 12.5 MG: 25 TABLET ORAL at 08:47

## 2021-10-15 RX ADMIN — APIXABAN 5 MG: 5 TABLET, FILM COATED ORAL at 20:03

## 2021-10-15 RX ADMIN — FAMOTIDINE 20 MG: 20 TABLET, FILM COATED ORAL at 08:46

## 2021-10-15 RX ADMIN — SODIUM CHLORIDE 1 DROP: 50 SOLUTION OPHTHALMIC at 20:04

## 2021-10-15 NOTE — PLAN OF CARE
Problem: Adult Inpatient Plan of Care  Goal: Patient-Specific Goal (Individualized)  Outcome: Ongoing, Progressing  Flowsheets (Taken 10/15/2021 2097)  Patient-Specific Goals (Include Timeframe): vss, remain on 6-8L of hflnc with activity, tolerating po, dc'd isolation, possible dc tomorrow to rehab, will continue to monitor   Goal Outcome Evaluation:

## 2021-10-15 NOTE — PROGRESS NOTES
Name: Angel Cisneros ADMIT: 2021   : 1950  PCP: Ayaan Amin MD    MRN: [id number] LOS: 40 days   AGE/SEX: 71 y.o. male  ROOM: [facility]   Subjective   Chief Complaint   Patient presents with   • positive covid   • Headache   • Abdominal Pain   • Shortness of Breath     Dyspnea fair  On steroids  On 6L oxygen  Working with therapists- desats some with therapy  Feels like getting stronger  Using IS    ROS  No f/c  No n/v  No cp/palp  Mild soa/cough    Objective   Vital Signs  Temp:  [96.6 °F (35.9 °C)-98 °F (36.7 °C)] 96.6 °F (35.9 °C)  Heart Rate:  [57-87] 62  Resp:  [16] 16  BP: ()/(56-88) 130/75  SpO2:  [92 %-99 %] 99 %  on  Flow (L/min):  [6] 6;   Device (Oxygen Therapy): high-flow nasal cannula; humidified  Body mass index is 20.97 kg/m².    Physical Exam  Constitutional:       Appearance: He is well-developed. He is ill-appearing (chronically).   HENT:      Head: Normocephalic and atraumatic.   Eyes:      General: No scleral icterus.  Cardiovascular:      Rate and Rhythm: Normal rate and regular rhythm.      Heart sounds: Normal heart sounds.   Pulmonary:      Effort: Respiratory distress (slight, decreased bs at bases) present.      Breath sounds: Normal breath sounds.   Abdominal:      General: There is no distension.      Palpations: Abdomen is soft.      Tenderness: There is no abdominal tenderness.   Musculoskeletal:      Cervical back: Neck supple.   Neurological:      Mental Status: He is alert.   Psychiatric:         Behavior: Behavior normal.         Results Review:       I reviewed the patient's new clinical results.  Results from last 7 days   Lab Units 10/15/21  0455 10/12/21  1056 10/09/21  0533   WBC 10*3/mm3 4.87 4.69 4.34   HEMOGLOBIN g/dL 11.8* 13.0 13.1   PLATELETS 10*3/mm3 149 166 127*     Results from last 7 days   Lab Units 10/15/21  0455 10/12/21  1056 10/09/21  1238   SODIUM mmol/L 146* 142 138   POTASSIUM mmol/L 4.4 3.8 4.1   CHLORIDE mmol/L 110* 105 106    CO2 mmol/L 26.4 26.6 21.4*   BUN mg/dL 33* 35* 36*   CREATININE mg/dL 1.35* 1.46* 1.22   GLUCOSE mg/dL 74 100* 184*   Estimated Creatinine Clearance: 43.7 mL/min (A) (by C-G formula based on SCr of 1.35 mg/dL (H)).    Results from last 7 days   Lab Units 10/15/21  0455 10/12/21  1056 10/09/21  1238   CALCIUM mg/dL 8.3* 8.9 8.2*           Coag     HbA1C   Lab Results   Component Value Date    HGBA1C 5.39 09/06/2021     Infection       Radiology(recent) XR Chest 1 View    Result Date: 10/14/2021  Bilateral pulmonary opacities appear stable. No pneumothorax or pneumomediastinum is evident.  This report was finalized on 10/14/2021 7:31 AM by Dr. Rich Joseph M.D.      No results found for: TROPONINT, TROPONINI, BNP  No components found for: TSH;2    amLODIPine, 10 mg, Oral, Q24H  calcium carbonate, 1 tablet, Oral, Daily  dexamethasone, 4 mg, Oral, Daily With Breakfast  dextromethorphan polistirex ER, 60 mg, Oral, Q12H  enoxaparin, 1 mg/kg, Subcutaneous, Q12H  famotidine, 20 mg, Oral, Daily  finasteride, 5 mg, Oral, Daily  metoprolol tartrate, 12.5 mg, Oral, Q12H  mirtazapine, 7.5 mg, Oral, Nightly  prednisoLONE acetate, 1 drop, Both Eyes, Daily  saccharomyces boulardii, 250 mg, Oral, BID  senna-docusate sodium, 2 tablet, Oral, BID  sodium chloride, 1 drop, Left Eye, TID       Diet Regular; Low Potassium; 2 gm (50 mEq)      Assessment/Plan      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]  No   • Paroxysmal atrial fibrillation (HCC) [I48.0]  No   • Acute respiratory failure with hypoxia (HCC) [J96.01]  Yes   • BPH (benign prostatic hyperplasia) [N40.0]  Yes   • CLL (chronic lymphocytic leukemia) (HCC) [C91.10]  Yes   • Chronic kidney disease, stage 3b (HCC) [N18.32]  Yes   • HTN (hypertension) [I10]  Yes   • GERD (gastroesophageal reflux disease) [K21.9]  Yes   • Hodgkin's disease of lymph nodes of head, face, and neck  ([facility]) [C81.91]  Yes      Resolved Hospital Problems    Diagnosis Date Resolved POA   • Acute constipation [K59.00] 10/08/2021 No         71 y.o. male with history of CLL admitted with acute hypoxic respiratory failure due to COVID-19 pneumonia.  He was fully vaccinated.     COVID-19 pneumonia with acute hypoxic respiratory failure:   - He completed 7-day course of Rocephin due to elevated procalcitonin/pneumomediastinum previously this admission and infectious disease evaluated.  Completed remdesivir course.  Has been on high-dose dexamethasone for some time.  Weaning down  -Oxygen requirements slowly improving.  Still on 5-6 L with increased need with exertion     Paroxysmal A. fib  -Sinus rhythm.  Continue metoprolol.    On therapeutic Lovenox for anticoagulation.  Cardiology evaluated and are anticipating about additional 1 month of anticoagulation at discharge.  Transition to Eliquis today.      Critical illness myopathy  -Encourage physical therapy and out of bed as much as possible.  Encourage protein supplements     Fibrosis noted on CT. outpatient follow-up     Hypertension: Acceptable acutely.  Continue current regimen     CKD3: Stable at baseline.  check labs in AM     CLL: Follows with [facility]     GERD: PPI     · Eliquis  · Full code.  · Discussed with patient and nursing staff and his son on the phone (on 10/14)  ·   · Anticipate discharge to facility- heard great news that this is approved. Awaiting bed availability sounds like will have bed on Saturday.     Greater than 38 minutes spent with greater than 50% counseling and coordinating care. Discussed during multidisciplinary rounds today      Jake Gomez MD  [facility]  10/15/21  15:17 EDT

## 2021-10-15 NOTE — SIGNIFICANT NOTE
10/15/21 1621   OTHER   Discipline physical therapist   Rehab Time/Intention   Session Not Performed   (Pt completed 10 reps of LAQ. He  was complaining of being in pain and O2 decreased to 80 %. Session discontinued secondary to pt not feeling well. Time spent with pt was 5 minutes.)

## 2021-10-15 NOTE — PROGRESS NOTES
LOS: 40 days   Patient Care Team:  Ayaan Amin MD as PCP - General (Family Medicine)  Following for Covid pneumonia and acute respiratory failure  Subjective     Patient is resting in bed on 6 L nasal cannula O2 does not feel short of breath at rest he does get dyspneic with exertion  Review of Systems:          Objective     Vital Signs  Vital Sign Min/Max for last 24 hours  Temp  Min: 96.6 °F (35.9 °C)  Max: 98 °F (36.7 °C)   BP  Min: 94/56  Max: 130/75   Pulse  Min: 57  Max: 87   Resp  Min: 16  Max: 16   SpO2  Min: 92 %  Max: 99 %   Flow (L/min)  Min: 5  Max: 6   Weight  Min: 60.7 kg (133 lb 14.4 oz)  Max: 60.7 kg (133 lb 14.4 oz)        Ventilator/Non-Invasive Ventilation Settings (From admission, onward)    None                       Body mass index is 20.97 kg/m².  I/O last 3 completed shifts:  In: 1440 [P.O.:1440]  Out: 2350 [Urine:2350]  I/O this shift:  In: 960 [P.O.:960]  Out: 1300 [Urine:1300]        Physical Exam:  General Appearance: Well-developed white male he is resting in bed 6 L nasal cannula O2 with oxygen saturation of 96%  Eyes: Conjunctiva are clear and anicteric  ENT: Mucous membranes are moist no erythema no exudate  Neck: No jugular venous distension trachea midline no crepitance  Lungs: Clear nonlabored symmetric expansion no wheezes no rales no rhonchi  Cardiac: Regular rate rhythm no murmur no gallop  Abdomen: Soft no palpable hepatosplenomegaly or masses  : Not examined  Musculoskeletal: Grossly normal there is no calf tenderness or palpable cords  Skin: No jaundice no petechiae skin is warm and dry did not look at his presacral skin lesions today  Neuro: He is alert oriented cooperative following commands moving all extremities grossly intact  Extremities/P Vascular: No clubbing no cyanosis no edema palpable radial dorsalis pedis pulses bilaterally  MSE: He seems to be in good spirits today he is very looking forward to going to rehab tomorrow       Labs:  Results  from last 7 days   Lab Units 10/15/21  0455 10/12/21  1056 10/09/21  1238   GLUCOSE mg/dL 74 100* 184*   SODIUM mmol/L 146* 142 138   POTASSIUM mmol/L 4.4 3.8 4.1   CO2 mmol/L 26.4 26.6 21.4*   CHLORIDE mmol/L 110* 105 106   ANION GAP mmol/L 9.6 10.4 10.6   CREATININE mg/dL 1.35* 1.46* 1.22   BUN mg/dL 33* 35* 36*   BUN / CREAT RATIO  24.4 24.0 29.5*   CALCIUM mg/dL 8.3* 8.9 8.2*   EGFR IF NONAFRICN AM mL/min/1.73 52* 48* 59*     Estimated Creatinine Clearance: 43.7 mL/min (A) (by C-G formula based on SCr of 1.35 mg/dL (H)).      Results from last 7 days   Lab Units 10/15/21  0455 10/12/21  1056 10/09/21  0533   WBC 10*3/mm3 4.87 4.69 4.34   RBC 10*6/mm3 3.92* 4.29 4.44   HEMOGLOBIN g/dL 11.8* 13.0 13.1   HEMATOCRIT % 36.0* 38.6 41.7   MCV fL 91.8 90.0 93.9   MCH pg 30.1 30.3 29.5   MCHC g/dL 32.8 33.7 31.4*   RDW % 15.3 15.3 15.3   RDW-SD fl 51.2 50.4 52.6   MPV fL 9.9 10.6 12.2*   PLATELETS 10*3/mm3 149 166 127*   NEUTROPHIL % % 69.9 77.6* 79.4*   LYMPHOCYTE % % 16.4* 12.6* 13.1*   MONOCYTES % % 8.0 6.4 4.8*   EOSINOPHIL % % 0.2* 1.1 0.7   BASOPHIL % % 0.8 0.4 0.2   IMM GRAN % % 4.7* 1.9*  --    NEUTROS ABS 10*3/mm3 3.40 3.64 3.44   LYMPHS ABS 10*3/mm3 0.80 0.59* 0.57*   MONOS ABS 10*3/mm3 0.39 0.30 0.21   EOS ABS 10*3/mm3 0.01 0.05 0.03   BASOS ABS 10*3/mm3 0.04 0.02 0.01   IMMATURE GRANS (ABS) 10*3/mm3 0.23* 0.09*  --    NRBC /100 WBC 0.0 0.2  --                              Microbiology Results (last 10 days)     Procedure Component Value - Date/Time    COVID PRE-OP / PRE-PROCEDURE SCREENING ORDER (NO ISOLATION) - Swab, Nasopharynx [[id number]]  (Normal) Collected: 10/14/21 1234    Lab Status: Final result Specimen: Swab from Nasopharynx Updated: 10/14/21 1819    Narrative:      The following orders were created for panel order COVID PRE-OP / PRE-PROCEDURE SCREENING ORDER (NO ISOLATION) - Swab, Nasopharynx.  Procedure                               Abnormality         Status                     ---------                                -----------         ------                     COVID-19,APTIMA PANTHER(...[[id number]]  Normal              Final result                 Please view results for these tests on the individual orders.    COVID-19,APTIMA PANTHER(CHUN),[facility], NP/OP SWAB IN UTM/VTM/SALINE TRANSPORT MEDIA,24 HR TAT - Swab, Nasopharynx [[id number]]  (Normal) Collected: 10/14/21 1234    Lab Status: Final result Specimen: Swab from Nasopharynx Updated: 10/14/21 1755     COVID19 Not Detected    Narrative:      Fact sheet for providers: https://www.fda.gov/media/255874/download     Fact sheet for patients: https://www.fda.gov/media/425198/download    Test performed by RT PCR.              amLODIPine, 10 mg, Oral, Q24H  apixaban, 5 mg, Oral, Q12H  calcium carbonate, 1 tablet, Oral, Daily  dexamethasone, 4 mg, Oral, Daily With Breakfast  dextromethorphan polistirex ER, 60 mg, Oral, Q12H  famotidine, 20 mg, Oral, Daily  finasteride, 5 mg, Oral, Daily  metoprolol tartrate, 12.5 mg, Oral, Q12H  mirtazapine, 7.5 mg, Oral, Nightly  prednisoLONE acetate, 1 drop, Both Eyes, Daily  saccharomyces boulardii, 250 mg, Oral, BID  senna-docusate sodium, 2 tablet, Oral, BID  sodium chloride, 1 drop, Left Eye, TID           Diagnostics:  CT Head Without Contrast    Result Date: 9/8/2021  CT HEAD WITHOUT CONTRAST  HISTORY: Increasing confusion  COMPARISON: None available.  TECHNIQUE: Axial CT imaging was obtained through the brain. No IV contrast was administered.  FINDINGS: No acute intracranial hemorrhage is seen. Ventricles are normal in size. There is no midline shift or mass effect. Mild mucosal thickening is seen within the left maxillary sinus. There is some minimal opacification of the right mastoid air cells.      No acute intracranial findings.  Radiation dose reduction techniques were utilized, including automated exposure control and exposure modulation based on body size.  This report was finalized on 9/8/2021 4:38 AM by Dr. Vidales  KEVIN Jones      XR Chest 1 View    Result Date: 9/25/2021  XR CHEST 1 VW-  HISTORY: Male who is 71 years-old,  pneumomediastinum follow-up  TECHNIQUE: Frontal view of the chest  COMPARISON: 09/24/2021  FINDINGS: The heart is enlarged. Pneumomediastinum is again apparent, with soft tissue gas again extending to the right neck base. Bilateral infiltrates appear similar to prior exam. No pleural effusion, or pneumothorax. No acute osseous process.      No significant change.  This report was finalized on 9/25/2021 7:08 AM by Dr. Parminder Bradshaw M.D.      XR Chest 1 View    Result Date: 9/24/2021  ONE VIEW PORTABLE CHEST AT 5:50 AM  HISTORY: Follow-up of pneumomediastinum. Covid 19 pneumonia.  FINDINGS: The lungs are well-expanded with extensive pneumonia in both lungs, particularly in the lower lobes and showing no significant change from 2 days ago. There has been nearly complete resolution of some pneumomediastinum and there remains a tiny amount of subcutaneous emphysema at the base of the right neck. There is no evidence of pneumothorax. The heart remains mildly enlarged.  This report was finalized on 9/24/2021 7:13 AM by Dr. Fidel Madison M.D.      XR Chest 1 View    Result Date: 9/22/2021  XR CHEST 1 VW-  HISTORY: Male who is 71 years-old,  Covid pneumonia  TECHNIQUE: Frontal view of the chest  COMPARISON: 09/20/2021  FINDINGS: Heart size is normal. Pneumomediastinum is noted, extending to the right neck base. Pulmonary vasculature is unremarkable. Bilateral infiltrates do not appear significantly changed. No pleural effusion, or pneumothorax. Right hemidiaphragm remains elevated. No acute osseous process.      Persistent bilateral infiltrates. Pneumomediastinum extends into the right neck base.  This report was finalized on 9/22/2021 1:00 PM by Dr. Parminder Bradshaw M.D.      XR Chest 1 View    Result Date: 9/20/2021  XR CHEST 1 VW-  09/20/2021  HISTORY: Follow-up pneumonia.  Heart size is mildly  enlarged. There are moderately severe patchy infiltrates in the bilateral mid to lower lungs right worse than left. FINDINGS are consistent with Covid 19 and appears similar to yesterday's study.  No pneumothorax is seen. Bony structures appear unremarkable.      Mild cardiomegaly. 2. Moderately severe bilateral pulmonary infiltrates right worse than left. FINDINGS are consistent with Covid 19. 3. Chest appears similar to yesterday's study.  This report was finalized on 9/20/2021 7:37 AM by Dr. Norman Montalvo M.D.      XR Chest 1 View    Result Date: 9/19/2021  ONE VIEW PORTABLE CHEST AT 5:10 AM  HISTORY: Covid 19 pneumonia. Shortness of breath.  FINDINGS: There is extensive pneumonia predominantly in both lower lobes and consistent with Covid 19 pneumonia. This shows no significant change from 3 days ago. However, there is now some mediastinal air present that extends into the base of the right neck. No definite pneumothorax is seen. The heart remains slightly enlarged.  This report was finalized on 9/19/2021 7:07 AM by Dr. Fidel Madison M.D.      XR Chest 1 View    Result Date: 9/16/2021  EXAMINATION: SINGLE VIEW CHEST RADIOGRAPH  HISTORY: 71-year-old male with history of worsening hypoxia.  FINDINGS: An upright AP portable chest radiograph was obtained. Comparison is made to chest radiograph dated 09/07/2021. There has been interval progression of opacification in the bilateral mid and lower lung fields since the prior examination. The cardiac silhouette is obscured to a greater degree than on the prior examination. The visualized osseous structures appear normal.      There is evidence of interval progression of bilateral pulmonary infiltrates.  This report was finalized on 9/16/2021 11:08 AM by Dr. Matthew Bradley M.D.      XR Chest 1 View    Result Date: 9/7/2021  SINGLE VIEW OF THE CHEST  HISTORY: Worsening respiratory failure  COMPARISON: 09/05/2021  FINDINGS: Cardiomegaly is present. There is no  vascular congestion. Bilateral pulmonary infiltrates are seen. Appearance is in keeping with COVID. They have worsened when compared to prior exam. No pneumothorax or pleural effusion is seen.      Worsening bilateral pulmonary infiltrates.  This report was finalized on 9/7/2021 10:44 PM by Dr. Sobeida Jones M.D.      XR Chest AP    Result Date: 9/5/2021  PORTABLE CHEST 09/05/2021 AT 4:25 PM  CLINICAL HISTORY: Cough. Fever. COVID 19 evaluation.  Compared to the previous chest dated 09/07/2018.  There is patchy ill-defined increased density in the inferior half of the right lung and to lesser extent in the inferior aspect of the left lung consistent with pneumonia due to COVID 19 infection. There are no pleural effusions. The heart is borderline enlarged.  This report was finalized on 9/5/2021 5:01 PM by Dr. Blu Mendez M.D.      Results for orders placed during the hospital encounter of 09/07/18    Adult Transthoracic Echo Complete W/ Cont if Necessary Per Protocol    Interpretation Summary  · EF = 65%.  · Left ventricular systolic function is normal.  · Mild tricuspid valve regurgitation is present.  · Calculated right ventricular systolic pressure from tricuspid regurgitation is 37 mmHg.  · Estimated right ventricular systolic pressure from tricuspid regurgitation is mildly elevated (35-45 mmHg).          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]  No   • Paroxysmal atrial fibrillation (HCC) [I48.0]  No   • Acute respiratory failure with hypoxia (HCC) [J96.01]  Yes   • BPH (benign prostatic hyperplasia) [N40.0]  Yes   • CLL (chronic lymphocytic leukemia) (HCC) [C91.10]  Yes   • Chronic kidney disease, stage 3b (HCC) [N18.32]  Yes   • HTN (hypertension) [I10]  Yes   • GERD (gastroesophageal reflux disease) [K21.9]  Yes   • Hodgkin's disease of lymph nodes of head, face, and neck (HCC) [C81.91]  Yes      Resolved  Hospital Problems    Diagnosis Date Resolved POA   • Acute constipation [K59.00] 10/08/2021 No     Chest x-ray with chronic bibasilar infiltrates that really have not changed minimally last couple of weeks the pneumomediastinum has resolved    Assessment/Plan     1. COVID-19 infection and pneumonia he is on Decadron 6 milligrams  daily and has completed remdesivir.  Will probably  wean his dexamethasone very slowly given the duration he has been on, I dropped him down to 2 mg tomorrow and I probably would do that for at least 3 to 5 days then may be 1 mg a day for 3 to 5 days and then stop  2. Acute hypoxic respiratory failure secondary #1 wean O2 as tolerated making progress been very slow.  He does have some fibrotic changes on his lung bases on the CT scan from the third how much further he improves I do not know there was still a lot of inflammation on that scan.  3. Elevated procalcitonin completed antibiotics for possible secondary bacterial pneumonia. And repeat procalcitonin was normal.  4. Pneumomediastinum resolved  5. CLL  6. History of non-Hodgkin's lymphoma  7. Chronic kidney disease stage IIIb  8. Paroxysmal atrial fibrillation  9. Thrombocytopenia mild not sure etiology, D-dimer normal unlikely clot consuming platelets or DIC questions is related to his CLL, platelets up to normal today  10. Presacral precoccygeal pressure sore  11. Seems a little better spirits hearing that he possibly can get out of here in the next day or 2      I think is very important that when patient goes to rehab they know that they can turn his O2 up to what ever it takes to keep his sats up with exertion just turning back down at rest    Plan for disposition: Hopefully to rehab soon    Clay Truong MD  10/15/21  18:58 EDT    Time:

## 2021-10-15 NOTE — PLAN OF CARE
Goal Outcome Evaluation:  Plan of Care Reviewed With: patient    Pt A&O X4,VSS. No S/Sx of distress, 6L high flow. Pt resting in bed comfortably with eyes closed. Bed in lowest position, call light within reach. Possible discharge today. Will continue to assess.

## 2021-10-16 ENCOUNTER — HOME HEALTH ADMISSION (OUTPATIENT)
Dept: HOME HEALTH SERVICES | Facility: HOME HEALTHCARE | Age: 71
End: 2021-10-16

## 2021-10-16 VITALS
HEART RATE: 65 BPM | WEIGHT: 150 LBS | BODY MASS INDEX: 23.54 KG/M2 | RESPIRATION RATE: 16 BRPM | SYSTOLIC BLOOD PRESSURE: 119 MMHG | DIASTOLIC BLOOD PRESSURE: 98 MMHG | OXYGEN SATURATION: 95 % | TEMPERATURE: 96.6 F | HEIGHT: 67 IN

## 2021-10-16 PROBLEM — D89.834 CYTOKINE RELEASE SYNDROME, GRADE 4: Status: ACTIVE | Noted: 2021-10-16

## 2021-10-16 LAB
BILIRUB UR QL STRIP: NEGATIVE
CLARITY UR: CLEAR
COLOR UR: YELLOW
GLUCOSE UR STRIP-MCNC: NEGATIVE MG/DL
HGB UR QL STRIP.AUTO: NEGATIVE
KETONES UR QL STRIP: NEGATIVE
LEUKOCYTE ESTERASE UR QL STRIP.AUTO: NEGATIVE
NITRITE UR QL STRIP: NEGATIVE
PH UR STRIP.AUTO: 5.5 [PH] (ref 5–8)
PROT UR QL STRIP: ABNORMAL
SP GR UR STRIP: 1.01 (ref 1–1.03)
UROBILINOGEN UR QL STRIP: ABNORMAL

## 2021-10-16 PROCEDURE — 81003 URINALYSIS AUTO W/O SCOPE: CPT | Performed by: INTERNAL MEDICINE

## 2021-10-16 PROCEDURE — 94799 UNLISTED PULMONARY SVC/PX: CPT

## 2021-10-16 RX ORDER — LANOLIN ALCOHOL/MO/W.PET/CERES
3 CREAM (GRAM) TOPICAL NIGHTLY PRN
Qty: 30 TABLET
Start: 2021-10-16 | End: 2022-08-18 | Stop reason: ALTCHOICE

## 2021-10-16 RX ORDER — SACCHAROMYCES BOULARDII 250 MG
250 CAPSULE ORAL 2 TIMES DAILY
Start: 2021-10-16 | End: 2022-08-18 | Stop reason: ALTCHOICE

## 2021-10-16 RX ORDER — TAMSULOSIN HYDROCHLORIDE 0.4 MG/1
0.4 CAPSULE ORAL DAILY
Qty: 30 CAPSULE
Start: 2021-10-16 | End: 2021-12-09 | Stop reason: SDUPTHER

## 2021-10-16 RX ORDER — FAMOTIDINE 20 MG/1
20 TABLET, FILM COATED ORAL DAILY
Start: 2021-10-17 | End: 2021-12-02

## 2021-10-16 RX ORDER — ACETAMINOPHEN 325 MG/1
650 TABLET ORAL EVERY 4 HOURS PRN
Start: 2021-10-16

## 2021-10-16 RX ORDER — ECHINACEA PURPUREA EXTRACT 125 MG
1 TABLET ORAL AS NEEDED
Refills: 12
Start: 2021-10-16 | End: 2021-12-02

## 2021-10-16 RX ORDER — AMOXICILLIN 250 MG
2 CAPSULE ORAL 2 TIMES DAILY
Start: 2021-10-16 | End: 2022-04-18

## 2021-10-16 RX ORDER — TAMSULOSIN HYDROCHLORIDE 0.4 MG/1
0.4 CAPSULE ORAL DAILY
Status: DISCONTINUED | OUTPATIENT
Start: 2021-10-16 | End: 2021-10-16 | Stop reason: HOSPADM

## 2021-10-16 RX ORDER — DEXTROMETHORPHAN POLISTIREX 30 MG/5ML
30 SUSPENSION ORAL EVERY 12 HOURS SCHEDULED
Qty: 89 ML | Refills: 0 | Status: SHIPPED | OUTPATIENT
Start: 2021-10-16 | End: 2021-10-16 | Stop reason: HOSPADM

## 2021-10-16 RX ORDER — DEXAMETHASONE 2 MG/1
2 TABLET ORAL
Start: 2021-10-17 | End: 2021-12-02

## 2021-10-16 RX ORDER — POLYETHYLENE GLYCOL 3350 17 G/17G
17 POWDER, FOR SOLUTION ORAL DAILY PRN
Start: 2021-10-16 | End: 2022-08-18 | Stop reason: ALTCHOICE

## 2021-10-16 RX ADMIN — METOPROLOL TARTRATE 12.5 MG: 25 TABLET ORAL at 08:45

## 2021-10-16 RX ADMIN — AMLODIPINE BESYLATE 10 MG: 10 TABLET ORAL at 08:45

## 2021-10-16 RX ADMIN — APIXABAN 5 MG: 5 TABLET, FILM COATED ORAL at 08:45

## 2021-10-16 RX ADMIN — PREDNISOLONE ACETATE 1 DROP: 10 SUSPENSION/ DROPS OPHTHALMIC at 08:45

## 2021-10-16 RX ADMIN — SODIUM CHLORIDE 1 DROP: 50 SOLUTION OPHTHALMIC at 08:45

## 2021-10-16 RX ADMIN — Medication 250 MG: at 08:45

## 2021-10-16 RX ADMIN — FINASTERIDE 5 MG: 5 TABLET, FILM COATED ORAL at 08:45

## 2021-10-16 RX ADMIN — FAMOTIDINE 20 MG: 20 TABLET, FILM COATED ORAL at 08:45

## 2021-10-16 RX ADMIN — DEXAMETHASONE 2 MG: 2 TABLET ORAL at 08:45

## 2021-10-16 RX ADMIN — Medication 3 MG: at 01:09

## 2021-10-16 RX ADMIN — ANTACID TABLETS 1 TABLET: 500 TABLET, CHEWABLE ORAL at 08:45

## 2021-10-16 RX ADMIN — TAMSULOSIN HYDROCHLORIDE 0.4 MG: 0.4 CAPSULE ORAL at 13:16

## 2021-10-16 RX ADMIN — DEXTROMETHORPHAN POLISTIREX 60 MG: 30 SUSPENSION, EXTENDED RELEASE ORAL at 08:45

## 2021-10-16 NOTE — DISCHARGE SUMMARY
NAME: Angel Cisneros ADMIT: 2021   : 1950  PCP: Ayaan Amin MD    MRN: [id number] LOS: 41 days   AGE/SEX: 71 y.o. male  ROOM: [facility]     Date of Admission:  2021  Date of Discharge:  10/16/2021    PCP: Ayaan Amin MD    CHIEF COMPLAINT  positive covid, Headache, Abdominal Pain, and Shortness of Breath      DISCHARGE DIAGNOSIS  Active Hospital Problems    Diagnosis  POA   • **Pneumonia due to COVID-19 virus [U07.1, J12.82]  Yes   • Cytokine release syndrome, grade 4 [D89.834]  Unknown   • Critical illness myopathy [G72.81]  No   • Paroxysmal atrial fibrillation (HCC) [I48.0]  No   • Acute respiratory failure with hypoxia (HCC) [J96.01]  Yes   • BPH (benign prostatic hyperplasia) [N40.0]  Yes   • CLL (chronic lymphocytic leukemia) (HCC) [C91.10]  Yes   • Chronic kidney disease, stage 3b (HCC) [N18.32]  Yes   • HTN (hypertension) [I10]  Yes   • GERD (gastroesophageal reflux disease) [K21.9]  Yes   • Hodgkin's disease of lymph nodes of head, face, and neck (HCC) [C81.91]  Yes      Resolved Hospital Problems    Diagnosis Date Resolved POA   • Acute constipation [K59.00] 10/08/2021 No       SECONDARY DIAGNOSES  Past Medical History:   Diagnosis Date   • Arthritis     both knees   • Cancer (HCC)     dx with lymphoma / radiation   • Cataracts, bilateral    • Hx of cornea transplant     both eyes   • Leukemia (HCC)    • Lymphoma (HCC)     dx in . treated with radiation.   • Lymphoma (HCC)     treated with radiation. dx in        CONSULTS   Pulmonary  Nephrology  ID  Cardiology    HOSPITAL COURSE    Patient is a 71 y.o. male with history of of CLL on immunotherapy, fully vaccinated who came to the hospital with COVID-19 pneumonia.  He has been here for over a month just slowly weaning down oxygen. Treated for potential secondary bacterial pneumonia with course of abx  Will probably  wean his dexamethasone very slowly given the duration he has been on, I dropped him down  to 2 mg today and I probably would do that for at least 3 to 5 days then may be 1 mg a day for 3 to 5 days and then stop    Paroxysmal A. Fib.  Continue metoprolol.  Cardiology evaluated and are anticipating about additional 1 month of anticoagulation at discharge but will follow up with them in the office. On Eliquis today.      Still requiring 5-6L high flow oxygen at rest and then higher amounts for exertion. This has been stable for some time and likely to continue to have high oxygen requirements as now with fibrotic changes on his lungs. Thankfully he is going to a rehab facility that can work on his strength while also delivering high flow oxygen.     He is having a slight amount of dysuria this morning.  U/A without any infection but have started flomax as he states issues with BPH in the past.     DIAGNOSTICS    10/16/2021 1113 10/16/2021 1139 Urinalysis With Culture If Indicated - Urine, Clean Catch [[id number]]    (Abnormal)   Urine, Clean Catch    Final result Component Value   Color, UA Yellow   Appearance, UA Clear   pH, UA 5.5   Specific Gravity, UA 1.012   Glucose, UA Negative   Ketones, UA Negative   Bilirubin, UA Negative   Blood, UA Negative   Protein, UA Trace Abnormal    Leuk Esterase, UA Negative   Nitrite, UA Negative   Urobilinogen, UA 0.2 E.U./dL            XR Chest 1 View [[id number]] Gabe as Reviewed   Order Status: Completed Collected: 10/15/21 0752    Updated: 10/15/21 1323   Narrative:     PORTABLE CHEST       HISTORY: Pneumomediastinum, follow-up.       COMPARISON: Comparison is made to multiple prior chest x-rays with the   most recent examination being that of 10/14/2021.       FINDINGS: Single view of the chest demonstrates the heart to be within   normal limits in size. There is elevation of the right hemidiaphragm,   unchanged. There is no evidence of pneumothorax or mediastinum.   Bilateral infiltrates are noted at the bases and involving the left lung   laterally, similar in  appearance as compared to prior examination with   mild consolidation. There is no evidence of pleural effusion or of   congestive failure.       This report was finalized on 10/15/2021 1:20 PM by Dr. Wagner Pacheco M.D.       XR Chest 1 View [[id number]] Gabe as Reviewed   Order Status: Completed Collected: 10/14/21 0710    Updated: 10/14/21 0734   Narrative:     PORTABLE CHEST X-RAY       HISTORY: Follow-up pneumomediastinum.       TECHNIQUE: Portable chest x-ray is provided and correlated with recent   exams.       FINDINGS: The cardiac silhouette appears normal given the apical   lordotic projection. No pneumomediastinum or pneumothorax is evident.   Coarse infiltrates in the mid and lower lung zones are again observed   and appear unchanged. Upper lung zones remain clear. There is no   evidence of pleural effusion.       Impression:     Bilateral pulmonary opacities appear stable. No pneumothorax   or pneumomediastinum is evident.        CT Chest Without Contrast Diagnostic [[id number]] Gabe as Reviewed   Order Status: Completed Collected: 10/03/21 0815    Updated: 10/03/21 1017   Narrative:     CT SCAN OF THE CHEST WITHOUT CONTRAST       HISTORY: COVID-19 pneumonia. Shortness of breath. Evaluate for fibrosis.       FINDINGS: The CT scan was performed through the chest without contrast   and compared to multiple recent chest x-rays as well as to a previous   chest CT scan dated 09/07/2018. The following findings are present:   1. There is some scattered groundglass opacity and consolidation in both   lungs, particularly in the periphery of the lungs and in the lower lobes   and consistent with COVID-19 pneumonia. The patient did have some very   faint central areas of groundglass opacity in 2018 but these actually   have resolved on the current exam. It is difficult to estimate the   presence of underlying fibrosis but there are no findings in the aerated   portions of both lungs to suggest a progressive  fibrotic process.   2. There are no pleural effusions. There is no pneumothorax. There is an   extremely tiny amount of mediastinal air located anteriorly. There is no   pericardial effusion.   3. The CT images through the upper liver, spleen, and both adrenal   glands are unremarkable.                      /15/2021 0455 10/15/2021 0549 Basic Metabolic Panel [[id number]]    (Abnormal)   Blood    Final result Component Value Units   Glucose 74 mg/dL   BUN 33 High  mg/dL   Creatinine 1.35 High  mg/dL   Sodium 146 High  mmol/L   Potassium 4.4 mmol/L   Chloride 110 High  mmol/L   CO2 26.4 mmol/L   Calcium 8.3 Low  mg/dL   eGFR Non African Amer 52 Low  mL/min/1.73   BUN/Creatinine Ratio 24.4    Anion Gap 9.6 mmol/L           10/15/2021 0455 10/15/2021 0547 C-reactive Protein [[id number]]   (Abnormal)   Blood    Final result Component Value Units   C-Reactive Protein 0.61 High  mg/dL           10/15/2021 0455 10/15/2021 0515 CBC Auto Differential [[id number]]   (Abnormal)   Blood    Final result Component Value Units   WBC 4.87 10*3/mm3   RBC 3.92 Low  10*6/mm3   Hemoglobin 11.8 Low  g/dL   Hematocrit 36.0 Low  %   MCV 91.8 fL   MCH 30.1 pg   MCHC 32.8 g/dL   RDW 15.3 %   RDW-SD 51.2 fl   MPV 9.9 fL   Platelets 149 10*3/mm3   Neutrophil % 69.9 %   Lymphocyte % 16.4 Low  %   Monocyte % 8.0 %   Eosinophil % 0.2 Low  %   Basophil % 0.8 %   Immature Grans % 4.7 High  %            PHYSICAL EXAM  Objective    Alert  Chronically ill  Decreased bs at bases  No edema  Ready for discharge    CONDITION ON DISCHARGE  Stable.      DISCHARGE DISPOSITION   Skilled Nursing Facility (DC - External)      DISCHARGE MEDICATIONS       Your medication list      START taking these medications      Instructions Last Dose Given Next Dose Due   acetaminophen 325 MG tablet  Commonly known as: TYLENOL      Take 2 tablets by mouth Every 4 (Four) Hours As Needed for Mild Pain .       apixaban 5 MG tablet tablet  Commonly known as: ELIQUIS      Take 1  tablet by mouth Every 12 (Twelve) Hours. Indications: Atrial Fibrillation       dexamethasone 2 MG tablet  Commonly known as: DECADRON  Start taking on: October 17, 2021      Take 1 tablet by mouth Daily With Breakfast.       famotidine 20 MG tablet  Commonly known as: PEPCID  Start taking on: October 17, 2021      Take 1 tablet by mouth Daily.       melatonin 3 MG tablet      Take 1 tablet by mouth At Night As Needed for Sleep.       metoprolol tartrate 25 MG tablet  Commonly known as: LOPRESSOR      Take 0.5 tablets by mouth Every 12 (Twelve) Hours.       polyethylene glycol 17 g packet  Commonly known as: MIRALAX      Take 17 g by mouth Daily As Needed (constipation).       saccharomyces boulardii 250 MG capsule  Commonly known as: FLORASTOR      Take 1 capsule by mouth 2 (Two) Times a Day.       sennosides-docusate 8.6-50 MG per tablet  Commonly known as: PERICOLACE      Take 2 tablets by mouth 2 (Two) Times a Day.       sodium chloride 0.65 % nasal spray      1 spray into the nostril(s) as directed by provider As Needed for Congestion.       tamsulosin 0.4 MG capsule 24 hr capsule  Commonly known as: FLOMAX      Take 1 capsule by mouth Daily.          CONTINUE taking these medications      Instructions Last Dose Given Next Dose Due   amLODIPine 10 MG tablet  Commonly known as: NORVASC      Take 1 tablet by mouth Daily.       calcium carbonate 500 MG chewable tablet  Commonly known as: TUMS      Chew 1 tablet Daily.       doxylamine 25 MG tablet  Commonly known as: UNISOM      Take  by mouth.       finasteride 5 MG tablet  Commonly known as: PROSCAR           fluorometholone 0.1 % ophthalmic suspension  Commonly known as: FML      Administer 1 drop to both eyes Daily.       mirtazapine 7.5 MG tablet  Commonly known as: REMERON      Take 1 tablet by mouth Every Night.       prednisoLONE acetate 1 % ophthalmic suspension  Commonly known as: PRED FORTE      Administer 1 drop to both eyes daily.       sodium  chloride 5 % ophthalmic solution  Commonly known as: SENA 128      Administer 1 drop into the left eye 3 (three) times a day.          STOP taking these medications    azithromycin 250 MG tablet  Commonly known as: Zithromax Z-Sandip        Imbruvica 420 MG tablet tablet  Generic drug: ibrutinib              Where to Get Your Medications      Information about where to get these medications is not yet available    Ask your nurse or doctor about these medications  · acetaminophen 325 MG tablet  · apixaban 5 MG tablet tablet  · dexamethasone 2 MG tablet  · famotidine 20 MG tablet  · melatonin 3 MG tablet  · metoprolol tartrate 25 MG tablet  · polyethylene glycol 17 g packet  · saccharomyces boulardii 250 MG capsule  · sennosides-docusate 8.6-50 MG per tablet  · sodium chloride 0.65 % nasal spray  · tamsulosin 0.4 MG capsule 24 hr capsule          Future Appointments   Date Time Provider Department Center   12/16/2021  9:00 AM Ayaan Amin MD [id number]      Contact information for follow-up providers     Ayaan Amin MD .    Specialty: Family Medicine  Contact information:  [address] [address]  [facility] [address]  [phone]                   Contact information for after-discharge care     Destination     [facility] .    Service: Inpatient Rehabilitation  Contact information:  [address] [facility] [id number] [phone]                 Home Medical Care     [facility] .    Service: Home Health Services  Contact information:  [address]  [facility] [address] [phone]                             TEST  RESULTS PENDING AT DISCHARGE  Pending Labs     Order Current Status    Fungus Culture - Sputum, Cough Preliminary result             Jake Gomez MD  [facility]  10/16/21  11:56 EDT      Time: greater than 32 minutes on discharge  It was a pleasure taking care of this  patient while in the hospital.

## 2021-10-16 NOTE — PLAN OF CARE
Problem: Adult Inpatient Plan of Care  Goal: Patient-Specific Goal (Individualized)  10/16/2021 1129 by Giuseppe Beasley RN  Outcome: Ongoing, Progressing  Outcome: Ongoing, Progressing  VSS with O2 6L HHFNC with sats >94%. A&Ox4. No c/o pain, no signs of distress. Urine sample sent to lab, neg. Family update at bedside   10/16/2021 1110 by Giuseppe Beasley RN  Outcome: Ongoing, Progressing  Goal: Absence of Hospital-Acquired Illness or Injury  10/16/2021 1129 by Giuseppe Beasley RN  Outcome: Ongoing, Progressing  10/16/2021 1110 by Giuseppe Beasley RN  Outcome: Ongoing, Progressing  Intervention: Identify and Manage Fall Risk  Recent Flowsheet Documentation  Taken 10/16/2021 1012 by Giuseppe Beasley RN  Safety Promotion/Fall Prevention:  • activity supervised  • assistive device/personal items within reach  • clutter free environment maintained  • fall prevention program maintained  • nonskid shoes/slippers when out of bed  • room organization consistent  • safety round/check completed  • lighting adjusted  Taken 10/16/2021 0834 by Giuseppe Beasley RN  Safety Promotion/Fall Prevention:  • activity supervised  • assistive device/personal items within reach  • clutter free environment maintained  • fall prevention program maintained  • lighting adjusted  • nonskid shoes/slippers when out of bed  • room organization consistent  • safety round/check completed  • muscle strengthening facilitated  Intervention: Prevent Skin Injury  Recent Flowsheet Documentation  Taken 10/16/2021 1012 by Giuseppe Beasley RN  Body Position: position changed independently  Taken 10/16/2021 0834 by Giuseppe Beasley RN  Body Position:  • sitting up in bed  • position changed independently  Skin Protection:  • adhesive use limited  • transparent dressing maintained  • tubing/devices free from skin contact  Intervention: Prevent and Manage VTE (venous thromboembolism) Risk  Recent Flowsheet  Documentation  Taken 10/16/2021 0834 by Giuseppe Beasley RN  VTE Prevention/Management: (pt on eliquis)  • bilateral  • dorsiflexion/plantar flexion performed  Intervention: Prevent Infection  Recent Flowsheet Documentation  Taken 10/16/2021 1012 by Giuseppe Beasley RN  Infection Prevention:  • visitors restricted/screened  • single patient room provided  • rest/sleep promoted  Taken 10/16/2021 0834 by Giuseppe Beasley RN  Infection Prevention:  • visitors restricted/screened  • single patient room provided  • rest/sleep promoted  Goal: Optimal Comfort and Wellbeing  10/16/2021 1129 by Giuseppe Beasley RN  Outcome: Ongoing, Progressing  10/16/2021 1110 by Giuseppe Beasley RN  Outcome: Ongoing, Progressing  Intervention: Provide Person-Centered Care  Recent Flowsheet Documentation  Taken 10/16/2021 0834 by Giuseppe Beasley RN  Trust Relationship/Rapport: care explained  Goal: Readiness for Transition of Care  10/16/2021 1129 by Giuseppe Beasley RN  Outcome: Ongoing, Progressing  10/16/2021 1110 by Giuseppe Beasley RN  Outcome: Ongoing, Progressing     Problem: Infection  Goal: Infection Symptom Resolution  10/16/2021 1129 by Giuseppe Beasley RN  Outcome: Ongoing, Progressing  10/16/2021 1110 by Giuseppe Beasley RN  Outcome: Ongoing, Progressing  Intervention: Prevent or Manage Infection  Recent Flowsheet Documentation  Taken 10/16/2021 1012 by Giuseppe Beasley RN  Isolation Precautions: protective environment maintained  Taken 10/16/2021 0834 by Giuseppe Beasley RN  Isolation Precautions: protective environment maintained     Problem: Fall Injury Risk  Goal: Absence of Fall and Fall-Related Injury  10/16/2021 1129 by Giuseppe Beasley RN  Outcome: Ongoing, Progressing  10/16/2021 1110 by Giuseppe Beasley RN  Outcome: Ongoing, Progressing  Intervention: Identify and Manage Contributors to Fall Injury Risk  Recent Flowsheet  Documentation  Taken 10/16/2021 1012 by Giuseppe Beasley RN  Medication Review/Management: medications reviewed  Taken 10/16/2021 0834 by Giuseppe Beasley RN  Medication Review/Management: medications reviewed  Intervention: Promote Injury-Free Environment  Recent Flowsheet Documentation  Taken 10/16/2021 1012 by Giuseppe Beasley RN  Safety Promotion/Fall Prevention:  • activity supervised  • assistive device/personal items within reach  • clutter free environment maintained  • fall prevention program maintained  • nonskid shoes/slippers when out of bed  • room organization consistent  • safety round/check completed  • lighting adjusted  Taken 10/16/2021 0834 by Giuseppe Beasley RN  Safety Promotion/Fall Prevention:  • activity supervised  • assistive device/personal items within reach  • clutter free environment maintained  • fall prevention program maintained  • lighting adjusted  • nonskid shoes/slippers when out of bed  • room organization consistent  • safety round/check completed  • muscle strengthening facilitated     Problem: Skin Injury Risk Increased  Goal: Skin Health and Integrity  10/16/2021 1129 by Giuseppe Beasley RN    10/16/2021 1110 by Giuseppe Beasley RN  Outcome: Ongoing, Progressing  Intervention: Optimize Skin Protection  Recent Flowsheet Documentation  Taken 10/16/2021 1012 by Giuseppe Beasley RN  Head of Bed (HOB): HOB elevated  Taken 10/16/2021 0834 by Giuseppe Beasley RN  Pressure Reduction Techniques:  • frequent weight shift encouraged  • sit time limited to 2 hours  • pressure points protected  • rest period provided between sit times  • heels elevated off bed  Head of Bed (HOB): HOB elevated  Pressure Reduction Devices:  • alternating pressure pump (ADD)  • positioning supports utilized  • heel offloading device utilized  Skin Protection:  • adhesive use limited  • transparent dressing maintained  • tubing/devices free from skin contact    Goal Outcome Evaluation:  Plan of Care Reviewed With: patient        Progress: no change  Outcome Summary: Pt's O2 remains 60/70% opti. No c/o pain, no signs of distress. A&Ox4. Tolerating all meds as given. Bladder scanned. BMx1. Monitoring am labs, respiratory status and titrating O2 as tolerated.

## 2021-10-16 NOTE — CASE MANAGEMENT/SOCIAL WORK
"Physicians Statement of Medical Necessity for  Ambulance Transportation    GENERAL INFORMATION     Name: Angel Cisneros  YOB: 1950  Medicare #: [id number]  Transport Date: 10/16/2021 (Valid for round trips this date, or for scheduled repetitive trips for 60 days from the date signed below.)  Origin: [facility], [address] [place] [address]   Destination: [facility], [address] [place] [address] [place], [address], [phone]  Is the Patient's stay covered under Medicare Part A (PPS/DRG?)YES  Closest appropriate facility? YES  If this a hosp-hosp transfer? YES  Is this a hospice patient? NO  MEDICAL NECESSITY QUESTIONAIRE    Ambulance Transportation is medically necessary only if other means of transportation are contraindicated or would be potentially harmful to the patient.  To meet this requirement, the patient must be either \"bed confined\" or suffer from a condition such that transport by means other than an ambulance is contraindicated by the patient's condition.  The following questions must be answered by the healthcare professional signing below for this form to be valid:     1) Describe the MEDICAL CONDITION (physical and/or mental) of this patient AT THE TIME OF AMBULANCE TRANSPORT that requires the patient to be transported in an ambulance, and why transport by other means is contraindicated by the patient's condition: respiratory distress, pneumonia due to COVID 19  Past Medical History:   Diagnosis Date   • Arthritis     both knees   • Cancer (HCC)     dx with lymphoma 2009/ radiation   • Cataracts, bilateral    • Hx of cornea transplant     both eyes   • Leukemia (HCC)    • Lymphoma (HCC)     dx in 2009. treated with radiation.   • Lymphoma (HCC)     treated with radiation. dx in 2009      Past Surgical History:   Procedure Laterality Date   • BREAST LUMPECTOMY Right    • COLONOSCOPY N/A 3/8/2016    Procedure: COLONOSCOPY with cold polypectomy X 3;  Surgeon: " "Chris Harden MD;  Location: [facility] ENDOSCOPY;  Service:    • ENDOSCOPY N/A 10/23/2018    Procedure: ESOPHAGOGASTRODUODENOSCOPY WITH BIOPSY;  Surgeon: Chris Harden MD;  Location: [facility] ENDOSCOPY;  Service: Gastroenterology   • EYE SURGERY      partial cornea transplant 4-5 years ago   • KNEE SURGERY Left    • TOE SURGERY Bilateral    • TONSILLECTOMY        2) Is this patient \"bed confined\" as defined below?NO   To be \"bed confined\" the patient must satisfy all three of the following criteria:  (1) unable to get up from bed without assistance; AND (2) unable to ambulate;  AND (3) unable to sit in a chair or wheelchair.  3) Can this patient safely be transported by car or wheelchair van (I.e., may safely sit during transport, without an attendant or monitoring?)NO  4. In addition to completing questions 1-3 above, please check any of the following conditions that apply:            SIGNATURE OF PHYSICIAN OR OTHER AUTHORIZED HEALTHCARE PROFESSIONAL    I certify that the above information is true and correct based on my evaluation of this patient, and represent that the patient requires transport by ambulance and that other forms of transport are contraindicated.  I understand that this information will be used by the Centers for Medicare and Medicaid Services (CMS) to support the determiniation of medical necessity for ambulance services, and I represent that I have personal knowledge of the patient's condition at the time of transport.       If this box is checked, I also certify that the patient is physically or mentally incapable of signing the ambulance service's claim form and that the institution with which I am affiliated has furnished care, services or assistance to the patient.  My signature below is made on behalf of the patient pursuant to 42 .36(b)(4). In accordance with 42 .37, the specific reason(s) that the patient is physically or mentally incapable of signing the claim for is as " follows:     Signature of Physician or Healthcare Professional  Date/Time:   10/16/2021 1023     (For Scheduled repetitive transport, this form is not valid for transports performed more than 60 days after this date).                                                                                                                                            --------------------------------------------------------------------------------------------  Printed Name and Credentials of Physician or Authorized Healthcare Professional     *Form must be signed by patient's attending physician for scheduled, repetitive transports,.  For non-repetitive ambulance transports, if unable to obtain the signature of the attending physician, any of the following may sign (please select below):     Physician  Clinical Nurse Specialist  Registered Nurse     Physician Assistant  Discharge Planner  Licensed Practical Nurse     Nurse Practitioner

## 2021-10-16 NOTE — CASE MANAGEMENT/SOCIAL WORK
Continued Stay Note  [facility]     Patient Name: Angel Cisneros  MRN: [id number]  Today's Date: 10/16/2021    Admit Date: 9/5/2021     Discharge Plan     Row Name 10/16/21 1014       Plan    Plan Discharge to [facility] to transport.    Provided Post Acute Provider List? N/A    Provided Post Acute Provider Quality & Resource List? N/A    Patient/Family in Agreement with Plan unable to assess    Plan Comments CCP received call from Dr. Gomez wants to discharge & need to confirm if bed is ready. CCP spoke [email or web address] verified bed is ready today. CCP updated Dr. Gomez bed availability. CCP spoke [email or web address] scheduled  at 1100 to transport patient to [facility]. CCP updated patient’s nurse on scheduled  time of 1100. CCP to follow. Halie Vargas RN.               Discharge Codes    No documentation.               Expected Discharge Date and Time     Expected Discharge Date Expected Discharge Time    Oct 16, 2021             Elissa Vargas RN

## 2021-10-18 NOTE — CASE MANAGEMENT/SOCIAL WORK
Case Management Discharge Note      Final Note: Patient DC'd to [facility]    Provided Post Acute Provider List?: N/A  N/A Provider List Comment: The patient was not provided with a  HH/SNF list nor a HH/nursing home compare list from [employer] as P.T. was unable to fully complete an eval on the patient as his heart rate increased and nursing requested that the patient no longer work with P.T.  Provided Post Acute Provider Quality & Resource List?: N/A  N/A Quality & Resource List Comment: The patient was not provided with a  HH/SNF list nor a HH/nursing home compare list from [employer] as P.T. was unable to fully complete an eval on the patient as his heart rate increased and nursing requested that the patient no longer work with P.T.    Selected Continued Care - Discharged on 10/16/2021 Admission date: 9/5/2021 - Discharge disposition: Skilled Nursing Facility (DC - External)    Destination Coordination complete.    Service Provider Selected Services Address Phone Fax Patient Preferred    [facility]  Inpatient Rehabilitation [address] [place], [place] [id number] [phone] --          Durable Medical Equipment    No services have been selected for the patient.              Dialysis/Infusion    No services have been selected for the patient.              Home Medical Care     Service Provider Selected Services Address Phone Fax Patient Preferred    [facility]  Home Health Services [address] [address] [phone] --          Therapy    No services have been selected for the patient.              Community Resources    No services have been selected for the patient.              [facility]    No services have been selected for the patient.                  Transportation Services  Ambulance: [facility]    Final Discharge Disposition Code: 62 - inpatient rehab facility

## 2021-10-24 LAB — FUNGUS WND CULT: ABNORMAL

## 2021-11-18 ENCOUNTER — TELEPHONE (OUTPATIENT)
Dept: FAMILY MEDICINE CLINIC | Facility: CLINIC | Age: 71
End: 2021-11-18

## 2021-11-18 NOTE — TELEPHONE ENCOUNTER
Tell her this is the responsibility of the discharging doctor from the hospital as I do not know what is needed at present as I have not been seeing him in the hospital.  This has always been the case. I will take over when he is discharged.

## 2021-11-18 NOTE — TELEPHONE ENCOUNTER
[facility] CALLED AND NEEDING THESE ITEMS ORDERED FOR THE PATIENT BEFORE HE IS RELEASED FROM HOSPITAL       NEED:     TRANSFER CHAIR   BATH CHAIR   NEBULIZER       PLEASE ORDER WITH GOULDS   NEEDS ASAP     RAQUEL FROM [facility]  [phone]

## 2021-11-19 ENCOUNTER — TELEPHONE (OUTPATIENT)
Dept: FAMILY MEDICINE CLINIC | Facility: CLINIC | Age: 71
End: 2021-11-19

## 2021-11-19 NOTE — TELEPHONE ENCOUNTER
Caller: [facility]     Relationship: Home Health    Best call back number: [phone]    What orders are you requesting (i.e. lab or imaging): VERBAL ORDERS     Additional notes: RAQUEL FROM [facility] NEEDING VERBAL FOR SKILL NURSING 1 TIME A WEEK FOR 8 WEEKS

## 2021-11-23 NOTE — TELEPHONE ENCOUNTER
Caller: Angel Cisneros    Relationship to patient: Self    Best call back number: [phone]     Patient is needing: PATIENT CALLING TO CHECK THE STATUS OF MEDICATION REFILL PATIENT STATES ONLY HAS ENOUGH UNTIL FRIDAY      metoprolol tartrate (LOPRESSOR) 25 MG tablet      [facility]/pharmacy #[id number] - [place], [place] - [address] AT [facility] - [phone]  - [phone]   [phone]

## 2021-11-24 ENCOUNTER — TELEPHONE (OUTPATIENT)
Dept: PEDIATRICS | Facility: OTHER | Age: 71
End: 2021-11-24

## 2021-11-24 NOTE — TELEPHONE ENCOUNTER
Caller: ALYCE    Relationship: Home Health    Best call back number: [phone]    What orders are you requesting (i.e. lab or imaging): VERBAL    In what timeframe would the patient need to come in: ASAP    Where will you receive your lab/imaging services: IN HOME    Additional notes:ALYCE WITH [facility] IS CALLING FOR ORDERS FOR OCCUPATIONAL THERAPY, 2 TIMES PER WEEK FOR 6 WEEKS.     ORDER EVALUATION  AND  HOME HEALTH AID FOR BATHING.    PLEASE ADVISE.

## 2021-11-24 NOTE — TELEPHONE ENCOUNTER
GEORGE Daniels to return call to office.        Priyanka for [facility] reception to give verbal orders.

## 2021-11-29 NOTE — TELEPHONE ENCOUNTER
Spoke with Frances with [facility]. Patient unable to make Wednesday 12/1 appointment with Dr Amin due to transportation issues. Frances stated patient's girlfriend able to bring patient on Thursday. Rescheduled appointment to 12/2 Thursday and patient will contact girlfriend to determine if she is able to bring him at time scheduled and will reach back out to our office.

## 2021-12-02 ENCOUNTER — TELEPHONE (OUTPATIENT)
Dept: FAMILY MEDICINE CLINIC | Facility: CLINIC | Age: 71
End: 2021-12-02

## 2021-12-02 ENCOUNTER — OFFICE VISIT (OUTPATIENT)
Dept: FAMILY MEDICINE CLINIC | Facility: CLINIC | Age: 71
End: 2021-12-02

## 2021-12-02 VITALS
DIASTOLIC BLOOD PRESSURE: 76 MMHG | TEMPERATURE: 98.4 F | HEART RATE: 90 BPM | HEIGHT: 67 IN | BODY MASS INDEX: 23.07 KG/M2 | SYSTOLIC BLOOD PRESSURE: 110 MMHG | OXYGEN SATURATION: 98 % | WEIGHT: 147 LBS

## 2021-12-02 DIAGNOSIS — I48.0 PAROXYSMAL ATRIAL FIBRILLATION (HCC): ICD-10-CM

## 2021-12-02 DIAGNOSIS — U07.1 PNEUMONIA DUE TO COVID-19 VIRUS: Primary | ICD-10-CM

## 2021-12-02 DIAGNOSIS — J12.82 PNEUMONIA DUE TO COVID-19 VIRUS: Primary | ICD-10-CM

## 2021-12-02 DIAGNOSIS — R09.02 HYPOXIA: ICD-10-CM

## 2021-12-02 PROCEDURE — 90662 IIV NO PRSV INCREASED AG IM: CPT | Performed by: FAMILY MEDICINE

## 2021-12-02 PROCEDURE — 0003A COVID-19 (PFIZER): CPT | Performed by: FAMILY MEDICINE

## 2021-12-02 PROCEDURE — 99214 OFFICE O/P EST MOD 30 MIN: CPT | Performed by: FAMILY MEDICINE

## 2021-12-02 PROCEDURE — G0008 ADMIN INFLUENZA VIRUS VAC: HCPCS | Performed by: FAMILY MEDICINE

## 2021-12-02 PROCEDURE — 91300 COVID-19 (PFIZER): CPT | Performed by: FAMILY MEDICINE

## 2021-12-02 RX ORDER — BUDESONIDE 0.5 MG/2ML
INHALANT ORAL
COMMUNITY
Start: 2021-11-10 | End: 2022-08-18 | Stop reason: ALTCHOICE

## 2021-12-02 RX ORDER — IPRATROPIUM BROMIDE AND ALBUTEROL SULFATE 2.5; .5 MG/3ML; MG/3ML
SOLUTION RESPIRATORY (INHALATION)
COMMUNITY
Start: 2021-11-10 | End: 2022-08-18 | Stop reason: ALTCHOICE

## 2021-12-02 RX ORDER — ALPRAZOLAM 0.25 MG/1
TABLET ORAL
COMMUNITY
Start: 2021-11-10 | End: 2022-04-18

## 2021-12-02 RX ORDER — FAMOTIDINE 20 MG/1
20 TABLET, FILM COATED ORAL DAILY
Start: 2021-12-02 | End: 2021-12-09 | Stop reason: SDUPTHER

## 2021-12-02 NOTE — PROGRESS NOTES
Chief Complaint   Patient presents with   • Hospital Follow Up Visit       Subjective   Angel Cisneros is a 71 y.o. male.     History of Present Illness   F/u HOSPITALIZATION FOR COVID PNEUMONIA. Hospitalized from 9/5 to 10/16.  Had rehab at [facility] 4 weeks.   Had Cytokine release syndrome and criticla illness myopathy.  Known CLL.  On 5 L o2 now.    F/U HTN.  Controlled.  Continue meds.    F/U pafib.  ON eliquis now.  On metoprolol 1/2 tablet twice a day.     The following portions of the patient's history were reviewed and updated as appropriate: allergies, current medications, past family history, past medical history, past social history, past surgical history and problem list.    Review of Systems   Constitutional: Negative for appetite change and fatigue.   HENT: Negative for nosebleeds and sore throat.    Eyes: Negative for blurred vision and visual disturbance.   Respiratory: Negative for shortness of breath and wheezing.    Cardiovascular: Negative for chest pain and leg swelling.   Gastrointestinal: Negative for abdominal distention and abdominal pain.   Endocrine: Negative for cold intolerance and polyuria.   Genitourinary: Negative for dysuria and hematuria.   Musculoskeletal: Negative for arthralgias and myalgias.   Skin: Negative for color change and rash.   Neurological: Negative for weakness and confusion.   Psychiatric/Behavioral: Negative for agitation and depressed mood.       Patient Active Problem List   Diagnosis   • Lymphoma (HCC)   • Leukemia (HCC)   • Chest pain   • GERD (gastroesophageal reflux disease)   • HTN (hypertension)   • Chronic kidney disease, stage 3b (HCC)   • Generalized abdominal pain   • CLL (chronic lymphocytic leukemia) (HCC)   • Gastroesophageal reflux disease   • Hodgkin's disease of lymph nodes of head, face, and neck (HCC)   • Oral thrush   • Medicare annual wellness visit, subsequent   • Primary insomnia   • Adjustment disorder with depressed mood   •  Dysuria   • Gross hematuria   • Acute non-recurrent maxillary sinusitis   • Cough   • BPH (benign prostatic hyperplasia)   • Elevated PSA   • Clinical diagnosis of COVID-19   • Pneumonia due to COVID-19 virus   • Paroxysmal atrial fibrillation (HCC)   • Acute respiratory failure with hypoxia (HCC)   • Critical illness myopathy   • Cytokine release syndrome, grade 4   • Hypoxia       Allergies   Allergen Reactions   • Ambien [Zolpidem Tartrate] Mental Status Change   • Penicillins Unknown - Low Severity     unknown childhood allergy         Current Outpatient Medications:   •  acetaminophen (TYLENOL) 325 MG tablet, Take 2 tablets by mouth Every 4 (Four) Hours As Needed for Mild Pain ., Disp: , Rfl:   •  ALPRAZolam (XANAX) 0.25 MG tablet, , Disp: , Rfl:   •  amLODIPine (NORVASC) 10 MG tablet, Take 1 tablet by mouth Daily., Disp: 90 tablet, Rfl: 3  •  apixaban (ELIQUIS) 5 MG tablet tablet, Take 1 tablet by mouth Every 12 (Twelve) Hours. Indications: Atrial Fibrillation, Disp: 60 tablet, Rfl: 4  •  budesonide (PULMICORT) 0.5 MG/2ML nebulizer solution, , Disp: , Rfl:   •  calcium carbonate (TUMS) 500 MG chewable tablet, Chew 1 tablet Daily., Disp: , Rfl:   •  doxylamine (UNISOM) 25 MG tablet, Take  by mouth., Disp: , Rfl:   •  famotidine (PEPCID) 20 MG tablet, Take 1 tablet by mouth Daily., Disp: , Rfl:   •  finasteride (PROSCAR) 5 MG tablet, , Disp: , Rfl:   •  fluorometholone (FML) 0.1 % ophthalmic suspension, Administer 1 drop to both eyes Daily., Disp: , Rfl:   •  ipratropium-albuterol (DUO-NEB) 0.5-2.5 mg/3 ml nebulizer, , Disp: , Rfl:   •  melatonin 3 MG tablet, Take 1 tablet by mouth At Night As Needed for Sleep., Disp: 30 tablet, Rfl:   •  metoprolol tartrate (LOPRESSOR) 25 MG tablet, TAKE A HALF TABLET BY MOUTH TWICE A DAY, Disp: 90 tablet, Rfl: 3  •  mirtazapine (REMERON) 7.5 MG tablet, Take 1 tablet by mouth Every Night., Disp: 90 tablet, Rfl: 2  •  polyethylene glycol (polyethylene glycol) 17 g packet, Take  17 g by mouth Daily As Needed (constipation)., Disp: , Rfl:   •  prednisoLONE acetate (PRED FORTE) 1 % ophthalmic suspension, Administer 1 drop to both eyes daily., Disp: , Rfl:   •  saccharomyces boulardii (FLORASTOR) 250 MG capsule, Take 1 capsule by mouth 2 (Two) Times a Day., Disp: , Rfl:   •  sennosides-docusate (PERICOLACE) 8.6-50 MG per tablet, Take 2 tablets by mouth 2 (Two) Times a Day., Disp: , Rfl:   •  sodium chloride (SENA 128) 5 % ophthalmic solution, Administer 1 drop into the left eye 3 (three) times a day., Disp: , Rfl:   •  tamsulosin (FLOMAX) 0.4 MG capsule 24 hr capsule, Take 1 capsule by mouth Daily., Disp: 30 capsule, Rfl:   •  ibrutinib (Imbruvica) 420 MG tablet tablet, Take 420 mg by mouth Daily., Disp: , Rfl:     Past Medical History:   Diagnosis Date   • Arthritis     both knees   • Cancer (HCC)     dx with lymphoma 2009/ radiation   • Cataracts, bilateral    • History of COVID-19 09/2021   • Hx of cornea transplant     both eyes   • Leukemia (HCC)    • Lymphoma (HCC)     dx in 2009. treated with radiation.   • Lymphoma (HCC)     treated with radiation. dx in 2009       Past Surgical History:   Procedure Laterality Date   • BREAST LUMPECTOMY Right    • COLONOSCOPY N/A 3/8/2016    Procedure: COLONOSCOPY with cold polypectomy X 3;  Surgeon: Chris Harden MD;  Location: [facility] ENDOSCOPY;  Service:    • ENDOSCOPY N/A 10/23/2018    Procedure: ESOPHAGOGASTRODUODENOSCOPY WITH BIOPSY;  Surgeon: Chris Harden MD;  Location: [facility] ENDOSCOPY;  Service: Gastroenterology   • EYE SURGERY      partial cornea transplant 4-5 years ago   • KNEE SURGERY Left    • TOE SURGERY Bilateral    • TONSILLECTOMY         Family History   Problem Relation Age of Onset   • Kidney cancer Mother        Social History     Tobacco Use   • Smoking status: Never Smoker   • Smokeless tobacco: Never Used   Substance Use Topics   • Alcohol use: No            Objective     Vitals:    12/02/21 1309   BP: 110/76   Pulse: 90    Temp: 98.4 °F (36.9 °C)   SpO2: 98%     Body mass index is 23.02 kg/m².    Physical Exam    Lab Results   Component Value Date    GLUCOSE 74 10/15/2021    BUN 33 (H) 10/15/2021    CREATININE 1.35 (H) 10/15/2021    EGFRIFNONA 52 (L) 10/15/2021    EGFRIFAFRI 49 (L) 06/03/2020    BCR 24.4 10/15/2021    K 4.4 10/15/2021    CO2 26.4 10/15/2021    CALCIUM 8.3 (L) 10/15/2021    PROTENTOTREF 6.4 06/03/2020    ALBUMIN 2.70 (L) 09/20/2021    LABIL2 1.4 03/08/2021    AST 26 09/20/2021    ALT 65 (H) 09/20/2021       WBC   Date Value Ref Range Status   10/15/2021 4.87 3.40 - 10.80 10*3/mm3 Final   06/24/2021 8.67 4.5 - 11.0 10*3/uL Final     RBC   Date Value Ref Range Status   10/15/2021 3.92 (L) 4.14 - 5.80 10*6/mm3 Final   06/24/2021 4.41 (L) 4.5 - 5.9 10*6/uL Final     Hemoglobin   Date Value Ref Range Status   10/15/2021 11.8 (L) 13.0 - 17.7 g/dL Final   06/24/2021 13.1 (L) 13.5 - 17.5 g/dL Final     Hematocrit   Date Value Ref Range Status   10/15/2021 36.0 (L) 37.5 - 51.0 % Final   06/24/2021 40.9 (L) 41.0 - 53.0 % Final     MCV   Date Value Ref Range Status   10/15/2021 91.8 79.0 - 97.0 fL Final   06/24/2021 92.7 80.0 - 100.0 fL Final     MCH   Date Value Ref Range Status   10/15/2021 30.1 26.6 - 33.0 pg Final   06/24/2021 29.7 26.0 - 34.0 pg Final     MCHC   Date Value Ref Range Status   10/15/2021 32.8 31.5 - 35.7 g/dL Final   06/24/2021 32.0 31.0 - 37.0 g/dL Final     RDW   Date Value Ref Range Status   10/15/2021 15.3 12.3 - 15.4 % Final   06/24/2021 14.9 12.0 - 16.8 % Final     RDW-SD   Date Value Ref Range Status   10/15/2021 51.2 37.0 - 54.0 fl Final     MPV   Date Value Ref Range Status   10/15/2021 9.9 6.0 - 12.0 fL Final   06/24/2021 11.4 8.4 - 12.4 fL Final     Platelets   Date Value Ref Range Status   10/15/2021 149 140 - 450 10*3/mm3 Final   06/24/2021 227 140 - 440 10*3/uL Final     Neutrophil Rel %   Date Value Ref Range Status   06/24/2021 47.6 45 - 80 % Final     Neutrophil %   Date Value Ref Range  Status   10/15/2021 69.9 42.7 - 76.0 % Final     Lymphocyte Rel %   Date Value Ref Range Status   06/24/2021 34.5 15 - 50 % Final     Lymphocyte %   Date Value Ref Range Status   10/15/2021 16.4 (L) 19.6 - 45.3 % Final     Monocyte Rel %   Date Value Ref Range Status   06/24/2021 15.6 (H) 0 - 15 % Final     Monocyte %   Date Value Ref Range Status   10/15/2021 8.0 5.0 - 12.0 % Final     Eosinophil %   Date Value Ref Range Status   10/15/2021 0.2 (L) 0.3 - 6.2 % Final   06/24/2021 1.4 0 - 7 % Final     Basophil Rel %   Date Value Ref Range Status   06/24/2021 0.9 0 - 2 % Final     Basophil %   Date Value Ref Range Status   10/15/2021 0.8 0.0 - 1.5 % Final     Immature Grans %   Date Value Ref Range Status   10/15/2021 4.7 (H) 0.0 - 0.5 % Final     Neutrophils Absolute   Date Value Ref Range Status   06/24/2021 4.13 2.0 - 8.8 10*3/uL Final     Neutrophils, Absolute   Date Value Ref Range Status   10/15/2021 3.40 1.70 - 7.00 10*3/mm3 Final     Lymphocytes Absolute   Date Value Ref Range Status   06/24/2021 2.99 0.7 - 5.5 10*3/uL Final     Lymphocytes, Absolute   Date Value Ref Range Status   10/15/2021 0.80 0.70 - 3.10 10*3/mm3 Final     Monocytes Absolute   Date Value Ref Range Status   06/24/2021 1.35 0.0 - 1.7 10*3/uL Final     Monocytes, Absolute   Date Value Ref Range Status   10/15/2021 0.39 0.10 - 0.90 10*3/mm3 Final     Eosinophils Absolute   Date Value Ref Range Status   06/24/2021 0.12 0.0 - 0.8 10*3/uL Final     Eosinophils, Absolute   Date Value Ref Range Status   10/15/2021 0.01 0.00 - 0.40 10*3/mm3 Final     Basophils Absolute   Date Value Ref Range Status   06/24/2021 0.08 0.0 - 0.2 10*3/uL Final     Basophils, Absolute   Date Value Ref Range Status   10/15/2021 0.04 0.00 - 0.20 10*3/mm3 Final     Immature Grans, Absolute   Date Value Ref Range Status   10/15/2021 0.23 (H) 0.00 - 0.05 10*3/mm3 Final     nRBC   Date Value Ref Range Status   10/15/2021 0.0 0.0 - 0.2 /100 WBC Final       Lab Results    Component Value Date    HGBA1C 5.39 09/06/2021       No results found for: [id number]    TSH   Date Value Ref Range Status   09/09/2021 0.272 0.270 - 4.200 uIU/mL Final       No results found for: CHOL  Lab Results   Component Value Date    TRIG 125 03/16/2021     Lab Results   Component Value Date    HDL 52 03/16/2021     Lab Results   Component Value Date    LDL 95 03/16/2021     Lab Results   Component Value Date    VLDL 22 03/16/2021     No results found for: LDLHDL      Procedures    Assessment/Plan   Problems Addressed this Visit     Hypoxia    Relevant Orders    Ambulatory Referral to Pulmonology    Paroxysmal atrial fibrillation (HCC)    Relevant Medications    apixaban (ELIQUIS) 5 MG tablet tablet    Other Relevant Orders    Ambulatory Referral to Cardiology    Pneumonia due to COVID-19 virus - Primary    Relevant Medications    ipratropium-albuterol (DUO-NEB) 0.5-2.5 mg/3 ml nebulizer    budesonide (PULMICORT) 0.5 MG/2ML nebulizer solution    Other Relevant Orders    Ambulatory Referral to Pulmonology      Diagnoses       Codes Comments    Pneumonia due to COVID-19 virus    -  Primary ICD-10-CM: U07.1, J12.82  ICD-9-CM: 480.8, 079.89     Paroxysmal atrial fibrillation (HCC)     ICD-10-CM: I48.0  ICD-9-CM: 427.31     Hypoxia     ICD-10-CM: R09.02  ICD-9-CM: 799.02       Covid pneumonia with hypoxia.  Uncontrolled.  Decrease o2 to 4 L as o2 sats 98% on this after 30 minutes.    To pulmonary.  Titrate down O2 to keep sats above 92%.  Pafib.  Contorlled.  Continue metoprolol. RF eliquis.  To cards.  Covid booster today. Flu shot today.    Orders Placed This Encounter   Procedures   • COVID-19 Vaccine (Pfizer)   • Fluzone High-Dose 65+yrs   • Ambulatory Referral to Pulmonology     Referral Priority:   Routine     Referral Type:   Consultation     Referral Reason:   Specialty Services Required     Referred to Provider:   Alex Monge MD     Requested Specialty:   Pulmonary Disease     Number of Visits  Requested:   1   • Ambulatory Referral to Cardiology     Referral Priority:   Routine     Referral Type:   Consultation     Referral Reason:   Specialty Services Required     Requested Specialty:   Cardiology     Number of Visits Requested:   1       Current Outpatient Medications   Medication Sig Dispense Refill   • acetaminophen (TYLENOL) 325 MG tablet Take 2 tablets by mouth Every 4 (Four) Hours As Needed for Mild Pain .     • ALPRAZolam (XANAX) 0.25 MG tablet      • amLODIPine (NORVASC) 10 MG tablet Take 1 tablet by mouth Daily. 90 tablet 3   • apixaban (ELIQUIS) 5 MG tablet tablet Take 1 tablet by mouth Every 12 (Twelve) Hours. Indications: Atrial Fibrillation 60 tablet 4   • budesonide (PULMICORT) 0.5 MG/2ML nebulizer solution      • calcium carbonate (TUMS) 500 MG chewable tablet Chew 1 tablet Daily.     • doxylamine (UNISOM) 25 MG tablet Take  by mouth.     • famotidine (PEPCID) 20 MG tablet Take 1 tablet by mouth Daily.     • finasteride (PROSCAR) 5 MG tablet      • fluorometholone (FML) 0.1 % ophthalmic suspension Administer 1 drop to both eyes Daily.     • ipratropium-albuterol (DUO-NEB) 0.5-2.5 mg/3 ml nebulizer      • melatonin 3 MG tablet Take 1 tablet by mouth At Night As Needed for Sleep. 30 tablet    • metoprolol tartrate (LOPRESSOR) 25 MG tablet TAKE A HALF TABLET BY MOUTH TWICE A DAY 90 tablet 3   • mirtazapine (REMERON) 7.5 MG tablet Take 1 tablet by mouth Every Night. 90 tablet 2   • polyethylene glycol (polyethylene glycol) 17 g packet Take 17 g by mouth Daily As Needed (constipation).     • prednisoLONE acetate (PRED FORTE) 1 % ophthalmic suspension Administer 1 drop to both eyes daily.     • saccharomyces boulardii (FLORASTOR) 250 MG capsule Take 1 capsule by mouth 2 (Two) Times a Day.     • sennosides-docusate (PERICOLACE) 8.6-50 MG per tablet Take 2 tablets by mouth 2 (Two) Times a Day.     • sodium chloride (SENA 128) 5 % ophthalmic solution Administer 1 drop into the left eye 3 (three)  times a day.     • tamsulosin (FLOMAX) 0.4 MG capsule 24 hr capsule Take 1 capsule by mouth Daily. 30 capsule    • ibrutinib (Imbruvica) 420 MG tablet tablet Take 420 mg by mouth Daily.       No current facility-administered medications for this visit.       Angel Cisneros had no medications administered during this visit.    Return in about 2 months (around 2/2/2022).    There are no Patient Instructions on file for this visit.

## 2021-12-09 ENCOUNTER — TELEPHONE (OUTPATIENT)
Dept: FAMILY MEDICINE CLINIC | Facility: CLINIC | Age: 71
End: 2021-12-09

## 2021-12-09 RX ORDER — FAMOTIDINE 20 MG/1
20 TABLET, FILM COATED ORAL DAILY
Status: CANCELLED
Start: 2021-12-09

## 2021-12-09 RX ORDER — TAMSULOSIN HYDROCHLORIDE 0.4 MG/1
0.4 CAPSULE ORAL DAILY
Qty: 30 CAPSULE | Refills: 5 | Status: SHIPPED | OUTPATIENT
Start: 2021-12-09 | End: 2022-04-18 | Stop reason: SDUPTHER

## 2021-12-09 RX ORDER — TAMSULOSIN HYDROCHLORIDE 0.4 MG/1
0.4 CAPSULE ORAL DAILY
Qty: 30 CAPSULE | Status: CANCELLED
Start: 2021-12-09

## 2021-12-09 RX ORDER — FAMOTIDINE 20 MG/1
20 TABLET, FILM COATED ORAL DAILY
Qty: 30 TABLET | Refills: 5 | Status: SHIPPED | OUTPATIENT
Start: 2021-12-09 | End: 2022-04-18

## 2021-12-09 NOTE — TELEPHONE ENCOUNTER
Caller: RAQUEL    Relationship: [facility]    Best call back number: [phone]    Requested Prescriptions:   Requested Prescriptions     Pending Prescriptions Disp Refills   • famotidine (PEPCID) 20 MG tablet       Sig: Take 1 tablet by mouth Daily.   • tamsulosin (FLOMAX) 0.4 MG capsule 24 hr capsule 30 capsule      Sig: Take 1 capsule by mouth Daily.        Pharmacy where request should be sent: [facility]/PHARMACY #[id number] - [facility], [place] - [address] AT [facility] [phone] [facility] [phone] FX     Additional details provided by patient: PATIENT HAS ONE OF EACH    Does the patient have less than a 3 day supply:  [x] Yes  [] No    Lee Christie Rep   12/09/21 10:23 EST

## 2021-12-13 ENCOUNTER — TELEPHONE (OUTPATIENT)
Dept: FAMILY MEDICINE CLINIC | Facility: CLINIC | Age: 71
End: 2021-12-13

## 2021-12-13 NOTE — TELEPHONE ENCOUNTER
Informed patient of referral being sent to [facility] and gave office phone number. He stated he will contact their office to make appointment.

## 2021-12-13 NOTE — TELEPHONE ENCOUNTER
Caller: Angel Cisneros    Relationship: Self    Best call back number: [phone]     What is the best time to reach you: YES    Who are you requesting to speak with (clinical staff, provider,  specific staff member): CLINICAL    What was the call regarding: PATIENT HAS NOT HEARD BACK REGARDING APPOINTMENT WITH A PULMONOLOGIST.     Do you require a callback: YES

## 2021-12-14 ENCOUNTER — OUTSIDE FACILITY SERVICE (OUTPATIENT)
Dept: FAMILY MEDICINE CLINIC | Facility: CLINIC | Age: 71
End: 2021-12-14

## 2021-12-15 ENCOUNTER — TELEPHONE (OUTPATIENT)
Dept: FAMILY MEDICINE CLINIC | Facility: CLINIC | Age: 71
End: 2021-12-15

## 2021-12-15 PROCEDURE — G0180 MD CERTIFICATION HHA PATIENT: HCPCS | Performed by: FAMILY MEDICINE

## 2021-12-15 NOTE — TELEPHONE ENCOUNTER
Caller: Angel Cisneros    Relationship: Self    Best call back number: [phone] (M)    What is the best time to reach you: ANYTIME    Who are you requesting to speak with (clinical staff, provider,  specific staff member): CLINICAL STAFF    Do you know the name of the person who called:     What was the call regarding: PATIENT WANTS TO KNOW IF HE CAN TAKE D3 + K2 WITH HIS OTHER MEDICATIONS.     Do you require a callback: YES        THANKS

## 2022-01-01 ENCOUNTER — TELEPHONE (OUTPATIENT)
Dept: FAMILY MEDICINE CLINIC | Facility: CLINIC | Age: 72
End: 2022-01-01

## 2022-01-19 ENCOUNTER — OUTSIDE FACILITY SERVICE (OUTPATIENT)
Dept: FAMILY MEDICINE CLINIC | Facility: CLINIC | Age: 72
End: 2022-01-19

## 2022-01-19 PROCEDURE — G0180 MD CERTIFICATION HHA PATIENT: HCPCS | Performed by: FAMILY MEDICINE

## 2022-01-31 ENCOUNTER — TELEPHONE (OUTPATIENT)
Dept: FAMILY MEDICINE CLINIC | Facility: CLINIC | Age: 72
End: 2022-01-31

## 2022-02-02 ENCOUNTER — OFFICE VISIT (OUTPATIENT)
Dept: FAMILY MEDICINE CLINIC | Facility: CLINIC | Age: 72
End: 2022-02-02

## 2022-02-02 VITALS
TEMPERATURE: 96.8 F | OXYGEN SATURATION: 96 % | HEIGHT: 67 IN | WEIGHT: 146 LBS | HEART RATE: 66 BPM | SYSTOLIC BLOOD PRESSURE: 116 MMHG | BODY MASS INDEX: 22.91 KG/M2 | DIASTOLIC BLOOD PRESSURE: 78 MMHG

## 2022-02-02 DIAGNOSIS — J96.01 ACUTE RESPIRATORY FAILURE WITH HYPOXIA: Primary | ICD-10-CM

## 2022-02-02 DIAGNOSIS — R35.0 BENIGN PROSTATIC HYPERPLASIA WITH URINARY FREQUENCY: ICD-10-CM

## 2022-02-02 DIAGNOSIS — N40.1 BENIGN PROSTATIC HYPERPLASIA WITH URINARY FREQUENCY: ICD-10-CM

## 2022-02-02 DIAGNOSIS — I10 PRIMARY HYPERTENSION: ICD-10-CM

## 2022-02-02 PROBLEM — N18.31 STAGE 3A CHRONIC KIDNEY DISEASE: Status: ACTIVE | Noted: 2022-02-02

## 2022-02-02 PROCEDURE — 99214 OFFICE O/P EST MOD 30 MIN: CPT | Performed by: FAMILY MEDICINE

## 2022-02-02 RX ORDER — FINASTERIDE 5 MG/1
5 TABLET, FILM COATED ORAL DAILY
Qty: 90 TABLET | Refills: 3 | Status: SHIPPED | OUTPATIENT
Start: 2022-02-02 | End: 2022-07-07

## 2022-02-02 NOTE — PROGRESS NOTES
Chief Complaint   Patient presents with   • Hypertension       Subjective   Angel Cisneros is a 72 y.o. male.     History of Present Illness   F/U resp failure with hypoxia due to covid.  Hospitalized 9/5 to 10/16.  On 3 L O2 now.  Seeing Dr Truong.  Still with SOA with walking.    F/U HTN.  No orthostasis.    F/U BPH.  Doing well with meds.    The following portions of the patient's history were reviewed and updated as appropriate: allergies, current medications, past family history, past medical history, past social history, past surgical history and problem list.    Review of Systems   Constitutional: Negative for appetite change and fatigue.   HENT: Negative for nosebleeds and sore throat.    Eyes: Negative for blurred vision and visual disturbance.   Respiratory: Positive for shortness of breath. Negative for wheezing.    Cardiovascular: Negative for chest pain and leg swelling.   Gastrointestinal: Negative for abdominal distention and abdominal pain.   Endocrine: Negative for cold intolerance and polyuria.   Genitourinary: Negative for dysuria and hematuria.   Musculoskeletal: Negative for arthralgias and myalgias.   Skin: Negative for color change and rash.   Neurological: Negative for weakness and confusion.   Psychiatric/Behavioral: Negative for agitation and depressed mood.       Patient Active Problem List   Diagnosis   • Lymphoma (HCC)   • Leukemia (HCC)   • Chest pain   • GERD (gastroesophageal reflux disease)   • HTN (hypertension)   • Chronic kidney disease, stage 3b (HCC)   • Generalized abdominal pain   • CLL (chronic lymphocytic leukemia) (HCC)   • Gastroesophageal reflux disease   • Hodgkin's disease of lymph nodes of head, face, and neck (HCC)   • Oral thrush   • Medicare annual wellness visit, subsequent   • Primary insomnia   • Adjustment disorder with depressed mood   • Dysuria   • Gross hematuria   • Acute non-recurrent maxillary sinusitis   • Cough   • BPH (benign prostatic  hyperplasia)   • Elevated PSA   • Clinical diagnosis of COVID-19   • Pneumonia due to COVID-19 virus   • Paroxysmal atrial fibrillation (HCC)   • Acute respiratory failure with hypoxia (HCC)   • Critical illness myopathy   • Cytokine release syndrome, grade 4   • Hypoxia   • Stage 3a chronic kidney disease (HCC)       Allergies   Allergen Reactions   • Ambien [Zolpidem Tartrate] Mental Status Change   • Penicillins Unknown - Low Severity     unknown childhood allergy         Current Outpatient Medications:   •  acetaminophen (TYLENOL) 325 MG tablet, Take 2 tablets by mouth Every 4 (Four) Hours As Needed for Mild Pain ., Disp: , Rfl:   •  ALPRAZolam (XANAX) 0.25 MG tablet, , Disp: , Rfl:   •  amLODIPine (NORVASC) 10 MG tablet, Take 1 tablet by mouth Daily., Disp: 90 tablet, Rfl: 3  •  budesonide (PULMICORT) 0.5 MG/2ML nebulizer solution, , Disp: , Rfl:   •  calcium carbonate (TUMS) 500 MG chewable tablet, Chew 1 tablet Daily., Disp: , Rfl:   •  doxylamine (UNISOM) 25 MG tablet, Take  by mouth., Disp: , Rfl:   •  famotidine (PEPCID) 20 MG tablet, Take 1 tablet by mouth Daily., Disp: 30 tablet, Rfl: 5  •  finasteride (PROSCAR) 5 MG tablet, Take 1 tablet by mouth Daily., Disp: 90 tablet, Rfl: 3  •  fluorometholone (FML) 0.1 % ophthalmic suspension, Administer 1 drop to both eyes Daily., Disp: , Rfl:   •  ibrutinib (Imbruvica) 420 MG tablet tablet, Take 420 mg by mouth Daily., Disp: , Rfl:   •  ipratropium-albuterol (DUO-NEB) 0.5-2.5 mg/3 ml nebulizer, , Disp: , Rfl:   •  melatonin 3 MG tablet, Take 1 tablet by mouth At Night As Needed for Sleep., Disp: 30 tablet, Rfl:   •  metoprolol tartrate (LOPRESSOR) 25 MG tablet, TAKE A HALF TABLET BY MOUTH TWICE A DAY, Disp: 90 tablet, Rfl: 3  •  polyethylene glycol (polyethylene glycol) 17 g packet, Take 17 g by mouth Daily As Needed (constipation)., Disp: , Rfl:   •  prednisoLONE acetate (PRED FORTE) 1 % ophthalmic suspension, Administer 1 drop to both eyes daily., Disp: , Rfl:    •  saccharomyces boulardii (FLORASTOR) 250 MG capsule, Take 1 capsule by mouth 2 (Two) Times a Day., Disp: , Rfl:   •  sennosides-docusate (PERICOLACE) 8.6-50 MG per tablet, Take 2 tablets by mouth 2 (Two) Times a Day., Disp: , Rfl:   •  sodium chloride (SENA 128) 5 % ophthalmic solution, Administer 1 drop into the left eye 3 (three) times a day., Disp: , Rfl:   •  tamsulosin (FLOMAX) 0.4 MG capsule 24 hr capsule, Take 1 capsule by mouth Daily., Disp: 30 capsule, Rfl: 5    Past Medical History:   Diagnosis Date   • Arthritis     both knees   • Cancer (HCC)     dx with lymphoma 2009/ radiation   • Cataracts, bilateral    • History of COVID-19 09/2021   • Hx of cornea transplant     both eyes   • Leukemia (HCC)    • Lymphoma (HCC)     dx in 2009. treated with radiation.   • Lymphoma (HCC)     treated with radiation. dx in 2009       Past Surgical History:   Procedure Laterality Date   • BREAST LUMPECTOMY Right    • COLONOSCOPY N/A 3/8/2016    Procedure: COLONOSCOPY with cold polypectomy X 3;  Surgeon: Chris Harden MD;  Location: [facility] ENDOSCOPY;  Service:    • ENDOSCOPY N/A 10/23/2018    Procedure: ESOPHAGOGASTRODUODENOSCOPY WITH BIOPSY;  Surgeon: Chris Harden MD;  Location: [facility] ENDOSCOPY;  Service: Gastroenterology   • EYE SURGERY      partial cornea transplant 4-5 years ago   • KNEE SURGERY Left    • TOE SURGERY Bilateral    • TONSILLECTOMY         Family History   Problem Relation Age of Onset   • Kidney cancer Mother        Social History     Tobacco Use   • Smoking status: Never Smoker   • Smokeless tobacco: Never Used   Substance Use Topics   • Alcohol use: No            Objective     Vitals:    02/02/22 1108   BP: 116/78   Pulse: 66   Temp: 96.8 °F (36 °C)   SpO2: 96%     Body mass index is 22.87 kg/m².    Physical Exam  Vitals reviewed.   Constitutional:       Appearance: He is well-developed. He is not diaphoretic.   HENT:      Head: Normocephalic and atraumatic.   Eyes:      General: No  scleral icterus.     Pupils: Pupils are equal, round, and reactive to light.   Neck:      Thyroid: No thyromegaly.   Cardiovascular:      Rate and Rhythm: Normal rate and regular rhythm.      Heart sounds: No murmur heard.  No friction rub. No gallop.    Pulmonary:      Effort: Pulmonary effort is normal. No respiratory distress.      Breath sounds: No wheezing or rales.   Chest:      Chest wall: No tenderness.   Abdominal:      General: Bowel sounds are normal. There is no distension.      Palpations: Abdomen is soft.      Tenderness: There is no abdominal tenderness.   Musculoskeletal:         General: No deformity. Normal range of motion.   Lymphadenopathy:      Cervical: No cervical adenopathy.   Skin:     General: Skin is warm and dry.      Findings: No rash.   Neurological:      Cranial Nerves: No cranial nerve deficit.      Motor: No abnormal muscle tone.         Lab Results   Component Value Date    GLUCOSE 74 10/15/2021    BUN 33 (H) 10/15/2021    CREATININE 1.35 (H) 10/15/2021    EGFRIFNONA 52 (L) 10/15/2021    EGFRIFAFRI 49 (L) 06/03/2020    BCR 24.4 10/15/2021    K 4.4 10/15/2021    CO2 26.4 10/15/2021    CALCIUM 8.3 (L) 10/15/2021    PROTENTOTREF 6.4 06/03/2020    ALBUMIN 2.70 (L) 09/20/2021    LABIL2 1.4 03/08/2021    AST 26 09/20/2021    ALT 65 (H) 09/20/2021       WBC   Date Value Ref Range Status   12/03/2021 11.00 4.5 - 11.0 10*3/uL Final     RBC   Date Value Ref Range Status   12/03/2021 3.80 (L) 4.5 - 5.9 10*6/uL Final     Hemoglobin   Date Value Ref Range Status   12/03/2021 11.2 (L) 13.5 - 17.5 g/dL Final     Hematocrit   Date Value Ref Range Status   12/03/2021 37.8 (L) 41.0 - 53.0 % Final     MCV   Date Value Ref Range Status   12/03/2021 99.5 80.0 - 100.0 fL Final     MCH   Date Value Ref Range Status   12/03/2021 29.5 26.0 - 34.0 pg Final     MCHC   Date Value Ref Range Status   12/03/2021 29.6 (L) 31.0 - 37.0 g/dL Final     RDW   Date Value Ref Range Status   12/03/2021 17.5 (H) 12.0 -  16.8 % Final     RDW-SD   Date Value Ref Range Status   10/15/2021 51.2 37.0 - 54.0 fl Final     MPV   Date Value Ref Range Status   12/03/2021 9.6 8.4 - 12.4 fL Final     Platelets   Date Value Ref Range Status   12/03/2021 223 140 - 440 10*3/uL Final     Neutrophil Rel %   Date Value Ref Range Status   12/03/2021 45.2 45 - 80 % Final     Lymphocyte Rel %   Date Value Ref Range Status   12/03/2021 40.7 15 - 50 % Final     Monocyte Rel %   Date Value Ref Range Status   12/03/2021 11.0 0 - 15 % Final     Eosinophil %   Date Value Ref Range Status   12/03/2021 2.5 0 - 7 % Final     Basophil Rel %   Date Value Ref Range Status   12/03/2021 0.6 0 - 2 % Final     Immature Grans %   Date Value Ref Range Status   10/15/2021 4.7 (H) 0.0 - 0.5 % Final     Neutrophils Absolute   Date Value Ref Range Status   12/03/2021 4.97 2.0 - 8.8 10*3/uL Final     Lymphocytes Absolute   Date Value Ref Range Status   12/03/2021 4.48 0.7 - 5.5 10*3/uL Final     Monocytes Absolute   Date Value Ref Range Status   12/03/2021 1.21 0.0 - 1.7 10*3/uL Final     Eosinophils Absolute   Date Value Ref Range Status   12/03/2021 0.27 0.0 - 0.8 10*3/uL Final     Basophils Absolute   Date Value Ref Range Status   12/03/2021 0.07 0.0 - 0.2 10*3/uL Final     Immature Grans, Absolute   Date Value Ref Range Status   10/15/2021 0.23 (H) 0.00 - 0.05 10*3/mm3 Final     nRBC   Date Value Ref Range Status   10/15/2021 0.0 0.0 - 0.2 /100 WBC Final       Lab Results   Component Value Date    HGBA1C 5.39 09/06/2021       No results found for: [id number]    TSH   Date Value Ref Range Status   09/09/2021 0.272 0.270 - 4.200 uIU/mL Final       No results found for: CHOL  Lab Results   Component Value Date    TRIG 125 03/16/2021     Lab Results   Component Value Date    HDL 52 03/16/2021     Lab Results   Component Value Date    LDL 95 03/16/2021     Lab Results   Component Value Date    VLDL 22 03/16/2021     No results found for:  [facility]      Procedures    Assessment/Plan   Problems Addressed this Visit     Acute respiratory failure with hypoxia (HCC) - Primary    BPH (benign prostatic hyperplasia)    HTN (hypertension)      Diagnoses       Codes Comments    Acute respiratory failure with hypoxia (HCC)    -  Primary ICD-10-CM: J96.01  ICD-9-CM: 518.81     Primary hypertension     ICD-10-CM: I10  ICD-9-CM: 401.9     Benign prostatic hyperplasia with urinary frequency     ICD-10-CM: N40.1, R35.0  ICD-9-CM: 600.01, 788.41       Hypoxia secondary to resp failure and covid.  Decrease O2 to 2L a minute.  Satting 95% on this.  F/U with Dr Truong.    HTn.  Controlled.  Continue meds.   BPH.  Controlled.  RF finasteride.  Continue flomax.      No orders of the defined types were placed in this encounter.      Current Outpatient Medications   Medication Sig Dispense Refill   • acetaminophen (TYLENOL) 325 MG tablet Take 2 tablets by mouth Every 4 (Four) Hours As Needed for Mild Pain .     • ALPRAZolam (XANAX) 0.25 MG tablet      • amLODIPine (NORVASC) 10 MG tablet Take 1 tablet by mouth Daily. 90 tablet 3   • budesonide (PULMICORT) 0.5 MG/2ML nebulizer solution      • calcium carbonate (TUMS) 500 MG chewable tablet Chew 1 tablet Daily.     • doxylamine (UNISOM) 25 MG tablet Take  by mouth.     • famotidine (PEPCID) 20 MG tablet Take 1 tablet by mouth Daily. 30 tablet 5   • finasteride (PROSCAR) 5 MG tablet Take 1 tablet by mouth Daily. 90 tablet 3   • fluorometholone (FML) 0.1 % ophthalmic suspension Administer 1 drop to both eyes Daily.     • ibrutinib (Imbruvica) 420 MG tablet tablet Take 420 mg by mouth Daily.     • ipratropium-albuterol (DUO-NEB) 0.5-2.5 mg/3 ml nebulizer      • melatonin 3 MG tablet Take 1 tablet by mouth At Night As Needed for Sleep. 30 tablet    • metoprolol tartrate (LOPRESSOR) 25 MG tablet TAKE A HALF TABLET BY MOUTH TWICE A DAY 90 tablet 3   • polyethylene glycol (polyethylene glycol) 17 g packet Take 17 g by mouth Daily As  Needed (constipation).     • prednisoLONE acetate (PRED FORTE) 1 % ophthalmic suspension Administer 1 drop to both eyes daily.     • saccharomyces boulardii (FLORASTOR) 250 MG capsule Take 1 capsule by mouth 2 (Two) Times a Day.     • sennosides-docusate (PERICOLACE) 8.6-50 MG per tablet Take 2 tablets by mouth 2 (Two) Times a Day.     • sodium chloride (SENA 128) 5 % ophthalmic solution Administer 1 drop into the left eye 3 (three) times a day.     • tamsulosin (FLOMAX) 0.4 MG capsule 24 hr capsule Take 1 capsule by mouth Daily. 30 capsule 5     No current facility-administered medications for this visit.       Angel Cisneros had no medications administered during this visit.    Return in about 2 months (around 4/2/2022).    There are no Patient Instructions on file for this visit.

## 2022-02-04 ENCOUNTER — TELEPHONE (OUTPATIENT)
Dept: FAMILY MEDICINE CLINIC | Facility: CLINIC | Age: 72
End: 2022-02-04

## 2022-02-04 NOTE — TELEPHONE ENCOUNTER
Caller: Angel Cisneros    Relationship: Self    Best call back number: [phone]*    What is the best time to reach you: ANYTIME    Who are you requesting to speak with (clinical staff, provider,  specific staff member): CLINICAL    What was the call regarding: PATIENT CALLING REQUESTING HELP WITH GETTING A HANDICAP STICKER FOR HIS VEHICLE SINCE HE IS ON OXYGEN.    Do you require a callback: YES

## 2022-02-04 NOTE — TELEPHONE ENCOUNTER
Caller: Angel Cisneros    Relationship: Self    Best call back number: [phone]*    What medication are you requesting: COUGH MEDICATION    What are your current symptoms: COUGH    How long have you been experiencing symptoms: 2 WEEKS    Have you had these symptoms before:    [] Yes  [x] No    Have you been treated for these symptoms before:   [] Yes  [x] No    If a prescription is needed, what is your preferred pharmacy and phone number:      [facility]/pharmacy #[id number] - [place], [place] - [address] AT [facility] - [phone]  - [phone]   [phone]    Additional notes: PATIENT STATES HE SAW DR. SHORT ON 2/2/22.           Left message on patients voicemail of message below. Encouraged to call with questions.

## 2022-02-07 ENCOUNTER — TELEPHONE (OUTPATIENT)
Dept: FAMILY MEDICINE CLINIC | Facility: CLINIC | Age: 72
End: 2022-02-07

## 2022-02-07 ENCOUNTER — OUTSIDE FACILITY SERVICE (OUTPATIENT)
Dept: FAMILY MEDICINE CLINIC | Facility: CLINIC | Age: 72
End: 2022-02-07

## 2022-02-07 PROCEDURE — G0179 MD RECERTIFICATION HHA PT: HCPCS | Performed by: FAMILY MEDICINE

## 2022-02-07 NOTE — TELEPHONE ENCOUNTER
Caller: Angel Cisneros    Relationship to patient: Self    Best call back number: [phone]    Patient is needing: PT IS CALLING IN REGARDS TO THE [employer]ENDING [employer]APERWORK OVER FOR A PORTABLE OXYGEN MACHINE. PT IS WANTING TO MAKE SURE IT WAS RECEIVED.     COMPANY PH: [phone]

## 2022-02-09 NOTE — TELEPHONE ENCOUNTER
Per patient, he is on 3l/minute. Information given to [address] [place] [address] and faxed.

## 2022-03-25 ENCOUNTER — TELEPHONE (OUTPATIENT)
Dept: FAMILY MEDICINE CLINIC | Facility: CLINIC | Age: 72
End: 2022-03-25

## 2022-03-25 NOTE — TELEPHONE ENCOUNTER
Caller: Angel Cisneros    Relationship: Self    Best call back number: [phone] (M)    What is the best time to reach you: ANYTIME     Who are you requesting to speak with (clinical staff, provider,  specific staff member): CLINICAL STAFF    Do you know the name of the person who called:      What was the call regarding:  PATIENT CALLED TO ADVISE THAT HE WOULD LIKE TO KNOW WHAT DR. SHORT THINKS ABOUT HIM SWITCHHING MELATONIN TO MIDNIGHT.    Do you require a callback:  YES        THANKS

## 2022-04-18 ENCOUNTER — OFFICE VISIT (OUTPATIENT)
Dept: FAMILY MEDICINE CLINIC | Facility: CLINIC | Age: 72
End: 2022-04-18

## 2022-04-18 VITALS
BODY MASS INDEX: 23.07 KG/M2 | DIASTOLIC BLOOD PRESSURE: 70 MMHG | WEIGHT: 147 LBS | OXYGEN SATURATION: 98 % | HEIGHT: 67 IN | TEMPERATURE: 98 F | HEART RATE: 56 BPM | SYSTOLIC BLOOD PRESSURE: 120 MMHG

## 2022-04-18 DIAGNOSIS — Z23 ENCOUNTER FOR IMMUNIZATION: ICD-10-CM

## 2022-04-18 DIAGNOSIS — I10 ESSENTIAL HYPERTENSION: ICD-10-CM

## 2022-04-18 DIAGNOSIS — Z28.39 IMMUNIZATION DEFICIENCY: ICD-10-CM

## 2022-04-18 DIAGNOSIS — N18.31 STAGE 3A CHRONIC KIDNEY DISEASE: ICD-10-CM

## 2022-04-18 DIAGNOSIS — R35.0 BENIGN PROSTATIC HYPERPLASIA WITH URINARY FREQUENCY: ICD-10-CM

## 2022-04-18 DIAGNOSIS — Z00.00 MEDICARE ANNUAL WELLNESS VISIT, SUBSEQUENT: Primary | ICD-10-CM

## 2022-04-18 DIAGNOSIS — N40.1 BENIGN PROSTATIC HYPERPLASIA WITH URINARY FREQUENCY: ICD-10-CM

## 2022-04-18 DIAGNOSIS — I10 PRIMARY HYPERTENSION: ICD-10-CM

## 2022-04-18 PROCEDURE — 99214 OFFICE O/P EST MOD 30 MIN: CPT | Performed by: FAMILY MEDICINE

## 2022-04-18 PROCEDURE — 91305 COVID-19 (PFIZER) 12+ YRS: CPT | Performed by: FAMILY MEDICINE

## 2022-04-18 PROCEDURE — G0009 ADMIN PNEUMOCOCCAL VACCINE: HCPCS | Performed by: FAMILY MEDICINE

## 2022-04-18 PROCEDURE — 1159F MED LIST DOCD IN RCRD: CPT | Performed by: FAMILY MEDICINE

## 2022-04-18 PROCEDURE — 1170F FXNL STATUS ASSESSED: CPT | Performed by: FAMILY MEDICINE

## 2022-04-18 PROCEDURE — 90670 PCV13 VACCINE IM: CPT | Performed by: FAMILY MEDICINE

## 2022-04-18 PROCEDURE — G0439 PPPS, SUBSEQ VISIT: HCPCS | Performed by: FAMILY MEDICINE

## 2022-04-18 PROCEDURE — 0051A COVID-19 (PFIZER) 12+ YRS: CPT | Performed by: FAMILY MEDICINE

## 2022-04-18 RX ORDER — TAMSULOSIN HYDROCHLORIDE 0.4 MG/1
0.4 CAPSULE ORAL DAILY
Qty: 90 CAPSULE | Refills: 3 | Status: SHIPPED | OUTPATIENT
Start: 2022-04-18

## 2022-04-18 RX ORDER — AMLODIPINE BESYLATE 10 MG/1
10 TABLET ORAL DAILY
Qty: 90 TABLET | Refills: 3 | Status: SHIPPED | OUTPATIENT
Start: 2022-04-18 | End: 2022-09-28 | Stop reason: SDUPTHER

## 2022-04-18 NOTE — PROGRESS NOTES
The ABCs of the Annual Wellness Visit  Subsequent Medicare Wellness Visit    Chief Complaint   Patient presents with   • Medicare Wellness-subsequent      Subjective    History of Present Illness:  Angel Cisneros is a 72 y.o. male who presents for a Subsequent Medicare Wellness Visit.  F/U HTN with CKD.  Cr 2.21 from 4/4/21.  Was 1.67 from 12/3/21.  No orthostasis.  Doing well wiht meds.    F/U BPH.  On tamsulosin.    F/U resp failure with hypoxia due to covid. From hospitalization 9/5/21-10/16/21.  On 3L O2 at night only right now.  Seeing Dr Truong.    LDL 95 3/21.    The following portions of the patient's history were reviewed and   updated as appropriate: allergies, current medications, past family history, past medical history, past social history, past surgical history and problem list.    Compared to one year ago, the patient feels his physical   health is the same.    Compared to one year ago, the patient feels his mental   health is the same.    Recent Hospitalizations:  This patient has had a [facility] admission record on file within the last 365 days.    Current Medical Providers:  Patient Care Team:  Ayaan Amin MD as PCP - General (Family Medicine)    Outpatient Medications Prior to Visit   Medication Sig Dispense Refill   • acetaminophen (TYLENOL) 325 MG tablet Take 2 tablets by mouth Every 4 (Four) Hours As Needed for Mild Pain .     • ALPRAZolam (XANAX) 0.25 MG tablet      • amLODIPine (NORVASC) 10 MG tablet Take 1 tablet by mouth Daily. 90 tablet 3   • budesonide (PULMICORT) 0.5 MG/2ML nebulizer solution      • calcium carbonate (TUMS) 500 MG chewable tablet Chew 1 tablet Daily.     • doxylamine (UNISOM) 25 MG tablet Take  by mouth.     • famotidine (PEPCID) 20 MG tablet Take 1 tablet by mouth Daily. 30 tablet 5   • finasteride (PROSCAR) 5 MG tablet Take 1 tablet by mouth Daily. 90 tablet 3   • fluorometholone (FML) 0.1 % ophthalmic suspension Administer 1 drop to both eyes  Daily.     • ibrutinib (IMBRUVICA) 420 MG tablet tablet Take 420 mg by mouth Daily.     • ipratropium-albuterol (DUO-NEB) 0.5-2.5 mg/3 ml nebulizer      • melatonin 3 MG tablet Take 1 tablet by mouth At Night As Needed for Sleep. 30 tablet    • metoprolol tartrate (LOPRESSOR) 25 MG tablet TAKE A HALF TABLET BY MOUTH TWICE A DAY 90 tablet 3   • polyethylene glycol (polyethylene glycol) 17 g packet Take 17 g by mouth Daily As Needed (constipation).     • prednisoLONE acetate (PRED FORTE) 1 % ophthalmic suspension Administer 1 drop to both eyes daily.     • saccharomyces boulardii (FLORASTOR) 250 MG capsule Take 1 capsule by mouth 2 (Two) Times a Day.     • sennosides-docusate (PERICOLACE) 8.6-50 MG per tablet Take 2 tablets by mouth 2 (Two) Times a Day.     • sodium chloride (SENA 128) 5 % ophthalmic solution Administer 1 drop into the left eye 3 (three) times a day.     • tamsulosin (FLOMAX) 0.4 MG capsule 24 hr capsule Take 1 capsule by mouth Daily. 30 capsule 5     No facility-administered medications prior to visit.       No opioid medication identified on active medication list. I have reviewed chart for other potential  high risk medication/s and harmful drug interactions in the elderly.          Aspirin is not on active medication list.  Aspirin use is not indicated based on review of current medical condition/s. Risk of harm outweighs potential benefits.  .    Patient Active Problem List   Diagnosis   • Lymphoma (HCC)   • Leukemia (HCC)   • Chest pain   • GERD (gastroesophageal reflux disease)   • HTN (hypertension)   • Chronic kidney disease, stage 3b (HCC)   • Generalized abdominal pain   • CLL (chronic lymphocytic leukemia) (HCC)   • Gastroesophageal reflux disease   • Hodgkin's disease of lymph nodes of head, face, and neck (HCC)   • Oral thrush   • Medicare annual wellness visit, subsequent   • Primary insomnia   • Adjustment disorder with depressed mood   • Dysuria   • Gross hematuria   • Acute  "non-recurrent maxillary sinusitis   • Cough   • BPH (benign prostatic hyperplasia)   • Elevated PSA   • Clinical diagnosis of COVID-19   • Pneumonia due to COVID-19 virus   • Paroxysmal atrial fibrillation (HCC)   • Acute respiratory failure with hypoxia (HCC)   • Critical illness myopathy   • Cytokine release syndrome, grade 4   • Hypoxia   • Stage 3a chronic kidney disease (HCC)     Advance Care Planning  Advance Directive is not on file.  ACP discussion was held with the patient during this visit. Patient has an advance directive (not in EMR), copy requested.    Review of Systems   Constitutional: Negative for diaphoresis and fatigue.   HENT: Negative for postnasal drip and rhinorrhea.    Respiratory: Negative for shortness of breath and stridor.    Gastrointestinal: Negative for constipation and diarrhea.   Musculoskeletal: Negative for arthralgias and back pain.        Objective    Vitals:    04/18/22 0928   BP: 120/70   BP Location: Right arm   Patient Position: Sitting   Pulse: 56   Temp: 98 °F (36.7 °C)   TempSrc: Temporal   SpO2: 98%   Weight: 66.7 kg (147 lb)   Height: 170.2 cm (67\")     BMI Readings from Last 1 Encounters:   04/18/22 23.02 kg/m²   BMI is within normal parameters. No follow-up required.    Does the patient have evidence of cognitive impairment? No    Physical Exam  Vitals reviewed.   Constitutional:       Appearance: He is well-developed. He is not diaphoretic.   HENT:      Head: Normocephalic and atraumatic.   Eyes:      General: No scleral icterus.     Pupils: Pupils are equal, round, and reactive to light.   Neck:      Thyroid: No thyromegaly.   Cardiovascular:      Rate and Rhythm: Normal rate and regular rhythm.      Heart sounds: No murmur heard.    No friction rub. No gallop.   Pulmonary:      Effort: Pulmonary effort is normal. No respiratory distress.      Breath sounds: No wheezing or rales.   Chest:      Chest wall: No tenderness.   Abdominal:      General: Bowel sounds are " normal. There is no distension.      Palpations: Abdomen is soft.      Tenderness: There is no abdominal tenderness.   Musculoskeletal:         General: No deformity. Normal range of motion.   Lymphadenopathy:      Cervical: No cervical adenopathy.   Skin:     General: Skin is warm and dry.      Findings: No rash.   Neurological:      Cranial Nerves: No cranial nerve deficit.      Motor: No abnormal muscle tone.                 HEALTH RISK ASSESSMENT    Smoking Status:  Social History     Tobacco Use   Smoking Status Never Smoker   Smokeless Tobacco Never Used     Alcohol Consumption:  Social History     Substance and Sexual Activity   Alcohol Use No     Fall Risk Screen:    [facility] Fall Risk Assessment has not been completed.    Depression Screening:  PHQ-2/PHQ-9 Depression Screening 12/4/2020   Retired Total Score 3       Health Habits and Functional and Cognitive Screening:  Functional & Cognitive Status 4/18/2022   Do you have difficulty preparing food and eating? No   Do you have difficulty bathing yourself, getting dressed or grooming yourself? No   Do you have difficulty using the toilet? No   Do you have difficulty moving around from place to place? No   Do you have trouble with steps or getting out of a bed or a chair? No   Current Diet Well Balanced Diet   Dental Exam Up to date   Eye Exam Up to date   Exercise (times per week) 3 times per week   Current Exercises Include Walking   Current Exercise Activities Include -   Do you need help using the phone?  No   Are you deaf or do you have serious difficulty hearing?  No   Do you need help with transportation? No   Do you need help shopping? No   Do you need help preparing meals?  No   Do you need help with housework?  No   Do you need help with laundry? No   Do you need help taking your medications? No   Do you need help managing money? No   Do you ever drive or ride in a car without wearing a seat belt? No   Have you felt unusual stress, anger or loneliness  in the last month? No   Who do you live with? Spouse   If you need help, do you have trouble finding someone available to you? No   Have you been bothered in the last four weeks by sexual problems? -   Do you have difficulty concentrating, remembering or making decisions? No       Age-appropriate Screening Schedule:  Refer to the list below for future screening recommendations based on patient's age, sex and/or medical conditions. Orders for these recommended tests are listed in the plan section. The patient has been provided with a written plan.    Health Maintenance   Topic Date Due   • TDAP/TD VACCINES (1 - Tdap) Never done   • ZOSTER VACCINE (1 of 2) Never done   • INFLUENZA VACCINE  08/01/2022              Assessment/Plan   CMS Preventative Services Quick Reference  Risk Factors Identified During Encounter  Inactivity/Sedentary  The above risks/problems have been discussed with the patient.  Follow up actions/plans if indicated are seen below in the Assessment/Plan Section.  Pertinent information has been shared with the patient in the After Visit Summary.    Diagnoses and all orders for this visit:    1. Medicare annual wellness visit, subsequent (Primary)    2. Primary hypertension    3. Benign prostatic hyperplasia with urinary frequency    4. Stage 3a chronic kidney disease (HCC)      CKD, stage 3.  Cr 2.2 from 4/4/22.  Uncontrolled.  Avoid nsaids.  To nephrology.    HTN. Controlled.  Continue meds.  CMP reviewed from 4/4/22.RF amlo  BPH.  Controlle.d  RF tamsulosin.     Preventive Counseling:  Encouraged to stay active.  Covid vaccine booster number 2 today.  Prevnar 13 today.  Colonoscopy UTD.    Dentist UTD.  Optho UTD.       Follow Up:   No follow-ups on file.     An After Visit Summary and PPPS were made available to the patient.

## 2022-06-03 RX ORDER — FAMOTIDINE 20 MG/1
TABLET, FILM COATED ORAL
Qty: 90 TABLET | Refills: 1 | OUTPATIENT
Start: 2022-06-03

## 2022-06-07 RX ORDER — FAMOTIDINE 20 MG/1
TABLET, FILM COATED ORAL
Qty: 90 TABLET | Refills: 1 | OUTPATIENT
Start: 2022-06-07

## 2022-06-09 ENCOUNTER — TELEPHONE (OUTPATIENT)
Dept: FAMILY MEDICINE CLINIC | Facility: CLINIC | Age: 72
End: 2022-06-09

## 2022-06-09 RX ORDER — FAMOTIDINE 20 MG/1
20 TABLET, FILM COATED ORAL DAILY
Qty: 90 TABLET | Refills: 3 | Status: SHIPPED | OUTPATIENT
Start: 2022-06-09

## 2022-06-09 RX ORDER — FAMOTIDINE 20 MG/1
TABLET, FILM COATED ORAL
Qty: 90 TABLET | Refills: 1 | OUTPATIENT
Start: 2022-06-09

## 2022-06-09 NOTE — TELEPHONE ENCOUNTER
Caller: Angel Cisneros    Relationship: Self    Best call back number: [phone]    What is the best time to reach you: ANY TIME    Who are you requesting to speak with (clinical staff, provider,  specific staff member): CLINICAL STAFF    What was the call regarding: PATIENT CALLED IN REGARDS TO HIS famotidine (PEPCID) 20 MG tablet PRESCRIPTION, HE HAS BEEN TRYING TO GET IT REFILLED AND IT KEEPS GETTING DENIED AND HE WANTS TO KNOW WHY.    PLEASE ADVISE    Do you require a callback: YES

## 2022-06-09 NOTE — TELEPHONE ENCOUNTER
Rx Refill Note  Requested Prescriptions     Pending Prescriptions Disp Refills   • famotidine (PEPCID) 20 MG tablet [Pharmacy Med Name: FAMOTIDINE 20 MG TABLET] 90 tablet 1     Sig: TAKE 1 TABLET BY MOUTH EVERY DAY      Last office visit with prescribing clinician: 4/18/2022      Next office visit with prescribing clinician: 8/18/2022            Andres Wynne MA  06/09/22, 06:59 EDT

## 2022-07-07 ENCOUNTER — OFFICE VISIT (OUTPATIENT)
Dept: FAMILY MEDICINE CLINIC | Facility: CLINIC | Age: 72
End: 2022-07-07

## 2022-07-07 VITALS
RESPIRATION RATE: 16 BRPM | OXYGEN SATURATION: 95 % | HEART RATE: 66 BPM | TEMPERATURE: 98.4 F | SYSTOLIC BLOOD PRESSURE: 140 MMHG | DIASTOLIC BLOOD PRESSURE: 78 MMHG | HEIGHT: 67 IN | BODY MASS INDEX: 22.44 KG/M2 | WEIGHT: 143 LBS

## 2022-07-07 DIAGNOSIS — M25.551 RIGHT HIP PAIN: Primary | ICD-10-CM

## 2022-07-07 DIAGNOSIS — T50.905A MEDICATION SIDE EFFECT, INITIAL ENCOUNTER: ICD-10-CM

## 2022-07-07 PROCEDURE — 73502 X-RAY EXAM HIP UNI 2-3 VIEWS: CPT | Performed by: FAMILY MEDICINE

## 2022-07-07 PROCEDURE — 99214 OFFICE O/P EST MOD 30 MIN: CPT | Performed by: FAMILY MEDICINE

## 2022-07-07 RX ORDER — PREDNISONE 20 MG/1
TABLET ORAL
Qty: 18 TABLET | Refills: 0 | Status: SHIPPED | OUTPATIENT
Start: 2022-07-07 | End: 2022-08-18 | Stop reason: ALTCHOICE

## 2022-07-07 NOTE — PROGRESS NOTES
Chief Complaint   Patient presents with   • Lower Extremity Issue   • Mass     In left breast area       Subjective   Angel Cisneros is a 72 y.o. male.     History of Present Illness   C/o pain in R hip after doing concrete work 2 weeks ago.  About the same.  Did a lot of bending.  No fall or known injury.     C.o lump at L breast for 2 weeks.   About the same size.  Tender.     The following portions of the patient's history were reviewed and updated as appropriate: allergies, current medications, past family history, past medical history, past social history, past surgical history and problem list.    Review of Systems   Constitutional: Negative for appetite change and fatigue.   HENT: Negative for nosebleeds and sore throat.    Eyes: Negative for blurred vision and visual disturbance.   Respiratory: Negative for shortness of breath and wheezing.    Cardiovascular: Negative for chest pain and leg swelling.   Gastrointestinal: Negative for abdominal distention and abdominal pain.   Endocrine: Negative for cold intolerance and polyuria.   Genitourinary: Negative for dysuria and hematuria.        Breast tenderness on L   Musculoskeletal: Positive for arthralgias. Negative for myalgias.   Skin: Negative for color change and rash.   Neurological: Negative for weakness and confusion.   Psychiatric/Behavioral: Negative for agitation and depressed mood.       Patient Active Problem List   Diagnosis   • Lymphoma (HCC)   • Leukemia (HCC)   • Chest pain   • GERD (gastroesophageal reflux disease)   • HTN (hypertension)   • Chronic kidney disease, stage 3b (HCC)   • Generalized abdominal pain   • CLL (chronic lymphocytic leukemia) (HCC)   • Gastroesophageal reflux disease   • Hodgkin's disease of lymph nodes of head, face, and neck (HCC)   • Oral thrush   • Medicare annual wellness visit, subsequent   • Primary insomnia   • Adjustment disorder with depressed mood   • Dysuria   • Gross hematuria   • Acute non-recurrent  maxillary sinusitis   • Cough   • BPH (benign prostatic hyperplasia)   • Elevated PSA   • Clinical diagnosis of COVID-19   • Pneumonia due to COVID-19 virus   • Paroxysmal atrial fibrillation (HCC)   • Acute respiratory failure with hypoxia (HCC)   • Critical illness myopathy   • Cytokine release syndrome, grade 4   • Hypoxia   • Stage 3a chronic kidney disease (HCC)   • Right hip pain   • Medication side effect, initial encounter       Allergies   Allergen Reactions   • Ambien [Zolpidem Tartrate] Mental Status Change   • Penicillins Unknown - Low Severity     unknown childhood allergy         Current Outpatient Medications:   •  acetaminophen (TYLENOL) 325 MG tablet, Take 2 tablets by mouth Every 4 (Four) Hours As Needed for Mild Pain ., Disp: , Rfl:   •  amLODIPine (NORVASC) 10 MG tablet, Take 1 tablet by mouth Daily., Disp: 90 tablet, Rfl: 3  •  budesonide (PULMICORT) 0.5 MG/2ML nebulizer solution, , Disp: , Rfl:   •  calcium carbonate (TUMS) 500 MG chewable tablet, Chew 1 tablet Daily., Disp: , Rfl:   •  doxylamine (UNISOM) 25 MG tablet, Take  by mouth., Disp: , Rfl:   •  famotidine (PEPCID) 20 MG tablet, Take 1 tablet by mouth Daily., Disp: 90 tablet, Rfl: 3  •  fluorometholone (FML) 0.1 % ophthalmic suspension, Administer 1 drop to both eyes Daily., Disp: , Rfl:   •  ipratropium-albuterol (DUO-NEB) 0.5-2.5 mg/3 ml nebulizer, , Disp: , Rfl:   •  melatonin 3 MG tablet, Take 1 tablet by mouth At Night As Needed for Sleep., Disp: 30 tablet, Rfl:   •  metoprolol tartrate (LOPRESSOR) 25 MG tablet, TAKE A HALF TABLET BY MOUTH TWICE A DAY, Disp: 90 tablet, Rfl: 3  •  polyethylene glycol (polyethylene glycol) 17 g packet, Take 17 g by mouth Daily As Needed (constipation)., Disp: , Rfl:   •  saccharomyces boulardii (FLORASTOR) 250 MG capsule, Take 1 capsule by mouth 2 (Two) Times a Day., Disp: , Rfl:   •  sodium chloride (SENA 128) 5 % ophthalmic solution, Administer 1 drop into the left eye 3 (three) times a day.,  Disp: , Rfl:   •  tamsulosin (FLOMAX) 0.4 MG capsule 24 hr capsule, Take 1 capsule by mouth Daily., Disp: 90 capsule, Rfl: 3  •  predniSONE (DELTASONE) 20 MG tablet, 3 a day for 3 days then 2 a day for 3 days then 1 a day for 3 days., Disp: 18 tablet, Rfl: 0    Past Medical History:   Diagnosis Date   • Arthritis     both knees   • Cancer (HCC)     dx with lymphoma 2009/ radiation   • Cataracts, bilateral    • History of COVID-19 09/2021   • Hx of cornea transplant     both eyes   • Leukemia (HCC)    • Lymphoma (HCC)     dx in 2009. treated with radiation.   • Lymphoma (HCC)     treated with radiation. dx in 2009       Past Surgical History:   Procedure Laterality Date   • BREAST LUMPECTOMY Right    • COLONOSCOPY N/A 3/8/2016    Procedure: COLONOSCOPY with cold polypectomy X 3;  Surgeon: Chris Harden MD;  Location: [facility] ENDOSCOPY;  Service:    • ENDOSCOPY N/A 10/23/2018    Procedure: ESOPHAGOGASTRODUODENOSCOPY WITH BIOPSY;  Surgeon: Chris Harden MD;  Location: [facility] ENDOSCOPY;  Service: Gastroenterology   • EYE SURGERY      partial cornea transplant 4-5 years ago   • KNEE SURGERY Left    • TOE SURGERY Bilateral    • TONSILLECTOMY         Family History   Problem Relation Age of Onset   • Kidney cancer Mother        Social History     Tobacco Use   • Smoking status: Never Smoker   • Smokeless tobacco: Never Used   Substance Use Topics   • Alcohol use: No            Objective     Vitals:    07/07/22 1446   BP: 140/78   Pulse: 66   Resp: 16   Temp: 98.4 °F (36.9 °C)   SpO2: 95%     Body mass index is 22.4 kg/m².    Physical Exam  Vitals reviewed.   Constitutional:       Appearance: He is well-developed. He is not diaphoretic.   HENT:      Head: Normocephalic and atraumatic.   Eyes:      General: No scleral icterus.     Pupils: Pupils are equal, round, and reactive to light.   Neck:      Thyroid: No thyromegaly.   Cardiovascular:      Rate and Rhythm: Normal rate and regular rhythm.      Heart sounds: No  murmur heard.    No friction rub. No gallop.   Pulmonary:      Effort: Pulmonary effort is normal. No respiratory distress.      Breath sounds: No wheezing or rales.   Chest:      Chest wall: No tenderness.   Abdominal:      General: Bowel sounds are normal. There is no distension.      Palpations: Abdomen is soft.      Tenderness: There is no abdominal tenderness.   Genitourinary:     Comments: L areola with tenderness with slight gynecomastia.  Musculoskeletal:         General: No deformity. Normal range of motion.      Comments: R hip FROM with posterior tenderness.      Lymphadenopathy:      Cervical: No cervical adenopathy.   Skin:     General: Skin is warm and dry.      Findings: No rash.   Neurological:      Cranial Nerves: No cranial nerve deficit.      Motor: No abnormal muscle tone.         Lab Results   Component Value Date    GLUCOSE 74 10/15/2021    BUN 33 (H) 10/15/2021    CREATININE 1.35 (H) 10/15/2021    EGFRIFNONA 52 (L) 10/15/2021    EGFRIFAFRI 49 (L) 06/03/2020    BCR 24.4 10/15/2021    K 4.4 10/15/2021    CO2 26.4 10/15/2021    CALCIUM 8.3 (L) 10/15/2021    PROTENTOTREF 6.4 06/03/2020    ALBUMIN 2.70 (L) 09/20/2021    LABIL2 1.4 03/08/2021    AST 26 09/20/2021    ALT 65 (H) 09/20/2021       WBC   Date Value Ref Range Status   04/04/2022 10.67 4.5 - 11.0 10*3/uL Final     RBC   Date Value Ref Range Status   04/04/2022 4.51 4.5 - 5.9 10*6/uL Final     Hemoglobin   Date Value Ref Range Status   04/04/2022 13.2 (L) 13.5 - 17.5 g/dL Final     Hematocrit   Date Value Ref Range Status   04/04/2022 39.8 (L) 41.0 - 53.0 % Final     MCV   Date Value Ref Range Status   04/04/2022 88.2 80.0 - 100.0 fL Final     MCH   Date Value Ref Range Status   04/04/2022 29.3 26.0 - 34.0 pg Final     MCHC   Date Value Ref Range Status   04/04/2022 33.2 31.0 - 37.0 g/dL Final     RDW   Date Value Ref Range Status   04/04/2022 16.9 (H) 12.0 - 16.8 % Final     RDW-SD   Date Value Ref Range Status   10/15/2021 51.2 37.0 -  54.0 fl Final     MPV   Date Value Ref Range Status   04/04/2022 10.0 8.4 - 12.4 fL Final     Platelets   Date Value Ref Range Status   04/04/2022 268 140 - 440 10*3/uL Final     Neutrophil Rel %   Date Value Ref Range Status   04/04/2022 45.8 45 - 80 % Final     Lymphocyte Rel %   Date Value Ref Range Status   04/04/2022 38.2 15 - 50 % Final     Monocyte Rel %   Date Value Ref Range Status   04/04/2022 11.2 0 - 15 % Final     Eosinophil %   Date Value Ref Range Status   04/04/2022 4.3 0 - 7 % Final     Basophil Rel %   Date Value Ref Range Status   04/04/2022 0.5 0 - 2 % Final     Immature Grans %   Date Value Ref Range Status   10/15/2021 4.7 (H) 0.0 - 0.5 % Final     Neutrophils Absolute   Date Value Ref Range Status   04/04/2022 4.88 2.0 - 8.8 10*3/uL Final     Lymphocytes Absolute   Date Value Ref Range Status   04/04/2022 4.08 0.7 - 5.5 10*3/uL Final     Monocytes Absolute   Date Value Ref Range Status   04/04/2022 1.20 0.0 - 1.7 10*3/uL Final     Eosinophils Absolute   Date Value Ref Range Status   04/04/2022 0.46 0.0 - 0.8 10*3/uL Final     Basophils Absolute   Date Value Ref Range Status   04/04/2022 0.05 0.0 - 0.2 10*3/uL Final     Immature Grans, Absolute   Date Value Ref Range Status   10/15/2021 0.23 (H) 0.00 - 0.05 10*3/mm3 Final     nRBC   Date Value Ref Range Status   10/15/2021 0.0 0.0 - 0.2 /100 WBC Final       Lab Results   Component Value Date    HGBA1C 5.39 09/06/2021       No results found for: [id number]    TSH   Date Value Ref Range Status   09/09/2021 0.272 0.270 - 4.200 uIU/mL Final       No results found for: CHOL  Lab Results   Component Value Date    TRIG 125 03/16/2021     Lab Results   Component Value Date    HDL 52 03/16/2021     Lab Results   Component Value Date    LDL 95 03/16/2021     Lab Results   Component Value Date    VLDL 22 03/16/2021     No results found for: LDLHDL      Procedures   R hip 2 views performed for hip pain.  No comparisons.    Findings show nothing acute.       Assessment & Plan   Problems Addressed this Visit     Medication side effect, initial encounter    Right hip pain - Primary    Relevant Orders    XR Hip With or Without Pelvis 2 - 3 View Right (Completed)      Diagnoses       Codes Comments    Right hip pain    -  Primary ICD-10-CM: M25.551  ICD-9-CM: 719.45     Medication side effect, initial encounter     ICD-10-CM: T50.905A  ICD-9-CM: E947.9         R hip pain.  Xray negative.   Likely greater trochanteric bursitis.  Prednisone B and T.  If no better, to ortho.    Med side effect with finasteride.  Likely causing gynecomastia and breast tenderness.   Stop finasteride.    Orders Placed This Encounter   Procedures   • XR Hip With or Without Pelvis 2 - 3 View Right     Order Specific Question:   Reason for Exam:     Answer:   R hip pain     Order Specific Question:   Does this patient have a diabetic monitoring/medication delivering device on?     Answer:   No     Order Specific Question:   Release to patient     Answer:   Immediate       Current Outpatient Medications   Medication Sig Dispense Refill   • acetaminophen (TYLENOL) 325 MG tablet Take 2 tablets by mouth Every 4 (Four) Hours As Needed for Mild Pain .     • amLODIPine (NORVASC) 10 MG tablet Take 1 tablet by mouth Daily. 90 tablet 3   • budesonide (PULMICORT) 0.5 MG/2ML nebulizer solution      • calcium carbonate (TUMS) 500 MG chewable tablet Chew 1 tablet Daily.     • doxylamine (UNISOM) 25 MG tablet Take  by mouth.     • famotidine (PEPCID) 20 MG tablet Take 1 tablet by mouth Daily. 90 tablet 3   • fluorometholone (FML) 0.1 % ophthalmic suspension Administer 1 drop to both eyes Daily.     • ipratropium-albuterol (DUO-NEB) 0.5-2.5 mg/3 ml nebulizer      • melatonin 3 MG tablet Take 1 tablet by mouth At Night As Needed for Sleep. 30 tablet    • metoprolol tartrate (LOPRESSOR) 25 MG tablet TAKE A HALF TABLET BY MOUTH TWICE A DAY 90 tablet 3   • polyethylene glycol (polyethylene glycol) 17 g packet Take  17 g by mouth Daily As Needed (constipation).     • saccharomyces boulardii (FLORASTOR) 250 MG capsule Take 1 capsule by mouth 2 (Two) Times a Day.     • sodium chloride (SENA 128) 5 % ophthalmic solution Administer 1 drop into the left eye 3 (three) times a day.     • tamsulosin (FLOMAX) 0.4 MG capsule 24 hr capsule Take 1 capsule by mouth Daily. 90 capsule 3   • predniSONE (DELTASONE) 20 MG tablet 3 a day for 3 days then 2 a day for 3 days then 1 a day for 3 days. 18 tablet 0     No current facility-administered medications for this visit.       Angel Cisneros had no medications administered during this visit.    No follow-ups on file.    There are no Patient Instructions on file for this visit.

## 2022-09-01 ENCOUNTER — OFFICE VISIT (OUTPATIENT)
Dept: FAMILY MEDICINE CLINIC | Facility: CLINIC | Age: 72
End: 2022-09-01

## 2022-09-01 VITALS
BODY MASS INDEX: 21.56 KG/M2 | WEIGHT: 145.6 LBS | DIASTOLIC BLOOD PRESSURE: 78 MMHG | HEIGHT: 69 IN | OXYGEN SATURATION: 95 % | HEART RATE: 74 BPM | TEMPERATURE: 97.7 F | SYSTOLIC BLOOD PRESSURE: 138 MMHG

## 2022-09-01 DIAGNOSIS — M70.61 GREATER TROCHANTERIC BURSITIS OF RIGHT HIP: ICD-10-CM

## 2022-09-01 DIAGNOSIS — M17.11 PRIMARY OSTEOARTHRITIS OF RIGHT KNEE: Primary | ICD-10-CM

## 2022-09-01 DIAGNOSIS — N18.31 STAGE 3A CHRONIC KIDNEY DISEASE: ICD-10-CM

## 2022-09-01 PROCEDURE — 99214 OFFICE O/P EST MOD 30 MIN: CPT | Performed by: FAMILY MEDICINE

## 2022-09-01 RX ORDER — PREDNISONE 20 MG/1
TABLET ORAL
Qty: 18 TABLET | Refills: 0 | Status: SHIPPED | OUTPATIENT
Start: 2022-09-01 | End: 2022-12-07

## 2022-09-01 RX ORDER — CELECOXIB 200 MG/1
200 CAPSULE ORAL DAILY
COMMUNITY
Start: 2022-08-18 | End: 2022-09-01

## 2022-09-01 NOTE — PROGRESS NOTES
Chief Complaint   Patient presents with   • Pain     Right knee and hip for about a month        Subjective   Angel Cisneros is a 72 y.o. male.     History of Present Illness   C/o R knee and R hip pain.  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  On celebrex for 2 weeks from Urgent care..  No help.     F/u HTn   No orthostasis.  Cr 2.1 from 8/17, Cr 2.2 from 4/4.     The following portions of the patient's history were reviewed and updated as appropriate: allergies, current medications, past family history, past medical history, past social history, past surgical history and problem list.    Review of Systems   Constitutional: Negative for appetite change and fatigue.   HENT: Negative for nosebleeds and sore throat.    Eyes: Negative for blurred vision and visual disturbance.   Respiratory: Negative for shortness of breath and wheezing.    Cardiovascular: Negative for chest pain and leg swelling.   Gastrointestinal: Negative for abdominal distention and abdominal pain.   Endocrine: Negative for cold intolerance and polyuria.   Genitourinary: Negative for dysuria and hematuria.   Musculoskeletal: Positive for arthralgias. Negative for myalgias.   Skin: Negative for color change and rash.   Neurological: Negative for weakness and confusion.   Psychiatric/Behavioral: Negative for agitation and depressed mood.       Patient Active Problem List   Diagnosis   • Lymphoma (HCC)   • Leukemia (HCC)   • Chest pain   • GERD (gastroesophageal reflux disease)   • HTN (hypertension)   • Chronic kidney disease, stage 3b (HCC)   • Generalized abdominal pain   • CLL (chronic lymphocytic leukemia) (HCC)   • Gastroesophageal reflux disease   • Hodgkin's disease of lymph nodes of head, face, and neck (HCC)   • Oral thrush   • Medicare annual wellness visit, subsequent   • Primary insomnia   • Adjustment disorder with depressed mood   • Dysuria   • Gross hematuria   • Acute non-recurrent maxillary sinusitis   • Cough   • BPH  (benign prostatic hyperplasia)   • Elevated PSA   • Clinical diagnosis of COVID-19   • Pneumonia due to COVID-19 virus   • Paroxysmal atrial fibrillation (HCC)   • Acute respiratory failure with hypoxia (HCC)   • Critical illness myopathy   • Cytokine release syndrome, grade 4   • Hypoxia   • Stage 3a chronic kidney disease (HCC)   • Right hip pain   • Medication side effect, initial encounter   • Primary osteoarthritis of right knee   • Greater trochanteric bursitis of right hip       Allergies   Allergen Reactions   • Ambien [Zolpidem Tartrate] Mental Status Change   • Penicillins Unknown - Low Severity     unknown childhood allergy         Current Outpatient Medications:   •  acetaminophen (TYLENOL) 325 MG tablet, Take 2 tablets by mouth Every 4 (Four) Hours As Needed for Mild Pain ., Disp: , Rfl:   •  amLODIPine (NORVASC) 10 MG tablet, Take 1 tablet by mouth Daily., Disp: 90 tablet, Rfl: 3  •  calcium carbonate (TUMS) 500 MG chewable tablet, Chew 1 tablet Daily., Disp: , Rfl:   •  Diclofenac Sodium (VOLTAREN) 1 % gel gel, Apply 4 g topically to the appropriate area as directed 4 (Four) Times a Day., Disp: 100 g, Rfl: 0  •  doxylamine (UNISOM) 25 MG tablet, Take  by mouth., Disp: , Rfl:   •  famotidine (PEPCID) 20 MG tablet, Take 1 tablet by mouth Daily., Disp: 90 tablet, Rfl: 3  •  fluorometholone (FML) 0.1 % ophthalmic suspension, Administer 1 drop to both eyes Daily., Disp: , Rfl:   •  metoprolol tartrate (LOPRESSOR) 25 MG tablet, TAKE A HALF TABLET BY MOUTH TWICE A DAY, Disp: 90 tablet, Rfl: 3  •  tamsulosin (FLOMAX) 0.4 MG capsule 24 hr capsule, Take 1 capsule by mouth Daily., Disp: 90 capsule, Rfl: 3    Past Medical History:   Diagnosis Date   • Arthritis     both knees   • Cancer (HCC)     dx with lymphoma 2009/ radiation   • Cataracts, bilateral    • History of COVID-19 09/2021   • Hx of cornea transplant     both eyes   • Leukemia (HCC)    • Lymphoma (HCC)     dx in 2009. treated with radiation.   •  Lymphoma (HCC)     treated with radiation. dx in 2009       Past Surgical History:   Procedure Laterality Date   • BREAST LUMPECTOMY Right    • COLONOSCOPY N/A 3/8/2016    Procedure: COLONOSCOPY with cold polypectomy X 3;  Surgeon: Chris Harden MD;  Location: [facility] ENDOSCOPY;  Service:    • ENDOSCOPY N/A 10/23/2018    Procedure: ESOPHAGOGASTRODUODENOSCOPY WITH BIOPSY;  Surgeon: Chris Harden MD;  Location: [facility] ENDOSCOPY;  Service: Gastroenterology   • EYE SURGERY      partial cornea transplant 4-5 years ago   • KNEE SURGERY Left    • TOE SURGERY Bilateral    • TONSILLECTOMY         Family History   Problem Relation Age of Onset   • Kidney cancer Mother        Social History     Tobacco Use   • Smoking status: Never Smoker   • Smokeless tobacco: Never Used   Substance Use Topics   • Alcohol use: No            Objective     Vitals:    09/01/22 1014   BP: 138/78   Pulse: 74   Temp: 97.7 °F (36.5 °C)   SpO2: 95%     Body mass index is 21.81 kg/m².    Physical Exam  Vitals reviewed.   Constitutional:       Appearance: He is well-developed. He is not diaphoretic.   HENT:      Head: Normocephalic and atraumatic.   Eyes:      General: No scleral icterus.     Pupils: Pupils are equal, round, and reactive to light.   Neck:      Thyroid: No thyromegaly.   Cardiovascular:      Rate and Rhythm: Normal rate and regular rhythm.      Heart sounds: No murmur heard.    No friction rub. No gallop.   Pulmonary:      Effort: Pulmonary effort is normal. No respiratory distress.      Breath sounds: No wheezing or rales.   Chest:      Chest wall: No tenderness.   Abdominal:      General: Bowel sounds are normal. There is no distension.      Palpations: Abdomen is soft.      Tenderness: There is no abdominal tenderness.   Musculoskeletal:         General: No deformity. Normal range of motion.      Comments: R greater trochanteric bursa with tenderness.  Neg R leg log roll.  Crepitus noted with flexion of R knee.     Lymphadenopathy:      Cervical: No cervical adenopathy.   Skin:     General: Skin is warm and dry.      Findings: No rash.   Neurological:      Cranial Nerves: No cranial nerve deficit.      Motor: No abnormal muscle tone.         Lab Results   Component Value Date    GLUCOSE 74 10/15/2021    BUN 33 (H) 10/15/2021    CREATININE 1.35 (H) 10/15/2021    EGFRIFNONA 52 (L) 10/15/2021    EGFRIFAFRI 49 (L) 06/03/2020    BCR 24.4 10/15/2021    K 4.4 10/15/2021    CO2 26.4 10/15/2021    CALCIUM 8.3 (L) 10/15/2021    PROTENTOTREF 6.4 06/03/2020    ALBUMIN 2.70 (L) 09/20/2021    LABIL2 1.4 03/08/2021    AST 26 09/20/2021    ALT 65 (H) 09/20/2021       WBC   Date Value Ref Range Status   04/04/2022 10.67 4.5 - 11.0 10*3/uL Final     RBC   Date Value Ref Range Status   04/04/2022 4.51 4.5 - 5.9 10*6/uL Final     Hemoglobin   Date Value Ref Range Status   04/04/2022 13.2 (L) 13.5 - 17.5 g/dL Final     Hematocrit   Date Value Ref Range Status   04/04/2022 39.8 (L) 41.0 - 53.0 % Final     MCV   Date Value Ref Range Status   04/04/2022 88.2 80.0 - 100.0 fL Final     MCH   Date Value Ref Range Status   04/04/2022 29.3 26.0 - 34.0 pg Final     MCHC   Date Value Ref Range Status   04/04/2022 33.2 31.0 - 37.0 g/dL Final     RDW   Date Value Ref Range Status   04/04/2022 16.9 (H) 12.0 - 16.8 % Final     RDW-SD   Date Value Ref Range Status   10/15/2021 51.2 37.0 - 54.0 fl Final     MPV   Date Value Ref Range Status   04/04/2022 10.0 8.4 - 12.4 fL Final     Platelets   Date Value Ref Range Status   04/04/2022 268 140 - 440 10*3/uL Final     Neutrophil Rel %   Date Value Ref Range Status   04/04/2022 45.8 45 - 80 % Final     Lymphocyte Rel %   Date Value Ref Range Status   04/04/2022 38.2 15 - 50 % Final     Monocyte Rel %   Date Value Ref Range Status   04/04/2022 11.2 0 - 15 % Final     Eosinophil %   Date Value Ref Range Status   04/04/2022 4.3 0 - 7 % Final     Basophil Rel %   Date Value Ref Range Status   04/04/2022 0.5 0 - 2 %  Final     Immature Grans %   Date Value Ref Range Status   10/15/2021 4.7 (H) 0.0 - 0.5 % Final     Neutrophils Absolute   Date Value Ref Range Status   04/04/2022 4.88 2.0 - 8.8 10*3/uL Final     Lymphocytes Absolute   Date Value Ref Range Status   04/04/2022 4.08 0.7 - 5.5 10*3/uL Final     Monocytes Absolute   Date Value Ref Range Status   04/04/2022 1.20 0.0 - 1.7 10*3/uL Final     Eosinophils Absolute   Date Value Ref Range Status   04/04/2022 0.46 0.0 - 0.8 10*3/uL Final     Basophils Absolute   Date Value Ref Range Status   04/04/2022 0.05 0.0 - 0.2 10*3/uL Final     Immature Grans, Absolute   Date Value Ref Range Status   10/15/2021 0.23 (H) 0.00 - 0.05 10*3/mm3 Final     nRBC   Date Value Ref Range Status   10/15/2021 0.0 0.0 - 0.2 /100 WBC Final       Lab Results   Component Value Date    HGBA1C 5.39 09/06/2021       No results found for: [id number]    TSH   Date Value Ref Range Status   09/09/2021 0.272 0.270 - 4.200 uIU/mL Final       No results found for: CHOL  Lab Results   Component Value Date    TRIG 125 03/16/2021     Lab Results   Component Value Date    HDL 52 03/16/2021     Lab Results   Component Value Date    LDL 95 03/16/2021     Lab Results   Component Value Date    VLDL 22 03/16/2021     No results found for: LDLHDL      Procedures    Assessment & Plan   Problems Addressed this Visit     Greater trochanteric bursitis of right hip    Primary osteoarthritis of right knee - Primary    Stage 3a chronic kidney disease (HCC)      Diagnoses       Codes Comments    Primary osteoarthritis of right knee    -  Primary ICD-10-CM: M17.11  ICD-9-CM: 715.16     Stage 3a chronic kidney disease (HCC)     ICD-10-CM: N18.31  ICD-9-CM: 585.3     Greater trochanteric bursitis of right hip     ICD-10-CM: M70.61  ICD-9-CM: 726.5       R knee oa(chronic) with greater troch bursitis.  New problems.  Stop celebrex due to CKD.  Start prednisone.  To ortho.      No orders of the defined types were placed in this  encounter.      Current Outpatient Medications   Medication Sig Dispense Refill   • acetaminophen (TYLENOL) 325 MG tablet Take 2 tablets by mouth Every 4 (Four) Hours As Needed for Mild Pain .     • amLODIPine (NORVASC) 10 MG tablet Take 1 tablet by mouth Daily. 90 tablet 3   • calcium carbonate (TUMS) 500 MG chewable tablet Chew 1 tablet Daily.     • Diclofenac Sodium (VOLTAREN) 1 % gel gel Apply 4 g topically to the appropriate area as directed 4 (Four) Times a Day. 100 g 0   • doxylamine (UNISOM) 25 MG tablet Take  by mouth.     • famotidine (PEPCID) 20 MG tablet Take 1 tablet by mouth Daily. 90 tablet 3   • fluorometholone (FML) 0.1 % ophthalmic suspension Administer 1 drop to both eyes Daily.     • metoprolol tartrate (LOPRESSOR) 25 MG tablet TAKE A HALF TABLET BY MOUTH TWICE A DAY 90 tablet 3   • tamsulosin (FLOMAX) 0.4 MG capsule 24 hr capsule Take 1 capsule by mouth Daily. 90 capsule 3     No current facility-administered medications for this visit.       Angel Cisneros had no medications administered during this visit.    No follow-ups on file.    There are no Patient Instructions on file for this visit.

## 2022-09-15 ENCOUNTER — OFFICE VISIT (OUTPATIENT)
Dept: ORTHOPEDIC SURGERY | Facility: CLINIC | Age: 72
End: 2022-09-15

## 2022-09-15 VITALS — TEMPERATURE: 96.3 F | HEIGHT: 69 IN | WEIGHT: 144 LBS | BODY MASS INDEX: 21.33 KG/M2

## 2022-09-15 DIAGNOSIS — M17.11 PRIMARY OSTEOARTHRITIS OF RIGHT KNEE: ICD-10-CM

## 2022-09-15 DIAGNOSIS — M70.61 GREATER TROCHANTERIC BURSITIS OF RIGHT HIP: Primary | ICD-10-CM

## 2022-09-15 DIAGNOSIS — M25.561 RIGHT KNEE PAIN, UNSPECIFIED CHRONICITY: ICD-10-CM

## 2022-09-15 PROCEDURE — 73501 X-RAY EXAM HIP UNI 1 VIEW: CPT | Performed by: ORTHOPAEDIC SURGERY

## 2022-09-15 PROCEDURE — 20610 DRAIN/INJ JOINT/BURSA W/O US: CPT | Performed by: ORTHOPAEDIC SURGERY

## 2022-09-15 PROCEDURE — 99204 OFFICE O/P NEW MOD 45 MIN: CPT | Performed by: ORTHOPAEDIC SURGERY

## 2022-09-15 PROCEDURE — 72170 X-RAY EXAM OF PELVIS: CPT | Performed by: ORTHOPAEDIC SURGERY

## 2022-09-15 PROCEDURE — 73564 X-RAY EXAM KNEE 4 OR MORE: CPT | Performed by: ORTHOPAEDIC SURGERY

## 2022-09-15 RX ADMIN — METHYLPREDNISOLONE ACETATE 80 MG: 80 INJECTION, SUSPENSION INTRA-ARTICULAR; INTRALESIONAL; INTRAMUSCULAR; SOFT TISSUE at 11:32

## 2022-09-15 NOTE — PROGRESS NOTES
Large Joint Arthrocentesis: R greater trochanteric bursa  Date/Time: 9/15/2022 11:32 AM  Consent given by: patient  Site marked: site marked  Timeout: Immediately prior to procedure a time out was called to verify the correct patient, procedure, equipment, support staff and site/side marked as required   Supporting Documentation  Indications: pain and joint swelling   Procedure Details  Location: hip - R greater trochanteric bursa  Preparation: Patient was prepped and draped in the usual sterile fashion  Needle size: 22 G  Approach: anterolateral  Medications administered: 80 mg methylPREDNISolone acetate 80 MG/ML; 2 mL lidocaine (cardiac)  Patient tolerance: patient tolerated the procedure well with no immediate complications          General Exam    Patient: Angel Cisneros    YOB: 1950    Medical Record Number: [id number]    Chief Complaints: Right hip and knee pain    History of Present Illness:     72 y.o. male patient who presents for evaluation treatment of right hip and knee pain.  Patient states he has been having issues for about a month or so.  Symptoms started after he was moving and was doing some concrete work.  Reports that he was having some lateral hip pain denies any groin pain or weightbearing pain.  Is also having some generalized knee pain but more often medially based.  Patient can not take any oral anti-inflammatories due to kidney issues.    Denies any numbness or tingling.  Denies any fevers, cough or shortness of breath.    Allergies:   Allergies   Allergen Reactions   • Ambien [Zolpidem Tartrate] Mental Status Change   • Penicillins Unknown - Low Severity     unknown childhood allergy       Home Medications:      Current Outpatient Medications:   •  acetaminophen (TYLENOL) 325 MG tablet, Take 2 tablets by mouth Every 4 (Four) Hours As Needed for Mild Pain ., Disp: , Rfl:   •  amLODIPine (NORVASC) 10 MG tablet, Take 1 tablet by mouth Daily., Disp: 90 tablet, Rfl: 3  •   calcium carbonate (TUMS) 500 MG chewable tablet, Chew 1 tablet Daily., Disp: , Rfl:   •  Diclofenac Sodium (VOLTAREN) 1 % gel gel, Apply 4 g topically to the appropriate area as directed 4 (Four) Times a Day., Disp: 100 g, Rfl: 0  •  doxylamine (UNISOM) 25 MG tablet, Take  by mouth., Disp: , Rfl:   •  famotidine (PEPCID) 20 MG tablet, Take 1 tablet by mouth Daily., Disp: 90 tablet, Rfl: 3  •  fluorometholone (FML) 0.1 % ophthalmic suspension, Administer 1 drop to both eyes Daily., Disp: , Rfl:   •  metoprolol tartrate (LOPRESSOR) 25 MG tablet, 1/2 tablet twice a day, Disp: 90 tablet, Rfl: 3  •  tamsulosin (FLOMAX) 0.4 MG capsule 24 hr capsule, Take 1 capsule by mouth Daily., Disp: 90 capsule, Rfl: 3  •  predniSONE (DELTASONE) 20 MG tablet, 3 a day for 3 days then 2 a day for 3 days then 1 a day for 3 days., Disp: 18 tablet, Rfl: 0    Past Medical History:   Diagnosis Date   • Arthritis     both knees   • Cancer (HCC)     dx with lymphoma 2009/ radiation   • Cataracts, bilateral    • History of COVID-19 09/2021   • Hx of cornea transplant     both eyes   • Leukemia (HCC)    • Lymphoma (HCC)     dx in 2009. treated with radiation.   • Lymphoma (HCC)     treated with radiation. dx in 2009       Past Surgical History:   Procedure Laterality Date   • BREAST LUMPECTOMY Right    • COLONOSCOPY N/A 3/8/2016    Procedure: COLONOSCOPY with cold polypectomy X 3;  Surgeon: Chris Harden MD;  Location: [facility] ENDOSCOPY;  Service:    • ENDOSCOPY N/A 10/23/2018    Procedure: ESOPHAGOGASTRODUODENOSCOPY WITH BIOPSY;  Surgeon: Chris Harden MD;  Location: [facility] ENDOSCOPY;  Service: Gastroenterology   • EYE SURGERY      partial cornea transplant 4-5 years ago   • KNEE SURGERY Left    • TOE SURGERY Bilateral    • TONSILLECTOMY         Social History     Occupational History   • Not on file   Tobacco Use   • Smoking status: Never Smoker   • Smokeless tobacco: Never Used   Vaping Use   • Vaping Use: Never used   Substance and  "Sexual Activity   • Alcohol use: No   • Drug use: No   • Sexual activity: Defer      Social History     Social History Narrative   • Not on file       Family History   Problem Relation Age of Onset   • Kidney cancer Mother        Review of Systems:      Constitutional: Denies fever, shaking or chills         All other pertinent positives and negatives as noted above in HPI.    Physical Exam: 72 y.o. male    Vitals:    09/15/22 1032   Temp: 96.3 °F (35.7 °C)   Weight: 65.3 kg (144 lb)   Height: 174 cm (68.5\")       General:  Patient is awake and alert.  Appears in no acute distress or discomfort.        Musculoskeletal/Extremities:    Right lower extremity examined: Positive palpation of the greater trochanter.  Overall no pain with hip range of motion.  Negative straight leg raise and Stinchfield.  Minimal knee swelling noted.  Knee stable varus valgus stress.  Negative anterior drawer posterior drawer Lachman.  No overt tenderness along the joint line.         Radiology:       AP and lateral films of the right hip as well as AP, lateral, PA notch and sunrise of the right knee were taken reviewed to evaluate the patient's complaint/s.    Right hip films do show moderate degenerative changes including osteophyte formation predominantly noted along the superior aspect of the femoral head neck junction.  This seems to be consistent with previous imaging.  No overt fractures noted.  Evidence of bone sclerosis.    Right knee does show some mild degenerative changes mainly in the medial compartment with some early bone sclerosis.  No acute fractures noted.      Assessment: Right hip greater trochanteric bursitis, osteoarthritis, and right knee osteoarthritis      Plan:      Discussed the findings with the patient I feel that he does have several things going on but his main concern is his lateral hip pain.  Did have slightly altered x-ray but I do think the osteophytes is what we are seeing and that seem to be with the " radiologist thought on her previous film.  Patient does not have any overt weightbearing pain or groin pain in the hip.  He does have some underlying arthritis in the hip that could potentially be a driving factor here as well as causing some knee discomfort.  Recommend conservative treatment at this time he did not want a knee injection but is okay with a right hip injection.  Also stated we would order some formal therapy for him.  He can try some over-the-counter anti-inflammatory gel.  Also recommend rest, ice, activity modification and allow therapy to build him back this may take 6 to 8 weeks to resolve and can return.  In regards to his knee can was consider injection going forward if needed.           We will plan for follow up as needed.    All questions were answered.  Patient understands and agrees with the plan.    Vinayak Garcia MD    09/15/2022    CC to Ayaan Amin MD

## 2022-09-19 RX ORDER — METHYLPREDNISOLONE ACETATE 80 MG/ML
80 INJECTION, SUSPENSION INTRA-ARTICULAR; INTRALESIONAL; INTRAMUSCULAR; SOFT TISSUE
Status: COMPLETED | OUTPATIENT
Start: 2022-09-15 | End: 2022-09-15

## 2022-09-28 DIAGNOSIS — I10 ESSENTIAL HYPERTENSION: ICD-10-CM

## 2022-09-28 RX ORDER — AMLODIPINE BESYLATE 10 MG/1
10 TABLET ORAL DAILY
Qty: 90 TABLET | Refills: 3 | Status: SHIPPED | OUTPATIENT
Start: 2022-09-28 | End: 2022-12-07 | Stop reason: SDUPTHER

## 2022-09-28 NOTE — TELEPHONE ENCOUNTER
Rx Refill Note  Requested Prescriptions     Pending Prescriptions Disp Refills   • amLODIPine (NORVASC) 10 MG tablet 90 tablet 3     Sig: Take 1 tablet by mouth Daily.      Last office visit with prescribing clinician: 9/1/2022      Next office visit with prescribing clinician: 12/7/2022  Last refill 04/18/2022

## 2022-09-28 NOTE — TELEPHONE ENCOUNTER
Caller: Angel Cisneros    Relationship: Self    Best call back number: [phone]     Requested Prescriptions:   Requested Prescriptions     Pending Prescriptions Disp Refills   • amLODIPine (NORVASC) 10 MG tablet 90 tablet 3     Sig: Take 1 tablet by mouth Daily.        Pharmacy where request should be sent: [facility]/PHARMACY #[id number] [place], [place] - [address] AT [facility] [phone] [facility] [phone] [facility]     Additional details provided by patient: PATIENT STATED THAT HIS BLOOD PRESSURE IS HIGH - 176/90 - AND HE WOULD LIKE TO START TAKING THIS MEDICATION AGAIN.    Does the patient have less than a 3 day supply:  [x] Yes  [] No    Lee Cruz Rep   09/28/22 16:24 EDT

## 2022-10-26 ENCOUNTER — TREATMENT (OUTPATIENT)
Dept: PHYSICAL THERAPY | Facility: CLINIC | Age: 72
End: 2022-10-26

## 2022-10-26 DIAGNOSIS — M70.61 GREATER TROCHANTERIC BURSITIS OF RIGHT HIP: Primary | ICD-10-CM

## 2022-10-26 DIAGNOSIS — M17.11 PRIMARY OSTEOARTHRITIS OF RIGHT KNEE: ICD-10-CM

## 2022-10-26 PROCEDURE — 97162 PT EVAL MOD COMPLEX 30 MIN: CPT | Performed by: PHYSICAL THERAPIST

## 2022-10-26 PROCEDURE — 97110 THERAPEUTIC EXERCISES: CPT | Performed by: PHYSICAL THERAPIST

## 2022-10-26 PROCEDURE — 97530 THERAPEUTIC ACTIVITIES: CPT | Performed by: PHYSICAL THERAPIST

## 2022-10-26 NOTE — PROGRESS NOTES
Physical Therapy Initial Evaluation and Plan of Care    Patient: Angel Cisneros   : 1950  Diagnosis/ICD-10 Code:  Greater trochanteric bursitis of right hip [M70.61]  Referring practitioner: Vinayak Garcia MD  Date of Initial Visit: 10/26/2022  Today's Date: 10/26/2022  Patient seen for 1 sessions           Subjective Questionnaire: WOMAC: 34%; LEFS: 53/80    Subjective Evaluation    History of Present Illness  Mechanism of injury: History of current condition: Presents today with c/o right hip and knee pain, but mostly it's the knee that bothers him, pain is on the inside part of his knee. Says he got injection in the hip and knee but it has worn off now. 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 Pain isn't getting any better. Pain is there most of the time, gets worse with activity. Reports a little unsteadiness, no falls though.     Makes symptoms worse: walking, standing, moving knee and hip    Makes symptoms better: hot shower, sitting decreases the pain.    Reported functional limitation: limited walking distance, less active overall, walks with a limp. Hurts some at night. Difficulty on stairs        Patient Occupation: retired Quality of life: good    Pain  Current pain ratin  At best pain ratin  At worst pain ratin    Patient Goals  Patient goals for therapy: decreased pain, improved balance, increased motion, increased strength, independence with ADLs/IADLs and return to sport/leisure activities           Precautions: hx cancer (lymphoma) 2009, bilateral knee arthritis,     Objective          Observations     Additional Knee Observation Details  GAIT: antalgic RLE, decreased stance time RLE, step length, and hip extension    Palpation     Right Tenderness of the gluteus medius and TFL.     Tenderness     Right Hip   Tenderness in the greater trochanter.     Right Knee   Tenderness in the patellar tendon. No tenderness in the lateral joint line  and medial joint line.     Active Range of Motion     Right Knee   Flexion: 125 degrees with pain  Extension: 0 degrees     Passive Range of Motion     Right Hip   Flexion: 125 degrees with pain  Extension: 0 degrees   Abduction: 12 degrees   External rotation (90/90): 30 degrees   Internal rotation (90/90): 5 degrees     Strength/Myotome Testing     Right Hip   Planes of Motion   Flexion: 4-  Abduction: 3+    Right Knee   Flexion: 4-  Extension: 4-  Quadriceps contraction: fair    Tests     Right Hip   Positive RADHA and scour.     Right Knee   Positive bounce home, medial Prem, patellar compression and valgus stress test at 0 degrees.     Additional Tests Details  RADHA positive for limited ROM, but no pain in lateral hip or groin  Valgus stress test positive for pain, but no instability    Right Knee Flexibility Comments:   Mild hamstring tightness          Assessment & Plan     Assessment  Impairments: abnormal gait, abnormal or restricted ROM, activity intolerance, impaired balance, impaired physical strength, lacks appropriate home exercise program and pain with function  Functional Limitations: walking, uncomfortable because of pain and standing  Assessment details: The patient is a 72 y.o. male who presents to physical therapy today for right hip and right knee pain. Upon initial evaluation, the patient demonstrates the following impairments: limited hip and knee ROM, hip and knee weakness, impaired gait mechanics. Examination findings show significantly limited hip mobility likely contributes to hip and knee pain, also quad weakness is a significant factor. Due to these impairments, the patient is unable to perform or has difficulty with the following functional tasks: limited walking distance, difficulty with stairs, impaired balance. The patient would benefit from skilled PT services to address functional limitations and impairments and to improve patient quality of life.    Prognosis:  good    Goals  Plan Goals: STG:  Pt will be independent and compliant with initial HEP in 4 weeks.  Pt will report a 25% improvement in symptoms since starting therapy in 4 weeks.  Pt will demonstrate an increase in hip abduction PROM to 20 degrees within 4 weeks.   Pt will report pain level at worst <4 during walking activity in 4 weeks.    LTG: by DC (12 weeks)  Pt will be independent with final HEP for self-management of condition by DC.  Pt will improve score on LEFS to greater than 65/80 by DC.   Pt will report a 75% improvement in symptoms by DC in order to allow return to PLOF.  Pt will improve hip flexion PROM to 130 degrees order to be able to perform squat with good mechanics and no pain by DC.  Pt will improve knee extension strength to at least 5/5 in order to be able to negotiate stairs with normal mechanics by DC.  Pt will improve hip extension ROM to 10 deg in order to be able to walk with normal mechanics by DC.       Plan  Therapy options: will be seen for skilled therapy services  Planned modality interventions: cryotherapy, electrical stimulation/Russian stimulation, thermotherapy (hydrocollator packs), ultrasound and dry needling  Planned therapy interventions: abdominal trunk stabilization, ADL retraining, balance/weight-bearing training, body mechanics training, flexibility, functional ROM exercises, gait training, home exercise program, joint mobilization, manual therapy, motor coordination training, neuromuscular re-education, orthotic fitting/training, postural training, soft tissue mobilization, spinal/joint mobilization, strengthening, stretching and therapeutic activities  Frequency: 2x week  Duration in weeks: 12  Treatment plan discussed with: patient        See flowsheets for treatment detail.    History # of Personal Factors and/or Comorbidities: MODERATE (1-2)  Examination of Body System(s): # of elements: MODERATE (3)  Clinical Presentation: EVOLVING  Clinical Decision Making:  MODERATE      Timed:         Manual Therapy:         mins  83942;     Therapeutic Exercise:    15     mins  80135;     Neuromuscular Tom:        mins  85160;    Therapeutic Activity:     10     mins  38879;     Gait Training:           mins  74473;     Ultrasound:          mins  00379;    Ionto                                   mins   71870  Self Care                            mins   31781      Un-Timed:  Electrical Stimulation:         mins  00147 ( );  Dry Needling          mins self-pay  Traction          mins 17602  Low Eval          Mins  88641  Mod Eval      30    Mins  52679  High Eval                            Mins  44033  Re-Eval                               mins  41580      Timed Treatment:   25   mins   Total Treatment:     55   mins    PT SIGNATURE: Angela Wilson PT   [place] PT license #: [id number]  [place] PT license #: [id number]  DATE TREATMENT INITIATED: 10/26/2022    Initial Certification  Certification Period: 1/23/2023  I certify that the therapy services are furnished while this patient is under my care.  The services outlined above are required by this patient, and will be reviewed every 90 days.    PHYSICIAN: Vinayak Gracia MD  NPI: [id number]                                      DATE:     Please sign and return via fax to [phone]. Thank you, [facility].

## 2022-10-28 ENCOUNTER — TREATMENT (OUTPATIENT)
Dept: PHYSICAL THERAPY | Facility: CLINIC | Age: 72
End: 2022-10-28

## 2022-10-28 DIAGNOSIS — M70.61 GREATER TROCHANTERIC BURSITIS OF RIGHT HIP: Primary | ICD-10-CM

## 2022-10-28 DIAGNOSIS — M17.11 PRIMARY OSTEOARTHRITIS OF RIGHT KNEE: ICD-10-CM

## 2022-10-28 PROCEDURE — 97110 THERAPEUTIC EXERCISES: CPT | Performed by: PHYSICAL THERAPIST

## 2022-10-28 NOTE — PROGRESS NOTES
Physical Therapy Daily Treatment Note  Visit # 2        Patient: Angel Cisneros   : 1950  Referring practitioner: Vinayak Garcia MD  Date of Initial Evaluation:  Type: THERAPY  Noted: 10/26/2022  Today's Date: 10/28/2022           ICD-10-CM ICD-9-CM   1. Greater trochanteric bursitis of right hip  M70.61 726.5   2. Primary osteoarthritis of right knee  M17.11 715.16       Subjective  nAgel Cisneros reports:   No significant changes from status at PT eval through today.    Objective   See Exercise, Manual, and Modality Logs for complete treatment.   Reviewed current HEP, progressed therex program with exercises as noted.    Concluded session with CP to (R) hip and (R) knee x10min.     Assessment/Plan  Tolerated continued progression of therapeutic exercise/therapeutic activity well today, no increased pain reported during or after exercises.  Would continue to benefit from skilled PT progressing with functional WB and strength.      Progress per Plan of Care and Progress strengthening /stabilization /functional activity        Timed:         Manual Therapy:         mins  40899     Therapeutic Exercise:     32    mins  03495     Neuromuscular Tom:        mins  95400    Therapeutic Activity:          mins  12851     Gait Training:           mins  19816     Ultrasound:          mins  96200    Ionto                                   mins  57450  Self Care                            mins  78128    Un-Timed:  Electrical Stimulation:         mins 02589 ( )  Traction          mins 86645    Timed Treatment:   32   mins   Total Treatment:     32   mins    MIGUE Mondragon License #[id number]  Physical Therapist Assistant

## 2022-10-31 DIAGNOSIS — M70.61 GREATER TROCHANTERIC BURSITIS OF RIGHT HIP: Primary | ICD-10-CM

## 2022-11-01 ENCOUNTER — TREATMENT (OUTPATIENT)
Dept: PHYSICAL THERAPY | Facility: CLINIC | Age: 72
End: 2022-11-01

## 2022-11-01 DIAGNOSIS — M70.61 GREATER TROCHANTERIC BURSITIS OF RIGHT HIP: Primary | ICD-10-CM

## 2022-11-01 DIAGNOSIS — M17.11 PRIMARY OSTEOARTHRITIS OF RIGHT KNEE: ICD-10-CM

## 2022-11-01 PROCEDURE — 97110 THERAPEUTIC EXERCISES: CPT | Performed by: PHYSICAL THERAPIST

## 2022-11-01 NOTE — PROGRESS NOTES
Physical Therapy Daily Treatment Note  Visit # 3        Patient: Angel Cisneros   : 1950  Referring practitioner: Vinayak Garcia MD  Date of Initial Evaluation:  Type: THERAPY  Noted: 10/26/2022  Today's Date: 2022           ICD-10-CM ICD-9-CM   1. Greater trochanteric bursitis of right hip  M70.61 726.5   2. Primary osteoarthritis of right knee  M17.11 715.16       Subjective  Angel Cisneros reports:   My knee is hurting more than my hip today.  I have some sore spots in my hip but the knee pain is worse right now.      Objective   See Exercise, Manual, and Modality Logs for complete treatment.   Reviewed current HEP, progressed therex program with exercises as noted.      Assessment/Plan   Difficulty with AROM SLR, unable to perform independently with any control.  Able to continue progression of therapeutic exercise/therapeutic activity well today otherwise.    Would continue to benefit from skilled PT progressing with functional WB and strength of hip and knee.      Progress per Plan of Care and Progress strengthening /stabilization /functional activity, consider NMES to (R) quads.        Timed:         Manual Therapy:         mins  15314     Therapeutic Exercise:     40    mins  24944     Neuromuscular Tom:        mins  29105    Therapeutic Activity:          mins  04376     Gait Training:           mins  43059     Ultrasound:          mins  38848    Ionto                                   mins  84014  Self Care                            mins  59101    Un-Timed:  Electrical Stimulation:         mins 94963 ( )  Traction          mins 67118    Timed Treatment:   40   mins   Total Treatment:     40   mins    MIGUE Mondragon License #[id number]  Physical Therapist Assistant

## 2022-11-03 ENCOUNTER — TREATMENT (OUTPATIENT)
Dept: PHYSICAL THERAPY | Facility: CLINIC | Age: 72
End: 2022-11-03

## 2022-11-03 DIAGNOSIS — M70.61 GREATER TROCHANTERIC BURSITIS OF RIGHT HIP: ICD-10-CM

## 2022-11-03 DIAGNOSIS — M17.11 PRIMARY OSTEOARTHRITIS OF RIGHT KNEE: Primary | ICD-10-CM

## 2022-11-03 PROCEDURE — 97110 THERAPEUTIC EXERCISES: CPT | Performed by: PHYSICAL THERAPIST

## 2022-11-03 PROCEDURE — 97116 GAIT TRAINING THERAPY: CPT | Performed by: PHYSICAL THERAPIST

## 2022-11-03 NOTE — PROGRESS NOTES
Physical Therapy Daily Treatment Note    Patient: Angel Cisneros  : 1950  Referring practitioner: Vinayak Garcia MD  Today's Date: 11/3/2022    VISIT#: 4      Subjective   Angel Cisneros reports: Doing ok, woke up pretty sore today, have been using cane more.      Objective     See Exercise, Manual, and Modality Logs for complete treatment.     Patient Education:  Continue HEP    Assessment/Plan  Adjusted gait, switched cane to LUE (came in using it on RUE) and demonstrated much improved mechanics. Still very limited hip abduction and ER ROM.     Progress per Plan of Care and Progress strengthening /stabilization /functional activity            Timed:         Manual Therapy:   5      mins  90128;     Therapeutic Exercise:    27     mins  29004;     Neuromuscular Tom:        mins  71796;    Therapeutic Activity:          mins  45299;     Gait Trainin     mins  38568;     Ultrasound:          mins  16371;    Ionto:                                   mins   45080  Self Care:                            mins   88186    Un-Timed:  Electrical Stimulation:         mins  54682 ( );  Dry Needling          mins self-pay  Traction          mins 61307  Re-Eval                               mins  79083    Timed Treatment:   40   mins   Total Treatment:     40   mins    Angela Wilson PT  Physical Therapist  [place] PT license #: [id number]  [place] PT license #: [id number]

## 2022-11-07 NOTE — TELEPHONE ENCOUNTER
Caller: Angel Cisneros    Relationship: Self    Best call back number: [phone]    What medication are you requesting: MELOXICAM     What are your current symptoms: ARTHRITIS    How long have you been experiencing symptoms:     Have you had these symptoms before:    [] Yes  [] No    Have you been treated for these symptoms before:   [] Yes  [] No    If a prescription is needed, what is your preferred pharmacy and phone number: [id number] [place] - [address] AT [facility] [phone] [facility] [phone] FX     Additional notes:

## 2022-11-11 ENCOUNTER — TREATMENT (OUTPATIENT)
Dept: PHYSICAL THERAPY | Facility: CLINIC | Age: 72
End: 2022-11-11

## 2022-11-11 DIAGNOSIS — M70.61 GREATER TROCHANTERIC BURSITIS OF RIGHT HIP: Primary | ICD-10-CM

## 2022-11-11 DIAGNOSIS — M17.11 PRIMARY OSTEOARTHRITIS OF RIGHT KNEE: ICD-10-CM

## 2022-11-11 PROCEDURE — 97110 THERAPEUTIC EXERCISES: CPT | Performed by: PHYSICAL THERAPIST

## 2022-11-11 NOTE — PROGRESS NOTES
Physical Therapy Daily Treatment Note    Patient: Angel Cisneros  : 1950  Referring practitioner: Vinayak Garcia MD  Today's Date: 2022    VISIT#: 5    Subjective   Angel Cisneros reports:  Has been trying to use cane as instructed last visit. (R) knee bothering him much more than hip, rates knee pain 7/10 today.     Objective   See Exercise, Manual, and Modality Logs for complete treatment.     Assessment/Plan  Reviewed and practiced gait with cane in LUE, pt demos proper mechanics.  Continues to be unable to perform quality SLR.      Progress per Plan of Care and Progress strengthening /stabilization /functional activity, we discussed potentially trying NMES at next visit, pt to bring accessible clothing.             Timed:         Manual Therapy:         mins  85776;     Therapeutic Exercise:   40     mins  28827;     Neuromuscular Tom:        mins  80659;    Therapeutic Activity:          mins  87841;     Gait Training:           mins  89145;     Ultrasound:          mins  15318;    Ionto:                                   mins   37503  Self Care:                            mins   14001    Un-Timed:  Electrical Stimulation:         mins  71205 ( );  Dry Needling          mins self-pay  Traction          mins 89389  Re-Eval                               mins  56981    Timed Treatment:   40   mins   Total Treatment:     40   mins    Devon Hays PTA  [place] License #[id number]  Physical Therapist Assistant

## 2022-11-15 ENCOUNTER — TREATMENT (OUTPATIENT)
Dept: PHYSICAL THERAPY | Facility: CLINIC | Age: 72
End: 2022-11-15

## 2022-11-15 DIAGNOSIS — M17.11 PRIMARY OSTEOARTHRITIS OF RIGHT KNEE: ICD-10-CM

## 2022-11-15 DIAGNOSIS — M70.61 GREATER TROCHANTERIC BURSITIS OF RIGHT HIP: Primary | ICD-10-CM

## 2022-11-15 PROCEDURE — G0283 ELEC STIM OTHER THAN WOUND: HCPCS | Performed by: PHYSICAL THERAPIST

## 2022-11-15 PROCEDURE — 97110 THERAPEUTIC EXERCISES: CPT | Performed by: PHYSICAL THERAPIST

## 2022-11-15 NOTE — PROGRESS NOTES
Physical Therapy Daily Treatment Note    Patient: Angel Cisneros  : 1950  Referring practitioner: Vinayak Garcia MD  Today's Date: 11/15/2022    VISIT#: 6    Subjective   Angel Cisneros reports:  No significant changes to report from last visit through today, knee remains painful and sore, 6-7/10 currently.     Objective   See Exercise, Manual, and Modality Logs for complete treatment.   Trial application NMES to (R0 quads to address continued quad lag with SLR, parameters as noted in flowsheet.      Assessment/Plan   Tolerated continued progression of therapeutic exercise well today, with addition of NMES to (R) quads.  Pt found NMES slightly uncomfortable but able to participate fully for 10 minute application time.   .      Progress per Plan of Care and Progress strengthening /stabilization /functional activity, continue NMES per pt tolerance.           Timed:         Manual Therapy:         mins  45810;     Therapeutic Exercise:   40     mins  62599;     Neuromuscular Tom:        mins  07105;     Therapeutic Activity:          mins  49523;     Gait Training:           mins  47088;     Ultrasound:          mins  53767;    Ionto:                                   mins   56918  Self Care:                            mins   70311    Un-Timed:  Electrical Stimulation:    10     mins  86321 ( );  Dry Needling          mins self-pay  Traction          mins 62780  Re-Eval                               mins  78337    Timed Treatment:   40   mins   Total Treatment:     50   mins    MIGUE Mondragon License #[id number]  Physical Therapist Assistant

## 2022-11-17 ENCOUNTER — TREATMENT (OUTPATIENT)
Dept: PHYSICAL THERAPY | Facility: CLINIC | Age: 72
End: 2022-11-17

## 2022-11-17 DIAGNOSIS — M17.11 PRIMARY OSTEOARTHRITIS OF RIGHT KNEE: ICD-10-CM

## 2022-11-17 DIAGNOSIS — M70.61 GREATER TROCHANTERIC BURSITIS OF RIGHT HIP: Primary | ICD-10-CM

## 2022-11-17 PROCEDURE — 97110 THERAPEUTIC EXERCISES: CPT | Performed by: PHYSICAL THERAPIST

## 2022-11-17 NOTE — PROGRESS NOTES
Physical Therapy Daily Treatment Note    Patient: Angel Cisneros  : 1950  Referring practitioner: Vinayak Garcia MD  Today's Date: 2022    VISIT#: 7      Subjective   Angel Cisneros reports: Doing ok, knee is really sore today, thinks the weather might be part of it.       Objective     See Exercise, Manual, and Modality Logs for complete treatment.     Patient Education: continue HEP    Assessment/Plan  Fair tolerance to session, he was more painful than usual with exercises and unable to perform prone exercises. Minimal progress made to date, but plan to continue for a few more weeks and then reassess.     Progress per Plan of Care            Timed:         Manual Therapy:         mins  53955;     Therapeutic Exercise:    25     mins  88466;     Neuromuscular Tom:        mins  12857;    Therapeutic Activity:          mins  27912;     Gait Training:           mins  36591;     Ultrasound:          mins  27642;    Ionto:                                   mins   72468  Self Care:                            mins   76258    Un-Timed:  Electrical Stimulation:         mins  86853 ( );  Dry Needling          mins self-pay  Traction          mins 97519  Re-Eval                               mins  03142    Timed Treatment:   25   mins   Total Treatment:     25   mins    Angela Wilson, PT  Physical Therapist  [place] PT license #: [id number]  [place] PT license #: [id number]

## 2022-11-22 ENCOUNTER — TREATMENT (OUTPATIENT)
Dept: PHYSICAL THERAPY | Facility: CLINIC | Age: 72
End: 2022-11-22

## 2022-11-22 DIAGNOSIS — M17.11 PRIMARY OSTEOARTHRITIS OF RIGHT KNEE: ICD-10-CM

## 2022-11-22 DIAGNOSIS — M70.61 GREATER TROCHANTERIC BURSITIS OF RIGHT HIP: Primary | ICD-10-CM

## 2022-11-22 PROCEDURE — 97110 THERAPEUTIC EXERCISES: CPT | Performed by: PHYSICAL THERAPIST

## 2022-11-22 NOTE — PROGRESS NOTES
"Physical Therapy Daily Treatment Note    Patient: Angel Cisneros  : 1950  Referring practitioner: Vinayak Garcia MD  Today's Date: 2022    VISIT#: 8    Subjective   Angel Cisneros reports: Feeling \"pretty good\" today, less pain today than at last visit, pain \"seems to come and go.\"      Objective   See Exercise, Manual, and Modality Logs for complete treatment.     Assessment/Plan  Tolerated continued progression of therapeutic exercise well today, adding leg press and progressing with sets/reps with several other exercises.  No pain noted during or post-exercises.       Progress per Plan of Care            Timed:         Manual Therapy:         mins  19271;     Therapeutic Exercise:    40     mins  05836;     Neuromuscular Tom:        mins  54771;    Therapeutic Activity:          mins  33752;     Gait Training:           mins  95338;     Ultrasound:          mins  39933;    Ionto:                                   mins   14193  Self Care:                            mins   30328    Un-Timed:  Electrical Stimulation:         mins  51600 ( );  Dry Needling          mins self-pay  Traction          mins 31716  Re-Eval                               mins  59193    Timed Treatment:   40   mins   Total Treatment:     40   mins      Devon Hays PTA  [place] License #[id number]  Physical Therapist Assistant    "

## 2022-11-29 ENCOUNTER — TREATMENT (OUTPATIENT)
Dept: PHYSICAL THERAPY | Facility: CLINIC | Age: 72
End: 2022-11-29

## 2022-11-29 DIAGNOSIS — M70.61 GREATER TROCHANTERIC BURSITIS OF RIGHT HIP: Primary | ICD-10-CM

## 2022-11-29 DIAGNOSIS — M17.11 PRIMARY OSTEOARTHRITIS OF RIGHT KNEE: ICD-10-CM

## 2022-11-29 PROCEDURE — 97110 THERAPEUTIC EXERCISES: CPT | Performed by: PHYSICAL THERAPIST

## 2022-12-07 ENCOUNTER — OFFICE VISIT (OUTPATIENT)
Dept: FAMILY MEDICINE CLINIC | Facility: CLINIC | Age: 72
End: 2022-12-07

## 2022-12-07 VITALS
OXYGEN SATURATION: 97 % | TEMPERATURE: 97.8 F | SYSTOLIC BLOOD PRESSURE: 122 MMHG | BODY MASS INDEX: 21.74 KG/M2 | DIASTOLIC BLOOD PRESSURE: 70 MMHG | WEIGHT: 146.8 LBS | HEART RATE: 62 BPM | HEIGHT: 69 IN

## 2022-12-07 DIAGNOSIS — H00.011 HORDEOLUM EXTERNUM OF RIGHT UPPER EYELID: ICD-10-CM

## 2022-12-07 DIAGNOSIS — Z23 ENCOUNTER FOR IMMUNIZATION: ICD-10-CM

## 2022-12-07 DIAGNOSIS — I10 ESSENTIAL HYPERTENSION: ICD-10-CM

## 2022-12-07 DIAGNOSIS — I10 PRIMARY HYPERTENSION: Primary | ICD-10-CM

## 2022-12-07 DIAGNOSIS — K21.9 GASTROESOPHAGEAL REFLUX DISEASE WITHOUT ESOPHAGITIS: ICD-10-CM

## 2022-12-07 DIAGNOSIS — N18.32 CHRONIC KIDNEY DISEASE, STAGE 3B: ICD-10-CM

## 2022-12-07 DIAGNOSIS — Z28.39 IMMUNIZATION DEFICIENCY: ICD-10-CM

## 2022-12-07 PROBLEM — H00.019 HORDEOLUM EXTERNUM (STYE): Status: ACTIVE | Noted: 2022-12-07

## 2022-12-07 PROCEDURE — 90662 IIV NO PRSV INCREASED AG IM: CPT | Performed by: FAMILY MEDICINE

## 2022-12-07 PROCEDURE — G0008 ADMIN INFLUENZA VIRUS VAC: HCPCS | Performed by: FAMILY MEDICINE

## 2022-12-07 PROCEDURE — 99214 OFFICE O/P EST MOD 30 MIN: CPT | Performed by: FAMILY MEDICINE

## 2022-12-07 RX ORDER — AMLODIPINE BESYLATE 10 MG/1
10 TABLET ORAL DAILY
Qty: 90 TABLET | Refills: 3 | Status: SHIPPED | OUTPATIENT
Start: 2022-12-07

## 2022-12-07 RX ORDER — SULFAMETHOXAZOLE AND TRIMETHOPRIM 800; 160 MG/1; MG/1
1 TABLET ORAL 2 TIMES DAILY
Qty: 20 TABLET | Refills: 0 | Status: SHIPPED | OUTPATIENT
Start: 2022-12-07 | End: 2023-02-20 | Stop reason: SDUPTHER

## 2022-12-07 NOTE — PROGRESS NOTES
Chief Complaint   Patient presents with   • Knee Pain   • Hip Pain       Subjective   Angel Cisneros is a 72 y.o. male.     History of Present Illness   F/U HTN.  No orthostasis.  Doing well with meds.    C/o R eye with bump on lid.  There for 1 week.   F/U Cheo.  Doing welwl ith meds.     The following portions of the patient's history were reviewed and updated as appropriate: allergies, current medications, past family history, past medical history, past social history, past surgical history and problem list.    Review of Systems   Constitutional: Negative for appetite change and fatigue.   HENT: Negative for nosebleeds and sore throat.    Eyes: Negative for blurred vision and visual disturbance.   Respiratory: Negative for shortness of breath and wheezing.    Cardiovascular: Negative for chest pain and leg swelling.   Gastrointestinal: Negative for abdominal distention and abdominal pain.   Endocrine: Negative for cold intolerance and polyuria.   Genitourinary: Negative for dysuria and hematuria.   Musculoskeletal: Negative for arthralgias and myalgias.   Skin: Positive for skin lesions. Negative for color change and rash.   Neurological: Negative for weakness and confusion.   Psychiatric/Behavioral: Negative for agitation and depressed mood.       Patient Active Problem List   Diagnosis   • Lymphoma (HCC)   • Leukemia (HCC)   • Chest pain   • GERD (gastroesophageal reflux disease)   • HTN (hypertension)   • Chronic kidney disease, stage 3b (HCC)   • Generalized abdominal pain   • CLL (chronic lymphocytic leukemia) (HCC)   • Gastroesophageal reflux disease   • Hodgkin's disease of lymph nodes of head, face, and neck (HCC)   • Oral thrush   • Medicare annual wellness visit, subsequent   • Primary insomnia   • Adjustment disorder with depressed mood   • Dysuria   • Gross hematuria   • Acute non-recurrent maxillary sinusitis   • Cough   • BPH (benign prostatic hyperplasia)   • Elevated PSA   • Clinical  diagnosis of COVID-19   • Pneumonia due to COVID-19 virus   • Paroxysmal atrial fibrillation (HCC)   • Acute respiratory failure with hypoxia (HCC)   • Critical illness myopathy   • Cytokine release syndrome, grade 4   • Hypoxia   • Stage 3a chronic kidney disease (HCC)   • Right hip pain   • Medication side effect, initial encounter   • Primary osteoarthritis of right knee   • Greater trochanteric bursitis of right hip   • Hordeolum externum (stye)   • Immunization deficiency       Allergies   Allergen Reactions   • Ambien [Zolpidem Tartrate] Mental Status Change   • Penicillins Unknown - Low Severity     unknown childhood allergy         Current Outpatient Medications:   •  acetaminophen (TYLENOL) 325 MG tablet, Take 2 tablets by mouth Every 4 (Four) Hours As Needed for Mild Pain ., Disp: , Rfl:   •  amLODIPine (NORVASC) 10 MG tablet, Take 1 tablet by mouth Daily., Disp: 90 tablet, Rfl: 3  •  calcium carbonate (TUMS) 500 MG chewable tablet, Chew 1 tablet Daily., Disp: , Rfl:   •  Diclofenac Sodium (VOLTAREN) 1 % gel gel, Apply 4 g topically to the appropriate area as directed 4 (Four) Times a Day., Disp: 100 g, Rfl: 0  •  doxylamine (UNISOM) 25 MG tablet, Take  by mouth., Disp: , Rfl:   •  famotidine (PEPCID) 20 MG tablet, Take 1 tablet by mouth Daily., Disp: 90 tablet, Rfl: 3  •  fluorometholone (FML) 0.1 % ophthalmic suspension, Administer 1 drop to both eyes Daily., Disp: , Rfl:   •  metoprolol tartrate (LOPRESSOR) 25 MG tablet, 1/2 tablet twice a day, Disp: 90 tablet, Rfl: 3  •  tamsulosin (FLOMAX) 0.4 MG capsule 24 hr capsule, Take 1 capsule by mouth Daily., Disp: 90 capsule, Rfl: 3  •  sulfamethoxazole-trimethoprim (Bactrim DS) 800-160 MG per tablet, Take 1 tablet by mouth 2 (Two) Times a Day., Disp: 20 tablet, Rfl: 0    Past Medical History:   Diagnosis Date   • Arthritis     both knees   • Cancer (HCC)     dx with lymphoma 2009/ radiation   • Cataracts, bilateral    • History of COVID-19 09/2021   • Hx of  cornea transplant     both eyes   • Leukemia (HCC)    • Lymphoma (HCC)     dx in 2009. treated with radiation.   • Lymphoma (HCC)     treated with radiation. dx in 2009       Past Surgical History:   Procedure Laterality Date   • BREAST LUMPECTOMY Right    • COLONOSCOPY N/A 3/8/2016    Procedure: COLONOSCOPY with cold polypectomy X 3;  Surgeon: Chris Harden MD;  Location: [facility] ENDOSCOPY;  Service:    • ENDOSCOPY N/A 10/23/2018    Procedure: ESOPHAGOGASTRODUODENOSCOPY WITH BIOPSY;  Surgeon: Chris Harden MD;  Location: [facility] ENDOSCOPY;  Service: Gastroenterology   • EYE SURGERY      partial cornea transplant 4-5 years ago   • KNEE SURGERY Left    • TOE SURGERY Bilateral    • TONSILLECTOMY         Family History   Problem Relation Age of Onset   • Kidney cancer Mother        Social History     Tobacco Use   • Smoking status: Never   • Smokeless tobacco: Never   Substance Use Topics   • Alcohol use: No            Objective     Vitals:    12/07/22 0949   BP: 122/70   Pulse: 62   Temp: 97.8 °F (36.6 °C)   SpO2: 97%     Body mass index is 21.99 kg/m².    Physical Exam  Vitals reviewed.   Constitutional:       Appearance: He is well-developed. He is not diaphoretic.   HENT:      Head: Normocephalic and atraumatic.   Eyes:      General: No scleral icterus.     Pupils: Pupils are equal, round, and reactive to light.   Neck:      Thyroid: No thyromegaly.   Cardiovascular:      Rate and Rhythm: Normal rate and regular rhythm.      Heart sounds: No murmur heard.    No friction rub. No gallop.   Pulmonary:      Effort: Pulmonary effort is normal. No respiratory distress.      Breath sounds: No wheezing or rales.   Chest:      Chest wall: No tenderness.   Abdominal:      General: Bowel sounds are normal. There is no distension.      Palpations: Abdomen is soft.      Tenderness: There is no abdominal tenderness.   Musculoskeletal:         General: No deformity. Normal range of motion.   Lymphadenopathy:      Cervical:  No cervical adenopathy.   Skin:     General: Skin is warm and dry.      Findings: No rash.      Comments: R upper eylid margin with erythematous sub q nodule.    Neurological:      Cranial Nerves: No cranial nerve deficit.      Motor: No abnormal muscle tone.         Lab Results   Component Value Date    GLUCOSE 74 10/15/2021    BUN 33 (H) 10/15/2021    CREATININE 1.35 (H) 10/15/2021    EGFRIFNONA 52 (L) 10/15/2021    EGFRIFAFRI 49 (L) 06/03/2020    BCR 24.4 10/15/2021    K 4.4 10/15/2021    CO2 26.4 10/15/2021    CALCIUM 8.3 (L) 10/15/2021    PROTENTOTREF 6.4 06/03/2020    ALBUMIN 2.70 (L) 09/20/2021    LABIL2 1.4 03/08/2021    AST 26 09/20/2021    ALT 65 (H) 09/20/2021       WBC   Date Value Ref Range Status   04/04/2022 10.67 4.5 - 11.0 10*3/uL Final     RBC   Date Value Ref Range Status   04/04/2022 4.51 4.5 - 5.9 10*6/uL Final     Hemoglobin   Date Value Ref Range Status   04/04/2022 13.2 (L) 13.5 - 17.5 g/dL Final     Hematocrit   Date Value Ref Range Status   04/04/2022 39.8 (L) 41.0 - 53.0 % Final     MCV   Date Value Ref Range Status   04/04/2022 88.2 80.0 - 100.0 fL Final     MCH   Date Value Ref Range Status   04/04/2022 29.3 26.0 - 34.0 pg Final     MCHC   Date Value Ref Range Status   04/04/2022 33.2 31.0 - 37.0 g/dL Final     RDW   Date Value Ref Range Status   04/04/2022 16.9 (H) 12.0 - 16.8 % Final     RDW-SD   Date Value Ref Range Status   10/15/2021 51.2 37.0 - 54.0 fl Final     MPV   Date Value Ref Range Status   04/04/2022 10.0 8.4 - 12.4 fL Final     Platelets   Date Value Ref Range Status   04/04/2022 268 140 - 440 10*3/uL Final     Neutrophil Rel %   Date Value Ref Range Status   04/04/2022 45.8 45 - 80 % Final     Lymphocyte Rel %   Date Value Ref Range Status   04/04/2022 38.2 15 - 50 % Final     Monocyte Rel %   Date Value Ref Range Status   04/04/2022 11.2 0 - 15 % Final     Eosinophil %   Date Value Ref Range Status   04/04/2022 4.3 0 - 7 % Final     Basophil Rel %   Date Value Ref Range  Status   04/04/2022 0.5 0 - 2 % Final     Immature Grans %   Date Value Ref Range Status   10/15/2021 4.7 (H) 0.0 - 0.5 % Final     Neutrophils Absolute   Date Value Ref Range Status   04/04/2022 4.88 2.0 - 8.8 10*3/uL Final     Lymphocytes Absolute   Date Value Ref Range Status   04/04/2022 4.08 0.7 - 5.5 10*3/uL Final     Monocytes Absolute   Date Value Ref Range Status   04/04/2022 1.20 0.0 - 1.7 10*3/uL Final     Eosinophils Absolute   Date Value Ref Range Status   04/04/2022 0.46 0.0 - 0.8 10*3/uL Final     Basophils Absolute   Date Value Ref Range Status   04/04/2022 0.05 0.0 - 0.2 10*3/uL Final     Immature Grans, Absolute   Date Value Ref Range Status   10/15/2021 0.23 (H) 0.00 - 0.05 10*3/mm3 Final     nRBC   Date Value Ref Range Status   10/15/2021 0.0 0.0 - 0.2 /100 WBC Final       Lab Results   Component Value Date    HGBA1C 5.39 09/06/2021       No results found for: [id number]    TSH   Date Value Ref Range Status   09/09/2021 0.272 0.270 - 4.200 uIU/mL Final       No results found for: CHOL  Lab Results   Component Value Date    TRIG 125 03/16/2021     Lab Results   Component Value Date    HDL 52 03/16/2021     Lab Results   Component Value Date    LDL 95 03/16/2021     Lab Results   Component Value Date    VLDL 22 03/16/2021     No results found for: LDLHDL      Procedures    Assessment & Plan   Problems Addressed this Visit     Chronic kidney disease, stage 3b (HCC)    Gastroesophageal reflux disease    Hordeolum externum (stye)    HTN (hypertension) - Primary    Relevant Medications    amLODIPine (NORVASC) 10 MG tablet    Immunization deficiency    Relevant Orders    Fluzone High-Dose 65+yrs   Other Visit Diagnoses     Essential hypertension        Relevant Medications    amLODIPine (NORVASC) 10 MG tablet    Encounter for immunization        Relevant Orders    Fluzone High-Dose 65+yrs      Diagnoses       Codes Comments    Primary hypertension    -  Primary ICD-10-CM: I10  ICD-9-CM: 401.9      Hordeolum externum of right upper eyelid     ICD-10-CM: H00.011  ICD-9-CM: 373.11     Gastroesophageal reflux disease without esophagitis     ICD-10-CM: K21.9  ICD-9-CM: 530.81     Essential hypertension     ICD-10-CM: I10  ICD-9-CM: 401.9     Chronic kidney disease, stage 3b (HCC)     ICD-10-CM: N18.32  ICD-9-CM: 585.3     Immunization deficiency     ICD-10-CM: Z28.39  ICD-9-CM: V15.83     Encounter for immunization     ICD-10-CM: Z23  ICD-9-CM: V03.89         HTN.  Controlled.  RF amlo.  Continue metoprolol.  CMP with Cr 2.1 with egfr of 31 from 8/22.    Hordeolum of R eye. New problem.   Rx for bactrim DS one BID for 10 days.   TANNER.  Controlled.  Continue pepcid.   High dose flu today.   Orders Placed This Encounter   Procedures   • Fluzone High-Dose 65+yrs       Current Outpatient Medications   Medication Sig Dispense Refill   • acetaminophen (TYLENOL) 325 MG tablet Take 2 tablets by mouth Every 4 (Four) Hours As Needed for Mild Pain .     • amLODIPine (NORVASC) 10 MG tablet Take 1 tablet by mouth Daily. 90 tablet 3   • calcium carbonate (TUMS) 500 MG chewable tablet Chew 1 tablet Daily.     • Diclofenac Sodium (VOLTAREN) 1 % gel gel Apply 4 g topically to the appropriate area as directed 4 (Four) Times a Day. 100 g 0   • doxylamine (UNISOM) 25 MG tablet Take  by mouth.     • famotidine (PEPCID) 20 MG tablet Take 1 tablet by mouth Daily. 90 tablet 3   • fluorometholone (FML) 0.1 % ophthalmic suspension Administer 1 drop to both eyes Daily.     • metoprolol tartrate (LOPRESSOR) 25 MG tablet 1/2 tablet twice a day 90 tablet 3   • tamsulosin (FLOMAX) 0.4 MG capsule 24 hr capsule Take 1 capsule by mouth Daily. 90 capsule 3   • sulfamethoxazole-trimethoprim (Bactrim DS) 800-160 MG per tablet Take 1 tablet by mouth 2 (Two) Times a Day. 20 tablet 0     No current facility-administered medications for this visit.       Angel Cisneros had no medications administered during this visit.    No follow-ups on  file.    There are no Patient Instructions on file for this visit.

## 2023-01-01 ENCOUNTER — TELEPHONE (OUTPATIENT)
Dept: FAMILY MEDICINE CLINIC | Facility: CLINIC | Age: 73
End: 2023-01-01

## 2023-02-01 ENCOUNTER — TELEPHONE (OUTPATIENT)
Dept: FAMILY MEDICINE CLINIC | Facility: CLINIC | Age: 73
End: 2023-02-01
Payer: MEDICARE

## 2023-02-01 NOTE — TELEPHONE ENCOUNTER
Patent wants to know if he could get a referral to [facility].  There fax number is [phone].  He has pain in right knee and hip.  His phone number is [phone].

## 2023-02-02 ENCOUNTER — OFFICE VISIT (OUTPATIENT)
Dept: FAMILY MEDICINE CLINIC | Facility: CLINIC | Age: 73
End: 2023-02-02
Payer: MEDICARE

## 2023-02-02 VITALS
TEMPERATURE: 98.7 F | BODY MASS INDEX: 21.74 KG/M2 | DIASTOLIC BLOOD PRESSURE: 72 MMHG | WEIGHT: 146.8 LBS | HEIGHT: 69 IN | SYSTOLIC BLOOD PRESSURE: 111 MMHG | HEART RATE: 72 BPM | OXYGEN SATURATION: 90 %

## 2023-02-02 DIAGNOSIS — M54.41 CHRONIC RIGHT-SIDED LOW BACK PAIN WITH RIGHT-SIDED SCIATICA: Primary | ICD-10-CM

## 2023-02-02 DIAGNOSIS — G89.29 CHRONIC RIGHT-SIDED LOW BACK PAIN WITH RIGHT-SIDED SCIATICA: Primary | ICD-10-CM

## 2023-02-02 PROBLEM — M54.31 SCIATICA OF RIGHT SIDE: Status: ACTIVE | Noted: 2023-02-02

## 2023-02-02 PROCEDURE — 99214 OFFICE O/P EST MOD 30 MIN: CPT | Performed by: FAMILY MEDICINE

## 2023-02-02 RX ORDER — PREDNISONE 20 MG/1
TABLET ORAL
Qty: 18 TABLET | Refills: 0 | Status: SHIPPED | OUTPATIENT
Start: 2023-02-02

## 2023-02-02 NOTE — PROGRESS NOTES
Chief Complaint   Patient presents with   • Knee Pain   c/o back pain.    Subjective   Angel Cisneros is a 73 y.o. male.     History of Present Illness   C/o pain in lower back.   Going on for 3-4 months.  Increased severity.  Starts R side of back and wraps around to anterior thigh and medial knee.    The following portions of the patient's history were reviewed and updated as appropriate: allergies, current medications, past family history, past medical history, past social history, past surgical history and problem list.    Review of Systems   Constitutional: Negative for fatigue and fever.   Musculoskeletal: Positive for back pain.       Patient Active Problem List   Diagnosis   • Lymphoma (HCC)   • Leukemia (HCC)   • Chest pain   • GERD (gastroesophageal reflux disease)   • HTN (hypertension)   • Chronic kidney disease, stage 3b (HCC)   • Generalized abdominal pain   • CLL (chronic lymphocytic leukemia) (HCC)   • Gastroesophageal reflux disease   • Hodgkin's disease of lymph nodes of head, face, and neck (HCC)   • Oral thrush   • Medicare annual wellness visit, subsequent   • Primary insomnia   • Adjustment disorder with depressed mood   • Dysuria   • Gross hematuria   • Acute non-recurrent maxillary sinusitis   • Cough   • BPH (benign prostatic hyperplasia)   • Elevated PSA   • Clinical diagnosis of COVID-19   • Pneumonia due to COVID-19 virus   • Paroxysmal atrial fibrillation (HCC)   • Acute respiratory failure with hypoxia ([facility])   • Critical illness myopathy   • Cytokine release syndrome, grade 4   • Hypoxia   • Stage 3a chronic kidney disease (HCC)   • Right hip pain   • Medication side effect, initial encounter   • Primary osteoarthritis of right knee   • Greater trochanteric bursitis of right hip   • Hordeolum externum (stye)   • Immunization deficiency   • Sciatica of right side   • Chronic right-sided low back pain with right-sided sciatica       Allergies   Allergen Reactions   • Ambien  [Zolpidem Tartrate] Mental Status Change   • Penicillins Unknown - Low Severity     unknown childhood allergy         Current Outpatient Medications:   •  acetaminophen (TYLENOL) 325 MG tablet, Take 2 tablets by mouth Every 4 (Four) Hours As Needed for Mild Pain ., Disp: , Rfl:   •  amLODIPine (NORVASC) 10 MG tablet, Take 1 tablet by mouth Daily., Disp: 90 tablet, Rfl: 3  •  calcium carbonate (TUMS) 500 MG chewable tablet, Chew 1 tablet Daily., Disp: , Rfl:   •  Diclofenac Sodium (VOLTAREN) 1 % gel gel, Apply 4 g topically to the appropriate area as directed 4 (Four) Times a Day., Disp: 100 g, Rfl: 0  •  doxylamine (UNISOM) 25 MG tablet, Take  by mouth., Disp: , Rfl:   •  famotidine (PEPCID) 20 MG tablet, Take 1 tablet by mouth Daily., Disp: 90 tablet, Rfl: 3  •  fluorometholone (FML) 0.1 % ophthalmic suspension, Administer 1 drop to both eyes Daily., Disp: , Rfl:   •  metoprolol tartrate (LOPRESSOR) 25 MG tablet, 1/2 tablet twice a day, Disp: 90 tablet, Rfl: 3  •  sulfamethoxazole-trimethoprim (Bactrim DS) 800-160 MG per tablet, Take 1 tablet by mouth 2 (Two) Times a Day., Disp: 20 tablet, Rfl: 0  •  tamsulosin (FLOMAX) 0.4 MG capsule 24 hr capsule, Take 1 capsule by mouth Daily., Disp: 90 capsule, Rfl: 3  •  predniSONE (DELTASONE) 20 MG tablet, 3 a day for 3 days then 2 a day for 3 days then 1 a day for 3 days., Disp: 18 tablet, Rfl: 0  No current facility-administered medications for this visit.    Past Medical History:   Diagnosis Date   • Arthritis     both knees   • Cancer (HCC)     dx with lymphoma 2009/ radiation   • Cataracts, bilateral    • History of COVID-19 09/2021   • Hx of cornea transplant     both eyes   • Leukemia (HCC)    • Lymphoma (HCC)     dx in 2009. treated with radiation.   • Lymphoma (HCC)     treated with radiation. dx in 2009       Past Surgical History:   Procedure Laterality Date   • BREAST LUMPECTOMY Right    • COLONOSCOPY N/A 3/8/2016    Procedure: COLONOSCOPY with cold polypectomy X  3;  Surgeon: Chris Harden MD;  Location: [facility] ENDOSCOPY;  Service:    • ENDOSCOPY N/A 10/23/2018    Procedure: ESOPHAGOGASTRODUODENOSCOPY WITH BIOPSY;  Surgeon: Chris Harden MD;  Location: [facility] ENDOSCOPY;  Service: Gastroenterology   • EYE SURGERY      partial cornea transplant 4-5 years ago   • KNEE SURGERY Left    • TOE SURGERY Bilateral    • TONSILLECTOMY         Family History   Problem Relation Age of Onset   • Kidney cancer Mother        Social History     Tobacco Use   • Smoking status: Never   • Smokeless tobacco: Never   Substance Use Topics   • Alcohol use: No            Objective     Vitals:    02/02/23 0923   BP: 111/72   Pulse: 72   Temp: 98.7 °F (37.1 °C)   SpO2: 90%     Body mass index is 21.99 kg/m².    Physical Exam  Vitals reviewed.   Constitutional:       Appearance: He is well-developed. He is not diaphoretic.   HENT:      Head: Normocephalic and atraumatic.   Eyes:      General: No scleral icterus.     Pupils: Pupils are equal, round, and reactive to light.   Neck:      Thyroid: No thyromegaly.   Cardiovascular:      Rate and Rhythm: Normal rate and regular rhythm.      Heart sounds: No murmur heard.    No friction rub. No gallop.   Pulmonary:      Effort: Pulmonary effort is normal. No respiratory distress.      Breath sounds: No wheezing or rales.   Chest:      Chest wall: No tenderness.   Abdominal:      General: Bowel sounds are normal. There is no distension.      Palpations: Abdomen is soft.      Tenderness: There is no abdominal tenderness.   Musculoskeletal:         General: No deformity. Normal range of motion.   Lymphadenopathy:      Cervical: No cervical adenopathy.   Skin:     General: Skin is warm and dry.      Findings: No rash.   Neurological:      Cranial Nerves: No cranial nerve deficit.      Motor: No abnormal muscle tone.         Lab Results   Component Value Date    GLUCOSE 74 10/15/2021    BUN 33 (H) 10/15/2021    CREATININE 1.35 (H) 10/15/2021    EGFRIFNONA 52  (L) 10/15/2021    EGFRIFAFRI 49 (L) 06/03/2020    BCR 24.4 10/15/2021    K 4.4 10/15/2021    CO2 26.4 10/15/2021    CALCIUM 8.3 (L) 10/15/2021    PROTENTOTREF 6.4 06/03/2020    ALBUMIN 2.70 (L) 09/20/2021    LABIL2 1.4 03/08/2021    AST 26 09/20/2021    ALT 65 (H) 09/20/2021       WBC   Date Value Ref Range Status   04/04/2022 10.67 4.5 - 11.0 10*3/uL Final     RBC   Date Value Ref Range Status   04/04/2022 4.51 4.5 - 5.9 10*6/uL Final     Hemoglobin   Date Value Ref Range Status   04/04/2022 13.2 (L) 13.5 - 17.5 g/dL Final     Hematocrit   Date Value Ref Range Status   04/04/2022 39.8 (L) 41.0 - 53.0 % Final     MCV   Date Value Ref Range Status   04/04/2022 88.2 80.0 - 100.0 fL Final     MCH   Date Value Ref Range Status   04/04/2022 29.3 26.0 - 34.0 pg Final     MCHC   Date Value Ref Range Status   04/04/2022 33.2 31.0 - 37.0 g/dL Final     RDW   Date Value Ref Range Status   04/04/2022 16.9 (H) 12.0 - 16.8 % Final     RDW-SD   Date Value Ref Range Status   10/15/2021 51.2 37.0 - 54.0 fl Final     MPV   Date Value Ref Range Status   04/04/2022 10.0 8.4 - 12.4 fL Final     Platelets   Date Value Ref Range Status   04/04/2022 268 140 - 440 10*3/uL Final     Neutrophil Rel %   Date Value Ref Range Status   04/04/2022 45.8 45 - 80 % Final     Lymphocyte Rel %   Date Value Ref Range Status   04/04/2022 38.2 15 - 50 % Final     Monocyte Rel %   Date Value Ref Range Status   04/04/2022 11.2 0 - 15 % Final     Eosinophil %   Date Value Ref Range Status   04/04/2022 4.3 0 - 7 % Final     Basophil Rel %   Date Value Ref Range Status   04/04/2022 0.5 0 - 2 % Final     Immature Grans %   Date Value Ref Range Status   10/15/2021 4.7 (H) 0.0 - 0.5 % Final     Neutrophils Absolute   Date Value Ref Range Status   04/04/2022 4.88 2.0 - 8.8 10*3/uL Final     Lymphocytes Absolute   Date Value Ref Range Status   04/04/2022 4.08 0.7 - 5.5 10*3/uL Final     Monocytes Absolute   Date Value Ref Range Status   04/04/2022 1.20 0.0 - 1.7  10*3/uL Final     Eosinophils Absolute   Date Value Ref Range Status   04/04/2022 0.46 0.0 - 0.8 10*3/uL Final     Basophils Absolute   Date Value Ref Range Status   04/04/2022 0.05 0.0 - 0.2 10*3/uL Final     Immature Grans, Absolute   Date Value Ref Range Status   10/15/2021 0.23 (H) 0.00 - 0.05 10*3/mm3 Final     nRBC   Date Value Ref Range Status   10/15/2021 0.0 0.0 - 0.2 /100 WBC Final       Lab Results   Component Value Date    HGBA1C 5.39 09/06/2021       No results found for: [id number]    TSH   Date Value Ref Range Status   09/09/2021 0.272 0.270 - 4.200 uIU/mL Final       No results found for: CHOL  Lab Results   Component Value Date    TRIG 125 03/16/2021     Lab Results   Component Value Date    HDL 52 03/16/2021     Lab Results   Component Value Date    LDL 95 03/16/2021     Lab Results   Component Value Date    VLDL 22 03/16/2021     No results found for: LDLHDL      Procedures    Assessment & Plan   Problems Addressed this Visit     Chronic right-sided low back pain with right-sided sciatica - Primary    Relevant Medications    predniSONE (DELTASONE) 20 MG tablet    Other Relevant Orders    MRI Lumbar Spine Without Contrast   Diagnoses       Codes Comments    Chronic right-sided low back pain with right-sided sciatica    -  Primary ICD-10-CM: M54.41, G89.29  ICD-9-CM: 724.2, 724.3, 338.29         Chronic back pain iwht sciatica.  Acute worsening.  Start PT.  Avoid NSAIDS due to CKD.  Start prednisone.  Check MRI.   Orders Placed This Encounter   Procedures   • MRI Lumbar Spine Without Contrast     Standing Status:   Future     Standing Expiration Date:   2/3/2024       Current Outpatient Medications   Medication Sig Dispense Refill   • acetaminophen (TYLENOL) 325 MG tablet Take 2 tablets by mouth Every 4 (Four) Hours As Needed for Mild Pain .     • amLODIPine (NORVASC) 10 MG tablet Take 1 tablet by mouth Daily. 90 tablet 3   • calcium carbonate (TUMS) 500 MG chewable tablet Chew 1 tablet Daily.     •  Diclofenac Sodium (VOLTAREN) 1 % gel gel Apply 4 g topically to the appropriate area as directed 4 (Four) Times a Day. 100 g 0   • doxylamine (UNISOM) 25 MG tablet Take  by mouth.     • famotidine (PEPCID) 20 MG tablet Take 1 tablet by mouth Daily. 90 tablet 3   • fluorometholone (FML) 0.1 % ophthalmic suspension Administer 1 drop to both eyes Daily.     • metoprolol tartrate (LOPRESSOR) 25 MG tablet 1/2 tablet twice a day 90 tablet 3   • sulfamethoxazole-trimethoprim (Bactrim DS) 800-160 MG per tablet Take 1 tablet by mouth 2 (Two) Times a Day. 20 tablet 0   • tamsulosin (FLOMAX) 0.4 MG capsule 24 hr capsule Take 1 capsule by mouth Daily. 90 capsule 3   • predniSONE (DELTASONE) 20 MG tablet 3 a day for 3 days then 2 a day for 3 days then 1 a day for 3 days. 18 tablet 0     No current facility-administered medications for this visit.       Angel Cisneros had no medications administered during this visit.    Return in about 2 months (around 4/2/2023).    There are no Patient Instructions on file for this visit.

## 2023-02-20 RX ORDER — SULFAMETHOXAZOLE AND TRIMETHOPRIM 800; 160 MG/1; MG/1
1 TABLET ORAL 2 TIMES DAILY
Qty: 20 TABLET | Refills: 0 | Status: SHIPPED | OUTPATIENT
Start: 2023-02-20

## 2023-02-20 NOTE — TELEPHONE ENCOUNTER
Caller: Angel Cisneros    Relationship: Self    Best call back number: [phone]    What medication are you requesting: UNKNOWN     What are your current symptoms: STYE ON RIGHT EYE     Have you had these symptoms before:    [x] Yes  [] No    Have you been treated for these symptoms before:   [x] Yes  [] No    If a prescription is needed, what is your preferred pharmacy and phone number: [facility] #[id number] - [facility], [place] - [address] AT [facility] [phone] [facility] [phone]      Additional notes: PATIENT STATES HE HAD THE SAME ISSUES ABOUT A MONTH AGO. PATIENT IS REQUESTING THE MEDICATION HE RECEIVED LAST TIME.     PLEASE CALL PATIENT WHEN PRESCRIPTION IS SENT

## 2023-03-08 ENCOUNTER — HOSPITAL ENCOUNTER (OUTPATIENT)
Dept: MRI IMAGING | Facility: HOSPITAL | Age: 73
Discharge: HOME | End: 2023-03-08
Admitting: FAMILY MEDICINE
Payer: MEDICARE

## 2023-03-08 ENCOUNTER — TELEPHONE (OUTPATIENT)
Dept: FAMILY MEDICINE CLINIC | Facility: CLINIC | Age: 73
End: 2023-03-08
Payer: MEDICARE

## 2023-03-08 DIAGNOSIS — C81.91 HODGKIN'S DISEASE OF LYMPH NODES OF HEAD, FACE, AND NECK: Primary | ICD-10-CM

## 2023-03-08 DIAGNOSIS — M54.41 CHRONIC RIGHT-SIDED LOW BACK PAIN WITH RIGHT-SIDED SCIATICA: ICD-10-CM

## 2023-03-08 DIAGNOSIS — R59.0 LYMPHADENOPATHY, ABDOMINAL: ICD-10-CM

## 2023-03-08 DIAGNOSIS — G89.29 CHRONIC RIGHT-SIDED LOW BACK PAIN WITH RIGHT-SIDED SCIATICA: ICD-10-CM

## 2023-03-08 PROCEDURE — 72148 MRI LUMBAR SPINE W/O DYE: CPT

## 2023-04-20 ENCOUNTER — OFFICE VISIT (OUTPATIENT)
Dept: FAMILY MEDICINE CLINIC | Facility: CLINIC | Age: 73
End: 2023-04-20
Payer: MEDICARE

## 2023-04-20 VITALS
OXYGEN SATURATION: 98 % | WEIGHT: 146.4 LBS | HEIGHT: 69 IN | HEART RATE: 70 BPM | DIASTOLIC BLOOD PRESSURE: 64 MMHG | SYSTOLIC BLOOD PRESSURE: 118 MMHG | BODY MASS INDEX: 21.68 KG/M2 | TEMPERATURE: 98.9 F

## 2023-04-20 DIAGNOSIS — N18.32 CHRONIC KIDNEY DISEASE, STAGE 3B: ICD-10-CM

## 2023-04-20 DIAGNOSIS — I10 PRIMARY HYPERTENSION: ICD-10-CM

## 2023-04-20 DIAGNOSIS — N40.1 BENIGN PROSTATIC HYPERPLASIA WITH NOCTURIA: ICD-10-CM

## 2023-04-20 DIAGNOSIS — R35.1 BENIGN PROSTATIC HYPERPLASIA WITH NOCTURIA: ICD-10-CM

## 2023-04-20 DIAGNOSIS — Z00.00 MEDICARE ANNUAL WELLNESS VISIT, SUBSEQUENT: Primary | ICD-10-CM

## 2023-04-20 DIAGNOSIS — M17.11 PRIMARY OSTEOARTHRITIS OF RIGHT KNEE: ICD-10-CM

## 2023-04-20 RX ORDER — FINASTERIDE 5 MG/1
5 TABLET, FILM COATED ORAL DAILY
Qty: 90 TABLET | Refills: 3 | Status: SHIPPED | OUTPATIENT
Start: 2023-04-20

## 2023-04-20 RX ORDER — TAMSULOSIN HYDROCHLORIDE 0.4 MG/1
0.4 CAPSULE ORAL DAILY
Qty: 90 CAPSULE | Refills: 3 | Status: SHIPPED | OUTPATIENT
Start: 2023-04-20

## 2023-04-20 RX ORDER — ERGOCALCIFEROL 1.25 MG/1
CAPSULE ORAL
COMMUNITY
Start: 2023-02-20

## 2023-04-20 NOTE — PROGRESS NOTES
The ABCs of the Annual Wellness Visit  Subsequent Medicare Wellness Visit    Subjective    Angel Cisneros is a 73 y.o. male who presents for a Subsequent Medicare Wellness Visit.    The following portions of the patient's history were reviewed and   updated as appropriate: allergies, current medications, past family history, past medical history, past social history, past surgical history and problem list.    Compared to one year ago, the patient feels his physical   health is the same.    Compared to one year ago, the patient feels his mental   health is the same.    Recent Hospitalizations:  He was not admitted to the hospital during the last year.       Current Medical Providers:  Patient Care Team:  Ayaan Amin MD as PCP - General (Family Medicine)    Outpatient Medications Prior to Visit   Medication Sig Dispense Refill   • acetaminophen (TYLENOL) 325 MG tablet Take 2 tablets by mouth Every 4 (Four) Hours As Needed for Mild Pain .     • amLODIPine (NORVASC) 10 MG tablet Take 1 tablet by mouth Daily. 90 tablet 3   • calcium carbonate (TUMS) 500 MG chewable tablet Chew 1 tablet Daily.     • Diclofenac Sodium (VOLTAREN) 1 % gel gel Apply 4 g topically to the appropriate area as directed 4 (Four) Times a Day. 100 g 0   • doxylamine (UNISOM) 25 MG tablet Take  by mouth.     • famotidine (PEPCID) 20 MG tablet Take 1 tablet by mouth Daily. 90 tablet 3   • fluorometholone (FML) 0.1 % ophthalmic suspension Administer 1 drop to both eyes Daily.     • metoprolol tartrate (LOPRESSOR) 25 MG tablet 1/2 tablet twice a day 90 tablet 3   • predniSONE (DELTASONE) 20 MG tablet 3 a day for 3 days then 2 a day for 3 days then 1 a day for 3 days. 18 tablet 0   • sulfamethoxazole-trimethoprim (Bactrim DS) 800-160 MG per tablet Take 1 tablet by mouth 2 (Two) Times a Day. 20 tablet 0   • vitamin D (ERGOCALCIFEROL) 1.25 MG (35756 UT) capsule capsule TAKE 1 CAPSULE (50,000 UNITS TOTAL) BY MOUTH ONCE WEEKLY     • tamsulosin  (FLOMAX) 0.4 MG capsule 24 hr capsule Take 1 capsule by mouth Daily. 90 capsule 3     No facility-administered medications prior to visit.       No opioid medication identified on active medication list. I have reviewed chart for other potential  high risk medication/s and harmful drug interactions in the elderly.          Aspirin is not on active medication list.  Aspirin use is not indicated based on review of current medical condition/s. Risk of harm outweighs potential benefits.  .    Patient Active Problem List   Diagnosis   • Lymphoma   • Leukemia   • Chest pain   • GERD (gastroesophageal reflux disease)   • HTN (hypertension)   • Chronic kidney disease, stage 3b   • Generalized abdominal pain   • CLL (chronic lymphocytic leukemia) (HCC)   • Gastroesophageal reflux disease   • Hodgkin's disease of lymph nodes of head, face, and neck (HCC)   • Oral thrush   • Medicare annual wellness visit, subsequent   • Primary insomnia   • Adjustment disorder with depressed mood   • Dysuria   • Gross hematuria   • Acute non-recurrent maxillary sinusitis   • Cough   • BPH (benign prostatic hyperplasia)   • Elevated PSA   • Clinical diagnosis of COVID-19   • Pneumonia due to COVID-19 virus   • Paroxysmal atrial fibrillation (HCC)   • Acute respiratory failure with hypoxia ([facility])   • Critical illness myopathy   • Cytokine release syndrome, grade 4   • Hypoxia   • Stage 3a chronic kidney disease   • Right hip pain   • Medication side effect, initial encounter   • Primary osteoarthritis of right knee   • Greater trochanteric bursitis of right hip   • Hordeolum externum (stye)   • Immunization deficiency   • Sciatica of right side   • Chronic right-sided low back pain with right-sided sciatica   • Lymphadenopathy, abdominal     Advance Care Planning   Advance Care Planning     Advance Directive is not on file.  ACP discussion was held with the patient during this visit. Patient has an advance directive (not in EMR), copy  "requested.     Objective    Vitals:    23 1010   BP: 118/64   BP Location: Left arm   Patient Position: Sitting   Cuff Size: Adult   Pulse: 70   Temp: 98.9 °F (37.2 °C)   SpO2: 98%   Weight: 66.4 kg (146 lb 6.4 oz)   Height: 174 cm (68.5\")     Estimated body mass index is 21.93 kg/m² as calculated from the following:    Height as of this encounter: 174 cm (68.5\").    Weight as of this encounter: 66.4 kg (146 lb 6.4 oz).    BMI is within normal parameters. No other follow-up for BMI required.      Does the patient have evidence of cognitive impairment? No          HEALTH RISK ASSESSMENT    Smoking Status:  Social History     Tobacco Use   Smoking Status Never   Smokeless Tobacco Never     Alcohol Consumption:  Social History     Substance and Sexual Activity   Alcohol Use No     Fall Risk Screen:    JIMMY Fall Risk Assessment has not been completed.    Depression Screenin/20/2023    10:00 AM   PHQ-2/PHQ-9 Depression Screening   Little Interest or Pleasure in Doing Things 0-->not at all   PHQ-9: Brief Depression Severity Measure Score 0       Health Habits and Functional and Cognitive Screenin/20/2023    10:00 AM   Functional & Cognitive Status   Do you have difficulty preparing food and eating? No   Do you have difficulty bathing yourself, getting dressed or grooming yourself? No   Do you have difficulty using the toilet? No   Do you have difficulty moving around from place to place? No   Do you have trouble with steps or getting out of a bed or a chair? No   Current Diet Well Balanced Diet   Dental Exam Up to date   Eye Exam Up to date   Exercise (times per week) 0 times per week   Current Exercises Include No Regular Exercise   Do you need help using the phone?  No   Are you deaf or do you have serious difficulty hearing?  No   Do you need help with transportation? No   Do you need help shopping? No   Do you need help preparing meals?  No   Do you need help with housework?  No   Do you need " help with laundry? No   Do you need help taking your medications? No   Do you need help managing money? No   Do you ever drive or ride in a car without wearing a seat belt? No   Have you felt unusual stress, anger or loneliness in the last month? No   Who do you live with? Spouse   If you need help, do you have trouble finding someone available to you? No   Do you have difficulty concentrating, remembering or making decisions? No       Age-appropriate Screening Schedule:  Refer to the list below for future screening recommendations based on patient's age, sex and/or medical conditions. Orders for these recommended tests are listed in the plan section. The patient has been provided with a written plan.    Health Maintenance   Topic Date Due   • TDAP/TD VACCINES (1 - Tdap) Never done   • ZOSTER VACCINE (1 of 2) Never done   • HEPATITIS C SCREENING  Never done   • INFLUENZA VACCINE  08/01/2023   • ANNUAL WELLNESS VISIT  04/20/2024   • COLORECTAL CANCER SCREENING  03/08/2026   • COVID-19 Vaccine  Completed   • Pneumococcal Vaccine 65+  Completed                  CMS Preventative Services Quick Reference  Risk Factors Identified During Encounter  Inactivity/Sedentary: Patient was advised to exercise at least 150 minutes a week per CDC recommendations.  The above risks/problems have been discussed with the patient.  Pertinent information has been shared with the patient in the After Visit Summary.  An After Visit Summary and PPPS were made available to the patient.    Preventive Counseling:  Encouraged to stay active.  Covid vaccine UTD.  Pneumovax UTD.  Colonoscopy UTD.  Shingrix recommended.    Dentist UTD.  Optho UTD.      Follow Up:   Next Medicare Wellness visit to be scheduled in 1 year.       Additional E&M Note during same encounter follows:  Patient has multiple medical problems which are significant and separately identifiable that require additional work above and beyond the Medicare Wellness Visit.      Chief  "Complaint  Medicare Wellness-subsequent    Subjective        HPI  Angel Cisneros is also being seen today for R knee pain.  Had injection yesterday.    F/U CKD.  CR 2.2 from labs 13 days ago. (was 2.6).    F?U BPH.  I get up 4 times a night.      Review of Systems   Constitutional: Negative for chills, diaphoresis and fatigue.   HENT: Negative for rhinorrhea.    Respiratory: Negative for cough and shortness of breath.    Cardiovascular: Negative for chest pain and leg swelling.   Gastrointestinal: Negative for abdominal distention and abdominal pain.   Musculoskeletal: Positive for arthralgias. Negative for back pain.   Skin: Negative for rash.       Objective   Vital Signs:  /64 (BP Location: Left arm, Patient Position: Sitting, Cuff Size: Adult)   Pulse 70   Temp 98.9 °F (37.2 °C)   Ht 174 cm (68.5\")   Wt 66.4 kg (146 lb 6.4 oz)   SpO2 98%   BMI 21.93 kg/m²     Physical Exam  Vitals reviewed.   Constitutional:       Appearance: He is well-developed. He is not diaphoretic.   HENT:      Head: Normocephalic and atraumatic.   Eyes:      General: No scleral icterus.     Pupils: Pupils are equal, round, and reactive to light.   Neck:      Thyroid: No thyromegaly.   Cardiovascular:      Rate and Rhythm: Normal rate and regular rhythm.      Heart sounds: No murmur heard.    No friction rub. No gallop.   Pulmonary:      Effort: Pulmonary effort is normal. No respiratory distress.      Breath sounds: No wheezing or rales.   Chest:      Chest wall: No tenderness.   Abdominal:      General: Bowel sounds are normal. There is no distension.      Palpations: Abdomen is soft.      Tenderness: There is no abdominal tenderness.   Musculoskeletal:         General: No deformity. Normal range of motion.   Lymphadenopathy:      Cervical: No cervical adenopathy.   Skin:     General: Skin is warm and dry.      Findings: No rash.   Neurological:      Cranial Nerves: No cranial nerve deficit.      Motor: No abnormal " muscle tone.                         Assessment and Plan   Diagnoses and all orders for this visit:    1. Medicare annual wellness visit, subsequent (Primary)    2. Primary hypertension    3. Chronic kidney disease, stage 3b    4. Primary osteoarthritis of right knee    5. Benign prostatic hyperplasia with nocturia    Other orders  -     tamsulosin (FLOMAX) 0.4 MG capsule 24 hr capsule; Take 1 capsule by mouth Daily.  Dispense: 90 capsule; Refill: 3      R knee oa.  Uncontrolled, chronic.  Start tylenol 1000 TID.    HTN with CKD stage 3.  CR improved to 2.2 .  Continue  Meds.   BPH, uncontrolled, chronic.  Start finasteride.  Continue tamsulosin.          Follow Up   Return in about 6 months (around 10/20/2023).  Patient was given instructions and counseling regarding his condition or for health maintenance advice. Please see specific information pulled into the AVS if appropriate.

## 2023-05-08 NOTE — TELEPHONE ENCOUNTER
Caller: Angel Cisneros    Relationship: Self    Best call back number:  [phone]    What is the best time to reach you: ANY TIME    Who are you requesting to speak with (clinical staff, provider,  specific staff member): CLINICAL STAFF    What was the call regarding: PATIENT CALLED WANTING TO KNOW IF DR SHORT CAN REFER HIM TO SOMEONE FOR A SECOND OPINION ON HIS KNEE. HE HAS BEEN SEEING THE KNEE SPECIALIST ON [facility] BUT WANTS A SECOND OPINION.    PLEASE ADVISE    Do you require a callback: YES

## 2023-05-09 DIAGNOSIS — M17.11 PRIMARY OSTEOARTHRITIS OF RIGHT KNEE: Primary | ICD-10-CM

## 2023-05-15 ENCOUNTER — OFFICE VISIT (OUTPATIENT)
Dept: ORTHOPEDIC SURGERY | Facility: CLINIC | Age: 73
End: 2023-05-15
Payer: MEDICARE

## 2023-05-15 VITALS — BODY MASS INDEX: 21.03 KG/M2 | HEIGHT: 69 IN | WEIGHT: 142 LBS | TEMPERATURE: 97.3 F

## 2023-05-15 DIAGNOSIS — M25.561 RIGHT KNEE PAIN, UNSPECIFIED CHRONICITY: Primary | ICD-10-CM

## 2023-05-15 NOTE — PROGRESS NOTES
Patient: Angel Cisneros  YOB: 1950 73 y.o. male  Medical Record Number: [id number]    Chief Complaints:   Chief Complaint   Patient presents with   • Right Knee - Initial Evaluation       History of Present Illness:Angel Cisneros is a 73 y.o. male who presents with complaints of right knee pain.  Apparently patient has been going to the injection clinic, last injection a month ago did not help at all.  He reports over the last few months he has had increased right knee pain.  He unfortunately is not able to take anti-inflammatories because of stage III kidney disease, denies any recent injury.  He reports a history of leukemia and apparently his white blood cell count has been elevated and he is seeing his oncologist later today.    Allergies:   Allergies   Allergen Reactions   • Ambien [Zolpidem Tartrate] Mental Status Change   • Penicillins Unknown - Low Severity     unknown childhood allergy       Medications:   Current Outpatient Medications   Medication Sig Dispense Refill   • acetaminophen (TYLENOL) 325 MG tablet Take 2 tablets by mouth Every 4 (Four) Hours As Needed for Mild Pain .     • amLODIPine (NORVASC) 10 MG tablet Take 1 tablet by mouth Daily. 90 tablet 3   • calcium carbonate (TUMS) 500 MG chewable tablet Chew 1 tablet Daily.     • cetirizine (zyrTEC) 10 MG tablet Take 1 tablet by mouth Daily for 30 days. 30 tablet 0   • Diclofenac Sodium (VOLTAREN) 1 % gel gel Apply 4 g topically to the appropriate area as directed 4 (Four) Times a Day. 100 g 0   • doxylamine (UNISOM) 25 MG tablet Take  by mouth.     • famotidine (PEPCID) 20 MG tablet Take 1 tablet by mouth Daily. 90 tablet 3   • finasteride (Proscar) 5 MG tablet Take 1 tablet by mouth Daily. 90 tablet 3   • fluorometholone (FML) 0.1 % ophthalmic suspension Administer 1 drop to both eyes Daily.     • metoprolol tartrate (LOPRESSOR) 25 MG tablet 1/2 tablet twice a day 90 tablet 3   • predniSONE (DELTASONE) 20 MG tablet 3 a  "day for 3 days then 2 a day for 3 days then 1 a day for 3 days. 18 tablet 0   • tamsulosin (FLOMAX) 0.4 MG capsule 24 hr capsule Take 1 capsule by mouth Daily. 90 capsule 3   • vitamin D (ERGOCALCIFEROL) 1.25 MG (09493 UT) capsule capsule TAKE 1 CAPSULE (50,000 UNITS TOTAL) BY MOUTH ONCE WEEKLY       No current facility-administered medications for this visit.         The following portions of the patient's history were reviewed and updated as appropriate: allergies, current medications, past family history, past medical history, past social history, past surgical history and problem list.    Review of Systems:   14 point review of systems was performed. All systems negative except pertinent positives/negatives listed in HPI above    Physical Exam:   Vitals:    05/15/23 0954   Temp: 97.3 °F (36.3 °C)   TempSrc: Temporal   Weight: 64.4 kg (142 lb)   Height: 174 cm (68.5\")   PainSc:   5   PainLoc: Knee       General: A and O x 3, ASA, NAD   Skin clear no unusual lesions noted  Right knee patient has trace amount of effusion noted with 116 degrees flexion neutral in extension with a positive Prem negative Lockman calf soft and nontender he also has severe pain with internal ex rotation of the right hip with a positive Stinchfield positive logroll calf soft and nontender       Radiology:  Xrays 3views (ap,lateral, sunrise) right knee were ordered and reviewed today secondary to pain and show lucencies about the proximal tibia he otherwise has moderate arthritic changes.  2 views of right hip ordered and reviewed today secondary to increased pain and show avascular necrosis with femoral head collapse.  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  He will speak with his oncologist today about the x-ray findings of his right hip.  The hope at some point would be able to proceed with hip replacement given the severe pain he is having, the worsening and femoral head collapse.  I am going to have the patient come back to see me in 3 days to review all results with Dr. Del Angel and we will come up with a game plan at that time      Lauren Quiñones, APRN  5/15/2023

## 2023-05-16 ENCOUNTER — TELEPHONE (OUTPATIENT)
Dept: ORTHOPEDIC SURGERY | Facility: CLINIC | Age: 73
End: 2023-05-16
Payer: MEDICARE

## 2023-05-16 NOTE — TELEPHONE ENCOUNTER
Patient called our offices, cancelled his MRI as well as his follow up with our offices. Patient stated he would call us back to reschedule the follow up visit, has elected to not have the MRI at this time.

## 2023-05-19 ENCOUNTER — HOSPITAL ENCOUNTER (OUTPATIENT)
Facility: HOSPITAL | Age: 73
Setting detail: OBSERVATION
Discharge: HOME | End: 2023-05-22
Attending: EMERGENCY MEDICINE | Admitting: INTERNAL MEDICINE
Payer: MEDICARE

## 2023-05-19 ENCOUNTER — APPOINTMENT (OUTPATIENT)
Dept: CARDIOLOGY | Facility: HOSPITAL | Age: 73
End: 2023-05-19
Payer: MEDICARE

## 2023-05-19 DIAGNOSIS — C91.12 CLL (CHRONIC LYMPHOID LEUKEMIA) IN RELAPSE: ICD-10-CM

## 2023-05-19 DIAGNOSIS — N17.9 AKI (ACUTE KIDNEY INJURY): ICD-10-CM

## 2023-05-19 DIAGNOSIS — I82.421 ACUTE DEEP VEIN THROMBOSIS (DVT) OF ILIAC VEIN OF RIGHT LOWER EXTREMITY: Primary | ICD-10-CM

## 2023-05-19 LAB
ALBUMIN SERPL-MCNC: 3.6 G/DL (ref 3.5–5.2)
ALBUMIN/GLOB SERPL: 1.5 G/DL
ALP SERPL-CCNC: 184 U/L (ref 39–117)
ALT SERPL W P-5'-P-CCNC: 53 U/L (ref 1–41)
ANION GAP SERPL CALCULATED.3IONS-SCNC: 12 MMOL/L (ref 5–15)
APTT PPP: 29.2 SECONDS (ref 22.7–35.4)
AST SERPL-CCNC: 31 U/L (ref 1–40)
BH CV LOW VAS RIGHT COMMON FEMORAL SPONT: 1
BH CV LOW VAS RIGHT DISTAL FEMORAL SPONT: 1
BH CV LOW VAS RIGHT EXTERNAL ILIAC SPONT: 1
BH CV LOW VAS RIGHT GASTRONEMIUS VESSEL: 1
BH CV LOW VAS RIGHT LESSER SAPH VESSEL: 1
BH CV LOW VAS RIGHT MID FEMORAL SPONT: 1
BH CV LOW VAS RIGHT PERONEAL VESSEL: 1
BH CV LOW VAS RIGHT POPLITEAL SPONT: 1
BH CV LOW VAS RIGHT POSTERIOR TIBIAL VESSEL: 1
BH CV LOW VAS RIGHT PROFUNDA FEMORAL SPONT: 1
BH CV LOW VAS RIGHT PROXIMAL FEMORAL SPONT: 1
BH CV LOW VAS RIGHT SAPHENOFEMORAL JUNCTION SPONT: 1
BH CV LOW VAS RIGHT SOLEAL VESSEL: 1
BH CV LOWER VASCULAR LEFT COMMON FEMORAL AUGMENT: NORMAL
BH CV LOWER VASCULAR LEFT COMMON FEMORAL COMPETENT: NORMAL
BH CV LOWER VASCULAR LEFT COMMON FEMORAL COMPRESS: NORMAL
BH CV LOWER VASCULAR LEFT COMMON FEMORAL PHASIC: NORMAL
BH CV LOWER VASCULAR LEFT COMMON FEMORAL SPONT: NORMAL
BH CV LOWER VASCULAR RIGHT COMMON FEMORAL AUGMENT: NORMAL
BH CV LOWER VASCULAR RIGHT COMMON FEMORAL COMPRESS: NORMAL
BH CV LOWER VASCULAR RIGHT COMMON FEMORAL PHASIC: NORMAL
BH CV LOWER VASCULAR RIGHT COMMON FEMORAL SPONT: NORMAL
BH CV LOWER VASCULAR RIGHT COMMON FEMORAL THROMBUS: NORMAL
BH CV LOWER VASCULAR RIGHT DISTAL FEMORAL COMPRESS: NORMAL
BH CV LOWER VASCULAR RIGHT DISTAL FEMORAL THROMBUS: NORMAL
BH CV LOWER VASCULAR RIGHT EXTERNAL ILIAC AUGMENT: NORMAL
BH CV LOWER VASCULAR RIGHT EXTERNAL ILIAC COMPRESS: NORMAL
BH CV LOWER VASCULAR RIGHT EXTERNAL ILIAC PHASIC: NORMAL
BH CV LOWER VASCULAR RIGHT EXTERNAL ILIAC SPONT: NORMAL
BH CV LOWER VASCULAR RIGHT EXTERNAL ILIAC THROMBUS: NORMAL
BH CV LOWER VASCULAR RIGHT GASTRONEMIUS COMPRESS: NORMAL
BH CV LOWER VASCULAR RIGHT GASTRONEMIUS THROMBUS: NORMAL
BH CV LOWER VASCULAR RIGHT GREATER SAPH AK COMPRESS: NORMAL
BH CV LOWER VASCULAR RIGHT GREATER SAPH BK COMPRESS: NORMAL
BH CV LOWER VASCULAR RIGHT LESSER SAPH COMPRESS: NORMAL
BH CV LOWER VASCULAR RIGHT LESSER SAPH THROMBUS: NORMAL
BH CV LOWER VASCULAR RIGHT MID FEMORAL AUGMENT: NORMAL
BH CV LOWER VASCULAR RIGHT MID FEMORAL COMPRESS: NORMAL
BH CV LOWER VASCULAR RIGHT MID FEMORAL PHASIC: NORMAL
BH CV LOWER VASCULAR RIGHT MID FEMORAL SPONT: NORMAL
BH CV LOWER VASCULAR RIGHT MID FEMORAL THROMBUS: NORMAL
BH CV LOWER VASCULAR RIGHT PERONEAL COMPRESS: NORMAL
BH CV LOWER VASCULAR RIGHT PERONEAL THROMBUS: NORMAL
BH CV LOWER VASCULAR RIGHT POPLITEAL AUGMENT: NORMAL
BH CV LOWER VASCULAR RIGHT POPLITEAL COMPRESS: NORMAL
BH CV LOWER VASCULAR RIGHT POPLITEAL PHASIC: NORMAL
BH CV LOWER VASCULAR RIGHT POPLITEAL SPONT: NORMAL
BH CV LOWER VASCULAR RIGHT POPLITEAL THROMBUS: NORMAL
BH CV LOWER VASCULAR RIGHT POSTERIOR TIBIAL COMPRESS: NORMAL
BH CV LOWER VASCULAR RIGHT POSTERIOR TIBIAL THROMBUS: NORMAL
BH CV LOWER VASCULAR RIGHT PROFUNDA FEMORAL COMPRESS: NORMAL
BH CV LOWER VASCULAR RIGHT PROFUNDA FEMORAL THROMBUS: NORMAL
BH CV LOWER VASCULAR RIGHT PROXIMAL FEMORAL COMPRESS: NORMAL
BH CV LOWER VASCULAR RIGHT PROXIMAL FEMORAL THROMBUS: NORMAL
BH CV LOWER VASCULAR RIGHT SAPHENOFEMORAL JUNCTION COMPRESS: NORMAL
BH CV LOWER VASCULAR RIGHT SAPHENOFEMORAL JUNCTION THROMBUS: NORMAL
BH CV LOWER VASCULAR RIGHT SOLEAL COMPRESS: NORMAL
BH CV LOWER VASCULAR RIGHT SOLEAL THROMBUS: NORMAL
BH CV VAS PRELIMINARY FINDINGS SCRIPTING: 1
BILIRUB SERPL-MCNC: 0.3 MG/DL (ref 0–1.2)
BUN SERPL-MCNC: 35 MG/DL (ref 8–23)
BUN/CREAT SERPL: 12.8 (ref 7–25)
CALCIUM SPEC-SCNC: 8.8 MG/DL (ref 8.6–10.5)
CHLORIDE SERPL-SCNC: 107 MMOL/L (ref 98–107)
CO2 SERPL-SCNC: 22 MMOL/L (ref 22–29)
CREAT SERPL-MCNC: 2.73 MG/DL (ref 0.76–1.27)
DEPRECATED RDW RBC AUTO: 58 FL (ref 37–54)
EGFRCR SERPLBLD CKD-EPI 2021: 24 ML/MIN/1.73
ERYTHROCYTE [DISTWIDTH] IN BLOOD BY AUTOMATED COUNT: 16.6 % (ref 12.3–15.4)
GLOBULIN UR ELPH-MCNC: 2.4 GM/DL
GLUCOSE SERPL-MCNC: 165 MG/DL (ref 65–99)
HCT VFR BLD AUTO: 30.7 % (ref 37.5–51)
HGB BLD-MCNC: 9.8 G/DL (ref 13–17.7)
INR PPP: 1.02 (ref 0.9–1.1)
LYMPHOCYTES # BLD MANUAL: 91.17 10*3/MM3 (ref 0.7–3.1)
LYMPHOCYTES NFR BLD MANUAL: 3 % (ref 5–12)
MAXIMAL PREDICTED HEART RATE: 148 BPM
MCH RBC QN AUTO: 31 PG (ref 26.6–33)
MCHC RBC AUTO-ENTMCNC: 31.9 G/DL (ref 31.5–35.7)
MCV RBC AUTO: 97.2 FL (ref 79–97)
MONOCYTES # BLD: 2.91 10*3/MM3 (ref 0.1–0.9)
NEUTROPHILS # BLD AUTO: 2.91 10*3/MM3 (ref 1.7–7)
NEUTROPHILS NFR BLD MANUAL: 3 % (ref 42.7–76)
PLAT MORPH BLD: NORMAL
PLATELET # BLD AUTO: 149 10*3/MM3 (ref 140–450)
PMV BLD AUTO: 9.1 FL (ref 6–12)
POTASSIUM SERPL-SCNC: 4.1 MMOL/L (ref 3.5–5.2)
PROT SERPL-MCNC: 6 G/DL (ref 6–8.5)
PROTHROMBIN TIME: 13.5 SECONDS (ref 11.7–14.2)
RBC # BLD AUTO: 3.16 10*6/MM3 (ref 4.14–5.8)
RBC MORPH BLD: NORMAL
SMUDGE CELLS BLD QL SMEAR: ABNORMAL
SODIUM SERPL-SCNC: 141 MMOL/L (ref 136–145)
STRESS TARGET HR: 126 BPM
VARIANT LYMPHS NFR BLD MANUAL: 94 % (ref 19.6–45.3)
WBC NRBC COR # BLD: 96.99 10*3/MM3 (ref 3.4–10.8)

## 2023-05-19 PROCEDURE — 99284 EMERGENCY DEPT VISIT MOD MDM: CPT

## 2023-05-19 PROCEDURE — 85610 PROTHROMBIN TIME: CPT | Performed by: PHYSICIAN ASSISTANT

## 2023-05-19 PROCEDURE — G0378 HOSPITAL OBSERVATION PER HR: HCPCS

## 2023-05-19 PROCEDURE — 85730 THROMBOPLASTIN TIME PARTIAL: CPT | Performed by: PHYSICIAN ASSISTANT

## 2023-05-19 PROCEDURE — 85025 COMPLETE CBC W/AUTO DIFF WBC: CPT | Performed by: PHYSICIAN ASSISTANT

## 2023-05-19 PROCEDURE — 25010000002 ENOXAPARIN PER 10 MG: Performed by: PHYSICIAN ASSISTANT

## 2023-05-19 PROCEDURE — 80053 COMPREHEN METABOLIC PANEL: CPT | Performed by: PHYSICIAN ASSISTANT

## 2023-05-19 PROCEDURE — 93971 EXTREMITY STUDY: CPT

## 2023-05-19 PROCEDURE — 96372 THER/PROPH/DIAG INJ SC/IM: CPT

## 2023-05-19 PROCEDURE — 85007 BL SMEAR W/DIFF WBC COUNT: CPT | Performed by: PHYSICIAN ASSISTANT

## 2023-05-19 RX ORDER — FINASTERIDE 5 MG/1
5 TABLET, FILM COATED ORAL DAILY
Status: DISCONTINUED | OUTPATIENT
Start: 2023-05-19 | End: 2023-05-22 | Stop reason: HOSPADM

## 2023-05-19 RX ORDER — ACETAMINOPHEN 325 MG/1
650 TABLET ORAL EVERY 4 HOURS PRN
Status: DISCONTINUED | OUTPATIENT
Start: 2023-05-19 | End: 2023-05-22 | Stop reason: HOSPADM

## 2023-05-19 RX ORDER — AMOXICILLIN 250 MG
2 CAPSULE ORAL 2 TIMES DAILY
Status: DISCONTINUED | OUTPATIENT
Start: 2023-05-19 | End: 2023-05-22 | Stop reason: HOSPADM

## 2023-05-19 RX ORDER — AMLODIPINE BESYLATE 5 MG/1
10 TABLET ORAL DAILY
Status: DISCONTINUED | OUTPATIENT
Start: 2023-05-19 | End: 2023-05-21

## 2023-05-19 RX ORDER — ONDANSETRON 2 MG/ML
4 INJECTION INTRAMUSCULAR; INTRAVENOUS EVERY 6 HOURS PRN
Status: DISCONTINUED | OUTPATIENT
Start: 2023-05-19 | End: 2023-05-22 | Stop reason: HOSPADM

## 2023-05-19 RX ORDER — CALCIUM CARBONATE 500 MG/1
1 TABLET, CHEWABLE ORAL DAILY
Status: DISCONTINUED | OUTPATIENT
Start: 2023-05-19 | End: 2023-05-22 | Stop reason: HOSPADM

## 2023-05-19 RX ORDER — ONDANSETRON 4 MG/1
4 TABLET, FILM COATED ORAL EVERY 6 HOURS PRN
Status: DISCONTINUED | OUTPATIENT
Start: 2023-05-19 | End: 2023-05-22 | Stop reason: HOSPADM

## 2023-05-19 RX ORDER — ENOXAPARIN SODIUM 100 MG/ML
1 INJECTION SUBCUTANEOUS EVERY 24 HOURS
Status: DISCONTINUED | OUTPATIENT
Start: 2023-05-20 | End: 2023-05-20

## 2023-05-19 RX ORDER — ENOXAPARIN SODIUM 100 MG/ML
1 INJECTION SUBCUTANEOUS ONCE
Status: COMPLETED | OUTPATIENT
Start: 2023-05-19 | End: 2023-05-19

## 2023-05-19 RX ORDER — BISACODYL 5 MG/1
5 TABLET, DELAYED RELEASE ORAL DAILY PRN
Status: DISCONTINUED | OUTPATIENT
Start: 2023-05-19 | End: 2023-05-22 | Stop reason: HOSPADM

## 2023-05-19 RX ORDER — FAMOTIDINE 20 MG/1
20 TABLET, FILM COATED ORAL DAILY
Status: DISCONTINUED | OUTPATIENT
Start: 2023-05-19 | End: 2023-05-22 | Stop reason: HOSPADM

## 2023-05-19 RX ORDER — BISACODYL 10 MG
10 SUPPOSITORY, RECTAL RECTAL DAILY PRN
Status: DISCONTINUED | OUTPATIENT
Start: 2023-05-19 | End: 2023-05-22 | Stop reason: HOSPADM

## 2023-05-19 RX ORDER — UREA 10 %
3 LOTION (ML) TOPICAL NIGHTLY PRN
Status: DISCONTINUED | OUTPATIENT
Start: 2023-05-19 | End: 2023-05-22 | Stop reason: HOSPADM

## 2023-05-19 RX ORDER — TAMSULOSIN HYDROCHLORIDE 0.4 MG/1
0.4 CAPSULE ORAL DAILY
Status: DISCONTINUED | OUTPATIENT
Start: 2023-05-19 | End: 2023-05-22 | Stop reason: HOSPADM

## 2023-05-19 RX ORDER — POLYETHYLENE GLYCOL 3350 17 G/17G
17 POWDER, FOR SOLUTION ORAL DAILY PRN
Status: DISCONTINUED | OUTPATIENT
Start: 2023-05-19 | End: 2023-05-22 | Stop reason: HOSPADM

## 2023-05-19 RX ADMIN — SODIUM CHLORIDE 1 DROP: 20 SOLUTION OPHTHALMIC at 20:53

## 2023-05-19 RX ADMIN — METOPROLOL TARTRATE 12.5 MG: 25 TABLET, FILM COATED ORAL at 20:54

## 2023-05-19 RX ADMIN — Medication 3 MG: at 22:27

## 2023-05-19 RX ADMIN — ENOXAPARIN SODIUM 60 MG: 100 INJECTION SUBCUTANEOUS at 15:41

## 2023-05-19 RX ADMIN — ACETAMINOPHEN 650 MG: 325 TABLET, FILM COATED ORAL at 22:27

## 2023-05-19 NOTE — TELEPHONE ENCOUNTER
Spoke to patient I informed him that there's no appointments today he can been seen at the ER or Immediate care center patient verbalized and understands

## 2023-05-19 NOTE — ED NOTES
"Nursing report ED to floor  Angel Cisneros  73 y.o.  male    HPI :   Chief Complaint   Patient presents with    Leg Swelling       Admitting doctor:   Anastasia Means MD    Admitting diagnosis:   The primary encounter diagnosis was Acute deep vein thrombosis (DVT) of iliac vein of right lower extremity. Diagnoses of HADLEY (acute kidney injury) and CLL (chronic lymphoid leukemia) in relapse were also pertinent to this visit.    Code status:   Current Code Status       Date Active Code Status Order ID Comments User Context       Prior            Allergies:   Ambien [zolpidem tartrate] and Penicillins    Isolation:   No active isolations    Intake and Output  No intake or output data in the 24 hours ending 05/19/23 1716    Weight:       05/19/23  1208   Weight: 63.5 kg (140 lb)       Most recent vitals:   Vitals:    05/19/23 1204 05/19/23 1208 05/19/23 1529   BP:  127/62 128/81   Pulse: 95  75   Resp: 18  16   Temp: 97.7 °F (36.5 °C)     SpO2: 97%  98%   Weight:  63.5 kg (140 lb)    Height:  172.7 cm (68\")        Active LDAs/IV Access:   Lines, Drains & Airways       Active LDAs       Name Placement date Placement time Site Days    Peripheral IV 05/19/23 1225 Right Antecubital 05/19/23  1225  Antecubital  less than 1                    Labs (abnormal labs have a star):   Labs Reviewed   COMPREHENSIVE METABOLIC PANEL - Abnormal; Notable for the following components:       Result Value    Glucose 165 (*)     BUN 35 (*)     Creatinine 2.73 (*)     ALT (SGPT) 53 (*)     Alkaline Phosphatase 184 (*)     eGFR 24.0 (*)     All other components within normal limits    Narrative:     GFR Normal >60  Chronic Kidney Disease <60  Kidney Failure <15    The GFR formula is only valid for adults with stable renal function between ages 18 and 70.   CBC WITH AUTO DIFFERENTIAL - Abnormal; Notable for the following components:    WBC 96.99 (*)     RBC 3.16 (*)     Hemoglobin 9.8 (*)     Hematocrit 30.7 (*)     MCV 97.2 (*)     " RDW 16.6 (*)     RDW-SD 58.0 (*)     All other components within normal limits   MANUAL DIFFERENTIAL - Abnormal; Notable for the following components:    Neutrophil % 3.0 (*)     Lymphocyte % 94.0 (*)     Monocyte % 3.0 (*)     Lymphocytes Absolute 91.17 (*)     Monocytes Absolute 2.91 (*)     All other components within normal limits   PROTIME-INR - Normal   APTT - Normal   CBC AND DIFFERENTIAL    Narrative:     The following orders were created for panel order CBC & Differential.  Procedure                               Abnormality         Status                     ---------                               -----------         ------                     CBC Auto Differential[[id number]]        Abnormal            Final result                 Please view results for these tests on the individual orders.       EKG:   No orders to display       Meds given in ED:   Medications   Enoxaparin Sodium (LOVENOX) syringe 60 mg (60 mg Subcutaneous Given 5/19/23 1541)       Imaging results:  No radiology results for the last day    Ambulatory status:   - assist x1    Social issues:   Social History     Socioeconomic History    Marital status:    Tobacco Use    Smoking status: Never    Smokeless tobacco: Never   Vaping Use    Vaping Use: Never used   Substance and Sexual Activity    Alcohol use: No    Drug use: No    Sexual activity: Defer       NIH Stroke Scale:         Sangeetha Grande RN  05/19/23 17:16 EDT

## 2023-05-19 NOTE — ED PROVIDER NOTES
EMERGENCY DEPARTMENT ENCOUNTER    Room Number:  S423/1  Date of encounter:  5/19/2023  PCP: Ayaan Amin MD  Historian: Patient, spouse  Chronic or social conditions impacting care (social determinants of health): Nothing      I used full protective equipment while examining this patient.  This includes face mask, gloves and protective eyewear.  I washed my hands before entering the room and immediately upon leaving the room      HPI:  Chief Complaint: Right calf pain  A complete HPI/ROS/PMH/PSH/SH/FH are unobtainable due to: Nothing    Context: Angel Cisneros is a 73 y.o. male who presents to the ED c/o 3-day history of gradual onset, constant, progressively worsening right calf pain and swelling.  Patient denies any known trauma or injury.  He denies any chest pain or shortness of breath.  He denies any history of DVTs.  He takes no blood thinners.    Patient does have history of CLL.  He reports that his white blood cell count has been progressively increasing.  He is due for a bone marrow biopsy next week.  He follows with [facility].    Review of prior external notes (non-ED):   I reviewed orthopedic office visit from 5/15/2023.  Patient being seen for right knee pain.    Review of prior external test results outside of this encounter:  I reviewed CBC from 12/21/2022.  White blood cell count 15,000.    PAST MEDICAL HISTORY  Active Ambulatory Problems     Diagnosis Date Noted   • Lymphoma    • Leukemia    • Chest pain 09/07/2018   • GERD (gastroesophageal reflux disease) 09/07/2018   • HTN (hypertension) 09/07/2018   • Chronic kidney disease, stage 3b 09/08/2018   • Generalized abdominal pain 10/03/2018   • CLL (chronic lymphocytic leukemia) (HCC) 10/03/2018   • Gastroesophageal reflux disease 10/09/2018   • Hodgkin's disease of lymph nodes of head, face, and neck (HCC) 04/01/2013   • Oral thrush 11/05/2019   • Medicare annual wellness visit, subsequent 11/20/2019   • Primary insomnia  06/03/2020   • Adjustment disorder with depressed mood 11/23/2020   • Dysuria 12/04/2020   • Gross hematuria 12/07/2020   • Acute non-recurrent maxillary sinusitis 01/18/2021   • Cough 01/18/2021   • BPH (benign prostatic hyperplasia) 03/16/2021   • Elevated PSA 03/17/2021   • Clinical diagnosis of COVID-19 09/03/2021   • Pneumonia due to COVID-19 virus 09/05/2021   • Paroxysmal atrial fibrillation (HCC) 09/06/2021   • Acute respiratory failure with hypoxia (HCC) 09/06/2021   • Critical illness myopathy 10/09/2021   • Cytokine release syndrome, grade 4 10/16/2021   • Hypoxia 12/02/2021   • Stage 3a chronic kidney disease 02/02/2022   • Right hip pain 07/07/2022   • Medication side effect, initial encounter 07/07/2022   • Primary osteoarthritis of right knee 09/01/2022   • Greater trochanteric bursitis of right hip 09/01/2022   • Hordeolum externum (stye) 12/07/2022   • Immunization deficiency 12/07/2022   • Sciatica of right side 02/02/2023   • Chronic right-sided low back pain with right-sided sciatica 02/02/2023   • Lymphadenopathy, abdominal 03/08/2023     Resolved Ambulatory Problems     Diagnosis Date Noted   • Bradycardia 09/07/2018   • Acute constipation 10/07/2021     Past Medical History:   Diagnosis Date   • Arthritis    • Cancer    • Cataracts, bilateral    • History of COVID-19 09/2021   • Hx of cornea transplant          PAST SURGICAL HISTORY  Past Surgical History:   Procedure Laterality Date   • BREAST LUMPECTOMY Right    • COLONOSCOPY N/A 3/8/2016    Procedure: COLONOSCOPY with cold polypectomy X 3;  Surgeon: Chris Harden MD;  Location: [facility] ENDOSCOPY;  Service:    • ENDOSCOPY N/A 10/23/2018    Procedure: ESOPHAGOGASTRODUODENOSCOPY WITH BIOPSY;  Surgeon: Chris Harden MD;  Location: [facility] ENDOSCOPY;  Service: Gastroenterology   • EYE SURGERY      partial cornea transplant 4-5 years ago   • KNEE SURGERY Left    • TOE SURGERY Bilateral    • TONSILLECTOMY           FAMILY HISTORY  Family  History   Problem Relation Age of Onset   • Kidney cancer Mother          SOCIAL HISTORY  Social History     Socioeconomic History   • Marital status:    Tobacco Use   • Smoking status: Never     Passive exposure: Never   • Smokeless tobacco: Never   Vaping Use   • Vaping Use: Never used   Substance and Sexual Activity   • Alcohol use: No   • Drug use: No   • Sexual activity: Defer         ALLERGIES  Ambien [zolpidem tartrate] and Penicillins        REVIEW OF SYSTEMS  All systems reviewed and negative except for those discussed in HPI.       PHYSICAL EXAM    I have reviewed the triage vital signs and nursing notes.    ED Triage Vitals   Temp Heart Rate Resp BP SpO2   05/19/23 1204 05/19/23 1204 05/19/23 1204 05/19/23 1208 05/19/23 1204   97.7 °F (36.5 °C) 95 18 127/62 97 %      Temp src Heart Rate Source Patient Position BP Location FiO2 (%)   -- -- -- -- --              Physical Exam  GENERAL: Alert, oriented, not distressed  HENT: head atraumatic, no nuchal rigidity  EYES: no scleral icterus, EOMI  CV: regular rhythm, regular rate, no murmur  RESPIRATORY: normal effort, CTA  ABDOMEN: soft, nontender  MUSCULOSKELETAL: Moderate tenderness and swelling to the right calf.  Neurovascularly intact distally.  Painless range of motion of the right foot.  No significant erythema.  NEURO: alert, moves all extremities, follows commands  SKIN: warm, dry        LAB RESULTS  Recent Results (from the past 24 hour(s))   Comprehensive Metabolic Panel    Collection Time: 05/19/23 12:30 PM    Specimen: Blood   Result Value Ref Range    Glucose 165 (H) 65 - 99 mg/dL    BUN 35 (H) 8 - 23 mg/dL    Creatinine 2.73 (H) 0.76 - 1.27 mg/dL    Sodium 141 136 - 145 mmol/L    Potassium 4.1 3.5 - 5.2 mmol/L    Chloride 107 98 - 107 mmol/L    CO2 22.0 22.0 - 29.0 mmol/L    Calcium 8.8 8.6 - 10.5 mg/dL    Total Protein 6.0 6.0 - 8.5 g/dL    Albumin 3.6 3.5 - 5.2 g/dL    ALT (SGPT) 53 (H) 1 - 41 U/L    AST (SGOT) 31 1 - 40 U/L    Alkaline  Phosphatase 184 (H) 39 - 117 U/L    Total Bilirubin 0.3 0.0 - 1.2 mg/dL    Globulin 2.4 gm/dL    A/G Ratio 1.5 g/dL    BUN/Creatinine Ratio 12.8 7.0 - 25.0    Anion Gap 12.0 5.0 - 15.0 mmol/L    eGFR 24.0 (L) >60.0 mL/min/1.73   Protime-INR    Collection Time: 05/19/23 12:30 PM    Specimen: Blood   Result Value Ref Range    Protime 13.5 11.7 - 14.2 Seconds    INR 1.02 0.90 - 1.10   aPTT    Collection Time: 05/19/23 12:30 PM    Specimen: Blood   Result Value Ref Range    PTT 29.2 22.7 - 35.4 seconds   CBC Auto Differential    Collection Time: 05/19/23 12:30 PM    Specimen: Blood   Result Value Ref Range    WBC 96.99 (C) 3.40 - 10.80 10*3/mm3    RBC 3.16 (L) 4.14 - 5.80 10*6/mm3    Hemoglobin 9.8 (L) 13.0 - 17.7 g/dL    Hematocrit 30.7 (L) 37.5 - 51.0 %    MCV 97.2 (H) 79.0 - 97.0 fL    MCH 31.0 26.6 - 33.0 pg    MCHC 31.9 31.5 - 35.7 g/dL    RDW 16.6 (H) 12.3 - 15.4 %    RDW-SD 58.0 (H) 37.0 - 54.0 fl    MPV 9.1 6.0 - 12.0 fL    Platelets 149 140 - 450 10*3/mm3   Manual Differential    Collection Time: 05/19/23 12:30 PM    Specimen: Blood   Result Value Ref Range    Neutrophil % 3.0 (L) 42.7 - 76.0 %    Lymphocyte % 94.0 (H) 19.6 - 45.3 %    Monocyte % 3.0 (L) 5.0 - 12.0 %    Neutrophils Absolute 2.91 1.70 - 7.00 10*3/mm3    Lymphocytes Absolute 91.17 (H) 0.70 - 3.10 10*3/mm3    Monocytes Absolute 2.91 (H) 0.10 - 0.90 10*3/mm3    RBC Morphology Normal Normal    Smudge Cells Slight/1+ None Seen    Platelet Morphology Normal Normal   Duplex Venous Lower Extremity - Right CAR    Collection Time: 05/19/23  2:59 PM   Result Value Ref Range    Target HR (85%) 126 bpm    Max. Pred. HR (100%) 148 bpm    Right External Iliac Spont 1.0     Right Common Femoral Spont 1.0     Right Saphenofemoral Junction Spont 1.0     Right Profunda Femoral Spont 1.0     Right Proximal Femoral Spont 1.0     Right Mid Femoral Spont 1.0     Right Distal Femoral Spont 1.0     Right Popliteal Spont 1.0     Right Posterior Tibial Vessel 1.0      Right Peroneal Vessel 1.0     Right Gastronemius Vessel 1.0     BH CV LOW VAS RIGHT SOLEAL VESSEL 1.0     Right Lesser Saph Vessel 1.0     Right External Iliac Spont N     Right External Iliac Phasic N     Right External Iliac Compress P     Right External Iliac Augment N     Right External Iliac Thrombus A     Right Common Femoral Spont N     Right Common Femoral Phasic N     Right Common Femoral Compress P     Right Common Femoral Augment N     Right Common Femoral Thrombus A     Right Saphenofemoral Junction Compress P     Right Saphenofemoral Junction Thrombus A     Right Profunda Femoral Compress N     Right Profunda Femoral Thrombus A     Right Proximal Femoral Compress N     Right Proximal Femoral Thrombus A     Right Mid Femoral Spont N     Right Mid Femoral Phasic N     Right Mid Femoral Compress N     Right Mid Femoral Augment N     Right Mid Femoral Thrombus A     Right Distal Femoral Compress N     Right Distal Femoral Thrombus A     Right Popliteal Spont N     Right Popliteal Phasic N     Right Popliteal Compress N     Right Popliteal Augment N     Right Popliteal Thrombus A     Right Posterior Tibial Compress N     Right Posterior Tibial Thrombus A     Right Peroneal Compress N     Right Peroneal Thrombus A     Right Gastronemius Compress N     Right Gastronemius Thrombus A      CV LOWER VASCULAR RIGHT SOLEAL COMPRESS N      CV LOWER VASCULAR RIGHT SOLEAL THROMBUS A     Right Greater Saph AK Compress C     Right Greater Saph BK Compress C     Right Lesser Saph Compress N     Right Lesser Saph Thrombus A     Left Common Femoral Spont Y     Left Common Femoral Competent Y     Left Common Femoral Phasic Y     Left Common Femoral Compress C     Left Common Femoral Augment Y     BH CV VAS PRELIMINARY FINDINGS SCRIPTING 1.0        Ordered the above labs and independently reviewed the results.        RADIOLOGY  Duplex Venous Lower Extremity - Right CAR    Result Date: 5/19/2023  •  Acute right lower  extremity deep vein thrombosis noted in the external iliac, common femoral, deep femoral, proximal femoral, mid femoral, distal femoral, popliteal, posterior tibial, peroneal, gastrocnemius and soleal. •  Acute right lower extremity superficial thrombophlebitis noted in the saphenofemoral junction and small saphenous. •  All other right sided veins appeared normal.       I ordered the above noted radiological studies. Reviewed by me and discussed with radiologist.  See dictation for official radiology interpretation.      MEDICATIONS GIVEN IN ER    Medications   acetaminophen (TYLENOL) tablet 650 mg (has no administration in time range)   sennosides-docusate (PERICOLACE) 8.6-50 MG per tablet 2 tablet (has no administration in time range)     And   polyethylene glycol (MIRALAX) packet 17 g (has no administration in time range)     And   bisacodyl (DULCOLAX) EC tablet 5 mg (has no administration in time range)     And   bisacodyl (DULCOLAX) suppository 10 mg (has no administration in time range)   ondansetron (ZOFRAN) tablet 4 mg (has no administration in time range)     Or   ondansetron (ZOFRAN) injection 4 mg (has no administration in time range)   melatonin tablet 3 mg (has no administration in time range)   Enoxaparin Sodium (LOVENOX) syringe 60 mg (has no administration in time range)   amLODIPine (NORVASC) tablet 10 mg (has no administration in time range)   calcium carbonate (TUMS) chewable tablet 500 mg (200 mg elemental) (has no administration in time range)   famotidine (PEPCID) tablet 20 mg (has no administration in time range)   finasteride (PROSCAR) tablet 5 mg (has no administration in time range)   metoprolol tartrate (LOPRESSOR) tablet 12.5 mg (has no administration in time range)   sodium chloride (SENA 128) 2 % ophthalmic solution 1 drop (has no administration in time range)   tamsulosin (FLOMAX) 24 hr capsule 0.4 mg (has no administration in time range)   Enoxaparin Sodium (LOVENOX) syringe 60 mg  (60 mg Subcutaneous Given 5/19/23 1541)         ADDITIONAL ORDERS CONSIDERED BUT NOT ORDERED:  Nothing      PROGRESS, DATA ANALYSIS, CONSULTS, AND MEDICAL DECISION MAKING    All labs have been independently interpreted by myself.  All radiology studies have been independently interpreted by myself and discussed with radiologist dictating the report.   EKG's independently interpreted by myself.  Discussion below represents my analysis of pertinent findings related to patient's condition, differential diagnosis, treatment plan and final disposition.    I have discussed case with Dr. Hernandez, emergency room physician.  He has performed his own bedside examination and agrees with treatment plan.    ED Course as of 05/19/23 1954   Fri May 19, 2023   1220 Patient presents with 3-day history of atraumatic right calf swelling.  Patient does have history of CLL.  High suspicion of DVT.  Will order ultrasound. [EE]   1254 WBC(!!): 96.99  Patient with history of CLL [EE]   1355 Creatinine(!): 2.73  1.35 one year ago [WH]   1505 I discussed ultrasound findings with the lesion.  Patient has an acute DVT extending from the calf all the way up to the iliac vein. [EE]   1510 Creatinine(!): 2.73  This was 2.21, on 4/7/2023 [EE]   1706 I discussed case with Dr. Means [facility].  She agrees to admit. [EE]      ED Course User Index  [EE] Canelo Armstrong PA  [WH] Angel Hernandez MD       AS OF 19:54 EDT VITALS:    BP - 138/73  HR - 84  TEMP - 99.3 °F (37.4 °C) (Oral)  O2 SATS - 97%        DIAGNOSIS  Final diagnoses:   Acute deep vein thrombosis (DVT) of iliac vein of right lower extremity   HADLEY (acute kidney injury)   CLL (chronic lymphoid leukemia) in relapse         DISPOSITION  Admitted      Dictated utilizing Dragon dictation     Canelo Armstrong PA  05/19/23 1955

## 2023-05-19 NOTE — TELEPHONE ENCOUNTER
Caller: Angel Cisneros    Relationship to patient: Self    Best call back number: [phone]     Chief complaint: PAIN IN LOWER LEFT LEG     Type of visit: OFFICE    Requested date: ASAP    If rescheduling, when is the original appointment:     Additional notes:PATIENT CALLING STATING THAT HE HAS BEEN EXPERIENCING PAIN IN THE LOWER LEFT LEG HE WOULD LIKE TO KNOW IF THERE IS APPOINTMENT OPEN TO GET CHECKED OUT

## 2023-05-19 NOTE — H&P
HISTORY AND PHYSICAL   [facility]        Date of Admission: 2023  Patient Identification:  Name: Angel Cisneros  Age: 73 y.o.  Sex: male  :  1950  MRN: [id number]                     Primary Care Physician: Ayaan Amin MD    Chief Complaint:  73 year old gentleman who presented to the emergency room with pain and swelling of his right leg which started two days ago; no fever or chills; denies shortness of breath or chest pain; he has a history of cll and is supposed to have a bone marrow biopsy on monday    History of Present Illness:   As above    Past Medical History:  Past Medical History:   Diagnosis Date   • Arthritis     both knees   • Cancer     dx with lymphoma / radiation   • Cataracts, bilateral    • History of COVID-19 2021   • Hx of cornea transplant     both eyes   • Leukemia    • Lymphoma     dx in . treated with radiation.   • Lymphoma     treated with radiation. dx in      Past Surgical History:  Past Surgical History:   Procedure Laterality Date   • BREAST LUMPECTOMY Right    • COLONOSCOPY N/A 3/8/2016    Procedure: COLONOSCOPY with cold polypectomy X 3;  Surgeon: Chris Harden MD;  Location: [facility] ENDOSCOPY;  Service:    • ENDOSCOPY N/A 10/23/2018    Procedure: ESOPHAGOGASTRODUODENOSCOPY WITH BIOPSY;  Surgeon: Chris Harden MD;  Location: [facility] ENDOSCOPY;  Service: Gastroenterology   • EYE SURGERY      partial cornea transplant 4-5 years ago   • KNEE SURGERY Left    • TOE SURGERY Bilateral    • TONSILLECTOMY        Home Meds:  Medications Prior to Admission   Medication Sig Dispense Refill Last Dose   • amLODIPine (NORVASC) 10 MG tablet Take 1 tablet by mouth Daily. 90 tablet 3 2023   • famotidine (PEPCID) 20 MG tablet Take 1 tablet by mouth Daily. 90 tablet 3 2023   • finasteride (Proscar) 5 MG tablet Take 1 tablet by mouth Daily. 90 tablet 3 2023   • fluorometholone (FML) 0.1 % ophthalmic suspension Administer 1  drop to both eyes Daily.   5/19/2023   • sodium chloride (SENA 128) 2 % ophthalmic solution 1 drop.   5/19/2023   • tamsulosin (FLOMAX) 0.4 MG capsule 24 hr capsule Take 1 capsule by mouth Daily. 90 capsule 3 5/19/2023   • acetaminophen (TYLENOL) 325 MG tablet Take 2 tablets by mouth Every 4 (Four) Hours As Needed for Mild Pain .      • calcium carbonate (TUMS) 500 MG chewable tablet Chew 1 tablet Daily.      • cetirizine (zyrTEC) 10 MG tablet Take 1 tablet by mouth Daily for 30 days. 30 tablet 0    • Diclofenac Sodium (VOLTAREN) 1 % gel gel Apply 4 g topically to the appropriate area as directed 4 (Four) Times a Day. 100 g 0    • doxylamine (UNISOM) 25 MG tablet Take  by mouth.      • metoprolol tartrate (LOPRESSOR) 25 MG tablet 1/2 tablet twice a day 90 tablet 3    • predniSONE (DELTASONE) 20 MG tablet 3 a day for 3 days then 2 a day for 3 days then 1 a day for 3 days. 18 tablet 0    • vitamin D (ERGOCALCIFEROL) 1.25 MG (24532 UT) capsule capsule TAKE 1 CAPSULE (50,000 UNITS TOTAL) BY MOUTH ONCE WEEKLY          Allergies:  Allergies   Allergen Reactions   • Ambien [Zolpidem Tartrate] Mental Status Change   • Penicillins Unknown - Low Severity     unknown childhood allergy     Immunizations:  Immunization History   Administered Date(s) Administered   • COVID-19 (PFIZER) BIVALENT 12+YRS 12/09/2022   • COVID-19 (PFIZER) Purple Cap Monovalent 04/24/2021, 05/15/2021, 12/02/2021   • Covid-19 (Pfizer) Gray Cap Monovalent 04/18/2022   • FluLaval/Fluzone >6mos 11/20/2019   • Fluzone High Dose =>65 Years ([employer] ONLY) 11/07/2018, 11/04/2020, 12/02/2021   • Fluzone High-Dose 65+yrs 12/02/2021, 12/07/2022   • Influenza, Unspecified 11/20/2019, 11/04/2020, 12/07/2022   • Pneumococcal Conjugate 13-Valent (PCV13) 04/18/2022   • Pneumococcal Polysaccharide (PPSV23) 10/17/2013, 03/16/2021     Social History:   Social History     Social History Narrative   • Not on file     Social History     Socioeconomic History   • Marital  "status:    Tobacco Use   • Smoking status: Never     Passive exposure: Never   • Smokeless tobacco: Never   Vaping Use   • Vaping Use: Never used   Substance and Sexual Activity   • Alcohol use: No   • Drug use: No   • Sexual activity: Defer       Family History:  Family History   Problem Relation Age of Onset   • Kidney cancer Mother         Review of Systems  See history of present illness and past medical history.  Patient denies headache, dizziness, syncope, falls, trauma, change in vision, change in hearing, change in taste, changes in weight, changes in appetite, focal weakness, numbness, or paresthesia.  Patient denies chest pain, palpitations, dyspnea, orthopnea, PND, cough, sinus pressure, rhinorrhea, epistaxis, hemoptysis, nausea, vomiting,hematemesis, diarrhea, constipation or hematochezia.  Denies cold or heat intolerance, polydipsia, polyuria, polyphagia. Denies hematuria, pyuria, dysuria, hesitancy, frequency or urgency. Denies consumption of raw and under cooked meats foods or change in water source.  Denies fever, chills, sweats, night sweats.  Denies missing any routine medications. Remainder of ROS is negative.    Objective:  T Max 24 hrs: Temp (24hrs), Av.2 °F (36.8 °C), Min:97.7 °F (36.5 °C), Max:99.3 °F (37.4 °C)    Vitals Ranges:   Temp:  [97.7 °F (36.5 °C)-99.3 °F (37.4 °C)] 99.3 °F (37.4 °C)  Heart Rate:  [72-95] 84  Resp:  [16-18] 17  BP: (106-138)/(62-81) 138/73      Exam:  /73 (BP Location: Left arm, Patient Position: Lying)   Pulse 84   Temp 99.3 °F (37.4 °C) (Oral)   Resp 17   Ht 172.7 cm (68\")   Wt 63.5 kg (140 lb)   SpO2 97%   BMI 21.29 kg/m²     General Appearance:    Alert, cooperative, no distress, appears stated age   Head:    Normocephalic, without obvious abnormality, atraumatic   Eyes:    PERRL, conjunctivae/corneas clear, EOM's intact, both eyes   Ears:    Normal external ear canals, both ears   Nose:   Nares normal, septum midline, mucosa normal, no " drainage    or sinus tenderness   Throat:   Lips, mucosa, and tongue normal   Neck:   Supple, symmetrical, trachea midline, no adenopathy;     thyroid:  no enlargement/tenderness/nodules; no carotid    bruit or JVD   Back:     Symmetric, no curvature, ROM normal, no CVA tenderness   Lungs:     Clear to auscultation bilaterally, respirations unlabored   Chest Wall:    No tenderness or deformity    Heart:    Regular rate and rhythm, S1 and S2 normal, no murmur, rub   or gallop   Abdomen:     Soft, nontender, bowel sounds active all four quadrants,     no masses, no hepatomegaly, no splenomegaly   Extremities:   Extremities normal, atraumatic, swelling of right lower extremity   Pulses:   2+ and symmetric all extremities   Skin:   Skin color, texture, turgor normal, no rashes or lesions               .    Data Review:  Labs in chart were reviewed.  WBC   Date Value Ref Range Status   05/19/2023 96.99 (C) 3.40 - 10.80 10*3/mm3 Final     Hemoglobin   Date Value Ref Range Status   05/19/2023 9.8 (L) 13.0 - 17.7 g/dL Final     Hematocrit   Date Value Ref Range Status   05/19/2023 30.7 (L) 37.5 - 51.0 % Final     Platelets   Date Value Ref Range Status   05/19/2023 149 140 - 450 10*3/mm3 Final     Sodium   Date Value Ref Range Status   05/19/2023 141 136 - 145 mmol/L Final     Potassium   Date Value Ref Range Status   05/19/2023 4.1 3.5 - 5.2 mmol/L Final     Chloride   Date Value Ref Range Status   05/19/2023 107 98 - 107 mmol/L Final     CO2   Date Value Ref Range Status   05/19/2023 22.0 22.0 - 29.0 mmol/L Final     BUN   Date Value Ref Range Status   05/19/2023 35 (H) 8 - 23 mg/dL Final     Creatinine   Date Value Ref Range Status   05/19/2023 2.73 (H) 0.76 - 1.27 mg/dL Final     Glucose   Date Value Ref Range Status   05/19/2023 165 (H) 65 - 99 mg/dL Final     Calcium   Date Value Ref Range Status   05/19/2023 8.8 8.6 - 10.5 mg/dL Final                Imaging Results (All)     None            Assessment:  Active  Hospital Problems    Diagnosis  POA   • **Acute deep vein thrombosis (DVT) of iliac vein of right lower extremity [I82.421]  Yes      Resolved Hospital Problems   No resolved problems to display.   cll  Anemia  ckd4  Hyperglycemia  Hypertension  underweight    Plan:  Continue lovenox daily  Ask hematology to see  He has canceled his bone marrow biopsy for Monday but may be difficult to schedule now due ac  He is followed by dr wooten at [facility]  Trend labs  D.w patient, patient's son per phone as well as ed provider    Anastasia Means MD  5/19/2023  19:40 EDT

## 2023-05-19 NOTE — ED PROVIDER NOTES
MD ATTESTATION NOTE    The FLORENTIN and I have discussed this patient's history, physical exam, and treatment plan.  I have reviewed the documentation and personally had a face to face interaction with the patient. I affirm the documentation and agree with the treatment and plan.  The attached note describes my personal findings.      I provided a substantive portion of the care of the patient.  I personally performed the physical exam in its entirety, and below are my findings.  For this patient encounter, the patient wore surgical mask, I wore full protective PPE including N95 and eye protection.      Brief HPI: Patient complains of right calf pain and swelling for the past 3 days.  Denies recent injury, chest pain, shortness of breath, or numbness/tingling/weakness in his extremities.  Denies history of DVT.    PHYSICAL EXAM  ED Triage Vitals   Temp Heart Rate Resp BP SpO2   05/19/23 1204 05/19/23 1204 05/19/23 1204 05/19/23 1208 05/19/23 1204   97.7 °F (36.5 °C) 95 18 127/62 97 %      Temp src Heart Rate Source Patient Position BP Location FiO2 (%)   -- -- -- -- --                GENERAL: Awake, alert, oriented x3.  Well-developed, well-nourished elderly male.  Resting comfortably in no acute distress  HENT: nares patent  EYES: no scleral icterus  CV: regular rhythm, normal rate, equal pedal pulses bilaterally  RESPIRATORY: normal effort, clear to auscultation bilaterally  ABDOMEN: soft, nontender  MUSCULOSKELETAL: There is right calf tenderness.  There is 1+ edema in the right lower leg.  Full range of motion in all extremities  NEURO: Normal strength and light touch sensation in both lower extremities  PSYCH:  calm, cooperative  SKIN: warm, dry    Vital signs and nursing notes reviewed.        Plan: Obtain labs and Doppler ultrasound of the right leg.    Ultrasound shows an extensive DVT in the right leg.  Patient will be started on Lovenox and admitted.    ED Course as of 05/20/23 0733   Fri May 19, 2023   1220  Patient presents with 3-day history of atraumatic right calf swelling.  Patient does have history of CLL.  High suspicion of DVT.  Will order ultrasound. [EE]   1254 WBC(!!): 96.99  Patient with history of CLL [EE]   1355 Creatinine(!): 2.73  1.35 one year ago [WH]   1505 I discussed ultrasound findings with the lesion.  Patient has an acute DVT extending from the calf all the way up to the iliac vein. [EE]   1510 Creatinine(!): 2.73  This was 2.21, on 4/7/2023 [EE]   1706 I discussed case with Dr. Means [facility].  She agrees to admit. [EE]      ED Course User Index  [EE] Canelo Armstrong PA  [] Angel Hernandez MD Holland, William D, MD  05/20/23 1135

## 2023-05-19 NOTE — ED NOTES
Patient to ER via car from [facility] R lower leg swelling and painful x 3 days    Patient denies hx of DVT

## 2023-05-20 ENCOUNTER — APPOINTMENT (OUTPATIENT)
Dept: GENERAL RADIOLOGY | Facility: HOSPITAL | Age: 73
End: 2023-05-20
Payer: MEDICARE

## 2023-05-20 ENCOUNTER — APPOINTMENT (OUTPATIENT)
Dept: MRI IMAGING | Facility: HOSPITAL | Age: 73
End: 2023-05-20
Payer: MEDICARE

## 2023-05-20 PROBLEM — N17.9 AKI (ACUTE KIDNEY INJURY): Status: ACTIVE | Noted: 2023-05-20

## 2023-05-20 LAB
ANION GAP SERPL CALCULATED.3IONS-SCNC: 11 MMOL/L (ref 5–15)
APTT PPP: 29.5 SECONDS (ref 22.7–35.4)
BUN SERPL-MCNC: 33 MG/DL (ref 8–23)
BUN/CREAT SERPL: 13.6 (ref 7–25)
CALCIUM SPEC-SCNC: 8.7 MG/DL (ref 8.6–10.5)
CHLORIDE SERPL-SCNC: 109 MMOL/L (ref 98–107)
CO2 SERPL-SCNC: 23 MMOL/L (ref 22–29)
CREAT SERPL-MCNC: 2.42 MG/DL (ref 0.76–1.27)
DEPRECATED RDW RBC AUTO: 58.1 FL (ref 37–54)
EGFRCR SERPLBLD CKD-EPI 2021: 27.7 ML/MIN/1.73
ERYTHROCYTE [DISTWIDTH] IN BLOOD BY AUTOMATED COUNT: 16.7 % (ref 12.3–15.4)
GLUCOSE SERPL-MCNC: 104 MG/DL (ref 65–99)
HBA1C MFR BLD: 6 % (ref 4.8–5.6)
HCT VFR BLD AUTO: 26.8 % (ref 37.5–51)
HGB BLD-MCNC: 8.8 G/DL (ref 13–17.7)
INR PPP: 0.98 (ref 0.9–1.1)
LDH SERPL-CCNC: 269 U/L (ref 135–225)
MCH RBC QN AUTO: 31.9 PG (ref 26.6–33)
MCHC RBC AUTO-ENTMCNC: 32.8 G/DL (ref 31.5–35.7)
MCV RBC AUTO: 97.1 FL (ref 79–97)
PLATELET # BLD AUTO: 153 10*3/MM3 (ref 140–450)
PMV BLD AUTO: 9.6 FL (ref 6–12)
POTASSIUM SERPL-SCNC: 3.9 MMOL/L (ref 3.5–5.2)
PROTHROMBIN TIME: 13.1 SECONDS (ref 11.7–14.2)
RBC # BLD AUTO: 2.76 10*6/MM3 (ref 4.14–5.8)
SODIUM SERPL-SCNC: 143 MMOL/L (ref 136–145)
URATE SERPL-MCNC: 8.9 MG/DL (ref 3.4–7)
WBC NRBC COR # BLD: 94.36 10*3/MM3 (ref 3.4–10.8)

## 2023-05-20 PROCEDURE — 96365 THER/PROPH/DIAG IV INF INIT: CPT

## 2023-05-20 PROCEDURE — 96366 THER/PROPH/DIAG IV INF ADDON: CPT

## 2023-05-20 PROCEDURE — 84550 ASSAY OF BLOOD/URIC ACID: CPT | Performed by: INTERNAL MEDICINE

## 2023-05-20 PROCEDURE — 36415 COLL VENOUS BLD VENIPUNCTURE: CPT | Performed by: INTERNAL MEDICINE

## 2023-05-20 PROCEDURE — 25010000002 HEPARIN (PORCINE) 25000-0.45 UT/250ML-% SOLUTION: Performed by: INTERNAL MEDICINE

## 2023-05-20 PROCEDURE — 99205 OFFICE O/P NEW HI 60 MIN: CPT | Performed by: INTERNAL MEDICINE

## 2023-05-20 PROCEDURE — G0378 HOSPITAL OBSERVATION PER HR: HCPCS

## 2023-05-20 PROCEDURE — 85730 THROMBOPLASTIN TIME PARTIAL: CPT | Performed by: INTERNAL MEDICINE

## 2023-05-20 PROCEDURE — 85610 PROTHROMBIN TIME: CPT | Performed by: INTERNAL MEDICINE

## 2023-05-20 PROCEDURE — 85027 COMPLETE CBC AUTOMATED: CPT | Performed by: INTERNAL MEDICINE

## 2023-05-20 PROCEDURE — 73721 MRI JNT OF LWR EXTRE W/O DYE: CPT

## 2023-05-20 PROCEDURE — 80048 BASIC METABOLIC PNL TOTAL CA: CPT | Performed by: INTERNAL MEDICINE

## 2023-05-20 PROCEDURE — 83615 LACTATE (LD) (LDH) ENZYME: CPT | Performed by: INTERNAL MEDICINE

## 2023-05-20 PROCEDURE — 83036 HEMOGLOBIN GLYCOSYLATED A1C: CPT | Performed by: INTERNAL MEDICINE

## 2023-05-20 PROCEDURE — 77075 RADEX OSSEOUS SURVEY COMPL: CPT

## 2023-05-20 PROCEDURE — 85730 THROMBOPLASTIN TIME PARTIAL: CPT | Performed by: HOSPITALIST

## 2023-05-20 RX ORDER — FAMOTIDINE 20 MG/1
TABLET, FILM COATED ORAL
Qty: 90 TABLET | Refills: 3 | Status: SHIPPED | OUTPATIENT
Start: 2023-05-20

## 2023-05-20 RX ORDER — CALCIUM CARBONATE 500 MG/1
2 TABLET, CHEWABLE ORAL 2 TIMES DAILY PRN
Status: DISCONTINUED | OUTPATIENT
Start: 2023-05-20 | End: 2023-05-22 | Stop reason: HOSPADM

## 2023-05-20 RX ORDER — HEPARIN SODIUM 5000 [USP'U]/ML
40-80 INJECTION, SOLUTION INTRAVENOUS; SUBCUTANEOUS EVERY 6 HOURS PRN
Status: DISCONTINUED | OUTPATIENT
Start: 2023-05-20 | End: 2023-05-22 | Stop reason: HOSPADM

## 2023-05-20 RX ORDER — HEPARIN SODIUM 10000 [USP'U]/100ML
18 INJECTION, SOLUTION INTRAVENOUS
Status: DISCONTINUED | OUTPATIENT
Start: 2023-05-20 | End: 2023-05-22 | Stop reason: HOSPADM

## 2023-05-20 RX ADMIN — Medication 3 MG: at 23:18

## 2023-05-20 RX ADMIN — ANTACID TABLETS 2 TABLET: 500 TABLET, CHEWABLE ORAL at 04:36

## 2023-05-20 RX ADMIN — METOPROLOL TARTRATE 12.5 MG: 25 TABLET, FILM COATED ORAL at 08:46

## 2023-05-20 RX ADMIN — SODIUM CHLORIDE 1 DROP: 20 SOLUTION OPHTHALMIC at 12:53

## 2023-05-20 RX ADMIN — HEPARIN SODIUM 18 UNITS/KG/HR: 10000 INJECTION, SOLUTION INTRAVENOUS at 18:00

## 2023-05-20 RX ADMIN — SODIUM CHLORIDE 1 DROP: 20 SOLUTION OPHTHALMIC at 08:56

## 2023-05-20 RX ADMIN — AMLODIPINE BESYLATE 10 MG: 5 TABLET ORAL at 08:46

## 2023-05-20 RX ADMIN — ACETAMINOPHEN 650 MG: 325 TABLET, FILM COATED ORAL at 10:33

## 2023-05-20 RX ADMIN — DOCUSATE SODIUM 50MG AND SENNOSIDES 8.6MG 2 TABLET: 8.6; 5 TABLET, FILM COATED ORAL at 08:45

## 2023-05-20 RX ADMIN — SODIUM CHLORIDE 1 DROP: 20 SOLUTION OPHTHALMIC at 20:17

## 2023-05-20 RX ADMIN — METOPROLOL TARTRATE 12.5 MG: 25 TABLET, FILM COATED ORAL at 20:16

## 2023-05-20 RX ADMIN — FINASTERIDE 5 MG: 5 TABLET, FILM COATED ORAL at 08:47

## 2023-05-20 RX ADMIN — TAMSULOSIN HYDROCHLORIDE 0.4 MG: 0.4 CAPSULE ORAL at 08:47

## 2023-05-20 RX ADMIN — ACETAMINOPHEN 650 MG: 325 TABLET, FILM COATED ORAL at 20:16

## 2023-05-20 NOTE — PROGRESS NOTES
"DAILY PROGRESS NOTE  [facility]    Patient Identification:  Name: Angel Cisneros  Age: 73 y.o.  Sex: male  :  1950  MRN: [id number]         Primary Care Physician: Ayaan Amin MD    Subjective:  Interval History:He is feeling better today.  Objective:    Scheduled Meds:amLODIPine, 10 mg, Oral, Daily  calcium carbonate, 1 tablet, Oral, Daily  enoxaparin, 1 mg/kg, Subcutaneous, Q24H  famotidine, 20 mg, Oral, Daily  finasteride, 5 mg, Oral, Daily  metoprolol tartrate, 12.5 mg, Oral, Q12H  senna-docusate sodium, 2 tablet, Oral, BID  sodium chloride, 1 drop, Both Eyes, Q4H  tamsulosin, 0.4 mg, Oral, Daily      Continuous Infusions:     Vital signs in last 24 hours:  Temp:  [97.9 °F (36.6 °C)-100.1 °F (37.8 °C)] 98.4 °F (36.9 °C)  Heart Rate:  [64-84] 78  Resp:  [16-18] 18  BP: (113-140)/(69-81) 126/69    Intake/Output:    Intake/Output Summary (Last 24 hours) at 2023 1510  Last data filed at 2023 0650  Gross per 24 hour   Intake 720 ml   Output 650 ml   Net 70 ml       Exam:  /69 (BP Location: Right arm, Patient Position: Lying)   Pulse 78   Temp 98.4 °F (36.9 °C) (Oral)   Resp 18   Ht 172.7 cm (68\")   Wt 63.1 kg (139 lb 3.2 oz)   SpO2 96%   BMI 21.17 kg/m²     General Appearance:    Alert, cooperative, no distress   Head:    Normocephalic, without obvious abnormality, atraumatic   Eyes:       Throat:   Lips, tongue, gums normal   Neck:   Supple, symmetrical, trachea midline, no JVD   Lungs:     Clear to auscultation bilaterally, respirations unlabored   Chest Wall:    No tenderness or deformity    Heart:    Regular rate and rhythm, S1 and S2 normal, no murmur,no  Rub or gallop   Abdomen:     Soft, nontender, bowel sounds active, no masses, no organomegaly    Extremities:   Extremities normal, atraumatic, no cyanosis or less swelling right leg.   Pulses:      Skin:   Skin is warm and dry,  no rashes or palpable lesions   Neurologic:   no focal deficits noted "      Lab Results (last 72 hours)     Procedure Component Value Units Date/Time    Basic Metabolic Panel [[id number]]  (Abnormal) Collected: 05/20/23 0420    Specimen: Blood Updated: 05/20/23 0506     Glucose 104 mg/dL      BUN 33 mg/dL      Creatinine 2.42 mg/dL      Sodium 143 mmol/L      Potassium 3.9 mmol/L      Chloride 109 mmol/L      CO2 23.0 mmol/L      Calcium 8.7 mg/dL      BUN/Creatinine Ratio 13.6     Anion Gap 11.0 mmol/L      eGFR 27.7 mL/min/1.73     Narrative:      GFR Normal >60  Chronic Kidney Disease <60  Kidney Failure <15    The GFR formula is only valid for adults with stable renal function between ages 18 and 70.    Hemoglobin A1c [[id number]]  (Abnormal) Collected: 05/20/23 0420    Specimen: Blood Updated: 05/20/23 0458     Hemoglobin A1C 6.00 %     Narrative:      Hemoglobin A1C Ranges:    Increased Risk for Diabetes  5.7% to 6.4%  Diabetes                     >= 6.5%  Diabetic Goal                < 7.0%    CBC (No Diff) [[id number]]  (Abnormal) Collected: 05/20/23 0420    Specimen: Blood Updated: 05/20/23 0457     WBC 94.36 10*3/mm3      RBC 2.76 10*6/mm3      Hemoglobin 8.8 g/dL      Hematocrit 26.8 %      MCV 97.1 fL      MCH 31.9 pg      MCHC 32.8 g/dL      RDW 16.7 %      RDW-SD 58.1 fl      MPV 9.6 fL      Platelets 153 10*3/mm3     Manual Differential [[id number]]  (Abnormal) Collected: 05/19/23 1230    Specimen: Blood Updated: 05/19/23 1310     Neutrophil % 3.0 %      Lymphocyte % 94.0 %      Monocyte % 3.0 %      Neutrophils Absolute 2.91 10*3/mm3      Lymphocytes Absolute 91.17 10*3/mm3      Monocytes Absolute 2.91 10*3/mm3      RBC Morphology Normal     Smudge Cells Slight/1+     Platelet Morphology Normal    Comprehensive Metabolic Panel [[id number]]  (Abnormal) Collected: 05/19/23 1230    Specimen: Blood Updated: 05/19/23 1256     Glucose 165 mg/dL      BUN 35 mg/dL      Creatinine 2.73 mg/dL      Sodium 141 mmol/L      Potassium 4.1 mmol/L      Chloride 107 mmol/L      CO2 22.0  mmol/L      Calcium 8.8 mg/dL      Total Protein 6.0 g/dL      Albumin 3.6 g/dL      ALT (SGPT) 53 U/L      AST (SGOT) 31 U/L      Alkaline Phosphatase 184 U/L      Total Bilirubin 0.3 mg/dL      Globulin 2.4 gm/dL      A/G Ratio 1.5 g/dL      BUN/Creatinine Ratio 12.8     Anion Gap 12.0 mmol/L      eGFR 24.0 mL/min/1.73     Narrative:      GFR Normal >60  Chronic Kidney Disease <60  Kidney Failure <15    The GFR formula is only valid for adults with stable renal function between ages 18 and 70.    Protime-INR [[id number]]  (Normal) Collected: 05/19/23 1230    Specimen: Blood Updated: 05/19/23 1249     Protime 13.5 Seconds      INR 1.02    aPTT [[id number]]  (Normal) Collected: 05/19/23 1230    Specimen: Blood Updated: 05/19/23 1249     PTT 29.2 seconds     CBC & Differential [[id number]]  (Abnormal) Collected: 05/19/23 1230    Specimen: Blood Updated: 05/19/23 1245    Narrative:      The following orders were created for panel order CBC & Differential.  Procedure                               Abnormality         Status                     ---------                               -----------         ------                     CBC Auto Differential[[id number]]        Abnormal            Final result                 Please view results for these tests on the individual orders.    CBC Auto Differential [[id number]]  (Abnormal) Collected: 05/19/23 1230    Specimen: Blood Updated: 05/19/23 1245     WBC 96.99 10*3/mm3      RBC 3.16 10*6/mm3      Hemoglobin 9.8 g/dL      Hematocrit 30.7 %      MCV 97.2 fL      MCH 31.0 pg      MCHC 31.9 g/dL      RDW 16.6 %      RDW-SD 58.0 fl      MPV 9.1 fL      Platelets 149 10*3/mm3         Data Review:  Results from last 7 days   Lab Units 05/20/23  0420 05/19/23  1230   SODIUM mmol/L 143 141   POTASSIUM mmol/L 3.9 4.1   CHLORIDE mmol/L 109* 107   CO2 mmol/L 23.0 22.0   BUN mg/dL 33* 35*   CREATININE mg/dL 2.42* 2.73*   GLUCOSE mg/dL 104* 165*   CALCIUM mg/dL 8.7 8.8     Results from last 7  days   Lab Units 05/20/23  0420 05/19/23  1230   WBC 10*3/mm3 94.36* 96.99*   HEMOGLOBIN g/dL 8.8* 9.8*   HEMATOCRIT % 26.8* 30.7*   PLATELETS 10*3/mm3 153 149         Results from last 7 days   Lab Units 05/20/23  0420   HEMOGLOBIN A1C % 6.00*     Lab Results   Lab Value Date/Time    TROPONINT 0.011 09/05/2021 1653    TROPONINT <0.010 09/08/2018 0450    TROPONINT <0.010 09/07/2018 2232    TROPONINT <0.010 09/07/2018 1426         Results from last 7 days   Lab Units 05/19/23  1230   ALK PHOS U/L 184*   BILIRUBIN mg/dL 0.3   ALT (SGPT) U/L 53*   AST (SGOT) U/L 31         Results from last 7 days   Lab Units 05/20/23  0420   HEMOGLOBIN A1C % 6.00*     No results found for: POCGLU  Results from last 7 days   Lab Units 05/19/23  1230   INR  1.02       Past Medical History:   Diagnosis Date   • Arthritis     both knees   • Cancer     dx with lymphoma 2009/ radiation   • Cataracts, bilateral    • History of COVID-19 09/2021   • Hx of cornea transplant     both eyes   • Leukemia    • Lymphoma     dx in 2009. treated with radiation.   • Lymphoma     treated with radiation. dx in 2009       Assessment:  Active Hospital Problems    Diagnosis  POA   • **Acute deep vein thrombosis (DVT) of iliac vein of right lower extremity [I82.421]  Yes   • HADLEY (acute kidney injury) [N17.9]  Unknown   • Lymphadenopathy, abdominal [R59.0]  Yes   • Paroxysmal atrial fibrillation (HCC) [I48.0]  Yes   • CLL (chronic lymphocytic leukemia) (HCC) [C91.10]  Yes   • GERD (gastroesophageal reflux disease) [K21.9]  Yes   • Leukemia [C95.90]  Yes      Resolved Hospital Problems   No resolved problems to display.       Plan:  Continue with anticoagulation. Follow lab. Await hematology.    Fer Urbano MD  5/20/2023  15:10 EDT

## 2023-05-20 NOTE — CONSULTS
[facility] CBC GROUP INITIAL INPATIENT CONSULTATION NOTE    REASON FOR CONSULTATION:    Right lower extremity iliac, femoral, popliteal, calf DVT in the setting of CLL    HISTORY OF PRESENT ILLNESS:  Angel Cisneros is a 73 y.o. male who we are asked to see today in consultation for the above issues.    Patient with past medical history significant for CLL followed by Dr. Childress in the [facility] system, CKD3b, and previous lymphocyte predominant Hodgkin's lymphoma.    Patient is followed by Dr. Childress in hematology/medical oncology at [facility]. Patient previously was treated for lymphocyte predominant Hodgkin's lymphoma with involved field radiation (30 Gray) to the neck completed in December 2009 and has continued on a course of observation.  Incidentally at the time of diagnosis of his Hodgkin's lymphoma he was found to also have CLL/SLL involvement in bone marrow (greater than 40%).  This occurred while living [facility] [place] [facility]. In March 2010, patient moved to [place] and was seen by Dr. Childress. He underwent bone marrow biopsy 10/8/2013 which showed evidence of CLL/SLL. Received treatment with Bendamustine/rituximab beginning 10/21/2013 and received 6 cycles of therapy completed in March 2014.  He subsequently initiated treatment with Imbruvica in October 2017.  Patient had excellent response.  He was hospitalized for COVID-19 pneumonia September through October 2021 and ibrutinib was held.  He remained off of treatment since that time.  Most recent labs at [facility] on 12/21/2022 with WBC 14.94, differential 46 segs, 40 lymphs, 2 eosinophils with 13 atypical lymphs, hemoglobin 12.8, platelet count 406,000, IgG 638, IgA 56, IgM 156, free kappa light chain 18.98, free lambda light chain 76.26 with free light chain ratio 0.25.  IgM lambda monoclonal protein identified on immunofixation but not measurable.  Creatinine was 2.1. Patient with worsening chronic low back pain, underwent MRI  lumbar spine on 3/8/2023 which showed degenerative changes, did incidentally note retroperitoneal lymphadenopathy with 3 cm infrarenal and 2.5 cm right iliac lymph nodes identified. Patient developed worsening pain in his right knee.  He was seen by orthopedics on 5/15/2023 with x-ray that showed lucencies around the proximal tibia, avascular necrosis and femoral head collapse.  Stat MRI was recommended however he did not pursue this. Patient reports that he is currently scheduled for [facility].  Dr. Childress was planning to refer the patient to [facility] Caba at [facility] to treat his CLL thereafter.    He developed pain and swelling in the right lower extremity and presented to the emergency department on 5/19/2023.  Doppler 5/19/2023 showed acute right external iliac, femoral, popliteal, calf DVT along with superficial thrombophlebitis.  Patient was admitted and placed on Lovenox 1 mg/kg every 24 hours (60 mg, renally dosed).     On admission 5/19/2023, WBC was 96.99 with differential of 3 segs, 94 lymphs, 3 monocytes with hemoglobin 9.8, platelet count 149,000.  Creatinine was 2.73 with BUN 35 (baseline creatinine in the low 2 range)      Past Medical History:   Diagnosis Date   • Arthritis     both knees   • Cancer     dx with lymphoma 2009/ radiation   • Cataracts, bilateral    • History of COVID-19 09/2021   • Hx of cornea transplant     both eyes   • Leukemia    • Lymphoma     dx in 2009. treated with radiation.   • Lymphoma     treated with radiation. dx in 2009       Past Surgical History:   Procedure Laterality Date   • BREAST LUMPECTOMY Right    • COLONOSCOPY N/A 3/8/2016    Procedure: COLONOSCOPY with cold polypectomy X 3;  Surgeon: Chris Harden MD;  Location: [facility] ENDOSCOPY;  Service:    • ENDOSCOPY N/A 10/23/2018    Procedure: ESOPHAGOGASTRODUODENOSCOPY WITH BIOPSY;  Surgeon: Chris Harden MD;  Location: [facility] ENDOSCOPY;  Service: Gastroenterology   • EYE  SURGERY      partial cornea transplant 4-5 years ago   • KNEE SURGERY Left    • TOE SURGERY Bilateral    • TONSILLECTOMY         SOCIAL HISTORY:   reports that he has never smoked. He has never been exposed to tobacco smoke. He has never used smokeless tobacco. He reports that he does not drink alcohol and does not use drugs.    FAMILY HISTORY:  family history includes Kidney cancer in his mother.    ALLERGIES:  Allergies   Allergen Reactions   • Ambien [Zolpidem Tartrate] Mental Status Change   • Penicillins Unknown - Low Severity     unknown childhood allergy       MEDICATIONS:  As listed in the electronic medical record.    Review of Systems   A comprehensive review of systems was obtained with pertinent positive findings as noted in the interval history above.  All other systems negative.    Vitals:    05/19/23 2306 05/20/23 0416 05/20/23 0500 05/20/23 0709   BP: 133/77 113/70  140/77   BP Location: Left arm Right arm  Right arm   Patient Position: Lying Lying  Lying   Pulse: 80 64  69   Resp: 16 16  18   Temp: 100.1 °F (37.8 °C) 98.2 °F (36.8 °C)  97.9 °F (36.6 °C)   TempSrc: Oral Oral  Oral   SpO2: 96% 95%  95%   Weight:   63.1 kg (139 lb 3.2 oz)    Height:           Physical Exam  Constitutional:       Appearance: He is well-developed.   Eyes:      Conjunctiva/sclera: Conjunctivae normal.   Neck:      Thyroid: No thyromegaly.   Cardiovascular:      Rate and Rhythm: Normal rate and regular rhythm.      Heart sounds: No murmur heard.    No friction rub. No gallop.   Pulmonary:      Effort: No respiratory distress.      Breath sounds: Normal breath sounds.   Abdominal:      General: Bowel sounds are normal. There is no distension.      Palpations: Abdomen is soft.      Tenderness: There is no abdominal tenderness.   Musculoskeletal:         General: Swelling present.      Comments: Trace edema left lower extremity, 1+ edema right lower extremity   Lymphadenopathy:      Head:      Right side of head: No  submandibular adenopathy.      Cervical: No cervical adenopathy.      Upper Body:      Right upper body: No supraclavicular adenopathy.      Left upper body: No supraclavicular adenopathy.   Skin:     General: Skin is warm and dry.      Findings: No rash.   Neurological:      Mental Status: He is alert and oriented to person, place, and time.      Cranial Nerves: No cranial nerve deficit.      Motor: No abnormal muscle tone.      Deep Tendon Reflexes: Reflexes normal.   Psychiatric:         Behavior: Behavior normal.         DIAGNOSTIC DATA:    Results from last 7 days   Lab Units 05/20/23  0420 05/19/23  1230   WBC 10*3/mm3 94.36* 96.99*   HEMOGLOBIN g/dL 8.8* 9.8*   HEMATOCRIT % 26.8* 30.7*   PLATELETS 10*3/mm3 153 149      Results from last 7 days   Lab Units 05/20/23  0420 05/19/23  1230   SODIUM mmol/L 143 141   POTASSIUM mmol/L 3.9 4.1   CHLORIDE mmol/L 109* 107   CO2 mmol/L 23.0 22.0   BUN mg/dL 33* 35*   CREATININE mg/dL 2.42* 2.73*   CALCIUM mg/dL 8.7 8.8   BILIRUBIN mg/dL  --  0.3   ALK PHOS U/L  --  184*   ALT (SGPT) U/L  --  53*   AST (SGOT) U/L  --  31   GLUCOSE mg/dL 104* 165*          Assessment & Plan   ASSESSMENT:  This is a 73 y.o. male with:    *Acute right iliac, femoral, popliteal, calf DVT  · Patient developed worsening pain in the right lower extremity and presented to the emergency department on 5/19/2023.    · Doppler 5/19/2023 showed acute right external iliac, femoral, popliteal, calf DVT along with superficial thrombophlebitis.    · Patient was admitted and placed on Lovenox 1 mg/kg every 24 hours (60 mg, renally dosed)  · We will transition from Lovenox to heparin drip in order to facilitate bone marrow biopsy on Monday.  · Patient with no signs or symptoms to suggest pulmonary embolism  · Limited Doppler performed of the left lower extremity (femoral only) and he does have some edema present, we will check full Doppler left leg as well.    *CLL with prior history of lymphocyte  predominant Hodgkin's lymphoma  · Patient is followed by Dr. Childress in hematology/medical oncology at [facility].    · Patient previously was treated for lymphocyte predominant Hodgkin's lymphoma with involved field radiation (30 Gray) to the neck completed in December 2009 and has continued on a course of observation.  Incidentally at the time of diagnosis of his Hodgkin's lymphoma he was found to also have CLL/SLL involvement in bone marrow (greater than 40%).  This occurred while living in [place] at [facility].    · In March 2010, patient moved to [place] and was seen by Dr. Childress.    · He underwent bone marrow biopsy 10/8/2013 which showed evidence of CLL/SLL.    · Received treatment with Bendamustine/rituximab beginning 10/21/2013 and received 6 cycles of therapy completed in March 2014.    · He subsequently initiated treatment with Imbruvica in October 2017.  Patient had excellent response.    · He was hospitalized for COVID-19 pneumonia September through October 2021 and ibrutinib was held.  He remained off of treatment since that time.    · Most recent labs at [facility] on 12/21/2022 with WBC 14.94, differential 46 segs, 40 lymphs, 2 eosinophils with 13 atypical lymphs, hemoglobin 12.8, platelet count 406,000, IgG 638, IgA 56, IgM 156, free kappa light chain 18.98, free lambda light chain 76.26 with free light chain ratio 0.25.  IgM lambda monoclonal protein identified on immunofixation but not measurable.  Creatinine was 2.1.  · Patient with worsening chronic low back pain, underwent MRI lumbar spine on 3/8/2023 which showed degenerative changes, did incidentally note retroperitoneal lymphadenopathy with 3 cm infrarenal and 2.5 cm right iliac lymph nodes identified.  · Patient developed worsening pain in his right knee.  He was seen by orthopedics on 5/15/2023 with x-ray that showed lucencies around the proximal tibia, avascular necrosis and femoral head collapse.  Stat MRI was recommended  however he did not pursue this.  · Patient reports that he is currently scheduled for bone marrow biopsy at [facility] on 5/22/2023.  Dr. Childress was planning to refer the patient to Dr. Caba at [facility] to treat his CLL thereafter.  · He developed worsening pain in the right lower extremity and presented to the emergency department on 5/19/2023.  Doppler 5/19/2023 showed acute right external iliac, femoral, popliteal, calf DVT along with superficial thrombophlebitis.  Patient was admitted and placed on Lovenox 1 mg/kg every 24 hours (60 mg, renally dosed)  · On admission 5/19/2023, WBC was 96.99 with differential of 3 segs, 94 lymphs, 3 monocytes with hemoglobin 9.8, platelet count 149,000.  Creatinine was 2.73 with BUN 35 (baseline creatinine in the low 2 range)  · Patient with obvious progression of his CLL with dramatic escalation of his WBC, currently 94.36 along with worsening anemia and evidence on recent MRI lumbar spine of retroperitoneal lymphadenopathy up to 3 cm in the infrarenal region and 2.5 cm in the right iliac region.  We will need to evaluate this further with CT chest abdomen pelvis (without IV contrast).  He has worsening anemia likely related to marrow involvement from his CLL as well.  There is evidence on plain film of the right knee performed by orthopedics have some lucencies in the bone which would be highly unusual for CLL involvement in question whether there is another process.  He will need bone imaging performed with bone scan, skeletal survey at least initially as well as MRI right knee.  Patient was scheduled for bone marrow biopsy on 5/22/2023 in the [facility] system and was being referred by Dr. Childress to Dr. Caba to manage his CLL.  We discussed obtaining the bone marrow biopsy while he is admitted and transitioning him to heparin drip before hand.  After the bone marrow biopsy was performed he can transition to oral Eliquis and be discharged home to follow-up at [facility] with  Dr. Caba regarding treatment.    *Bone lesions  · Patient developed worsening pain in his right knee.  He was seen by orthopedics on 5/15/2023 with x-ray that showed lucencies around the proximal tibia, avascular necrosis and femoral head collapse.  Stat MRI was recommended however he did not pursue this.  · Etiology of lucencies unclear, would be unusual for CLL to involve bone.  · We will obtain bone scan and skeletal survey as well as MRI right knee although we will avoid gadolinium given his renal function.    *CKD 3b  • Patient with baseline creatinine in the low 2 range  • Creatinine on 12/21/2022 was 2.1  • On admission 5/19/2023, creatinine was 2.73 with BUN 35  • Creatinine today improved at 2.42 with BUN 33  • Check uric acid    *Anemia  • Labs on 12/21/2022 with hemoglobin 12.8  • On admission 5/19/2023, hemoglobin 9.8  • Patient with apparent progression of CLL with known marrow involvement which is the likely the most significant cause of his worsening anemia  • Patient does have underlying CKD 3b which is contributing factor to his anemia  • We will send off anemia evaluation with iron panel, ferritin, B12, folate, erythropoietin level      PLAN:   1. Additional labs today with LDH, uric acid  2. In a.m. send serum protein electrophoresis, immunoelectrophoresis, quantitative immunoglobulins, free serum light chains  3. Discontinue Lovenox  4. Begin heparin drip weight-based protocol  5. CT chest abdomen pelvis (without IV contrast)  6. Bone scan  7. Skeletal survey  8. MRI right knee (without contrast)  9. Plan for bone marrow biopsy and aspiration with interventional radiology on Monday, 5/22/2023  10. Plan for discharge home on Monday, 5/22/2023 on Eliquis following bone marrow biopsy  11. Patient will subsequently need to follow-up with Dr. Childress/Rosales at [facility] in medical oncology regarding bone marrow biopsy results and treatment of progressive CLL  12. Daily CBC, CMP, LDH, uric acid,  PTT    Discussed with patient at bedside.      Rich Sanchez MD

## 2023-05-20 NOTE — PLAN OF CARE
Goal Outcome Evaluation:  Plan of Care Reviewed With: patient        Progress: no change  Outcome Evaluation: vitals stable.  pt expressed pain and heartburn.  meds given per orders.  will continue to monitor.

## 2023-05-21 ENCOUNTER — APPOINTMENT (OUTPATIENT)
Dept: NUCLEAR MEDICINE | Facility: HOSPITAL | Age: 73
End: 2023-05-21
Payer: MEDICARE

## 2023-05-21 ENCOUNTER — APPOINTMENT (OUTPATIENT)
Dept: CARDIOLOGY | Facility: HOSPITAL | Age: 73
End: 2023-05-21
Payer: MEDICARE

## 2023-05-21 ENCOUNTER — APPOINTMENT (OUTPATIENT)
Dept: CT IMAGING | Facility: HOSPITAL | Age: 73
End: 2023-05-21
Payer: MEDICARE

## 2023-05-21 LAB
ALBUMIN SERPL-MCNC: 3.7 G/DL (ref 3.5–5.2)
ALBUMIN/GLOB SERPL: 1.6 G/DL
ALP SERPL-CCNC: 237 U/L (ref 39–117)
ALT SERPL W P-5'-P-CCNC: 117 U/L (ref 1–41)
ANION GAP SERPL CALCULATED.3IONS-SCNC: 10 MMOL/L (ref 5–15)
APTT PPP: 114.9 SECONDS (ref 22.7–35.4)
APTT PPP: 119.6 SECONDS (ref 22.7–35.4)
APTT PPP: 63 SECONDS (ref 22.7–35.4)
APTT PPP: 65.3 SECONDS (ref 22.7–35.4)
AST SERPL-CCNC: 85 U/L (ref 1–40)
BH CV LOW VAS RIGHT COMMON FEMORAL SPONT: 1
BH CV LOWER VASCULAR LEFT COMMON FEMORAL AUGMENT: NORMAL
BH CV LOWER VASCULAR LEFT COMMON FEMORAL COMPETENT: NORMAL
BH CV LOWER VASCULAR LEFT COMMON FEMORAL COMPRESS: NORMAL
BH CV LOWER VASCULAR LEFT COMMON FEMORAL PHASIC: NORMAL
BH CV LOWER VASCULAR LEFT COMMON FEMORAL SPONT: NORMAL
BH CV LOWER VASCULAR LEFT DISTAL FEMORAL COMPRESS: NORMAL
BH CV LOWER VASCULAR LEFT GASTRONEMIUS COMPRESS: NORMAL
BH CV LOWER VASCULAR LEFT GREATER SAPH AK COMPRESS: NORMAL
BH CV LOWER VASCULAR LEFT GREATER SAPH BK COMPRESS: NORMAL
BH CV LOWER VASCULAR LEFT LESSER SAPH COMPRESS: NORMAL
BH CV LOWER VASCULAR LEFT MID FEMORAL AUGMENT: NORMAL
BH CV LOWER VASCULAR LEFT MID FEMORAL COMPETENT: NORMAL
BH CV LOWER VASCULAR LEFT MID FEMORAL COMPRESS: NORMAL
BH CV LOWER VASCULAR LEFT MID FEMORAL PHASIC: NORMAL
BH CV LOWER VASCULAR LEFT MID FEMORAL SPONT: NORMAL
BH CV LOWER VASCULAR LEFT PERONEAL COMPRESS: NORMAL
BH CV LOWER VASCULAR LEFT POPLITEAL AUGMENT: NORMAL
BH CV LOWER VASCULAR LEFT POPLITEAL COMPETENT: NORMAL
BH CV LOWER VASCULAR LEFT POPLITEAL COMPRESS: NORMAL
BH CV LOWER VASCULAR LEFT POPLITEAL PHASIC: NORMAL
BH CV LOWER VASCULAR LEFT POPLITEAL SPONT: NORMAL
BH CV LOWER VASCULAR LEFT POSTERIOR TIBIAL COMPRESS: NORMAL
BH CV LOWER VASCULAR LEFT PROFUNDA FEMORAL COMPRESS: NORMAL
BH CV LOWER VASCULAR LEFT PROXIMAL FEMORAL COMPRESS: NORMAL
BH CV LOWER VASCULAR LEFT SAPHENOFEMORAL JUNCTION COMPRESS: NORMAL
BH CV LOWER VASCULAR RIGHT COMMON FEMORAL AUGMENT: NORMAL
BH CV LOWER VASCULAR RIGHT COMMON FEMORAL COMPRESS: NORMAL
BH CV LOWER VASCULAR RIGHT COMMON FEMORAL PHASIC: NORMAL
BH CV LOWER VASCULAR RIGHT COMMON FEMORAL SPONT: NORMAL
BH CV LOWER VASCULAR RIGHT COMMON FEMORAL THROMBUS: NORMAL
BILIRUB SERPL-MCNC: 0.2 MG/DL (ref 0–1.2)
BUN SERPL-MCNC: 33 MG/DL (ref 8–23)
BUN/CREAT SERPL: 13.4 (ref 7–25)
CALCIUM SPEC-SCNC: 9.4 MG/DL (ref 8.6–10.5)
CHLORIDE SERPL-SCNC: 107 MMOL/L (ref 98–107)
CO2 SERPL-SCNC: 25 MMOL/L (ref 22–29)
CREAT SERPL-MCNC: 2.46 MG/DL (ref 0.76–1.27)
CREAT UR-MCNC: 102.4 MG/DL
DEPRECATED RDW RBC AUTO: 56.4 FL (ref 37–54)
EGFRCR SERPLBLD CKD-EPI 2021: 27.2 ML/MIN/1.73
ERYTHROCYTE [DISTWIDTH] IN BLOOD BY AUTOMATED COUNT: 16.5 % (ref 12.3–15.4)
FERRITIN SERPL-MCNC: 1255 NG/ML (ref 30–400)
GLOBULIN UR ELPH-MCNC: 2.3 GM/DL
GLUCOSE SERPL-MCNC: 103 MG/DL (ref 65–99)
HCT VFR BLD AUTO: 28.9 % (ref 37.5–51)
HGB BLD-MCNC: 9.6 G/DL (ref 13–17.7)
IRON 24H UR-MRATE: 79 MCG/DL (ref 59–158)
IRON SATN MFR SERPL: 36 % (ref 20–50)
LDH SERPL-CCNC: 262 U/L (ref 135–225)
LYMPHOCYTES # BLD MANUAL: 111.41 10*3/MM3 (ref 0.7–3.1)
LYMPHOCYTES NFR BLD MANUAL: 0.5 % (ref 5–12)
MAXIMAL PREDICTED HEART RATE: 148 BPM
MCH RBC QN AUTO: 31.8 PG (ref 26.6–33)
MCHC RBC AUTO-ENTMCNC: 33.2 G/DL (ref 31.5–35.7)
MCV RBC AUTO: 95.7 FL (ref 79–97)
MONOCYTES # BLD: 0.57 10*3/MM3 (ref 0.1–0.9)
NEUTROPHILS # BLD AUTO: 2.29 10*3/MM3 (ref 1.7–7)
NEUTROPHILS NFR BLD MANUAL: 2 % (ref 42.7–76)
NRBC SPEC MANUAL: 0.5 /100 WBC (ref 0–0.2)
PLAT MORPH BLD: NORMAL
PLATELET # BLD AUTO: 180 10*3/MM3 (ref 140–450)
PMV BLD AUTO: 9.7 FL (ref 6–12)
POTASSIUM SERPL-SCNC: 4.1 MMOL/L (ref 3.5–5.2)
PROT SERPL-MCNC: 6 G/DL (ref 6–8.5)
RBC # BLD AUTO: 3.02 10*6/MM3 (ref 4.14–5.8)
RBC MORPH BLD: NORMAL
SMUDGE CELLS BLD QL SMEAR: ABNORMAL
SODIUM SERPL-SCNC: 142 MMOL/L (ref 136–145)
STRESS TARGET HR: 126 BPM
TIBC SERPL-MCNC: 222 MCG/DL (ref 298–536)
TRANSFERRIN SERPL-MCNC: 149 MG/DL (ref 200–360)
URATE SERPL-MCNC: 8.3 MG/DL (ref 3.4–7)
VARIANT LYMPHS NFR BLD MANUAL: 2.5 % (ref 0–5)
VARIANT LYMPHS NFR BLD MANUAL: 95 % (ref 19.6–45.3)
WBC NRBC COR # BLD: 114.27 10*3/MM3 (ref 3.4–10.8)

## 2023-05-21 PROCEDURE — 71250 CT THORAX DX C-: CPT

## 2023-05-21 PROCEDURE — 82570 ASSAY OF URINE CREATININE: CPT | Performed by: INTERNAL MEDICINE

## 2023-05-21 PROCEDURE — A9503 TC99M MEDRONATE: HCPCS | Performed by: STUDENT IN AN ORGANIZED HEALTH CARE EDUCATION/TRAINING PROGRAM

## 2023-05-21 PROCEDURE — 85025 COMPLETE CBC W/AUTO DIFF WBC: CPT | Performed by: INTERNAL MEDICINE

## 2023-05-21 PROCEDURE — 82728 ASSAY OF FERRITIN: CPT | Performed by: INTERNAL MEDICINE

## 2023-05-21 PROCEDURE — 0 TECHNETIUM MEDRONATE KIT: Performed by: STUDENT IN AN ORGANIZED HEALTH CARE EDUCATION/TRAINING PROGRAM

## 2023-05-21 PROCEDURE — 84300 ASSAY OF URINE SODIUM: CPT | Performed by: INTERNAL MEDICINE

## 2023-05-21 PROCEDURE — 99232 SBSQ HOSP IP/OBS MODERATE 35: CPT | Performed by: INTERNAL MEDICINE

## 2023-05-21 PROCEDURE — G0378 HOSPITAL OBSERVATION PER HR: HCPCS

## 2023-05-21 PROCEDURE — 84466 ASSAY OF TRANSFERRIN: CPT | Performed by: INTERNAL MEDICINE

## 2023-05-21 PROCEDURE — 25010000002 HEPARIN (PORCINE) 25000-0.45 UT/250ML-% SOLUTION: Performed by: INTERNAL MEDICINE

## 2023-05-21 PROCEDURE — 93971 EXTREMITY STUDY: CPT

## 2023-05-21 PROCEDURE — 84165 PROTEIN E-PHORESIS SERUM: CPT | Performed by: INTERNAL MEDICINE

## 2023-05-21 PROCEDURE — 83615 LACTATE (LD) (LDH) ENZYME: CPT | Performed by: INTERNAL MEDICINE

## 2023-05-21 PROCEDURE — 74176 CT ABD & PELVIS W/O CONTRAST: CPT

## 2023-05-21 PROCEDURE — 84550 ASSAY OF BLOOD/URIC ACID: CPT | Performed by: INTERNAL MEDICINE

## 2023-05-21 PROCEDURE — 81003 URINALYSIS AUTO W/O SCOPE: CPT | Performed by: INTERNAL MEDICINE

## 2023-05-21 PROCEDURE — 85730 THROMBOPLASTIN TIME PARTIAL: CPT | Performed by: HOSPITALIST

## 2023-05-21 PROCEDURE — 78306 BONE IMAGING WHOLE BODY: CPT

## 2023-05-21 PROCEDURE — 80053 COMPREHEN METABOLIC PANEL: CPT | Performed by: INTERNAL MEDICINE

## 2023-05-21 PROCEDURE — 85730 THROMBOPLASTIN TIME PARTIAL: CPT | Performed by: INTERNAL MEDICINE

## 2023-05-21 PROCEDURE — 96366 THER/PROPH/DIAG IV INF ADDON: CPT

## 2023-05-21 PROCEDURE — 85007 BL SMEAR W/DIFF WBC COUNT: CPT | Performed by: INTERNAL MEDICINE

## 2023-05-21 PROCEDURE — 85730 THROMBOPLASTIN TIME PARTIAL: CPT | Performed by: STUDENT IN AN ORGANIZED HEALTH CARE EDUCATION/TRAINING PROGRAM

## 2023-05-21 PROCEDURE — 83521 IG LIGHT CHAINS FREE EACH: CPT | Performed by: INTERNAL MEDICINE

## 2023-05-21 PROCEDURE — 0 DIATRIZOATE MEGLUMINE & SODIUM PER 1 ML: Performed by: STUDENT IN AN ORGANIZED HEALTH CARE EDUCATION/TRAINING PROGRAM

## 2023-05-21 PROCEDURE — 86334 IMMUNOFIX E-PHORESIS SERUM: CPT | Performed by: INTERNAL MEDICINE

## 2023-05-21 PROCEDURE — 83540 ASSAY OF IRON: CPT | Performed by: INTERNAL MEDICINE

## 2023-05-21 PROCEDURE — 25010000002 HEPARIN (PORCINE) PER 1000 UNITS: Performed by: INTERNAL MEDICINE

## 2023-05-21 PROCEDURE — 96372 THER/PROPH/DIAG INJ SC/IM: CPT

## 2023-05-21 PROCEDURE — 82784 ASSAY IGA/IGD/IGG/IGM EACH: CPT | Performed by: INTERNAL MEDICINE

## 2023-05-21 RX ORDER — ALLOPURINOL 100 MG/1
200 TABLET ORAL DAILY
Status: DISCONTINUED | OUTPATIENT
Start: 2023-05-21 | End: 2023-05-22 | Stop reason: HOSPADM

## 2023-05-21 RX ORDER — TC 99M MEDRONATE 20 MG/10ML
21.1 INJECTION, POWDER, LYOPHILIZED, FOR SOLUTION INTRAVENOUS
Status: COMPLETED | OUTPATIENT
Start: 2023-05-21 | End: 2023-05-21

## 2023-05-21 RX ORDER — AMLODIPINE BESYLATE 5 MG/1
5 TABLET ORAL DAILY
Status: DISCONTINUED | OUTPATIENT
Start: 2023-05-22 | End: 2023-05-22 | Stop reason: HOSPADM

## 2023-05-21 RX ADMIN — ACETAMINOPHEN 650 MG: 325 TABLET, FILM COATED ORAL at 00:12

## 2023-05-21 RX ADMIN — HEPARIN SODIUM 17 UNITS/KG/HR: 10000 INJECTION, SOLUTION INTRAVENOUS at 17:10

## 2023-05-21 RX ADMIN — HEPARIN SODIUM 2500 UNITS: 5000 INJECTION INTRAVENOUS; SUBCUTANEOUS at 20:00

## 2023-05-21 RX ADMIN — SODIUM CHLORIDE 1 DROP: 20 SOLUTION OPHTHALMIC at 21:57

## 2023-05-21 RX ADMIN — SODIUM CHLORIDE 1 DROP: 20 SOLUTION OPHTHALMIC at 08:27

## 2023-05-21 RX ADMIN — DIATRIZOATE MEGLUMINE AND DIATRIZOATE SODIUM 30 ML: 660; 100 LIQUID ORAL; RECTAL at 10:50

## 2023-05-21 RX ADMIN — ACETAMINOPHEN 650 MG: 325 TABLET, FILM COATED ORAL at 17:11

## 2023-05-21 RX ADMIN — SODIUM CHLORIDE 1 DROP: 20 SOLUTION OPHTHALMIC at 13:45

## 2023-05-21 RX ADMIN — ALLOPURINOL 200 MG: 100 TABLET ORAL at 23:08

## 2023-05-21 RX ADMIN — Medication 21.1 MILLICURIE: at 08:50

## 2023-05-21 RX ADMIN — HEPARIN SODIUM 2500 UNITS: 5000 INJECTION INTRAVENOUS; SUBCUTANEOUS at 02:06

## 2023-05-21 RX ADMIN — METOPROLOL TARTRATE 12.5 MG: 25 TABLET, FILM COATED ORAL at 08:26

## 2023-05-21 RX ADMIN — ANTACID TABLETS 2 TABLET: 500 TABLET, CHEWABLE ORAL at 00:12

## 2023-05-21 RX ADMIN — METOPROLOL TARTRATE 12.5 MG: 25 TABLET, FILM COATED ORAL at 20:03

## 2023-05-21 RX ADMIN — Medication 3 MG: at 23:05

## 2023-05-21 RX ADMIN — TAMSULOSIN HYDROCHLORIDE 0.4 MG: 0.4 CAPSULE ORAL at 08:26

## 2023-05-21 RX ADMIN — FINASTERIDE 5 MG: 5 TABLET, FILM COATED ORAL at 08:27

## 2023-05-21 RX ADMIN — AMLODIPINE BESYLATE 10 MG: 5 TABLET ORAL at 08:26

## 2023-05-21 RX ADMIN — ACETAMINOPHEN 650 MG: 325 TABLET, FILM COATED ORAL at 10:50

## 2023-05-21 NOTE — PROGRESS NOTES
[facility] GROUP INPATIENT PROGRESS NOTE    Length of Stay:  0 days    CHIEF COMPLAINT:  Acute right iliac, femoral, popliteal, calf DVT.  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  Possible bone lesions on x-ray.  Anemia.  CKD3b.    SUBJECTIVE:   Patient notes ongoing pain in the right lower extremity in the popliteal region.    ROS:  Review of Systems   A comprehensive review of systems was obtained with pertinent positive findings as noted in the interval history above.  All other systems negative.      OBJECTIVE:  Vitals:    05/20/23 2312 05/21/23 0311 05/21/23 0553 05/21/23 0730   BP: 122/75 119/72  133/82   BP Location: Right arm Right arm  Left arm   Patient Position: Lying Lying  Lying   Pulse: 72 66  66   Resp: 18 18  18   Temp: 98.9 °F (37.2 °C) 98 °F (36.7 °C)  97.9 °F (36.6 °C)   TempSrc: Oral Oral  Oral   SpO2: 95% 96%  96%   Weight:   63.3 kg (139 lb 8 oz)    Height:             PHYSICAL EXAMINATION:  General: Alert and orient x3 no distress  Chest/Lungs: Clear to auscultation bilaterally  Heart: Regular rate and rhythm no murmurs gallops or rubs  Abdomen/GI: Soft nontender nondistended bowel sounds present  Extremities: 1+ right lower extremity edema.  Trace edema present yesterday in the left lower extremity has improved.    DIAGNOSTIC DATA:  Results Review:     I reviewed the patient's new clinical results.    Results from last 7 days   Lab Units 05/20/23  0420 05/19/23  1230   WBC 10*3/mm3 94.36* 96.99*   HEMOGLOBIN g/dL 8.8* 9.8*   HEMATOCRIT % 26.8* 30.7*   PLATELETS 10*3/mm3 153 149      Results from last 7 days   Lab Units 05/21/23  0757 05/20/23  0420 05/19/23  1230   SODIUM mmol/L 142 143 141   POTASSIUM mmol/L 4.1 3.9 4.1   CHLORIDE mmol/L 107 109* 107   CO2 mmol/L 25.0 23.0 22.0   BUN mg/dL 33* 33* 35*   CREATININE mg/dL 2.46* 2.42* 2.73*   CALCIUM mg/dL 9.4 8.7 8.8   BILIRUBIN mg/dL 0.2  --  0.3   ALK PHOS U/L 237*  --  184*   ALT (SGPT) U/L 117*  --   53*   AST (SGOT) U/L 85*  --  31   GLUCOSE mg/dL 103* 104* 165*      Lab Results   Component Value Date    NEUTROABS 2.91 05/19/2023     Results from last 7 days   Lab Units 05/21/23  0757 05/20/23  2356 05/20/23  1601 05/19/23  1230   INR   --   --  0.98 1.02   APTT seconds 119.6*  114.9* 63.0* 29.5 29.2               Assessment & Plan   ASSESSMENT/PLAN:  This is a 73 y.o. male with:     *Acute right iliac, femoral, popliteal, calf DVT  • Patient developed worsening pain in the right lower extremity and presented to the emergency department on 5/19/2023.    • Doppler 5/19/2023 showed acute right external iliac, femoral, popliteal, calf DVT along with superficial thrombophlebitis.    • Patient was admitted and placed on Lovenox 1 mg/kg every 24 hours (60 mg, renally dosed)  • On 5/20/2023 transitioned from Lovenox to heparin drip to facilitate bone marrow biopsy on 5/22/2023  • Patient with no signs or symptoms to suggest pulmonary embolism  • Limited Doppler performed of the left lower extremity (femoral only) and he does have some edema present, requested full Doppler left leg as well, pending.  • Patient does note ongoing pain in the right lower extremity.  Given the extent of thrombosis, we will consult vascular surgery to ensure that he does not need to pursue thrombectomy.  • Patient continuing today on heparin drip.  Plan to hold tomorrow prior to bone marrow biopsy.  Patient can transition following bone marrow biopsy to oral Eliquis 10 mg twice daily x7 days followed by 5 mg twice daily (assuming vascular does not wish to pursue intervention/thrombectomy).     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 Childress in hematology/medical oncology at [facility].    • Patient previously was treated for lymphocyte predominant Hodgkin's lymphoma with involved field radiation (30 Gray) to the neck completed in December 2009 and has continued on a course of  observation.  Incidentally at the time of diagnosis of his Hodgkin's lymphoma he was found to also have CLL/SLL involvement in bone marrow (greater than 40%).  This occurred while living in [place] at [facility].    • In March 2010, patient moved to [place] and was seen by Dr. Childress.    • He underwent bone marrow biopsy 10/8/2013 which showed evidence of CLL/SLL.    • Received treatment with Bendamustine/rituximab beginning 10/21/2013 and received 6 cycles of therapy completed in March 2014.    • He subsequently initiated treatment with Imbruvica in October 2017.  Patient had excellent response.    • He was hospitalized for COVID-19 pneumonia September through October 2021 and ibrutinib was held.  He remained off of treatment since that time.    • Most recent labs at [facility] on 12/21/2022 with WBC 14.94, differential 46 segs, 40 lymphs, 2 eosinophils with 13 atypical lymphs, hemoglobin 12.8, platelet count 406,000, IgG 638, IgA 56, IgM 156, free kappa light chain 18.98, free lambda light chain 76.26 with free light chain ratio 0.25.  IgM lambda monoclonal protein identified on immunofixation but not measurable.  Creatinine was 2.1.  • Patient with worsening chronic low back pain, underwent MRI lumbar spine on 3/8/2023 which showed degenerative changes, did incidentally note retroperitoneal lymphadenopathy with 3 cm infrarenal and 2.5 cm right iliac lymph nodes identified.  • Patient developed worsening pain in his right knee.  He was seen by orthopedics on 5/15/2023 with x-ray that showed lucencies around the proximal tibia, avascular necrosis and femoral head collapse.  Stat MRI was recommended however he did not pursue this.  • Patient reports that he is currently scheduled for bone marrow biopsy at [facility] on 5/22/2023.  Dr. Childress was planning to refer the patient to Dr. Caba at [facility] to treat his CLL thereafter.  • He developed worsening pain in the right lower extremity and presented to  the emergency department on 5/19/2023.  Doppler 5/19/2023 showed acute right external iliac, femoral, popliteal, calf DVT along with superficial thrombophlebitis.  Patient was admitted and placed on Lovenox 1 mg/kg every 24 hours (60 mg, renally dosed)  • On admission 5/19/2023, WBC was 96.99 with differential of 3 segs, 94 lymphs, 3 monocytes with hemoglobin 9.8, platelet count 149,000.  Creatinine was 2.73 with BUN 35 (baseline creatinine in the low 2 range)  • Patient with obvious progression of his CLL with dramatic escalation of his WBC, currently 94.36 along with worsening anemia and evidence on recent MRI lumbar spine of retroperitoneal lymphadenopathy up to 3 cm in the infrarenal region and 2.5 cm in the right iliac region.  He has worsening anemia likely related to marrow involvement from his CLL as well.  There is evidence on plain film of the right knee performed by orthopedics have some lucencies in the bone which would be highly unusual for CLL involvement in question whether there is another process.    • Labs on 5/20/2023 with , uric acid 8.9  • Labs pending from 5/21/2023 with serum protein electrophoresis, immunoelectrophoresis, quantitative immunoglobulins, free serum light chains  • Bone scan 5/21/2023 with no evidence of bony metastatic disease.  • MRI right knee without evidence of suspicious bone lesion  • CT chest abdomen pelvis 5/21/2023 with left supraclavicular/low cervical lymph nodes borderline enlarged up to 1.2 cm, bilateral axillary adenopathy, dominant lymph node left 3.4 cm, subpectoral lester enlargement on the left up to 3.1 cm, mild mediastinal lymph node enlargement up to 1.8 cm.  Spleen mildly enlarged at 13.5 cm.  Bulky periportal, retroperitoneal, mesenteric, pelvic lymphadenopathy with left periaortic/retroperitoneal mass measuring 5.1 cm, periportal lymph node 2.2 cm, right common iliac 4.4 cm and external iliac right 4.8 cm.  Incidental note of bilateral femoral head  osteonecrosis, on the right with subchondral collapse and flattening of the right femoral head.  • Skeletal survey pending  • Plan for bone marrow biopsy on 5/22/2023 while inpatient  • Patient will need immediate outpatient follow-up with Dr. Childress/Rosales in the [facility] system following discharge to discuss treatment.  • Today, WBC increased at 114,000.  LDH borderline elevated at 262.  Uric acid 8.3 and we will need nephrology to comment regarding this issue and whether patient can receive allopurinol with his current renal function. Plan for bone marrow biopsy tomorrow (order placed).  Following bone marrow biopsy, patient could be discharged and we will need to ensure he has immediate follow-up arranged with Dr. Clark in the [facility] system where he will resume his outpatient care with future treatment for his CLL.     *Bone lesions  • Patient developed worsening pain in his right knee.  He was seen by orthopedics on 5/15/2023 with x-ray that showed lucencies around the proximal tibia, avascular necrosis and femoral head collapse.  Stat MRI was recommended however he did not pursue this.  • Etiology of lucencies unclear, would be unusual for CLL to involve bone.  • MRI right knee (without contrast) on 5/20/2023 with chronic areas of osteonecrosis, complex tear medial meniscus, thinning cartilage and small effusion.  No evidence of malignant lesion or metastasis.  • Bone scan 5/21/2023 with no evidence of metastatic lesions  • Skeletal survey pending     *CKD 3b  • Patient with baseline creatinine in the low 2 range  • Creatinine on 12/21/2022 was 2.1  • On admission 5/19/2023, creatinine was 2.73 with BUN 35  • Creatinine today stable at 2.46  • Uric acid elevated at 8.9 on 5/20/2023 with underlying progressive CLL  • Uric acid today 8.3.  We will need to consult nephrology regarding recommendations for use and dosing of allopurinol in the setting.     *Anemia  • Labs on 12/21/2022 with hemoglobin  12.8  • On admission 5/19/2023, hemoglobin 9.8  • Patient with apparent progression of CLL with known marrow involvement which is the likely the most significant cause of his worsening anemia  • Patient does have underlying CKD 3b which is contributing factor to his anemia  • Labs on 5/21/2023 with iron 79, ferritin 1235, iron saturation 36%, TIBC 222.  Consistent with anemia secondary to chronic disease/malignancy  • Hemoglobin today improved at 9.6.  Check erythropoietin level, B12 and folate in a.m.        PLAN:   1. Patient continuing on heparin drip  2. Consult vascular surgery regarding potential need for right lower extremity thrombectomy due to ongoing pain  3. Labs pending from 5/20/2023 with serum protein electrophoresis, immunoelectrophoresis, quantitative immunoglobulins, free serum light chains  4. Consult nephrology regarding hyperuricemia for recommendations regarding need for allopurinol and dosing in the setting of progressive CLL with impending treatment  5. Skeletal survey pending  6. Plan for bone marrow biopsy and aspiration with interventional radiology on Monday, 5/22/2023.  Heparin will need to be held 2 hours prior to procedure  7. Recommend to resume anticoagulation following bone marrow biopsy with Eliquis 10 mg twice daily x7 days followed by 5 mg twice daily (assuming vascular surgery does not recommend pursuit of thrombectomy)  8. Possible discharge home on Monday, 5/22/2023 on Eliquis following bone marrow biopsy  9. Patient will subsequently need immediate follow-up with Dr. Childress/Rosales at [facility] in medical oncology regarding bone marrow biopsy results and treatment of progressive CLL  10. Daily CBC, CMP, LDH, uric acid, PTT.  Check B12, folate, erythropoietin level in a.m.    Discussed with patient and family at bedside.  Discussed with Dr. Costa.           Rich Sanchez MD

## 2023-05-21 NOTE — PROGRESS NOTES
Name: Angel Cisneros ADMIT: 2023   : 1950  PCP: Ayaan Amin MD    MRN: [id number] LOS: 0 days   AGE/SEX: 73 y.o. male  ROOM: [facility]     Subjective   Subjective   Examined at bedside.  Continues to endorse some pain underneath his right knee.  His white blood cell count is 114 today.  Denies any fevers    Objective   Objective   Vital Signs  Temp:  [97.9 °F (36.6 °C)-99.6 °F (37.6 °C)] 98.3 °F (36.8 °C)  Heart Rate:  [66-80] 67  Resp:  [18] 18  BP: (119-149)/(72-87) 122/77  SpO2:  [95 %-97 %] 97 %  on   ;   Device (Oxygen Therapy): room air  Body mass index is 21.21 kg/m².  Physical Exam  Constitutional:       Appearance: Normal appearance.   HENT:      Head: Normocephalic and atraumatic.   Cardiovascular:      Rate and Rhythm: Normal rate and regular rhythm.   Abdominal:      General: There is no distension.      Palpations: Abdomen is soft.   Musculoskeletal:      Comments: Tenderness to palpation on the right knee as well as a calf going down to the ankle.   Neurological:      General: No focal deficit present.      Mental Status: He is alert and oriented to person, place, and time.         Results Review     I reviewed the patient's new clinical results.  Results from last 7 days   Lab Units 23  0757 23  0420 23  1230   WBC 10*3/mm3 114.27* 94.36* 96.99*   HEMOGLOBIN g/dL 9.6* 8.8* 9.8*   PLATELETS 10*3/mm3 180 153 149     Results from last 7 days   Lab Units 23  0757 23  0420 23  1230   SODIUM mmol/L 142 143 141   POTASSIUM mmol/L 4.1 3.9 4.1   CHLORIDE mmol/L 107 109* 107   CO2 mmol/L 25.0 23.0 22.0   BUN mg/dL 33* 33* 35*   CREATININE mg/dL 2.46* 2.42* 2.73*   GLUCOSE mg/dL 103* 104* 165*   Estimated Creatinine Clearance: 24.3 mL/min (A) (by C-G formula based on SCr of 2.46 mg/dL (H)).  Results from last 7 days   Lab Units 23  0757 23  1230   ALBUMIN g/dL 3.7 3.6   BILIRUBIN mg/dL 0.2 0.3   ALK PHOS U/L 237* 184*   AST (SGOT) U/L  85* 31   ALT (SGPT) U/L 117* 53*     Results from last 7 days   Lab Units 05/21/23  0757 05/20/23  0420 05/19/23  1230   CALCIUM mg/dL 9.4 8.7 8.8   ALBUMIN g/dL 3.7  --  3.6       COVID19   Date Value Ref Range Status   10/14/2021 Not Detected Not Detected - Ref. Range Final   09/22/2021 Detected (C) Not Detected - Ref. Range Final     Hemoglobin A1C   Date/Time Value Ref Range Status   05/20/2023 0420 6.00 (H) 4.80 - 5.60 % Final       NM Bone Scan Whole Body  Narrative: NUCLEAR MEDICINE WHOLE BODY BONE SCAN     HISTORY: CLL.     TECHNIQUE:  21.1 mCi of 99m technetium MDP was administered intravenous  followed by 3 hour delayed phase whole body imaging with selected spot  views.     COMPARISON: CT chest, abdomen and pelvis 05/21/2023, MRI right knee  05/20/2023, complete bone survey 05/20/2023, MRI lumbar spine  03/08/2023.     FINDINGS:  There is mild right nasopharyngeal uptake which is  nonspecific. There is increased uptake at both hips, greater on the  right and this is consistent with osteonecrosis and osteoarthritis.  There is also increased uptake at the right knee attributed to  osteoarthritis and osteonecrosis. There are no additional abnormal foci  of increased uptake.     Impression: Bilateral hip uptake, greater on the right, due to a  combination of osteonecrosis and osteoarthritis. There is also increased  uptake to the right knee due to a combination of osteoarthritis and  osteonecrosis. Mild right nasopharyngeal uptake is nonspecific and may  be related to sinus disease though a bone lesion is difficult to exclude  at this location. There is no further abnormal uptake to suggest the  presence of bony metastatic disease.     This report was finalized on 5/21/2023 1:22 PM by Dr. Thanh Singh M.D.     CT Chest Without Contrast Diagnostic, CT Abdomen Pelvis Without Contrast  Narrative: CT CHEST, ABDOMEN, AND PELVIS WITHOUT IV CONTRAST     HISTORY: 73-year-old male with CLL.     TECHNIQUE:  Radiation dose reduction techniques were utilized, including  automated exposure control and exposure modulation based on body size.  CT of the chest, abdomen, and pelvis includes axial imaging from the  thoracic inlet through the trochanters without intravenous contrast and  with oral contrast.     COMPARISON: CT chest without contrast 10/03/2021, 09/07/2018.     FINDINGS:  CHEST: Within the medial left supraclavicular region/left neck base  there is what appears to represent an enlarged node measuring 12 mm.  There is bilateral axillary lester enlargement that is new when compared  to prior exam 10/03/2021. The dominant left axillary node measures 2.1 x  3.4 cm. There is also subpectoral lester enlargement and a left  subpectoral node measures 3.1 x 1.3 cm. There is mild mediastinal lester  enlargement. A subcarinal node measures 1.8 cm.     Heart size is mildly enlarged. There is peripheral predominant  chronic-appearing pulmonary interstitial disease though peripheral  interstitial infiltrates demonstrated on the previous exam 10/03/2021  have resolved. There is no suspicious pulmonary nodule or pleural  effusion. There appears be subtle chronic osteonecrosis of the left  humeral head without subchondral collapse.      ABDOMEN/PELVIS: Liver, adrenal glands, pancreas appear within normal  limits. Spleen measures 9.9 x 5.6 x 13.5 cm which is mildly enlarged.  Kidneys appear within normal limits. Oral contrast extends to the colon  and there is no bowel dilatation or evidence for bowel obstruction.     There is bulky periportal, retroperitoneal, mesenteric, pelvic lester  enlargement. A large left paraaortic, retroperitoneal lester mass  measures 5.1 x 4.7 cm. An enlarged periportal node measures 2.2 cm in  short axis. A right common iliac node measures 4.4 x 3.1 cm. A right  external iliac lester mass measures 4.8 x 2.9 cm.     There is osteonecrosis at both femoral heads, more extensive on the  right and there is  subchondral collapse of the right femoral head with  flattening of the superior right femoral head and secondary  osteoarthritis at the right hip joint. Degenerative disc disease is  present within the lumbar spine greatest at L2-3 and L3-4.     Impression: 1. Pathologic lester enlargement within the neck base, chest, abdomen,  pelvis with bulky lester enlargement greatest within the retroperitoneum  and pelvis. Mild splenomegaly.  2. Chronic osteonecrosis left humeral head and both femoral heads, most  severely involving the right femoral head where there is subchondral  collapse and flattening and secondary osteoarthritis.  3. Peripheral predominant pulmonary interstitial disease.        Radiation dose reduction techniques were utilized, including automated  exposure control and exposure modulation based on body size.     This report was finalized on 5/21/2023 1:21 PM by Dr. Thanh Singh M.D.       Scheduled Medications  amLODIPine, 10 mg, Oral, Daily  calcium carbonate, 1 tablet, Oral, Daily  famotidine, 20 mg, Oral, Daily  finasteride, 5 mg, Oral, Daily  metoprolol tartrate, 12.5 mg, Oral, Q12H  senna-docusate sodium, 2 tablet, Oral, BID  sodium chloride, 1 drop, Both Eyes, Q4H  tamsulosin, 0.4 mg, Oral, Daily    Infusions  heparin, 18 Units/kg/hr, Last Rate: 18 Units/kg/hr (05/21/23 1323)    Diet  Diet: Cardiac Diets; Healthy Heart (2-3 Na+); Texture: Regular Texture (IDDSI 7); Fluid Consistency: Thin (IDDSI 0)       Assessment/Plan     Active Hospital Problems    Diagnosis  POA   • **Acute deep vein thrombosis (DVT) of iliac vein of right lower extremity [I82.421]  Yes   • HADLEY (acute kidney injury) [N17.9]  Unknown   • Lymphadenopathy, abdominal [R59.0]  Yes   • Paroxysmal atrial fibrillation (HCC) [I48.0]  Yes   • CLL (chronic lymphocytic leukemia) (HCC) [C91.10]  Yes   • GERD (gastroesophageal reflux disease) [K21.9]  Yes   • Leukemia [C95.90]  Yes      Resolved Hospital Problems   No resolved problems to  display.       73 y.o. male admitted with Acute deep vein thrombosis (DVT) of iliac vein of right lower extremity.    Acute DVT of the right lower extremity  He remains on a heparin drip.  He is going to have a bone marrow biopsy on Monday    Chronic lymphocytic leukemia  Avascular necrosis of the right knee  Oncology following.  A bone scan has been ordered.  Bone marrow biopsy tomorrow.  We will transition from heparin to oral Eliquis tomorrow after the procedure.    Paroxysmal atrial fibrillation  Eliquis is currently held and he is on a heparin drip.  Rate controlled with metoprolol tartrate  Does not look like he was on any anticoagulation at home prior to this admission    Hypertension  Metoprolol and amlodipine    Anemia  Hematology following    Heparin drip for DVT prophylaxis  Full code  Discussed with patient  Anticipate discharge home tomorrow after bone marrow biopsy procedure    Braulio Mora MD  [facility]  05/21/23  14:54 EDT

## 2023-05-21 NOTE — CONSULTS
Name: Angel Cisneros ADMIT: 2023   : 1950  PCP: Ayaan Amin MD    MRN: [id number] LOS: 0 days   AGE/SEX: 73 y.o. male  ROOM: [address]      Patient Care Team:  Ayaan Amin MD as PCP - General (Family Medicine)  Chief Complaint   Patient presents with   • Leg Swelling     CC:right leg DVT    Subjective     Inpatient Vascular Surgery Consult  Consult performed by: John Mcnair II, MD  Consult ordered by: Rich Sanchez Jr., MD        History of Present Illness   Patient is a pleasant 73 year old man with history of non-hodgkins Lymphoma now with CLL with a WBC of 114k and diffuse lymphadenopathy who presented to the hospital 2 days ago with right leg swelling and pain and was found to have an extensive right leg DVT on duplex ultrasound.   He has been started initially on therapeutic lovenox but now switched to heparin gtt so that he could have a bone marrow biopsy tomorrow.   Patient reports his swelling in he right leg has improved but he still has a cramping pain in his right calf that is worse when ambulation. He denies chest pain, difficulty breathing, heart palpitations.   He has had CTs and MRI that shows osteonecrosis of bilateral femoral heads and medial right femoral condyle as well as some cartilage thinning. He reports chronic hip and knee pain but states that pain is different from the new pain that led to his coming to the hospital.   At the time of my evaluation the patient is resting comfortably in the hospital bed in the vascular lab. He states he has never had a blood clot before and is unaware of any family history of blood clots. He denies ever having any procedures on his arteries or veins or any stents placed anywhere.     Review of Systems    Past Medical History:   Diagnosis Date   • Arthritis     both knees   • Cancer     dx with lymphoma / radiation   • Cataracts, bilateral    • History of COVID-19 2021   • Hx of cornea transplant      both eyes   • Leukemia    • Lymphoma     dx in 2009. treated with radiation.   • Lymphoma     treated with radiation. dx in 2009     Past Surgical History:   Procedure Laterality Date   • BREAST LUMPECTOMY Right    • COLONOSCOPY N/A 3/8/2016    Procedure: COLONOSCOPY with cold polypectomy X 3;  Surgeon: Chris Harden MD;  Location: [facility];  Service:    • ENDOSCOPY N/A 10/23/2018    Procedure: ESOPHAGOGASTRODUODENOSCOPY WITH BIOPSY;  Surgeon: Chris Harden MD;  Location: [facility];  Service: Gastroenterology   • EYE SURGERY      partial cornea transplant 4-5 years ago   • KNEE SURGERY Left    • TOE SURGERY Bilateral    • TONSILLECTOMY       Family History   Problem Relation Age of Onset   • Kidney cancer Mother      Social History     Tobacco Use   • Smoking status: Never     Passive exposure: Never   • Smokeless tobacco: Never   Vaping Use   • Vaping Use: Never used   Substance Use Topics   • Alcohol use: No   • Drug use: No     Medications Prior to Admission   Medication Sig Dispense Refill Last Dose   • amLODIPine (NORVASC) 10 MG tablet Take 1 tablet by mouth Daily. 90 tablet 3 5/19/2023   • finasteride (Proscar) 5 MG tablet Take 1 tablet by mouth Daily. 90 tablet 3 5/19/2023   • fluorometholone (FML) 0.1 % ophthalmic suspension Administer 1 drop to both eyes Daily.   5/19/2023   • sodium chloride (SENA 128) 2 % ophthalmic solution 1 drop.   5/19/2023   • tamsulosin (FLOMAX) 0.4 MG capsule 24 hr capsule Take 1 capsule by mouth Daily. 90 capsule 3 5/19/2023   • acetaminophen (TYLENOL) 325 MG tablet Take 2 tablets by mouth Every 4 (Four) Hours As Needed for Mild Pain .      • calcium carbonate (TUMS) 500 MG chewable tablet Chew 1 tablet Daily.      • cetirizine (zyrTEC) 10 MG tablet Take 1 tablet by mouth Daily for 30 days. 30 tablet 0    • Diclofenac Sodium (VOLTAREN) 1 % gel gel Apply 4 g topically to the appropriate area as directed 4 (Four) Times a Day. 100 g 0    • doxylamine (UNISOM)  25 MG tablet Take  by mouth.      • metoprolol tartrate (LOPRESSOR) 25 MG tablet 1/2 tablet twice a day 90 tablet 3    • predniSONE (DELTASONE) 20 MG tablet 3 a day for 3 days then 2 a day for 3 days then 1 a day for 3 days. 18 tablet 0    • vitamin D (ERGOCALCIFEROL) 1.25 MG (33227 UT) capsule capsule TAKE 1 CAPSULE (50,000 UNITS TOTAL) BY MOUTH ONCE WEEKLY        amLODIPine, 10 mg, Oral, Daily  calcium carbonate, 1 tablet, Oral, Daily  famotidine, 20 mg, Oral, Daily  finasteride, 5 mg, Oral, Daily  metoprolol tartrate, 12.5 mg, Oral, Q12H  senna-docusate sodium, 2 tablet, Oral, BID  sodium chloride, 1 drop, Both Eyes, Q4H  tamsulosin, 0.4 mg, Oral, Daily      heparin, 18 Units/kg/hr, Last Rate: 17 Units/kg/hr (05/21/23 1710)      •  acetaminophen  •  senna-docusate sodium **AND** polyethylene glycol **AND** bisacodyl **AND** bisacodyl  •  calcium carbonate  •  heparin (porcine)  •  melatonin  •  ondansetron **OR** ondansetron  Ambien [zolpidem tartrate] and Penicillins    Objective     Physical Exam:  Physical Exam   NAD  NCAT  RRR  Respirations unlabored  Abdomen soft, non-tender, non-distended  Palpable DP/PT bilaterally  R leg with moderate edema. Compartments all very soft with little to no tenderness.     Vital Signs and Labs:  Vital Signs Patient Vitals for the past 24 hrs:   BP Temp Temp src Pulse Resp SpO2 Weight   05/21/23 1316 122/77 98.3 °F (36.8 °C) Oral 67 18 97 % --   05/21/23 0730 133/82 97.9 °F (36.6 °C) Oral 66 18 96 % --   05/21/23 0553 -- -- -- -- -- -- 63.3 kg (139 lb 8 oz)   05/21/23 0311 119/72 98 °F (36.7 °C) Oral 66 18 96 % --   05/20/23 2312 122/75 98.9 °F (37.2 °C) Oral 72 18 95 % --   05/20/23 1959 149/87 99.6 °F (37.6 °C) Oral 80 18 96 % --     I/O:  I/O last 3 completed shifts:  In: 1326 [P.O.:1200; I.V.:126]  Out: 1725 [Urine:1725]    CBC    Results from last 7 days   Lab Units 05/21/23  0757 05/20/23  0420 05/19/23  1230   WBC 10*3/mm3 114.27* 94.36* 96.99*   HEMOGLOBIN g/dL 9.6*  8.8* 9.8*   PLATELETS 10*3/mm3 180 153 149     BMP   Results from last 7 days   Lab Units 05/21/23  0757 05/20/23  0420 05/19/23  1230   SODIUM mmol/L 142 143 141   POTASSIUM mmol/L 4.1 3.9 4.1   CHLORIDE mmol/L 107 109* 107   CO2 mmol/L 25.0 23.0 22.0   BUN mg/dL 33* 33* 35*   CREATININE mg/dL 2.46* 2.42* 2.73*   GLUCOSE mg/dL 103* 104* 165*     Cr Clearance Estimated Creatinine Clearance: 24.3 mL/min (A) (by C-G formula based on SCr of 2.46 mg/dL (H)).  Coag   Results from last 7 days   Lab Units 05/21/23  0757 05/20/23  2356 05/20/23  1601 05/19/23  1230   INR   --   --  0.98 1.02   APTT seconds 119.6*  114.9* 63.0* 29.5 29.2     HbA1C   Lab Results   Component Value Date    HGBA1C 6.00 (H) 05/20/2023    HGBA1C 5.39 09/06/2021     Blood Glucose No results found for: POCGLU  Infection     CMP   Results from last 7 days   Lab Units 05/21/23  0757 05/20/23  0420 05/19/23  1230   SODIUM mmol/L 142 143 141   POTASSIUM mmol/L 4.1 3.9 4.1   CHLORIDE mmol/L 107 109* 107   CO2 mmol/L 25.0 23.0 22.0   BUN mg/dL 33* 33* 35*   CREATININE mg/dL 2.46* 2.42* 2.73*   GLUCOSE mg/dL 103* 104* 165*   ALBUMIN g/dL 3.7  --  3.6   BILIRUBIN mg/dL 0.2  --  0.3   ALK PHOS U/L 237*  --  184*   AST (SGOT) U/L 85*  --  31   ALT (SGPT) U/L 117*  --  53*     ABG      UA      LIAM  No results found for: POCMETH, POCAMPHET, POCBARBITUR, POCBENZO, POCCOCAINE, POCOPIATES, POCOXYCODO, POCPHENCYC, POCPROPOXY, POCTHC, POCTRICYC  Lysis Labs   Results from last 7 days   Lab Units 05/21/23  0757 05/20/23  2356 05/20/23  1601 05/20/23  0420 05/19/23  1230   INR   --   --  0.98  --  1.02   APTT seconds 119.6*  114.9* 63.0* 29.5  --  29.2   HEMOGLOBIN g/dL 9.6*  --   --  8.8* 9.8*   PLATELETS 10*3/mm3 180  --   --  153 149   CREATININE mg/dL 2.46*  --   --  2.42* 2.73*       Active Hospital Problems    Diagnosis  POA   • **Acute deep vein thrombosis (DVT) of iliac vein of right lower extremity [I82.421]  Yes   • HADLEY (acute kidney injury) [N17.9]   Unknown   • Lymphadenopathy, abdominal [R59.0]  Yes   • Paroxysmal atrial fibrillation (HCC) [I48.0]  Yes   • CLL (chronic lymphocytic leukemia) (HCC) [C91.10]  Yes   • GERD (gastroesophageal reflux disease) [K21.9]  Yes   • Leukemia [C95.90]  Yes      Resolved Hospital Problems   No resolved problems to display.     Problem Points:  3:  Patient has a new problem, with no additional work-up planned (max of 1)  Total problem points:3    Data Points:  1:  I have reviewed or order clinical lab test  1:  I have reviewed or order radiology test (except heart catheterization or echo)  2:  I have personally and independently review of image, tracing, or specimen  2:  I have reviewed and summation of old records and/or discussed the patients care with another health care provider  Total data points:4 or more    Risk Points:  Moderate:  Acute compliated injury    MDM requires 2/3 (Problem points, Data points and Risk)  MDM Prob point Data point Risk   SF 1 1 Minimal   Low 2 2 Low   Mod 3 3 Moderate   High 4 4 High     Code requires 3/3 (MDM, History and Exam)  Code MDM History Exam Time   78270 SF/Low Detailed Detailed 30   94562 Mod Comprehensive Comprehensive 50   42946 High Comprehensive Comprehensive 70     Detailed history:  4 elements HPI or status of 3 chronic problems; 2-9 system ROS  Comprehensive:  4 elements HPI or status of 3 chronic problems;  10 system ROS    Detailed Exam:  12 findings from any organ system  Comprehensive Exam:  2 findings from each of 9 systems.   47460    Assessment & Plan       Acute deep vein thrombosis (DVT) of iliac vein of right lower extremity    Leukemia    GERD (gastroesophageal reflux disease)    CLL (chronic lymphocytic leukemia) (HCC)    Paroxysmal atrial fibrillation (HCC)    Lymphadenopathy, abdominal    HADLEY (acute kidney injury)      73 y.o. male with extensive right leg DVT extending from external iliac vein down to tibial veins. His leg is less edematous than I would have  expected based on his duplex findings. Ordinarily with this extensive disease we would strongly consider thrombectomy however he has other medical problems complicating his care at the moment, most significant being his cancer and his acute kidney injury. In addition to being hypercoagulable from the malignancy, there could be compression of the iliac vein from his lympadenopathy giving a mechanical reason for his DVT. I cannot tell if this is the case based on his non-contrasted CT scan.   Would ideally get a CTA chest and CT venogram of his abdomen and pelvis and discuss risks and benefits of thrombectomy or thrombolysis thereafter but he has an HADLEY with creatinine 2.46 so I do not think that is advisable at this time.   He is scheduled to undergo bone marrow biopsy tomorrow but I would recommend resuming his anticoagulation as soon afterwards as possible but will defer exact timing to the proceduralist performing his biopsy. In the meantime will order ACE wrap and elevation for right leg.   I will ask one of my partners to see him tomorrow as I will be at [facility].     I discussed the patients findings and my recommendations with patient.    John Mcnair II, MD  05/21/23  18:36 EDT    Please call my office with any question: [phone]

## 2023-05-21 NOTE — NURSING NOTE
Patient arrived Xray Triage for MRI pump exchange. Heparin 11.4 ml/hr with 150cc remaining in bag. Patients floor RN( Tushar) on unit  to verify dosage.

## 2023-05-21 NOTE — PLAN OF CARE
Goal Outcome Evaluation:  Plan of Care Reviewed With: patient        Progress: no change  Outcome Evaluation: vitals stable.  pt expressed pain and heartburn.  meds given per orders.  MRI completed.  will continue to monitor.

## 2023-05-22 ENCOUNTER — READMISSION MANAGEMENT (OUTPATIENT)
Dept: CALL CENTER | Facility: HOSPITAL | Age: 73
End: 2023-05-22
Payer: MEDICARE

## 2023-05-22 ENCOUNTER — APPOINTMENT (OUTPATIENT)
Dept: CT IMAGING | Facility: HOSPITAL | Age: 73
End: 2023-05-22
Payer: MEDICARE

## 2023-05-22 VITALS
HEART RATE: 78 BPM | RESPIRATION RATE: 19 BRPM | HEIGHT: 68 IN | DIASTOLIC BLOOD PRESSURE: 85 MMHG | WEIGHT: 141.5 LBS | TEMPERATURE: 98.3 F | BODY MASS INDEX: 21.44 KG/M2 | SYSTOLIC BLOOD PRESSURE: 145 MMHG | OXYGEN SATURATION: 99 %

## 2023-05-22 LAB
ALBUMIN SERPL-MCNC: 3.1 G/DL (ref 3.5–5.2)
ALBUMIN/GLOB SERPL: 1.3 G/DL
ALP SERPL-CCNC: 241 U/L (ref 39–117)
ALT SERPL W P-5'-P-CCNC: 115 U/L (ref 1–41)
ANION GAP SERPL CALCULATED.3IONS-SCNC: 10.5 MMOL/L (ref 5–15)
APTT PPP: 63.4 SECONDS (ref 22.7–35.4)
APTT PPP: 78.7 SECONDS (ref 22.7–35.4)
APTT PPP: 99.6 SECONDS (ref 22.7–35.4)
AST SERPL-CCNC: 60 U/L (ref 1–40)
BASOPHILS # BLD MANUAL: 1.23 10*3/MM3 (ref 0–0.2)
BASOPHILS NFR BLD MANUAL: 1 % (ref 0–1.5)
BILIRUB SERPL-MCNC: 0.2 MG/DL (ref 0–1.2)
BILIRUB UR QL STRIP: NEGATIVE
BUN SERPL-MCNC: 35 MG/DL (ref 8–23)
BUN/CREAT SERPL: 13.9 (ref 7–25)
CALCIUM SPEC-SCNC: 9.1 MG/DL (ref 8.6–10.5)
CHLORIDE SERPL-SCNC: 102 MMOL/L (ref 98–107)
CLARITY UR: CLEAR
CO2 SERPL-SCNC: 25.5 MMOL/L (ref 22–29)
COLOR UR: ABNORMAL
CREAT SERPL-MCNC: 2.52 MG/DL (ref 0.76–1.27)
DEPRECATED RDW RBC AUTO: 55.3 FL (ref 37–54)
DEPRECATED RDW RBC AUTO: 58.5 FL (ref 37–54)
EGFRCR SERPLBLD CKD-EPI 2021: 26.4 ML/MIN/1.73
ERYTHROCYTE [DISTWIDTH] IN BLOOD BY AUTOMATED COUNT: 16.2 % (ref 12.3–15.4)
ERYTHROCYTE [DISTWIDTH] IN BLOOD BY AUTOMATED COUNT: 16.6 % (ref 12.3–15.4)
FOLATE SERPL-MCNC: 9.48 NG/ML (ref 4.78–24.2)
GLOBULIN UR ELPH-MCNC: 2.4 GM/DL
GLUCOSE SERPL-MCNC: 123 MG/DL (ref 65–99)
GLUCOSE UR STRIP-MCNC: NEGATIVE MG/DL
HCT VFR BLD AUTO: 26.9 % (ref 37.5–51)
HCT VFR BLD AUTO: 30.7 % (ref 37.5–51)
HGB BLD-MCNC: 8.8 G/DL (ref 13–17.7)
HGB BLD-MCNC: 9.7 G/DL (ref 13–17.7)
HGB UR QL STRIP.AUTO: NEGATIVE
INR PPP: 0.99 (ref 0.9–1.1)
KETONES UR QL STRIP: NEGATIVE
LDH SERPL-CCNC: 263 U/L (ref 135–225)
LEUKOCYTE ESTERASE UR QL STRIP.AUTO: NEGATIVE
LYMPHOCYTES # BLD MANUAL: 112.4 10*3/MM3 (ref 0.7–3.1)
LYMPHOCYTES # BLD MANUAL: 120.15 10*3/MM3 (ref 0.7–3.1)
LYMPHOCYTES NFR BLD MANUAL: 1 % (ref 5–12)
MCH RBC QN AUTO: 30.5 PG (ref 26.6–33)
MCH RBC QN AUTO: 31.7 PG (ref 26.6–33)
MCHC RBC AUTO-ENTMCNC: 31.6 G/DL (ref 31.5–35.7)
MCHC RBC AUTO-ENTMCNC: 32.7 G/DL (ref 31.5–35.7)
MCV RBC AUTO: 96.5 FL (ref 79–97)
MCV RBC AUTO: 96.8 FL (ref 79–97)
MONOCYTES # BLD: 1.18 10*3/MM3 (ref 0.1–0.9)
NEUTROPHILS # BLD AUTO: 1.23 10*3/MM3 (ref 1.7–7)
NEUTROPHILS # BLD AUTO: 4.73 10*3/MM3 (ref 1.7–7)
NEUTROPHILS NFR BLD MANUAL: 1 % (ref 42.7–76)
NEUTROPHILS NFR BLD MANUAL: 4 % (ref 42.7–76)
NITRITE UR QL STRIP: NEGATIVE
NRBC BLD AUTO-RTO: 0.1 /100 WBC (ref 0–0.2)
NRBC BLD AUTO-RTO: 0.2 /100 WBC (ref 0–0.2)
PH UR STRIP.AUTO: 6 [PH] (ref 5–8)
PLAT MORPH BLD: NORMAL
PLATELET # BLD AUTO: 189 10*3/MM3 (ref 140–450)
PLATELET # BLD AUTO: 200 10*3/MM3 (ref 140–450)
PMV BLD AUTO: 9.4 FL (ref 6–12)
PMV BLD AUTO: 9.6 FL (ref 6–12)
POTASSIUM SERPL-SCNC: 4.3 MMOL/L (ref 3.5–5.2)
PROT SERPL-MCNC: 5.5 G/DL (ref 6–8.5)
PROT UR QL STRIP: ABNORMAL
PROTHROMBIN TIME: 13.2 SECONDS (ref 11.7–14.2)
RBC # BLD AUTO: 2.78 10*6/MM3 (ref 4.14–5.8)
RBC # BLD AUTO: 3.18 10*6/MM3 (ref 4.14–5.8)
RBC MORPH BLD: NORMAL
RBC MORPH BLD: NORMAL
SMALL PLATELETS BLD QL SMEAR: ADEQUATE
SMUDGE CELLS BLD QL SMEAR: ABNORMAL
SODIUM SERPL-SCNC: 138 MMOL/L (ref 136–145)
SODIUM UR-SCNC: 55 MMOL/L
SP GR UR STRIP: 1.01 (ref 1–1.03)
URATE SERPL-MCNC: 8 MG/DL (ref 3.4–7)
UROBILINOGEN UR QL STRIP: ABNORMAL
VARIANT LYMPHS NFR BLD MANUAL: 95 % (ref 19.6–45.3)
VARIANT LYMPHS NFR BLD MANUAL: 98 % (ref 19.6–45.3)
VIT B12 BLD-MCNC: 308 PG/ML (ref 211–946)
WBC MORPH BLD: NORMAL
WBC NRBC COR # BLD: 118.32 10*3/MM3 (ref 3.4–10.8)
WBC NRBC COR # BLD: 122.6 10*3/MM3 (ref 3.4–10.8)

## 2023-05-22 PROCEDURE — 82607 VITAMIN B-12: CPT | Performed by: INTERNAL MEDICINE

## 2023-05-22 PROCEDURE — 82668 ASSAY OF ERYTHROPOIETIN: CPT | Performed by: INTERNAL MEDICINE

## 2023-05-22 PROCEDURE — 25010000002 FENTANYL CITRATE (PF) 50 MCG/ML SOLUTION: Performed by: RADIOLOGY

## 2023-05-22 PROCEDURE — 83615 LACTATE (LD) (LDH) ENZYME: CPT | Performed by: INTERNAL MEDICINE

## 2023-05-22 PROCEDURE — 25010000002 HEPARIN (PORCINE) 25000-0.45 UT/250ML-% SOLUTION: Performed by: INTERNAL MEDICINE

## 2023-05-22 PROCEDURE — 85025 COMPLETE CBC W/AUTO DIFF WBC: CPT | Performed by: STUDENT IN AN ORGANIZED HEALTH CARE EDUCATION/TRAINING PROGRAM

## 2023-05-22 PROCEDURE — 85025 COMPLETE CBC W/AUTO DIFF WBC: CPT | Performed by: INTERNAL MEDICINE

## 2023-05-22 PROCEDURE — 84550 ASSAY OF BLOOD/URIC ACID: CPT | Performed by: INTERNAL MEDICINE

## 2023-05-22 PROCEDURE — 99232 SBSQ HOSP IP/OBS MODERATE 35: CPT | Performed by: INTERNAL MEDICINE

## 2023-05-22 PROCEDURE — 85730 THROMBOPLASTIN TIME PARTIAL: CPT | Performed by: STUDENT IN AN ORGANIZED HEALTH CARE EDUCATION/TRAINING PROGRAM

## 2023-05-22 PROCEDURE — 85007 BL SMEAR W/DIFF WBC COUNT: CPT | Performed by: STUDENT IN AN ORGANIZED HEALTH CARE EDUCATION/TRAINING PROGRAM

## 2023-05-22 PROCEDURE — 85007 BL SMEAR W/DIFF WBC COUNT: CPT | Performed by: INTERNAL MEDICINE

## 2023-05-22 PROCEDURE — G0378 HOSPITAL OBSERVATION PER HR: HCPCS

## 2023-05-22 PROCEDURE — 96366 THER/PROPH/DIAG IV INF ADDON: CPT

## 2023-05-22 PROCEDURE — 77012 CT SCAN FOR NEEDLE BIOPSY: CPT

## 2023-05-22 PROCEDURE — 0 LIDOCAINE 1 % SOLUTION: Performed by: STUDENT IN AN ORGANIZED HEALTH CARE EDUCATION/TRAINING PROGRAM

## 2023-05-22 PROCEDURE — 82746 ASSAY OF FOLIC ACID SERUM: CPT | Performed by: INTERNAL MEDICINE

## 2023-05-22 PROCEDURE — 85610 PROTHROMBIN TIME: CPT | Performed by: STUDENT IN AN ORGANIZED HEALTH CARE EDUCATION/TRAINING PROGRAM

## 2023-05-22 PROCEDURE — 80053 COMPREHEN METABOLIC PANEL: CPT | Performed by: INTERNAL MEDICINE

## 2023-05-22 PROCEDURE — 25010000002 MIDAZOLAM PER 1 MG: Performed by: RADIOLOGY

## 2023-05-22 RX ORDER — ALLOPURINOL 200 MG/1
200 TABLET ORAL DAILY
Qty: 30 TABLET | Refills: 0 | Status: SHIPPED | OUTPATIENT
Start: 2023-05-23

## 2023-05-22 RX ORDER — SODIUM CHLORIDE 0.9 % (FLUSH) 0.9 %
3 SYRINGE (ML) INJECTION EVERY 12 HOURS SCHEDULED
Status: DISCONTINUED | OUTPATIENT
Start: 2023-05-22 | End: 2023-05-22 | Stop reason: HOSPADM

## 2023-05-22 RX ORDER — SODIUM CHLORIDE 9 MG/ML
40 INJECTION, SOLUTION INTRAVENOUS AS NEEDED
Status: DISCONTINUED | OUTPATIENT
Start: 2023-05-22 | End: 2023-05-22 | Stop reason: HOSPADM

## 2023-05-22 RX ORDER — FENTANYL CITRATE 50 UG/ML
INJECTION, SOLUTION INTRAMUSCULAR; INTRAVENOUS AS NEEDED
Status: COMPLETED | OUTPATIENT
Start: 2023-05-22 | End: 2023-05-22

## 2023-05-22 RX ORDER — SODIUM CHLORIDE 9 MG/ML
INJECTION, SOLUTION INTRAVENOUS CONTINUOUS PRN
Status: COMPLETED | OUTPATIENT
Start: 2023-05-22 | End: 2023-05-22

## 2023-05-22 RX ORDER — SODIUM CHLORIDE 0.9 % (FLUSH) 0.9 %
10 SYRINGE (ML) INJECTION AS NEEDED
Status: DISCONTINUED | OUTPATIENT
Start: 2023-05-22 | End: 2023-05-22 | Stop reason: HOSPADM

## 2023-05-22 RX ORDER — MIDAZOLAM HYDROCHLORIDE 1 MG/ML
INJECTION INTRAMUSCULAR; INTRAVENOUS AS NEEDED
Status: COMPLETED | OUTPATIENT
Start: 2023-05-22 | End: 2023-05-22

## 2023-05-22 RX ORDER — LIDOCAINE HYDROCHLORIDE 10 MG/ML
20 INJECTION, SOLUTION INFILTRATION; PERINEURAL ONCE
Status: COMPLETED | OUTPATIENT
Start: 2023-05-22 | End: 2023-05-22

## 2023-05-22 RX ORDER — SODIUM CHLORIDE 9 MG/ML
25 INJECTION, SOLUTION INTRAVENOUS ONCE
Status: DISCONTINUED | OUTPATIENT
Start: 2023-05-22 | End: 2023-05-22 | Stop reason: HOSPADM

## 2023-05-22 RX ADMIN — HEPARIN SODIUM 15 UNITS/KG/HR: 10000 INJECTION, SOLUTION INTRAVENOUS at 08:49

## 2023-05-22 RX ADMIN — SODIUM CHLORIDE 30 ML/HR: 9 INJECTION, SOLUTION INTRAVENOUS at 15:38

## 2023-05-22 RX ADMIN — FENTANYL CITRATE 50 MCG: 50 INJECTION, SOLUTION INTRAMUSCULAR; INTRAVENOUS at 15:47

## 2023-05-22 RX ADMIN — SODIUM CHLORIDE 1 DROP: 20 SOLUTION OPHTHALMIC at 08:53

## 2023-05-22 RX ADMIN — LIDOCAINE HYDROCHLORIDE 20 ML: 10 INJECTION, SOLUTION INFILTRATION; PERINEURAL at 15:48

## 2023-05-22 RX ADMIN — METOPROLOL TARTRATE 12.5 MG: 25 TABLET, FILM COATED ORAL at 08:51

## 2023-05-22 RX ADMIN — SODIUM CHLORIDE 1 DROP: 20 SOLUTION OPHTHALMIC at 12:38

## 2023-05-22 RX ADMIN — TAMSULOSIN HYDROCHLORIDE 0.4 MG: 0.4 CAPSULE ORAL at 08:51

## 2023-05-22 RX ADMIN — AMLODIPINE BESYLATE 5 MG: 5 TABLET ORAL at 08:51

## 2023-05-22 RX ADMIN — MIDAZOLAM 1 MG: 1 INJECTION INTRAMUSCULAR; INTRAVENOUS at 15:47

## 2023-05-22 RX ADMIN — FINASTERIDE 5 MG: 5 TABLET, FILM COATED ORAL at 08:51

## 2023-05-22 NOTE — PROGRESS NOTES
[facility] GROUP INPATIENT PROGRESS NOTE    Length of Stay:  0 days    CHIEF COMPLAINT:  Acute right iliac, femoral, popliteal, calf DVT.  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  Possible bone lesions on x-ray.  Anemia.  CKD3b.    SUBJECTIVE:   Patient notes pain better coontrolled in the right lower extremity. Afebrile. Vitals stable.     ROS:  Review of Systems   A comprehensive review of systems was obtained with pertinent positive findings as noted in the interval history above.  All other systems negative.      OBJECTIVE:  Vitals:    05/21/23 1927 05/21/23 2356 05/22/23 0500 05/22/23 0722   BP: 126/83 134/75     BP Location: Left arm Left arm     Patient Position: Lying Lying     Pulse: 72 68  72   Resp: 18 18  18   Temp: 97.9 °F (36.6 °C) 98.2 °F (36.8 °C)  98.3 °F (36.8 °C)   TempSrc: Oral Oral  Oral   SpO2: 95% 95%  95%   Weight:   64.2 kg (141 lb 8 oz)    Height:             PHYSICAL EXAMINATION:  General: Alert and orient x3 no distress  Chest/Lungs: Normal respiratory effort.   Heart: Normal heart rate.   Abdomen/GI: Soft nontender nondistended bowel sounds present  Extremities: 1+ left lower extremity edema.  Trace edema present yesterday in the left lower extremity has improved.    DIAGNOSTIC DATA:  Results Review:     I reviewed the patient's new clinical results.    Results from last 7 days   Lab Units 05/22/23  0157 05/21/23  0757 05/20/23  0420   WBC 10*3/mm3 118.32* 114.27* 94.36*   HEMOGLOBIN g/dL 8.8* 9.6* 8.8*   HEMATOCRIT % 26.9* 28.9* 26.8*   PLATELETS 10*3/mm3 189 180 153      Results from last 7 days   Lab Units 05/22/23  0157 05/21/23  0757 05/20/23  0420 05/19/23  1230   SODIUM mmol/L 138 142 143 141   POTASSIUM mmol/L 4.3 4.1 3.9 4.1   CHLORIDE mmol/L 102 107 109* 107   CO2 mmol/L 25.5 25.0 23.0 22.0   BUN mg/dL 35* 33* 33* 35*   CREATININE mg/dL 2.52* 2.46* 2.42* 2.73*   CALCIUM mg/dL 9.1 9.4 8.7 8.8   BILIRUBIN mg/dL 0.2 0.2  --  0.3   ALK PHOS  U/L 241* 237*  --  184*   ALT (SGPT) U/L 115* 117*  --  53*   AST (SGOT) U/L 60* 85*  --  31   GLUCOSE mg/dL 123* 103* 104* 165*      Lab Results   Component Value Date    NEUTROABS 4.73 05/22/2023     Results from last 7 days   Lab Units 05/22/23  0157 05/21/23  1711 05/21/23  0757 05/20/23  2356 05/20/23  1601 05/19/23  1230   INR   --   --   --   --  0.98 1.02   APTT seconds 99.6* 65.3* 119.6*  114.9*   < > 29.5 29.2    < > = values in this interval not displayed.               Assessment & Plan     This is a 73 y.o. male with:     *Acute right iliac, femoral, popliteal, calf DVT  • Patient developed worsening pain in the right lower extremity and presented to the emergency department on 5/19/2023.    • Doppler 5/19/2023 showed acute right external iliac, femoral, popliteal, calf DVT along with superficial thrombophlebitis.    • Patient was admitted and placed on Lovenox 1 mg/kg every 24 hours (60 mg, renally dosed)  • On 5/20/2023 transitioned from Lovenox to heparin drip to facilitate bone marrow biopsy on 5/22/2023  • Patient with no signs or symptoms to suggest pulmonary embolism  • Limited Doppler performed of the left lower extremity (femoral only) and he does have some edema present, requested full Doppler left leg as well, pending.  • Patient does note ongoing pain in the right lower extremity.  Given the extent of thrombosis, we will consult vascular surgery to ensure that he does not need to pursue thrombectomy.  • Patient continuing today on heparin drip.  Plan to hold tomorrow prior to bone marrow biopsy.  Patient can transition following bone marrow biopsy to oral Eliquis 10 mg twice daily x7 days followed by 5 mg twice daily. No plan for surgical intervention per vascular surgery.      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 Childress in hematology/medical oncology at [facility].    • Patient previously was treated for lymphocyte predominant  Hodgkin's lymphoma with involved field radiation (30 Gray) to the neck completed in December 2009 and has continued on a course of observation.  Incidentally at the time of diagnosis of his Hodgkin's lymphoma he was found to also have CLL/SLL involvement in bone marrow (greater than 40%).  This occurred while living in [place] at [facility].    • In March 2010, patient moved to [place] and was seen by Dr. Childress.    • He underwent bone marrow biopsy 10/8/2013 which showed evidence of CLL/SLL.    • Received treatment with Bendamustine/rituximab beginning 10/21/2013 and received 6 cycles of therapy completed in March 2014.    • He subsequently initiated treatment with Imbruvica in October 2017.  Patient had excellent response.    • He was hospitalized for COVID-19 pneumonia September through October 2021 and ibrutinib was held.  He remained off of treatment since that time.    • Most recent labs at [facility] on 12/21/2022 with WBC 14.94, differential 46 segs, 40 lymphs, 2 eosinophils with 13 atypical lymphs, hemoglobin 12.8, platelet count 406,000, IgG 638, IgA 56, IgM 156, free kappa light chain 18.98, free lambda light chain 76.26 with free light chain ratio 0.25.  IgM lambda monoclonal protein identified on immunofixation but not measurable.  Creatinine was 2.1.  • Patient with worsening chronic low back pain, underwent MRI lumbar spine on 3/8/2023 which showed degenerative changes, did incidentally note retroperitoneal lymphadenopathy with 3 cm infrarenal and 2.5 cm right iliac lymph nodes identified.  • Patient developed worsening pain in his right knee.  He was seen by orthopedics on 5/15/2023 with x-ray that showed lucencies around the proximal tibia, avascular necrosis and femoral head collapse.  Stat MRI was recommended however he did not pursue this.  • Patient reports that he is currently scheduled for bone marrow biopsy at [facility] on 5/22/2023.  Dr. Childress was planning to refer the patient  to Dr. Caba at [facility] to treat his CLL thereafter.  • He developed worsening pain in the right lower extremity and presented to the emergency department on 5/19/2023.  Doppler 5/19/2023 showed acute right external iliac, femoral, popliteal, calf DVT along with superficial thrombophlebitis.  Patient was admitted and placed on Lovenox 1 mg/kg every 24 hours (60 mg, renally dosed)  • On admission 5/19/2023, WBC was 96.99 with differential of 3 segs, 94 lymphs, 3 monocytes with hemoglobin 9.8, platelet count 149,000.  Creatinine was 2.73 with BUN 35 (baseline creatinine in the low 2 range)  • Patient with obvious progression of his CLL with dramatic escalation of his WBC, currently 94.36 along with worsening anemia and evidence on recent MRI lumbar spine of retroperitoneal lymphadenopathy up to 3 cm in the infrarenal region and 2.5 cm in the right iliac region.  He has worsening anemia likely related to marrow involvement from his CLL as well.  There is evidence on plain film of the right knee performed by orthopedics have some lucencies in the bone which would be highly unusual for CLL involvement in question whether there is another process.    • Labs on 5/20/2023 with , uric acid 8.9  • Labs pending from 5/21/2023 with serum protein electrophoresis, immunoelectrophoresis, quantitative immunoglobulins, free serum light chains  • Bone scan 5/21/2023 with no evidence of bony metastatic disease.  • MRI right knee without evidence of suspicious bone lesion  • CT chest abdomen pelvis 5/21/2023 with left supraclavicular/low cervical lymph nodes borderline enlarged up to 1.2 cm, bilateral axillary adenopathy, dominant lymph node left 3.4 cm, subpectoral lester enlargement on the left up to 3.1 cm, mild mediastinal lymph node enlargement up to 1.8 cm.  Spleen mildly enlarged at 13.5 cm.  Bulky periportal, retroperitoneal, mesenteric, pelvic lymphadenopathy with left periaortic/retroperitoneal mass measuring 5.1 cm,  periportal lymph node 2.2 cm, right common iliac 4.4 cm and external iliac right 4.8 cm.  Incidental note of bilateral femoral head osteonecrosis, on the right with subchondral collapse and flattening of the right femoral head.  • Skeletal survey pending  • Plan for bone marrow biopsy on 5/22/2023 while inpatient  • Patient will need immediate outpatient follow-up with Dr. Childress/Rosales in the [facility] system following discharge to discuss treatment.  • 5/22/23: WBC increased to 118.3 today. Plan for bone marrow biopsy later today. Following bone marrow biopsy, patient could be discharged and we will need to ensure he has immediate follow-up arranged with Dr. Childress/Rosales in the [facility] system where he will resume his outpatient care with future treatment for his CLL.     *Bone lesions  • Patient developed worsening pain in his right knee.  He was seen by orthopedics on 5/15/2023 with x-ray that showed lucencies around the proximal tibia, avascular necrosis and femoral head collapse.  Stat MRI was recommended however he did not pursue this.  • Etiology of lucencies unclear, would be unusual for CLL to involve bone.  • MRI right knee (without contrast) on 5/20/2023 with chronic areas of osteonecrosis, complex tear medial meniscus, thinning cartilage and small effusion.  No evidence of malignant lesion or metastasis.  • Bone scan 5/21/2023 with no evidence of metastatic lesions  • Skeletal survey from 5/20 show osteopenia/osteoporosis.      *CKD 3b  • Patient with baseline creatinine in the low 2 range  • Creatinine on 12/21/2022 was 2.1  • On admission 5/19/2023, creatinine was 2.73 with BUN 35  • Creatinine today stable at 2.46  • Uric acid elevated at 8.9 on 5/20/2023 with underlying progressive CLL  • Uric acid today 8.3.  We will need to consult nephrology regarding recommendations for use and dosing of allopurinol in the setting.     *Anemia  • Labs on 12/21/2022 with hemoglobin 12.8  • On admission 5/19/2023,  hemoglobin 9.8  • Patient with apparent progression of CLL with known marrow involvement which is the likely the most significant cause of his worsening anemia  • Patient does have underlying CKD 3b which is contributing factor to his anemia  • Labs on 5/21/2023 with iron 79, ferritin 1235, iron saturation 36%, TIBC 222.  Consistent with anemia secondary to chronic disease/malignancy  • Hemoglobin today improved at 9.7.      PLAN:   1. Patient continuing on heparin drip.   2. No surgical plans per vascular surgery. Can be switched to eliquis 10 mg BID  X 7 days --> 5 mg BID.  3. Labs pending from 5/20/2023 with serum protein electrophoresis, immunoelectrophoresis, quantitative immunoglobulins, free serum light chains  4. Continue allopurinol 200 mg daily  5. Skeletal survey pending  6. S/p bone marrow biopsy and aspiration with interventional radiology on 5/22/2023.  Path pending.  7. Patient will need immediate follow-up with Dr. Childress/Rosales at [facility] in medical oncology regarding bone marrow biopsy results and treatment of progressive CLL    Will sign off. Please call us with any questions.   All issues new to me.

## 2023-05-22 NOTE — DISCHARGE SUMMARY
Patient Name: Angel Cisneros  : 1950  MRN: [id number]    Date of Admission: 2023  Date of Discharge:  2023  Primary Care Physician: Ayaan Amin MD      Chief Complaint:   Leg Swelling      Discharge Diagnoses     Active Hospital Problems    Diagnosis  POA   • **Acute deep vein thrombosis (DVT) of iliac vein of right lower extremity [I82.421]  Yes   • HADLEY (acute kidney injury) [N17.9]  Unknown   • Lymphadenopathy, abdominal [R59.0]  Yes   • Paroxysmal atrial fibrillation (HCC) [I48.0]  Yes   • CLL (chronic lymphocytic leukemia) (HCC) [C91.10]  Yes   • GERD (gastroesophageal reflux disease) [K21.9]  Yes   • Leukemia [C95.90]  Yes      Resolved Hospital Problems   No resolved problems to display.        Hospital Course     This is a 73-year-old man with a history of CKD stage IIIb, CLL who was admitted on  for pain and swelling of his right leg.  He had a venous duplex of the right lower extremity that revealed a DVT.  Vascular surgery was consulted due to persistent pain and swelling of his right lower extremity.  It was determined that the risks of a mechanical thrombectomy outweighed the benefits, and so the procedure was not pursued.  He was supposed to undergo a bone marrow biopsy at [facility] however this has not happened yet.  He underwent the biopsy at [facility].    He was also noted to have an elevated uric acid level was started on allopurinol.  He was transitioned from heparin drip to Eliquis at discharge.    He will need immediate follow-up with his oncologist, Dr. Childress or Dr. [facility] for bone marrow biopsy results and treatment of CLL.  Day of Discharge       Physical Exam:  Temp:  [97.9 °F (36.6 °C)-98.3 °F (36.8 °C)] 98.3 °F (36.8 °C)  Heart Rate:  [67-88] 78  Resp:  [14-22] 19  BP: (119-151)/(73-85) 145/85  Body mass index is 21.52 kg/m².  Physical Exam  Constitutional:       Appearance: Normal appearance.   Cardiovascular:       Rate and Rhythm: Normal rate and regular rhythm.   Pulmonary:      Effort: Pulmonary effort is normal.   Abdominal:      General: There is no distension.      Palpations: Abdomen is soft.      Tenderness: There is no abdominal tenderness.   Neurological:      General: No focal deficit present.      Mental Status: He is alert and oriented to person, place, and time.         Consultants     Consult Orders (all) (From admission, onward)     Start     Ordered    05/21/23 1506  Inpatient Vascular Surgery Consult  Once        Specialty:  Vascular Surgery  Provider:  Angel Arnold MD    05/21/23 1505    05/21/23 1001  Inpatient Nephrology Consult  Once        Specialty:  Nephrology  Provider:  Osito Costa MD    05/21/23 1000    05/19/23 1939  Inpatient Hematology & Oncology Consult  Once        Specialty:  Hematology and Oncology  Provider:  Jason Noland MD    05/19/23 1939    05/19/23 1653  [facility] (on-call MD unless specified) Details  Once        Specialty:  Hospitalist  Provider:  Anastasia Means MD    05/19/23 1652              Procedures     Imaging Results (All)     Procedure Component Value Units Date/Time    CT Bone marrow biopsy and aspiration [[id number]] Collected: 05/22/23 1615     Updated: 05/22/23 1620    Narrative:      CT-GUIDED BONE MARROW ASPIRATION AND BIOPSY     HISTORY: CLL.     The patient was placed in the prone position. Following sterile prep and  local anesthetic, a 12-gauge bone biopsy needle was advanced into the  right posterior iliac crest by Dr. Madison. 25 cc of bone marrow was  aspirated followed by a 12-gauge core specimen.     The patient tolerated the procedure well and there were no immediate  complications.     Moderate sedation was provided under my direct supervision using 1 mg IV  Versed and 50 mcg IV Fentanyl. The patient was independently monitored  by a trained Department of Radiology RN using automated blood pressure,  EKG, and pulse oximetry. My  total intraservice time was 12 minutes.        Radiation dose reduction techniques were utilized, including automated  exposure control and exposure modulation based on body size.     This report was finalized on 5/22/2023 4:16 PM by Dr. Fidel Madison M.D.       NM Bone Scan Whole Body [[id number]] Collected: 05/21/23 1320     Updated: 05/21/23 1325    Narrative:      NUCLEAR MEDICINE WHOLE BODY BONE SCAN     HISTORY: CLL.     TECHNIQUE:  21.1 mCi of 99m technetium MDP was administered intravenous  followed by 3 hour delayed phase whole body imaging with selected spot  views.     COMPARISON: CT chest, abdomen and pelvis 05/21/2023, MRI right knee  05/20/2023, complete bone survey 05/20/2023, MRI lumbar spine  03/08/2023.     FINDINGS:  There is mild right nasopharyngeal uptake which is  nonspecific. There is increased uptake at both hips, greater on the  right and this is consistent with osteonecrosis and osteoarthritis.  There is also increased uptake at the right knee attributed to  osteoarthritis and osteonecrosis. There are no additional abnormal foci  of increased uptake.       Impression:      Bilateral hip uptake, greater on the right, due to a  combination of osteonecrosis and osteoarthritis. There is also increased  uptake to the right knee due to a combination of osteoarthritis and  osteonecrosis. Mild right nasopharyngeal uptake is nonspecific and may  be related to sinus disease though a bone lesion is difficult to exclude  at this location. There is no further abnormal uptake to suggest the  presence of bony metastatic disease.     This report was finalized on 5/21/2023 1:22 PM by Dr. Thanh Singh M.D.       CT Chest Without Contrast Diagnostic [[id number]] Collected: 05/21/23 1303     Updated: 05/21/23 1324    Narrative:      CT CHEST, ABDOMEN, AND PELVIS WITHOUT IV CONTRAST     HISTORY: 73-year-old male with CLL.     TECHNIQUE: Radiation dose reduction techniques were utilized,  including  automated exposure control and exposure modulation based on body size.  CT of the chest, abdomen, and pelvis includes axial imaging from the  thoracic inlet through the trochanters without intravenous contrast and  with oral contrast.     COMPARISON: CT chest without contrast 10/03/2021, 09/07/2018.     FINDINGS:  CHEST: Within the medial left supraclavicular region/left neck base  there is what appears to represent an enlarged node measuring 12 mm.  There is bilateral axillary lester enlargement that is new when compared  to prior exam 10/03/2021. The dominant left axillary node measures 2.1 x  3.4 cm. There is also subpectoral lester enlargement and a left  subpectoral node measures 3.1 x 1.3 cm. There is mild mediastinal lester  enlargement. A subcarinal node measures 1.8 cm.     Heart size is mildly enlarged. There is peripheral predominant  chronic-appearing pulmonary interstitial disease though peripheral  interstitial infiltrates demonstrated on the previous exam 10/03/2021  have resolved. There is no suspicious pulmonary nodule or pleural  effusion. There appears be subtle chronic osteonecrosis of the left  humeral head without subchondral collapse.      ABDOMEN/PELVIS: Liver, adrenal glands, pancreas appear within normal  limits. Spleen measures 9.9 x 5.6 x 13.5 cm which is mildly enlarged.  Kidneys appear within normal limits. Oral contrast extends to the colon  and there is no bowel dilatation or evidence for bowel obstruction.     There is bulky periportal, retroperitoneal, mesenteric, pelvic lester  enlargement. A large left paraaortic, retroperitoneal lester mass  measures 5.1 x 4.7 cm. An enlarged periportal node measures 2.2 cm in  short axis. A right common iliac node measures 4.4 x 3.1 cm. A right  external iliac lester mass measures 4.8 x 2.9 cm.     There is osteonecrosis at both femoral heads, more extensive on the  right and there is subchondral collapse of the right femoral head  with  flattening of the superior right femoral head and secondary  osteoarthritis at the right hip joint. Degenerative disc disease is  present within the lumbar spine greatest at L2-3 and L3-4.       Impression:      1. Pathologic lester enlargement within the neck base, chest, abdomen,  pelvis with bulky lester enlargement greatest within the retroperitoneum  and pelvis. Mild splenomegaly.  2. Chronic osteonecrosis left humeral head and both femoral heads, most  severely involving the right femoral head where there is subchondral  collapse and flattening and secondary osteoarthritis.  3. Peripheral predominant pulmonary interstitial disease.        Radiation dose reduction techniques were utilized, including automated  exposure control and exposure modulation based on body size.     This report was finalized on 5/21/2023 1:21 PM by Dr. Thanh Singh M.D.       CT Abdomen Pelvis Without Contrast [[id number]] Collected: 05/21/23 1303     Updated: 05/21/23 1324    Narrative:      CT CHEST, ABDOMEN, AND PELVIS WITHOUT IV CONTRAST     HISTORY: 73-year-old male with CLL.     TECHNIQUE: Radiation dose reduction techniques were utilized, including  automated exposure control and exposure modulation based on body size.  CT of the chest, abdomen, and pelvis includes axial imaging from the  thoracic inlet through the trochanters without intravenous contrast and  with oral contrast.     COMPARISON: CT chest without contrast 10/03/2021, 09/07/2018.     FINDINGS:  CHEST: Within the medial left supraclavicular region/left neck base  there is what appears to represent an enlarged node measuring 12 mm.  There is bilateral axillary lester enlargement that is new when compared  to prior exam 10/03/2021. The dominant left axillary node measures 2.1 x  3.4 cm. There is also subpectoral lester enlargement and a left  subpectoral node measures 3.1 x 1.3 cm. There is mild mediastinal lester  enlargement. A subcarinal node measures 1.8  cm.     Heart size is mildly enlarged. There is peripheral predominant  chronic-appearing pulmonary interstitial disease though peripheral  interstitial infiltrates demonstrated on the previous exam 10/03/2021  have resolved. There is no suspicious pulmonary nodule or pleural  effusion. There appears be subtle chronic osteonecrosis of the left  humeral head without subchondral collapse.      ABDOMEN/PELVIS: Liver, adrenal glands, pancreas appear within normal  limits. Spleen measures 9.9 x 5.6 x 13.5 cm which is mildly enlarged.  Kidneys appear within normal limits. Oral contrast extends to the colon  and there is no bowel dilatation or evidence for bowel obstruction.     There is bulky periportal, retroperitoneal, mesenteric, pelvic lester  enlargement. A large left paraaortic, retroperitoneal lester mass  measures 5.1 x 4.7 cm. An enlarged periportal node measures 2.2 cm in  short axis. A right common iliac node measures 4.4 x 3.1 cm. A right  external iliac lester mass measures 4.8 x 2.9 cm.     There is osteonecrosis at both femoral heads, more extensive on the  right and there is subchondral collapse of the right femoral head with  flattening of the superior right femoral head and secondary  osteoarthritis at the right hip joint. Degenerative disc disease is  present within the lumbar spine greatest at L2-3 and L3-4.       Impression:      1. Pathologic lester enlargement within the neck base, chest, abdomen,  pelvis with bulky lester enlargement greatest within the retroperitoneum  and pelvis. Mild splenomegaly.  2. Chronic osteonecrosis left humeral head and both femoral heads, most  severely involving the right femoral head where there is subchondral  collapse and flattening and secondary osteoarthritis.  3. Peripheral predominant pulmonary interstitial disease.        Radiation dose reduction techniques were utilized, including automated  exposure control and exposure modulation based on body size.     This  report was finalized on 5/21/2023 1:21 PM by Dr. Thanh Singh M.D.       MRI Knee Right Without Contrast [[id number]] Collected: 05/20/23 2235     Updated: 05/20/23 2239    Narrative:      MRI RIGHT KNEE WITHOUT CONTRAST     HISTORY: Right knee pain. This disease and avascular necrosis  demonstrated right lower extremity around the knee on plain film.     TECHNIQUE:  MRI right knee includes axial and coronal PD fat-sat as well  as sagittal PD, T1, T2-weighted sequences.     COMPARISON:None     FINDINGS:There are chronic areas of osteonecrosis involving the  posterior aspect of the medial femoral condyle and the central and  posterior aspect of the medial tibial plateau. Small area of chronic  osteonecrosis/bone infarct involving the posterior aspect of the lateral  tibial plateau. There is no subchondral collapse.     There is a complex tear of the posterior horn and posterior aspect of  the body of the medial meniscus and tear extends horizontally and radial  components. Vertical longitudinal tear is present in the outer third of  the posterior aspect of the medial meniscal body. The anterior horn  medial meniscus and lateral meniscus are intact. Lateral meniscus is  elongated and partially discoid. The cruciate ligaments, collateral  ligament complexes, extensor mechanism are intact. There is mild medial  and patellofemoral compartment articular cartilage thinning. Small  effusion is present. There is a degenerative intraosseous ganglion cyst  extending deep to the PCL insertion and origin of the posterior root  medial meniscus measuring 1.4 cm.     There is muscular edema within the distal quadriceps musculature in the  calf musculature. There is also T2 increased material within the  popliteus vein extending to the proximal calf veins consistent with deep  venous thromboses that most likely accounts for the muscular edema.       Impression:      1. Chronic areas of osteonecrosis involving the medial femoral  condyle  and medial and lateral tibial plateaus without subchondral collapse.  2. Complex tear of the posterior horn and body medial meniscus.  Posterior discoid lateral meniscus without tear.  3. Mild medial and patellofemoral compartment articular cartilage  thinning. Small effusion.  4. Signal abnormality within the popliteal vein and proximal calf veins  consistent with deep venous thrombosis as demonstrated on DVT exam  yesterday. There is also generalized muscular edema most likely related  to presence of venous thrombosis and compromised venous return.     This report was finalized on 5/20/2023 10:35 PM by Dr. Thanh Singh M.D.       XR Bone Survey Complete [[id number]] Collected: 05/20/23 1757     Updated: 05/20/23 1814    Narrative:      COMPLETE BONE SURVEY: 20 images     HISTORY: Lucency demonstrated in the right lower extremity on plain  films. Pain in the right posterior leg.     COMPARISON: None.     FINDINGS: There is flattening of the superior right femoral head and  this may be related to subchondral fracture or osteonecrosis. There  appears to be patchy osteopenia within the right pelvis and right  proximal femur and this may be related to disuse osteopenia and patchy  demineralization of bone especially with the right femoral head  fractures/flattening. There is endplate spur formation within the spine.  There is an area of chronic appearing scarring within the right midlung.  The bowel gas pattern is nonobstructed. No spinal fracture is  demonstrated.       Impression:      Flattening of the superior right femoral head with bone loss  and this may be related to subchondral fracture or osteonecrosis with  collapse. There is also asymmetric osteopenia involving the right pelvis  and right proximal femur that may be related to disuse osteoporosis and  demineralization of bone associated with collapse/fracture. MRI of the  pelvis/right hip may be helpful for further evaluation.     This report  was finalized on 5/20/2023 6:11 PM by Dr. Thanh Singh M.D.             Pertinent Labs     Results from last 7 days   Lab Units 05/22/23 0157 05/21/23 0757 05/20/23 0420 05/19/23  1230   WBC 10*3/mm3 118.32* 114.27* 94.36* 96.99*   HEMOGLOBIN g/dL 8.8* 9.6* 8.8* 9.8*   PLATELETS 10*3/mm3 189 180 153 149     Results from last 7 days   Lab Units 05/22/23 0157 05/21/23 0757 05/20/23 0420 05/19/23  1230   SODIUM mmol/L 138 142 143 141   POTASSIUM mmol/L 4.3 4.1 3.9 4.1   CHLORIDE mmol/L 102 107 109* 107   CO2 mmol/L 25.5 25.0 23.0 22.0   BUN mg/dL 35* 33* 33* 35*   CREATININE mg/dL 2.52* 2.46* 2.42* 2.73*   GLUCOSE mg/dL 123* 103* 104* 165*   Estimated Creatinine Clearance: 24.1 mL/min (A) (by C-G formula based on SCr of 2.52 mg/dL (H)).  Results from last 7 days   Lab Units 05/22/23 0157 05/21/23 0757 05/19/23  1230   ALBUMIN g/dL 3.1* 3.7 3.6   BILIRUBIN mg/dL 0.2 0.2 0.3   ALK PHOS U/L 241* 237* 184*   AST (SGOT) U/L 60* 85* 31   ALT (SGPT) U/L 115* 117* 53*     Results from last 7 days   Lab Units 05/22/23 0157 05/21/23 0757 05/20/23 0420 05/19/23  1230   CALCIUM mg/dL 9.1 9.4 8.7 8.8   ALBUMIN g/dL 3.1* 3.7  --  3.6         Results from last 7 days   Lab Units 05/22/23 0157 05/21/23  2233   SODIUM UR mmol/L  --  55   CREATININE UR mg/dL  --  102.4   URIC ACID mg/dL 8.0*  --          Invalid input(s): LDLCALC        Test Results Pending at Discharge     Pending Labs     Order Current Status    Bone Marrow Exam Collected (05/22/23 1550)    Bone Marrow Exam Collected (05/22/23 1550)    Flow Cytometry Collected (05/22/23 1550)    Erythropoietin In process    YARELY + PE In process    Immunoglobulin Free LT Chains Blood In process    Tissue Pathology Exam In process          Discharge Details        Discharge Medications      New Medications      Instructions Start Date   Allopurinol 200 MG tablet   200 mg, Oral, Daily   Start Date: May 23, 2023     apixaban 5 MG tablet tablet  Commonly known as: ELIQUIS    Take two tablets every 12 hours for 7 days, followed by 1 tablet every 12 hours.         Continue These Medications      Instructions Start Date   acetaminophen 325 MG tablet  Commonly known as: TYLENOL   650 mg, Oral, Every 4 Hours PRN      amLODIPine 10 MG tablet  Commonly known as: NORVASC   10 mg, Oral, Daily      calcium carbonate 500 MG chewable tablet  Commonly known as: TUMS   1 tablet, Oral, Daily      cetirizine 10 MG tablet  Commonly known as: zyrTEC   10 mg, Oral, Daily      Diclofenac Sodium 1 % gel gel  Commonly known as: VOLTAREN   4 g, Topical, 4 Times Daily      doxylamine 25 MG tablet  Commonly known as: UNISOM   Oral      famotidine 20 MG tablet  Commonly known as: PEPCID   TAKE 1 TABLET BY MOUTH EVERY DAY      finasteride 5 MG tablet  Commonly known as: Proscar   5 mg, Oral, Daily      fluorometholone 0.1 % ophthalmic suspension  Commonly known as: FML   1 drop, Both Eyes, Daily      metoprolol tartrate 25 MG tablet  Commonly known as: LOPRESSOR   1/2 tablet twice a day      sodium chloride 2 % ophthalmic solution  Commonly known as: SENA 128   1 drop      tamsulosin 0.4 MG capsule 24 hr capsule  Commonly known as: FLOMAX   0.4 mg, Oral, Daily      vitamin D 1.25 MG (26704 UT) capsule capsule  Commonly known as: ERGOCALCIFEROL   TAKE 1 CAPSULE (50,000 UNITS TOTAL) BY MOUTH ONCE WEEKLY         Stop These Medications    predniSONE 20 MG tablet  Commonly known as: DELTASONE            Allergies   Allergen Reactions   • Ambien [Zolpidem Tartrate] Mental Status Change   • Penicillins Unknown - Low Severity     unknown childhood allergy         Discharge Disposition:  Home or Self Care    Discharge Diet:  Diet Order   Procedures   • NPO Diet NPO Type: Sips with Meds       Discharge Activity:       CODE STATUS:    Code Status and Medical Interventions:   Ordered at: 05/19/23 1825     Code Status (Patient has no pulse and is not breathing):    CPR (Attempt to Resuscitate)     Medical Interventions  (Patient has pulse or is breathing):    Full       Future Appointments   Date Time Provider Department Center   10/23/2023  9:45 AM Ayaan Amin MD [id number]      Follow-up Information     Ayaan Amin MD .    Specialty: Family Medicine  Contact information:  [address] [address] [address] [phone]                         Time Spent on Discharge:  Greater than 30 minutes      Braulio Mora MD  [facility]  05/22/23  17:32 EDT

## 2023-05-22 NOTE — CONSULTS
[facility] Consult Note      Patient Name: Angel Cisneros  : 1950  MRN: [id number]  Primary Care Physician:  Ayaan Amin MD  Referring Physician: Anastasia Means MD  Date of admission: 2023    Subjective     Reason for Consult:  HADLEY on [id number]    HPI:   Angel Cisneros is a 73 y.o. male with YOH9z-3 (baseline SCR low-2's) followed by Dr. Alex Avila of our group, admitted  for further evaluation of pain and swelling of his right leg that had started two days prior to admission.  Doppler revealed acute right lower extremity DVT.  Noncontrasted CT scan also found pathologic lester enlargement throughout the chest, abdomen, and pelvis; no hydronephrosis noted.  PMH notable for prior lymphoma, CLL, and hypertension.  Vascular Surgery and Heme/Onc following.    · Weight has been stable on his scales; in the hospital, weight appears to have dropped by 5 pounds  · No urinary complaints; bowel movements fine  · Reports improvement in right leg swelling and pain since his arrival, though symptom still persists; on heparin drip  · Appetite is good; no N/V  · No orthopnea or shortness of breath    Review of Systems:   14 point review of systems is otherwise negative except for mentioned above on HPI    Personal History     Past Medical History:   Diagnosis Date   • Arthritis     both knees   • Cancer     dx with lymphoma / radiation   • Cataracts, bilateral    • History of COVID-2021   • Hx of cornea transplant     both eyes   • Leukemia    • Lymphoma     dx in . treated with radiation.   • Lymphoma     treated with radiation. dx in        Past Surgical History:   Procedure Laterality Date   • BREAST LUMPECTOMY Right    • COLONOSCOPY N/A 3/8/2016    Procedure: COLONOSCOPY with cold polypectomy X 3;  Surgeon: Chris Harden MD;  Location: [facility] ENDOSCOPY;  Service:    • ENDOSCOPY N/A 10/23/2018    Procedure: ESOPHAGOGASTRODUODENOSCOPY  WITH BIOPSY;  Surgeon: Chris Harden MD;  Location: [facility] ENDOSCOPY;  Service: Gastroenterology   • EYE SURGERY      partial cornea transplant 4-5 years ago   • KNEE SURGERY Left    • TOE SURGERY Bilateral    • TONSILLECTOMY         Family History: family history includes Kidney cancer in his mother.    Social History:  reports that he has never smoked. He has never been exposed to tobacco smoke. He has never used smokeless tobacco. He reports that he does not drink alcohol and does not use drugs.    Home Medications:  Prior to Admission medications    Medication Sig Start Date End Date Taking? Authorizing Provider   amLODIPine (NORVASC) 10 MG tablet Take 1 tablet by mouth Daily. 12/7/22  Yes Ayaan Amin MD   finasteride (Proscar) 5 MG tablet Take 1 tablet by mouth Daily. 4/20/23  Yes Ayaan Amin MD   fluorometholone (FML) 0.1 % ophthalmic suspension Administer 1 drop to both eyes Daily. 7/22/18  Yes Sedrick Shafer MD   sodium chloride (SENA 128) 2 % ophthalmic solution 1 drop.   Yes ProviderSedrick MD   tamsulosin (FLOMAX) 0.4 MG capsule 24 hr capsule Take 1 capsule by mouth Daily. 4/20/23  Yes Ayaan Amin MD   famotidine (PEPCID) 20 MG tablet Take 1 tablet by mouth Daily. 6/9/22 5/20/23 Yes Ayaan Amin MD   acetaminophen (TYLENOL) 325 MG tablet Take 2 tablets by mouth Every 4 (Four) Hours As Needed for Mild Pain . 10/16/21   Jake Gomez MD   calcium carbonate (TUMS) 500 MG chewable tablet Chew 1 tablet Daily.    Provider, MD Sedrick   cetirizine (zyrTEC) 10 MG tablet Take 1 tablet by mouth Daily for 30 days. 5/2/23 6/1/23  Lo Malik PA-C   Diclofenac Sodium (VOLTAREN) 1 % gel gel Apply 4 g topically to the appropriate area as directed 4 (Four) Times a Day. 8/18/22   Abril Cuevas APRN   doxylamine (UNISOM) 25 MG tablet Take  by mouth.    ProviderSedrick MD   famotidine (PEPCID) 20 MG tablet TAKE 1 TABLET BY MOUTH EVERY DAY  5/20/23   Ayaan Amin MD   metoprolol tartrate (LOPRESSOR) 25 MG tablet 1/2 tablet twice a day 9/1/22   Ayaan Amin MD   predniSONE (DELTASONE) 20 MG tablet 3 a day for 3 days then 2 a day for 3 days then 1 a day for 3 days. 2/2/23   Ayaan Amin MD   vitamin D (ERGOCALCIFEROL) 1.25 MG (49108 UT) capsule capsule TAKE 1 CAPSULE (50,000 UNITS TOTAL) BY MOUTH ONCE WEEKLY 2/20/23   Provider, MD Sedrick       Allergies:  Allergies   Allergen Reactions   • Ambien [Zolpidem Tartrate] Mental Status Change   • Penicillins Unknown - Low Severity     unknown childhood allergy       Objective     Vitals:   Temp:  [97.9 °F (36.6 °C)-98.9 °F (37.2 °C)] 97.9 °F (36.6 °C)  Heart Rate:  [66-72] 72  Resp:  [18] 18  BP: (119-133)/(72-83) 126/83    Intake/Output Summary (Last 24 hours) at 5/21/2023 2052  Last data filed at 5/21/2023 1927  Gross per 24 hour   Intake 366 ml   Output 1000 ml   Net -634 ml       Physical Exam:   Constitutional: Awake, alert, NAD, pleasant  HEENT: Sclera anicteric, no conjunctival injection, MMM   Neck: Supple, no carotid bruit, trachea at midline, no JVD  Respiratory: Clear to auscultation bilaterally, nonlabored respiration  Cardiovascular: RRR, no rub  Gastrointestinal: BS +, soft, nontender and nondistended  : No palpable bladder  Musculoskeletal: +1-2 edema on right; trace edema on left, no C/C Psychiatric: Appropriate affect, cooperative, oriented  Neurologic: No asterixis, moving all extremities, normal speech  Skin: Warm and dry       Scheduled Meds:     amLODIPine, 10 mg, Oral, Daily  calcium carbonate, 1 tablet, Oral, Daily  famotidine, 20 mg, Oral, Daily  finasteride, 5 mg, Oral, Daily  metoprolol tartrate, 12.5 mg, Oral, Q12H  senna-docusate sodium, 2 tablet, Oral, BID  sodium chloride, 1 drop, Both Eyes, Q4H  tamsulosin, 0.4 mg, Oral, Daily      IV Meds:   heparin, 18 Units/kg/hr, Last Rate: 19 Units/kg/hr (05/21/23 2000)        Results Reviewed:   I have personally  reviewed the results from the time of this admission to 5/21/2023 20:52 EDT     Lab Results   Component Value Date    GLUCOSE 103 (H) 05/21/2023    CALCIUM 9.4 05/21/2023     05/21/2023    K 4.1 05/21/2023    CO2 25.0 05/21/2023     05/21/2023    BUN 33 (H) 05/21/2023    CREATININE 2.46 (H) 05/21/2023    EGFRIFAFRI 49 (L) 06/03/2020    EGFRIFNONA 52 (L) 10/15/2021    BCR 13.4 05/21/2023    ANIONGAP 10.0 05/21/2023      Lab Results   Component Value Date    MG 2.3 09/20/2021    PHOS 3.3 09/20/2021    ALBUMIN 3.7 05/21/2023           Assessment / Plan       Acute deep vein thrombosis (DVT) of iliac vein of right lower extremity    Leukemia    GERD (gastroesophageal reflux disease)    CLL (chronic lymphocytic leukemia) (HCC)    Paroxysmal atrial fibrillation (HCC)    Lymphadenopathy, abdominal    HADLEY (acute kidney injury)      ASSESSMENT:  1.  HADLEY on CKD4, nonoliguric, stable.  WBC >100k raises issue of hyperviscosity, and tremendous lymphadenopathy raises possibility of TLS.  Has had some marginal blood pressures while here, which would also predispose to prerenal azotemia.  Infiltrative disease of the kidney in the setting of progressive CLL also a consideration.  Volume status appears stable by exam; normal potassium and anion gap.  No urine studies yet., No hydro by recent CT scan  2.  Progressive CLL and h/o lymphoma  3.  Extensive right leg DVT  4.  PAF on AC  5.  Hypertension, at times overcontrolled    PLAN:  1.  Reduce amlodipine to 5 mg daily, and place hold orders to prevent any inadvertent hypotension  2.  Begin allopurinol 200 mg daily for now; trend uric acid and LDH  3.  UA, FENa, and bladder scan  4.  Bone marrow biopsy planned tomorrow    Thank you for involving us in the care of Angel Cisneros.  Please feel free to call with any questions.    Osito Costa MD  05/21/23  20:52 EDT    [facility]  [phone]      Please note that portions of this  note were completed with a voice recognition program.

## 2023-05-22 NOTE — PROGRESS NOTES
[facility] Progress Note      Patient Name: Angel Cisneros  : 1950  MRN: [id number]  Primary Care Physician:  Ayaan Amin MD  Date of admission: 2023    Subjective     Interval History:   Continues to have slight discomfort and swelling in the right leg  Appetite is good; no N/V  Breathing is comfortable on room air  Wife at bedside; has many questions regarding his leukocytosis    Review of Systems:   As noted above    Objective     Vitals:   Temp:  [97.9 °F (36.6 °C)-98.3 °F (36.8 °C)] 98.3 °F (36.8 °C)  Heart Rate:  [67-88] 78  Resp:  [14-22] 19  BP: (119-151)/(73-85) 145/85    Intake/Output Summary (Last 24 hours) at 2023 0716  Last data filed at 2023 0722  Gross per 24 hour   Intake --   Output 1315 ml   Net -1315 ml       Physical Exam:    Constitutional: Awake, alert, NAD, pleasant  HEENT: Sclera anicteric, no conjunctival injection, MMM   Neck: Supple, no carotid bruit, trachea at midline, no JVD  Respiratory: Clear to auscultation bilaterally, nonlabored respiration  Cardiovascular: RRR, no rub  Gastrointestinal: BS +, soft, nontender and nondistended  : No palpable bladder  Musculoskeletal: +1 edema on right; trace edema on left, no C/C Psychiatric: Appropriate affect, cooperative, oriented  Neurologic: No asterixis, moving all extremities, normal speech  Skin: Warm and dry      Scheduled Meds:     allopurinol, 200 mg, Oral, Daily  amLODIPine, 5 mg, Oral, Daily  calcium carbonate, 1 tablet, Oral, Daily  famotidine, 20 mg, Oral, Daily  finasteride, 5 mg, Oral, Daily  metoprolol tartrate, 12.5 mg, Oral, Q12H  senna-docusate sodium, 2 tablet, Oral, BID  sodium chloride, 1 drop, Both Eyes, Q4H  sodium chloride, 3 mL, Intravenous, Q12H  sodium chloride, 25 mL/hr, Intravenous, Once  tamsulosin, 0.4 mg, Oral, Daily      IV Meds:   heparin, 18 Units/kg/hr, Last Rate: Stopped (23 1653)        Results Reviewed:   I have personally reviewed  the results from the time of this admission to 5/22/2023 17:36 EDT     Results from last 7 days   Lab Units 05/22/23  0157 05/21/23  0757 05/20/23  0420 05/19/23  1230   SODIUM mmol/L 138 142 143 141   POTASSIUM mmol/L 4.3 4.1 3.9 4.1   CHLORIDE mmol/L 102 107 109* 107   CO2 mmol/L 25.5 25.0 23.0 22.0   BUN mg/dL 35* 33* 33* 35*   CREATININE mg/dL 2.52* 2.46* 2.42* 2.73*   CALCIUM mg/dL 9.1 9.4 8.7 8.8   BILIRUBIN mg/dL 0.2 0.2  --  0.3   ALK PHOS U/L 241* 237*  --  184*   ALT (SGPT) U/L 115* 117*  --  53*   AST (SGOT) U/L 60* 85*  --  31   GLUCOSE mg/dL 123* 103* 104* 165*       Estimated Creatinine Clearance: 24.1 mL/min (A) (by C-G formula based on SCr of 2.52 mg/dL (H)).          Results from last 7 days   Lab Units 05/22/23  0157 05/21/23  0757 05/20/23  0420   URIC ACID mg/dL 8.0* 8.3* 8.9*       Results from last 7 days   Lab Units 05/22/23  0157 05/21/23  0757 05/20/23  0420 05/19/23  1230   WBC 10*3/mm3 118.32* 114.27* 94.36* 96.99*   HEMOGLOBIN g/dL 8.8* 9.6* 8.8* 9.8*   PLATELETS 10*3/mm3 189 180 153 149       Results from last 7 days   Lab Units 05/22/23  1423 05/20/23  1601 05/19/23  1230   INR  0.99 0.98 1.02       Assessment / Plan     ASSESSMENT:  1.  HADLEY on CKD4 (baseline SCR low-2's), nonoliguric, stable.  WBC >100k raises issue of hyperviscosity, and tremendous lymphadenopathy raises possibility of TLS.  Has had some marginal blood pressures while here, which would also predispose to prerenal azotemia.  Infiltrative disease of the kidney in the setting of progressive CLL also a consideration.  Volume status appears stable by exam; normal potassium and anion gap.  [place] urinalysis.  No hydro by recent CT scan  2.  Progressive CLL and h/o lymphoma  3.  Extensive right leg DVT  4.  PAF on AC  5.  Hypertension, at times overcontrolled    PLAN:  1.  Discussed with wife at bedside recently lowered amlodipine dose.  I told patient and wife that if SBP's routinely stay above 130, he can resume his  previous 10 mg daily dose.  If most readings are in the 110-120 range, though, would remain on the lower 5 mg dose.  2.  Will arrange follow-up in our office with Dr. Alex Avila  3.  Home anytime from renal view    Thank you for involving us in the care of Angel Cisneros.  Please feel free to call with any questions.    Osito Costa MD  05/22/23  17:36 EDT    [facility]  [phone]    Please note that portions of this note were completed with a voice recognition program.

## 2023-05-22 NOTE — OUTREACH NOTE
Prep Survey    Flowsheet Row Responses   [facility] [place] patient discharged from? [place]   Is LACE score < 7 ? No   Eligibility [facility]   Date of Admission 05/19/23   Date of Discharge 05/22/23   Discharge Disposition Home or Self Care   Discharge diagnosis Acute deep vein thrombosis (DVT)   Does the patient have one of the following disease processes/diagnoses(primary or secondary)? Other   Does the patient have Home health ordered? No   Is there a DME ordered? No   Prep survey completed? Yes          Nakita ROBERTO - Registered Nurse

## 2023-05-23 ENCOUNTER — TRANSITIONAL CARE MANAGEMENT TELEPHONE ENCOUNTER (OUTPATIENT)
Dept: CALL CENTER | Facility: HOSPITAL | Age: 73
End: 2023-05-23
Payer: MEDICARE

## 2023-05-23 LAB
ALBUMIN SERPL ELPH-MCNC: 3 G/DL (ref 2.9–4.4)
ALBUMIN/GLOB SERPL: 1.2 {RATIO} (ref 0.7–1.7)
ALPHA1 GLOB SERPL ELPH-MCNC: 0.4 G/DL (ref 0–0.4)
ALPHA2 GLOB SERPL ELPH-MCNC: 1 G/DL (ref 0.4–1)
B-GLOBULIN SERPL ELPH-MCNC: 0.7 G/DL (ref 0.7–1.3)
EPO SERPL-ACNC: 65.2 MIU/ML (ref 2.6–18.5)
GAMMA GLOB SERPL ELPH-MCNC: 0.5 G/DL (ref 0.4–1.8)
GLOBULIN SER-MCNC: 2.6 G/DL (ref 2.2–3.9)
IGA SERPL-MCNC: 22 MG/DL (ref 61–437)
IGG SERPL-MCNC: 420 MG/DL (ref 603–1613)
IGM SERPL-MCNC: 73 MG/DL (ref 15–143)
INTERPRETATION SERPL IEP-IMP: ABNORMAL
KAPPA LC FREE SER-MCNC: 11.9 MG/L (ref 3.3–19.4)
KAPPA LC FREE/LAMBDA FREE SER: 0.05 {RATIO} (ref 0.26–1.65)
LABORATORY COMMENT REPORT: ABNORMAL
LAMBDA LC FREE SERPL-MCNC: 228.2 MG/L (ref 5.7–26.3)
M PROTEIN SERPL ELPH-MCNC: 0.1 G/DL
PROT SERPL-MCNC: 5.6 G/DL (ref 6–8.5)

## 2023-05-23 NOTE — TELEPHONE ENCOUNTER
Caller: Angel Cisneros    Relationship: Self    Best call back number: [phone]     What medications are you currently taking: apixaban (ELIQUIS) 5 MG tablet tablet     What are your concerns: PATIENT CALLING WANTING TO KNOW IF THERE IS ANOTHER MEDICATION THAT WOULD BE COVERED BY HIS INSURANCE HE STATED THE COPAY WOULD BE $126

## 2023-05-23 NOTE — TELEPHONE ENCOUNTER
PATIENT CALLED BACK AND WANTED TO KNOW IF HE WOULD BE ALRIGHT UNTIL HE GETS THE MEDICATION ALTERNATIVE TO ELIQUIS. HE WAS DISCHARGED FROM THE HOSPITAL LAST NIGHT. HE DOES NOT HAVE ANOTHER BLOOD THINNER MEDICATION TO TAKE TODAY.    PLEASE CALL AND ADVISE [phone]    [facility]/pharmacy #[id number] - [place], [place] - [address] AT [facility] - [phone]  - [phone]   [phone]

## 2023-05-23 NOTE — TELEPHONE ENCOUNTER
SPOKE PATIENT I INFORMED HIM THAT DR SHORT WHAT'S HIM TO STAY ON ELIQUIS 5MG AND THAT HE CAN PICKUP HIS RX FROM THE PHARMACY. I WILL GIVE HIM A CALL BACK WHEN I GET A HOLD OF THE REP FOR MORE SAMPLES

## 2023-05-23 NOTE — OUTREACH NOTE
Call Center TCM Note    Flowsheet Row Responses   [facility] patient discharged from? [place]   Does the patient have one of the following disease processes/diagnoses(primary or secondary)? Other   TCM attempt successful? No  [no updated verbal release]   Unsuccessful attempts Attempt 1  [declined as pt was in a restaurant at time of call]          Summer Chaudhry RN    5/23/2023, 12:42 EDT

## 2023-05-23 NOTE — CASE MANAGEMENT/SOCIAL WORK
Case Management Discharge Note      Final Note: Discharged to home. NATASHA KOENIG CCP         Selected Continued Care - Discharged on 5/22/2023 Admission date: 5/19/2023 - Discharge disposition: Home or Self Care    Destination    No services have been selected for the patient.              Durable Medical Equipment    No services have been selected for the patient.              Dialysis/Infusion    No services have been selected for the patient.              Home Medical Care    No services have been selected for the patient.              Therapy    No services have been selected for the patient.              Community Resources    No services have been selected for the patient.              Community & DME    No services have been selected for the patient.                  Transportation Services  Private: Car    Final Discharge Disposition Code: 01 - home or self-care

## 2023-05-23 NOTE — OUTREACH NOTE
Call Center TCM Note    Flowsheet Row Responses   [facility] patient discharged from? [place]   Does the patient have one of the following disease processes/diagnoses(primary or secondary)? Other   TCM attempt successful? Yes   Call start time 1622   Call end time 1625   Discharge diagnosis Acute deep vein thrombosis (DVT)   Meds reviewed with patient/caregiver? Yes   Is the patient having any side effects they believe may be caused by any medication additions or changes? No   Does the patient have all medications ordered at discharge? Yes   Is the patient taking all medications as directed (includes completed medication regime)? Yes   Does the patient have an appointment with their PCP within 7 days of discharge? No appointments available   Nursing Interventions Routed TCM call to PCP office   Has home health visited the patient within 72 hours of discharge? N/A   Psychosocial issues? No   Did the patient receive a copy of their discharge instructions? Yes   Nursing interventions Reviewed instructions with patient   What is the patient's perception of their health status since discharge? Improving   Is the patient/caregiver able to teach back signs and symptoms related to disease process for when to call PCP? Yes   Is the patient/caregiver able to teach back signs and symptoms related to disease process for when to call 911? Yes   Is the patient/caregiver able to teach back the hierarchy of who to call/visit for symptoms/problems? PCP, Specialist, Home health nurse, Urgent Care, ED, 911 Yes   If the patient is a current smoker, are they able to teach back resources for cessation? Not a smoker   TCM call completed? Yes   Wrap up additional comments D/C DX:Acute deep vein thrombosis (DVT) - Pt doing ok, concerned about cost of new rx Eliquis. Pt states he was not offered starter elvia or any coupons. Pt has call into PCP Dr Amin to discuss. Allopuinol in place. PCP has no available times I could access to  sched TCM APPT.   Call end time 6217          Nahomy Perez MA    5/23/2023, 16:27 EDT

## 2023-05-24 ENCOUNTER — TELEPHONE (OUTPATIENT)
Dept: FAMILY MEDICINE CLINIC | Facility: CLINIC | Age: 73
End: 2023-05-24
Payer: MEDICARE

## 2023-05-24 LAB
DX PRELIMINARY: NORMAL
LAB AP CASE REPORT: NORMAL
LAB AP CLINICAL INFORMATION: NORMAL
LAB AP DIAGNOSIS COMMENT: NORMAL
LAB AP SPECIAL STAINS: NORMAL
PATH REPORT.GROSS SPEC: NORMAL

## 2023-05-24 NOTE — TELEPHONE ENCOUNTER
UNABLE TO LEAVE VOICEMAIL DUE TO MAILBOX BEING FULL    OK FOR HUB AND  CAN READ MESSSAGE    PLEASE LET PATIENT KNOW HE CAN PICKUP ELIQUIS  SAMPLES AT THE

## 2023-05-30 ENCOUNTER — NURSE TRIAGE (OUTPATIENT)
Dept: CALL CENTER | Facility: HOSPITAL | Age: 73
End: 2023-05-30

## 2023-05-30 NOTE — TELEPHONE ENCOUNTER
Patient calling for results of bone marrow studies.  Explained that when the results are available he would be called by a provider, voiced understanding.

## 2023-05-30 NOTE — TELEPHONE ENCOUNTER
"Reason for Disposition   Caller requesting lab results  (Exception: Routine or non-urgent lab result.)    Additional Information   Negative: Lab calling with strep throat test results and triager can call in prescription   Negative: Lab calling with urinalysis test results and triager can call in prescription   Negative: Medication questions   Negative: Medication renewal and refill questions   Negative: Pre-operative or pre-procedural questions   Negative: ED call to PCP (i.e., primary care provider; doctor, NP, or PA)   Negative: Doctor (or NP/PA) call to PCP   Negative: Call about patient who is currently hospitalized   Negative: Lab or radiology calling with CRITICAL test results   Negative: [1] Follow-up call from patient regarding patient's clinical status AND [2] information urgent   Negative: [1] Caller requests to speak ONLY to PCP AND [2] URGENT question   Negative: [1] Caller requests to speak to PCP now AND [2] won't tell us reason for call  (Exception: If 10 pm to 6 am, caller must first discuss reason for the call.)   Negative: Notification of hospital admission   Negative: Notification of death   Negative: Lab or radiology calling with test results   Negative: [1] Follow-up call from patient regarding patient's clinical status AND [2] information NON-URGENT   Negative: [1] Caller requests to speak ONLY to PCP AND [2] NON-URGENT question   Negative: Caller requesting an appointment, triage offered and declined    Answer Assessment - Initial Assessment Questions  1. REASON FOR CALL or QUESTION: \"What is your reason for calling today?\" or \"How can I best  help you?\" or \"What question do you have that I can help answer?\"      I was wondering if my bone marrow results were back.  2. CALLER: Document the source of call. (e.g., laboratory, patient).      patient    Protocols used: PCP Call - No Triage-ADULT-AH    "

## 2023-06-01 ENCOUNTER — READMISSION MANAGEMENT (OUTPATIENT)
Dept: CALL CENTER | Facility: HOSPITAL | Age: 73
End: 2023-06-01

## 2023-06-01 NOTE — OUTREACH NOTE
Medical Week 2 Survey    Flowsheet Row Responses   [facility] patient discharged from? [place]   Does the patient have one of the following disease processes/diagnoses(primary or secondary)? Other   Week 2 attempt successful? Yes   Call start time 1135   Discharge diagnosis Acute deep vein thrombosis (DVT)   Call end time 1139   Person spoke with today (if not patient) and relationship patient and wife   Meds reviewed with patient/caregiver? Yes   Is the patient having any side effects they believe may be caused by any medication additions or changes? No   Does the patient have all medications ordered at discharge? Yes   Is the patient taking all medications as directed (includes completed medication regime)? Yes   Does the patient have a primary care provider?  Yes   Does the patient have an appointment with their PCP within 7 days of discharge? Yes   Has the patient kept scheduled appointments due by today? Yes   Has home health visited the patient within 72 hours of discharge? N/A   Psychosocial issues? No   Did the patient receive a copy of their discharge instructions? Yes   Nursing interventions Reviewed instructions with patient   What is the patient's perception of their health status since discharge? Same  [Patient is having edema to right leg and foot. Still with SOB. Will make followup with PCP to see. He is also waiting on PET SCAN. ]   Is the patient/caregiver able to teach back signs and symptoms related to disease process for when to call PCP? Yes   Is the patient/caregiver able to teach back signs and symptoms related to disease process for when to call 911? Yes   Is the patient/caregiver able to teach back the hierarchy of who to call/visit for symptoms/problems? PCP, Specialist, Home health nurse, Urgent Care, ED, 911 Yes   If the patient is a current smoker, are they able to teach back resources for cessation? Not a smoker   Week 2 Call Completed? Yes   Is the patient interested in  additional calls from an ambulatory ?  NOTE:  applies to high risk patients requiring additional follow-up. No          Roque MAX - Registered Nurse

## 2023-06-09 ENCOUNTER — READMISSION MANAGEMENT (OUTPATIENT)
Dept: CALL CENTER | Facility: HOSPITAL | Age: 73
End: 2023-06-09
Payer: MEDICARE

## 2023-06-09 NOTE — OUTREACH NOTE
Medical Week 3 Survey      Flowsheet Row Responses   [facility] patient discharged from? [place]   Does the patient have one of the following disease processes/diagnoses(primary or secondary)? Other   Week 3 attempt successful? Yes   Call start time 1357   Call end time 1359   Discharge diagnosis Acute deep vein thrombosis (DVT)   Meds reviewed with patient/caregiver? Yes   Is the patient taking all medications as directed (includes completed medication regime)? Yes   Does the patient have a primary care provider?  Yes   Does the patient have an appointment with their PCP within 7 days of discharge? Yes   Has the patient kept scheduled appointments due by today? N/A   Comments 6/20/23   Has home health visited the patient within 72 hours of discharge? N/A   Psychosocial issues? No   Did the patient receive a copy of their discharge instructions? Yes   Nursing interventions Reviewed instructions with patient   What is the patient's perception of their health status since discharge? Improving   Is the patient/caregiver able to teach back signs and symptoms related to disease process for when to call PCP? Yes   Is the patient/caregiver able to teach back signs and symptoms related to disease process for when to call 911? Yes   Is the patient/caregiver able to teach back the hierarchy of who to call/visit for symptoms/problems? PCP, Specialist, Home health nurse, Urgent Care, ED, 911 Yes   Week 3 Call Completed? Yes   Graduated Yes   Graduated/Revoked comments Pt reports he is improving. pt has all meds and taking as ordered            RYLAN PENALOZA - Registered Nurse

## 2023-06-15 LAB
DX PRELIMINARY: NORMAL
LAB AP CASE REPORT: NORMAL
LAB AP CLINICAL INFORMATION: NORMAL
LAB AP DIAGNOSIS COMMENT: NORMAL
LAB AP SPECIAL STAINS: NORMAL
PATH REPORT.FINAL DX SPEC: NORMAL
PATH REPORT.GROSS SPEC: NORMAL

## 2023-06-20 PROBLEM — J01.00 ACUTE NON-RECURRENT MAXILLARY SINUSITIS: Status: RESOLVED | Noted: 2021-01-18 | Resolved: 2023-01-01

## 2023-10-16 NOTE — PROGRESS NOTES
[facility] Pav[facility] Care Team Pool3 days ago     THAD Batres, the PA for this medication was denied. Please read encounter for more details. If the provider would like to appeal, please provide a letter of medical necessity and route back to the PA team. Otherwise, please adivse patient on next steps and you may close the encounter.    Thank you!      Name: Angel Cisneros ADMIT: 2023   : 1950  PCP: Ayaan Amin MD    MRN: [id number] LOS: 0 days   AGE/SEX: 73 y.o. male  ROOM:  [place] [address]/1     CC: Right leg swelling  Interval History: Patient with no new problems overnight.  Subjective   Subjective     Review of Systems  Objective   Objective     Vitals:   Temp:  [97.9 °F (36.6 °C)-98.3 °F (36.8 °C)] 98.2 °F (36.8 °C)  Heart Rate:  [66-72] 68  Resp:  [18] 18  BP: (122-134)/(75-83) 134/75    No intake/output data recorded.    Scheduled Meds:     allopurinol, 200 mg, Oral, Daily  amLODIPine, 5 mg, Oral, Daily  calcium carbonate, 1 tablet, Oral, Daily  famotidine, 20 mg, Oral, Daily  finasteride, 5 mg, Oral, Daily  metoprolol tartrate, 12.5 mg, Oral, Q12H  senna-docusate sodium, 2 tablet, Oral, BID  sodium chloride, 1 drop, Both Eyes, Q4H  tamsulosin, 0.4 mg, Oral, Daily      IV Meds:   heparin, 18 Units/kg/hr, Last Rate: 17 Units/kg/hr (23 0236)        Physical Exam  Vascular: Edema seems reasonably well controlled with compression.  Almost no swelling of the thigh    Data Reviewed:  CBC    Results from last 7 days   Lab Units 23  0157 23  0757 23  0420 23  1230   WBC 10*3/mm3 118.32* 114.27* 94.36* 96.99*   HEMOGLOBIN g/dL 8.8* 9.6* 8.8* 9.8*   PLATELETS 10*3/mm3 189 180 153 149     BMP   Results from last 7 days   Lab Units 23  01523  0757 23  0420 23  1230   SODIUM mmol/L 138 142 143 141   POTASSIUM mmol/L 4.3 4.1 3.9 4.1   CHLORIDE mmol/L 102 107 109* 107   CO2 mmol/L 25.5 25.0 23.0 22.0   BUN mg/dL 35* 33* 33* 35*   CREATININE mg/dL 2.52* 2.46* 2.42* 2.73*   GLUCOSE mg/dL 123* 103* 104* 165*     Coag   Results from last 7 days   Lab Units 23  0157 23  1711 23  0757 23  2356 23  1601 23  1230   INR   --   --   --   --  0.98 1.02   APTT seconds 99.6* 65.3* 119.6*  114.9* 63.0* 29.5 29.2       Most notable findings include: PTT  99.6    Active Hospital Problems:   Active Hospital Problems    Diagnosis  POA   • **Acute deep vein thrombosis (DVT) of iliac vein of right lower extremity [I82.421]  Yes   • HADLEY (acute kidney injury) [N17.9]  Unknown   • Lymphadenopathy, abdominal [R59.0]  Yes   • Paroxysmal atrial fibrillation (HCC) [I48.0]  Yes   • CLL (chronic lymphocytic leukemia) (HCC) [C91.10]  Yes   • GERD (gastroesophageal reflux disease) [K21.9]  Yes   • Leukemia [C95.90]  Yes      Resolved Hospital Problems   No resolved problems to display.      Assessment & Plan   [facility], Subsequent Hospital Care  Assessment / Plan     Right lower extremity DVT: This is in the setting of CLL.  Despite presence of what looks like external iliac clot, he does not have much swelling in the thigh.  The indication for mechanical thrombectomy would really be the only reduction in long-term post thrombotic syndrome.  Given patient's age and comorbidities as well as the lack of severe swelling of the thigh I do not think that the risks justify the benefits.  Plan compression and anticoagulation only.  AC plans already outlined by Dr. Sanchez.  We will see again upon request.    Humberto Licea MD  23  07:04 EDT  Office Number [phone]

## (undated) DEVICE — TUBING, SUCTION, 1/4" X 10', STRAIGHT: Brand: MEDLINE

## (undated) DEVICE — CANN NASL CO2 TRULINK W/O2 A/

## (undated) DEVICE — BITEBLOCK OMNI BLOC

## (undated) DEVICE — Device: Brand: DEFENDO AIR/WATER/SUCTION AND BIOPSY VALVE

## (undated) DEVICE — FRCP BX RADJAW4 NDL 2.8 240CM LG OG BX40